# Patient Record
Sex: MALE | Race: WHITE | NOT HISPANIC OR LATINO | Employment: OTHER | URBAN - METROPOLITAN AREA
[De-identification: names, ages, dates, MRNs, and addresses within clinical notes are randomized per-mention and may not be internally consistent; named-entity substitution may affect disease eponyms.]

---

## 2017-01-13 RX ORDER — OXYBUTYNIN CHLORIDE 5 MG/1
5 TABLET, EXTENDED RELEASE ORAL DAILY
COMMUNITY
End: 2018-04-27 | Stop reason: SDUPTHER

## 2017-01-13 RX ORDER — FINASTERIDE 5 MG/1
5 TABLET, FILM COATED ORAL DAILY
COMMUNITY
End: 2018-04-27 | Stop reason: SDUPTHER

## 2017-01-13 RX ORDER — SILODOSIN 8 MG/1
8 CAPSULE ORAL DAILY
COMMUNITY
End: 2018-04-27 | Stop reason: SDUPTHER

## 2017-01-13 RX ORDER — IBUPROFEN 400 MG/1
400 TABLET ORAL DAILY
Status: ON HOLD | COMMUNITY
End: 2017-01-16

## 2017-01-13 RX ORDER — MONTELUKAST SODIUM 10 MG/1
10 TABLET ORAL
Status: ON HOLD | COMMUNITY
End: 2017-01-16

## 2017-01-13 RX ORDER — LISINOPRIL 10 MG/1
10 TABLET ORAL DAILY
COMMUNITY
End: 2018-05-03 | Stop reason: SDUPTHER

## 2017-01-15 ENCOUNTER — ANESTHESIA EVENT (OUTPATIENT)
Dept: GASTROENTEROLOGY | Facility: HOSPITAL | Age: 82
End: 2017-01-15
Payer: COMMERCIAL

## 2017-01-16 ENCOUNTER — HOSPITAL ENCOUNTER (OUTPATIENT)
Facility: HOSPITAL | Age: 82
Setting detail: OUTPATIENT SURGERY
Discharge: HOME | End: 2017-01-16
Attending: SURGERY | Admitting: SURGERY
Payer: COMMERCIAL

## 2017-01-16 ENCOUNTER — ANESTHESIA (OUTPATIENT)
Dept: GASTROENTEROLOGY | Facility: HOSPITAL | Age: 82
End: 2017-01-16
Payer: COMMERCIAL

## 2017-01-16 VITALS
HEART RATE: 51 BPM | TEMPERATURE: 97.5 F | HEIGHT: 72 IN | RESPIRATION RATE: 16 BRPM | OXYGEN SATURATION: 94 % | DIASTOLIC BLOOD PRESSURE: 93 MMHG | WEIGHT: 205 LBS | SYSTOLIC BLOOD PRESSURE: 134 MMHG | BODY MASS INDEX: 27.77 KG/M2

## 2017-01-16 DIAGNOSIS — K62.5 HEMORRHAGE OF ANUS AND RECTUM: ICD-10-CM

## 2017-01-16 PROCEDURE — 88312 SPECIAL STAINS GROUP 1: CPT | Performed by: SURGERY

## 2017-01-16 PROCEDURE — 88305 TISSUE EXAM BY PATHOLOGIST: CPT | Performed by: SURGERY

## 2017-01-16 RX ORDER — PROPOFOL 10 MG/ML
INJECTION, EMULSION INTRAVENOUS AS NEEDED
Status: DISCONTINUED | OUTPATIENT
Start: 2017-01-16 | End: 2017-01-16 | Stop reason: SURG

## 2017-01-16 RX ORDER — SODIUM CHLORIDE 9 MG/ML
100 INJECTION, SOLUTION INTRAVENOUS CONTINUOUS
Status: DISCONTINUED | OUTPATIENT
Start: 2017-01-16 | End: 2017-01-16 | Stop reason: HOSPADM

## 2017-01-16 RX ORDER — KETAMINE HYDROCHLORIDE 100 MG/ML
INJECTION, SOLUTION INTRAMUSCULAR; INTRAVENOUS AS NEEDED
Status: DISCONTINUED | OUTPATIENT
Start: 2017-01-16 | End: 2017-01-16 | Stop reason: SURG

## 2017-01-16 RX ORDER — PROPOFOL 10 MG/ML
INJECTION, EMULSION INTRAVENOUS CONTINUOUS PRN
Status: DISCONTINUED | OUTPATIENT
Start: 2017-01-16 | End: 2017-01-16 | Stop reason: SURG

## 2017-01-16 RX ADMIN — PROPOFOL 175 MCG/KG/MIN: 10 INJECTION, EMULSION INTRAVENOUS at 09:17

## 2017-01-16 RX ADMIN — SODIUM CHLORIDE 100 ML/HR: 0.9 INJECTION, SOLUTION INTRAVENOUS at 08:58

## 2017-01-16 RX ADMIN — PROPOFOL 75 MG: 10 INJECTION, EMULSION INTRAVENOUS at 09:17

## 2017-01-16 RX ADMIN — KETAMINE HYDROCHLORIDE 20 MG: 100 INJECTION INTRAMUSCULAR; INTRAVENOUS at 09:17

## 2017-01-16 RX ADMIN — LIDOCAINE HYDROCHLORIDE 75 MG: 20 INJECTION, SOLUTION INTRAVENOUS at 09:17

## 2017-02-24 ENCOUNTER — ALLSCRIPTS OFFICE VISIT (OUTPATIENT)
Dept: OTHER | Facility: OTHER | Age: 82
End: 2017-02-24

## 2017-03-09 ENCOUNTER — LAB CONVERSION - ENCOUNTER (OUTPATIENT)
Dept: OTHER | Facility: OTHER | Age: 82
End: 2017-03-09

## 2017-03-09 LAB
A/G RATIO (HISTORICAL): 1.7 (CALC) (ref 1–2.5)
ALBUMIN SERPL BCP-MCNC: 3.9 G/DL (ref 3.6–5.1)
ALP SERPL-CCNC: 49 U/L (ref 40–115)
ALT SERPL W P-5'-P-CCNC: 17 U/L (ref 9–46)
AST SERPL W P-5'-P-CCNC: 14 U/L (ref 10–35)
BILIRUB SERPL-MCNC: 0.6 MG/DL (ref 0.2–1.2)
BUN SERPL-MCNC: 21 MG/DL (ref 7–25)
BUN/CREA RATIO (HISTORICAL): 17 (CALC) (ref 6–22)
CALCIUM SERPL-MCNC: 9.6 MG/DL (ref 8.6–10.3)
CHLORIDE SERPL-SCNC: 102 MMOL/L (ref 98–110)
CHOLEST SERPL-MCNC: 167 MG/DL (ref 125–200)
CHOLEST/HDLC SERPL: 4.2 (CALC)
CO2 SERPL-SCNC: 28 MMOL/L (ref 20–31)
CREAT SERPL-MCNC: 1.23 MG/DL (ref 0.7–1.11)
CREATININE, RANDOM URINE (HISTORICAL): 188 MG/DL (ref 20–370)
EGFR AFRICAN AMERICAN (HISTORICAL): 63 ML/MIN/1.73M2
EGFR-AMERICAN CALC (HISTORICAL): 55 ML/MIN/1.73M2
GAMMA GLOBULIN (HISTORICAL): 2.3 G/DL (CALC) (ref 1.9–3.7)
GLUCOSE (HISTORICAL): 128 MG/DL (ref 65–99)
HBA1C MFR BLD HPLC: 6.2 % OF TOTAL HGB
HDLC SERPL-MCNC: 40 MG/DL
LDL CHOLESTEROL (HISTORICAL): 93 MG/DL (CALC)
MAGNESIUM, UR (HISTORICAL): 0.7 MG/DL
MICROALBUMIN/CREATININE RATIO (HISTORICAL): 4 MCG/MG CREAT
NON-HDL-CHOL (CHOL-HDL) (HISTORICAL): 127 MG/DL (CALC)
POTASSIUM SERPL-SCNC: 4.6 MMOL/L (ref 3.5–5.3)
SODIUM SERPL-SCNC: 136 MMOL/L (ref 135–146)
TOTAL PROTEIN (HISTORICAL): 6.2 G/DL (ref 6.1–8.1)
TRIGL SERPL-MCNC: 171 MG/DL

## 2017-04-27 ENCOUNTER — ALLSCRIPTS OFFICE VISIT (OUTPATIENT)
Dept: OTHER | Facility: OTHER | Age: 82
End: 2017-04-27

## 2017-07-12 ENCOUNTER — ALLSCRIPTS OFFICE VISIT (OUTPATIENT)
Dept: OTHER | Facility: OTHER | Age: 82
End: 2017-07-12

## 2017-07-12 DIAGNOSIS — E11.9 TYPE 2 DIABETES MELLITUS WITHOUT COMPLICATIONS (HCC): ICD-10-CM

## 2017-07-14 ENCOUNTER — LAB CONVERSION - ENCOUNTER (OUTPATIENT)
Dept: OTHER | Facility: OTHER | Age: 82
End: 2017-07-14

## 2017-07-14 LAB
BUN SERPL-MCNC: 24 MG/DL (ref 7–25)
BUN/CREA RATIO (HISTORICAL): 18 (CALC) (ref 6–22)
CALCIUM SERPL-MCNC: 9.8 MG/DL (ref 8.6–10.3)
CHLORIDE SERPL-SCNC: 105 MMOL/L (ref 98–110)
CO2 SERPL-SCNC: 27 MMOL/L (ref 20–31)
CREAT SERPL-MCNC: 1.37 MG/DL (ref 0.7–1.11)
EGFR AFRICAN AMERICAN (HISTORICAL): 56 ML/MIN/1.73M2
EGFR-AMERICAN CALC (HISTORICAL): 48 ML/MIN/1.73M2
GLUCOSE (HISTORICAL): 130 MG/DL (ref 65–99)
HBA1C MFR BLD HPLC: 6.1 % OF TOTAL HGB
POTASSIUM SERPL-SCNC: 4.9 MMOL/L (ref 3.5–5.3)
SODIUM SERPL-SCNC: 139 MMOL/L (ref 135–146)

## 2017-10-23 ENCOUNTER — ALLSCRIPTS OFFICE VISIT (OUTPATIENT)
Dept: OTHER | Facility: OTHER | Age: 82
End: 2017-10-23

## 2017-10-24 NOTE — PROGRESS NOTES
Assessment  1  Diabetes mellitus, type II (250 00) (E11 9)   2  Hypertension (401 9) (I10)   3  Spinal stenosis (724 00) (M48 00)   4  Encounter for preventive health examination (V70 0) (Z00 00)   5  Need for influenza vaccination (V04 81) (Z23)    Plan  Depression screen    · *VB - PHQ-9 Tool; Status:Complete;   Done: 10OVP2117 12:00AM  Diabetes mellitus, type II    · (1) COMPREHENSIVE METABOLIC PANEL; Status:Active; Requested KLK:05WAX8467;    · (1) HEMOGLOBIN A1C; Status:Active; Requested TAT:01WTF8597;    · (1) HEMOGLOBIN A1C; Status:Canceled;    · (1) LIPID PANEL, FASTING; Status:Active; Requested NYX:22VRY0037;   Encounter for prostate cancer screening    · (1) PSA (SCREEN) (Dx V76 44 Screen for Prostate Cancer); Status:Active; Requested  IWY:06NMU0808; Health Maintenance    · *VB - Fall Risk Assessment  (Dx Z13 89 Screen for Neurologic Disorder); Status:Complete;   Done:  85TGX8493 12:00AM   · *VB - Urinary Incontinence Screen (Dx Z13 89 Screen for UI); Status:Complete;   Done: 57LOE8724  10:34AM  Health Maintenance, Enlarged prostate with lower urinary tract symptoms (LUTS)    · *VB - Urinary Incontinence Screen (Dx Z13 89 Screen for UI); Status:Canceled;    Need for influenza vaccination    · Fluzone High-Dose 0 5 ML Intramuscular Suspension Prefilled Syringe  Spinal stenosis    · Avoid being constipated and avoid straining while having a bowel movement ; Status:Complete;    Done: 13BSR4506 11:30AM   · Begin a walking program  Start with walks lasting 10 minutes and slowly work up to 30-40 minutes  as pain allows ; Status:Complete;   Done: 39FLR0196 11:30AM   · We recommend that you avoid straining your back while lifting ; Status:Complete;   Done: 36YTU9591  11:30AM    Chief Complaint  Chief Complaint Free Text Note Form: Pt here for check up      History of Present Illness  HPI: only c/o mild intermittent neck and back pain   Spinal Stenosis: The patient is being seen for a routine clinic follow-up of spinal stenosis  Symptoms:  neck pain-- and-- back pain, but-- no lower extremity pain,-- no sensory changes,-- no localized weakness,-- no urinary retention,-- no urinary incontinence,-- no bowel incontinence-- and-- no gait disturbance  Current treatment includes acetaminophen and Tramado occ  By report, there is good compliance with treatment, good tolerance of treatment and good symptom control  The patient is currently able to do activities of daily living without limitations, unable to work and able to do housework without limitations  Hypertension (Follow-Up): The patient presents for follow-up of essential hypertension  The patient states he has been doing well with his blood pressure control since the last visit  He has no comorbid illnesses  He has no significant interval events  Symptoms: The patient is currently asymptomatic  Home monitoring: The patient checks his blood pressure sporadically  Blood pressure control has been good  Lifestyle: Diet: He consumes a diverse and healthy diet  Weight Issues: He does not have any weight concerns  Exercise: He does not exercise regularly  Smoking: He does not use tobacco Alcohol: He denies alcohol use  Drug Use: He denies drug use  Medications: Medication(s): an ACE inhibitor  Disease Management: the patient is doing well with his blood pressure goals  The patient is due for a lipid panel-- and-- a serum creatinine  Diabetes Type II (Follow-Up): The patient states he has been doing well with his Type II Diabetes control since the last visit  Comorbid Illnesses: hypertension  Complications: no peripheral neuropathy,-- no nephropathy,-- no retinopathy-- and-- no coronary artery disease  He has no significant interval events  Symptoms: The patient is currently asymptomatic  Associated symptoms include no recent weight gain-- and-- no recent weight loss  Home monitoring: The patient checks his blood sugar sporadically  -- Glycemic control has been good -- the patient reports no symptomatic hypoglycemic episodes  Medications: the patient is adherent with his medication regimen  -- He denies medication side effects  Medication(s): Metformin HCl,-- ACE inhibitors-- and-- Aspirin  The patient is doing well with his diabetes goals  Due For: a lipid panel,-- a urine microalbumin-- and-- a hemoglobin A1c  Review of Systems  Complete-Male:   Constitutional: as noted in HPI  Eyes: No complaints of eye pain, no red eyes, no discharge from eyes, no itchy eyes  Respiratory: No complaints of shortness of breath, no wheezing, no cough, no SOB on exertion, no orthopnea or PND  Genitourinary: No complaints of dysuria, no incontinence, no hesitancy, no nocturia, no genital lesion, no testicular pain  Musculoskeletal: as noted in HPI  Integumentary: No complaints of skin rash or skin lesions, no itching, no skin wound, no dry skin  Neurological: No compliants of headache, no confusion, no convulsions, no numbness or tingling, no dizziness or fainting, no limb weakness, no difficulty walking  Psychiatric: Is not suicidal, no sleep disturbances, no anxiety or depression, no change in personality, no emotional problems  Endocrine: as noted in HPI  Active Problems  1  Chronic low back pain (724 2,338 29) (M54 5,G89 29)   2  Colon cancer screening (V76 51) (Z12 11)   3  Depression screen (V79 0) (Z13 89)   4  Diabetes mellitus, type II (250 00) (E11 9)   5  Enlarged prostate with lower urinary tract symptoms (LUTS) (600 01) (N40 1)   6  Hearing loss, unspecified laterality   7  Hypertension (401 9) (I10)   8  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)   9  Seasonal allergies (477 9) (J30 2)   10  Spinal stenosis (724 00) (M48 00)    Past Medical History  1  History of Fecal occult blood test positive (792 1) (R19 5)   2  History of balanitis (V13 89) (Z87 438)   3   History of phimosis (V13 89) (W57 869)  Active Problems And Past Medical History Reviewed: The active problems and past medical history were reviewed and updated today  Surgical History  1  History of Elective Circumcision   2  History of Hernia Repair   3  History of Laminectomy Lumbar    Family History  Mother    1  Family history of Mother  At Age 80  Father    2  Family history of Father  At Age 61    Social History   · Former smoker (W04 18) (J68 138)  Social History Reviewed: The social history was reviewed and updated today  The social history was reviewed and is unchanged  Current Meds   1  Saleem Contour Test In [employer]; USE 1 STRIP DAILY; Therapy: 79HSG3739 to (Last EN:40GJZ1461)  Requested for: 2017 Ordered   2  Control Test STRP; USE 1 STRIP DAILY; Therapy: 40OJE5293 to (Evaluate:2017)  Requested for: 50DSX3626; Last Rx:2017   Ordered   3  Easy Touch Lancets 33G/Twist Miscellaneous; use as directed; Therapy: 08EHX7626 to (Last Rx:2017)  Requested for: 2017 Ordered   4  Finasteride 5 MG Oral Tablet; TAKE 1 TABLET AT BEDTIME; Therapy: 07PDI3004 to (0731 40 44 23)  Requested for: 2017; Last Rx:2017   Ordered   5  Ibuprofen 400 MG Oral Tablet; daily; Therapy: 14IIP3489 to Recorded   6  Lancets 30G Miscellaneous; USE AS DIRECTED; Therapy: 67NED0406 to (Evaluate:2016)  Requested for: 71CVE6471; Last Rx:2015   Ordered   7  Lisinopril 10 MG Oral Tablet; TAKE 1 TABLET DAILY; Therapy: 01SKS5094 to (Poly Caldera)  Requested for: 72Ush6941; Last Rx:68Mco2471   Ordered   8  MetFORMIN HCl - 500 MG Oral Tablet; TAKE 1 TABLET TWICE DAILY WITH FOOD; Therapy: 91QBI9379 to (Quentin Yeboah)  Requested for: 00Jwq6123; Last Rx:28Lvi9710   Ordered   9  Oxybutynin Chloride ER 5 MG Oral Tablet Extended Release 24 Hour; take 1 tablet by mouth daily; Therapy: 42BKY8887 to (Tom Wright)  Requested for: 2017 Ordered   10  Rapaflo 8 MG Oral Capsule; TAKE 1 CAPSULE DAILY WITH FOOD;     Therapy: 2014 to (Hugh Khan)  Requested for: 27Apr2017; Last Rx:03Xxd1907    Ordered   11  TraMADol HCl - 50 MG Oral Tablet; TAKE 1 TO 2 TABLETS 4 TIMES DAILY AS NEEDED FOR PAIN;    Therapy: 82PMR3780 to (Evaluate:06Ssa2331); Last Rx:32Wxp7406 Ordered   12  TrueTrack Test In Vitro Strip; TEST ONCE DAILY; Therapy: 10RUQ9334 to (Evaluate:98Lhk5371)  Requested for: 06Uum3359; Last HN:09PRG2396    Ordered  Medication List Reviewed: The medication list was reviewed and updated today  Allergies  1  No Known Drug Allergies    Vitals  Vital Signs    Recorded: 47BHI2308 09:01AM   Temperature 97 6 F, Tympanic   Heart Rate 76   Systolic 186, LUE, Sitting   Diastolic 60, LUE, Sitting   Height 5 ft 8 5 in   Weight 211 lb 2 oz   BMI Calculated 31 63   BSA Calculated 2 1     Physical Exam    Constitutional   General appearance: No acute distress, well appearing and well nourished  Eyes   Conjunctiva and lids: No swelling, erythema, or discharge  Pupils and irises: Equal, round and reactive to light  Pulmonary   Respiratory effort: No increased work of breathing or signs of respiratory distress  Auscultation of lungs: Clear to auscultation, equal breath sounds bilaterally, no wheezes, no rales, no rhonci  Cardiovascular   Auscultation of heart: Normal rate and rhythm, normal S1 and S2, without murmurs  Examination of extremities for edema and/or varicosities: Normal     Carotid pulses: Normal     Abdomen   Abdomen: Non-tender, no masses  Liver and spleen: No hepatomegaly or splenomegaly  Lymphatic   Palpation of lymph nodes in neck: No lymphadenopathy  Musculoskeletal   Gait and station: Normal     Skin   Skin and subcutaneous tissue: Normal without rashes or lesions  Neurologic   Cranial nerves: Cranial nerves 2-12 intact      Psychiatric   Orientation to person, place and time: Normal     Mood and affect: Normal          Results/Data  *VB - Urinary Incontinence Screen (Dx Z13 89 Screen for UI) 92TZD3772 10:34AM Shayla Battles     Test Name Result Flag Reference   Urinary Incontinence Assessment 55TID0437       *VB - Fall Risk Assessment  (Dx Z13 89 Screen for Neurologic Disorder) 12ECX5287 12:00AM Shayla Battles     Test Name Result Flag Reference   Falls Risk      1 fall without injury in the past year     *VB - PHQ-9 Tool 26RZI6426 12:00AM Shayla [employer]     Test Name Result Flag Reference   PHQ-9 Adult Depression Score 3       *VB - Foot Exam 13FOA9305 12:00AM Shayla [employer]     Test Name Result Flag Reference   FOOT EXAM 87WMV8746         Health Management  Diabetes mellitus, type II   *VB - Eye Exam; every 1 year; Last 78UIZ9258; Next Due: 23RTR9608; Near Due  Health Maintenance   Medicare Annual Wellness Visit; every 1 year; Next Due: 12XQZ7135;  Overdue    Future Appointments    Date/Time Provider Specialty Site   04/27/2018 10:30 AM Kaushik Freitas, [address]  Urology [facility] [place]     Signatures   Electronically signed by : MINDA Salazar ; Oct 23 2017 11:30AM EST                       (Author)

## 2017-11-27 ENCOUNTER — GENERIC CONVERSION - ENCOUNTER (OUTPATIENT)
Dept: OTHER | Facility: OTHER | Age: 82
End: 2017-11-27

## 2017-11-27 ENCOUNTER — APPOINTMENT (OUTPATIENT)
Dept: AUDIOLOGY | Age: 82
End: 2017-11-27
Payer: COMMERCIAL

## 2017-11-27 PROCEDURE — 92557 COMPREHENSIVE HEARING TEST: CPT | Performed by: AUDIOLOGIST

## 2017-11-27 PROCEDURE — 92567 TYMPANOMETRY: CPT | Performed by: AUDIOLOGIST

## 2017-11-29 ENCOUNTER — LAB CONVERSION - ENCOUNTER (OUTPATIENT)
Dept: OTHER | Facility: OTHER | Age: 82
End: 2017-11-29

## 2017-11-29 LAB
A/G RATIO (HISTORICAL): 1.7 (CALC) (ref 1–2.5)
ALBUMIN SERPL BCP-MCNC: 4 G/DL (ref 3.6–5.1)
ALP SERPL-CCNC: 53 U/L (ref 40–115)
ALT SERPL W P-5'-P-CCNC: 12 U/L (ref 9–46)
AST SERPL W P-5'-P-CCNC: 11 U/L (ref 10–35)
BILIRUB SERPL-MCNC: 0.6 MG/DL (ref 0.2–1.2)
BUN SERPL-MCNC: 25 MG/DL (ref 7–25)
BUN/CREA RATIO (HISTORICAL): 18 (CALC) (ref 6–22)
CALCIUM SERPL-MCNC: 9.7 MG/DL (ref 8.6–10.3)
CHLORIDE SERPL-SCNC: 103 MMOL/L (ref 98–110)
CHOLEST SERPL-MCNC: 169 MG/DL
CHOLEST/HDLC SERPL: 4.3 (CALC)
CO2 SERPL-SCNC: 24 MMOL/L (ref 20–31)
CREAT SERPL-MCNC: 1.37 MG/DL (ref 0.7–1.11)
EGFR AFRICAN AMERICAN (HISTORICAL): 56 ML/MIN/1.73M2
EGFR-AMERICAN CALC (HISTORICAL): 48 ML/MIN/1.73M2
GAMMA GLOBULIN (HISTORICAL): 2.4 G/DL (CALC) (ref 1.9–3.7)
GLUCOSE (HISTORICAL): 134 MG/DL (ref 65–99)
HBA1C MFR BLD HPLC: 6.1 % OF TOTAL HGB
HDLC SERPL-MCNC: 39 MG/DL
LDL CHOLESTEROL (HISTORICAL): 104 MG/DL (CALC)
NON-HDL-CHOL (CHOL-HDL) (HISTORICAL): 130 MG/DL (CALC)
POTASSIUM SERPL-SCNC: 4.7 MMOL/L (ref 3.5–5.3)
PROSTATE SPECIFIC ANTIGEN TOTAL (HISTORICAL): 0.7 NG/ML
SODIUM SERPL-SCNC: 138 MMOL/L (ref 135–146)
TOTAL PROTEIN (HISTORICAL): 6.4 G/DL (ref 6.1–8.1)
TRIGL SERPL-MCNC: 147 MG/DL

## 2017-12-01 ENCOUNTER — GENERIC CONVERSION - ENCOUNTER (OUTPATIENT)
Dept: INTERNAL MEDICINE CLINIC | Age: 82
End: 2017-12-01

## 2017-12-01 ENCOUNTER — GENERIC CONVERSION - ENCOUNTER (OUTPATIENT)
Dept: OTHER | Facility: OTHER | Age: 82
End: 2017-12-01

## 2017-12-12 NOTE — PROGRESS NOTES
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)    Plan  Diabetes mellitus, type II    · MetFORMIN HCl - 500 MG Oral Tablet (Glucophage); TAKE 1 TABLET TWICE DAILY WITH  FOOD   · Begin a limited exercise program ; Status:Complete;   Done: 23Eds9071 11:02AM    Discussion/Summary  Counseling Documentation With Imm: The patient, patient's family was counseled regarding diagnostic results, instructions for management, risk factor reductions, prognosis, impressions, risks and benefits of treatment options  Medication SE Review and Pt Understands Tx: Possible side effects of new medications were reviewed with the patient/guardian today  The treatment plan was reviewed with the patient/guardian  The patient/guardian understands and agrees with the treatment plan      Chief Complaint  Chief Complaint Free Text Note Form: Pt here for check up      History of Present Illness  HPI: He feels ok   His wife tells me that they stopped his Celexa about 2 months ago without any change  Hypertension (Follow-Up): The patient presents for follow-up of essential hypertension  The patient states he has been doing well with his blood pressure control since the last visit  Comorbid Illnesses: coronary artery disease  He has no significant interval events  Symptoms: The patient is currently asymptomatic  Home monitoring: The patient checks his blood pressure sporadically  Blood pressure control has been good  Lifestyle: Diet: He consumes a diverse and healthy diet  Weight Issues: He does not have any weight concerns  Exercise: He does not exercise regularly  Smoking: He does not use tobacco Alcohol: He denies alcohol use  Drug Use: He denies drug use  Medications: the patient is adherent with his medication regimen  He denies medication side effects  Medication(s): an ACE inhibitor     Disease Management: the patient is doing well with his blood pressure goals  Diabetes Type II (Follow-Up): The patient states he has been doing well with his Type II Diabetes control since the last visit  Comorbid Illnesses: hypertension  Complications: coronary artery disease, but no peripheral neuropathy, no nephropathy and no retinopathy  He has no significant interval events  Symptoms: The patient is currently asymptomatic  Associated symptoms include no recent weight gain and no recent weight loss  Home monitoring: The patient checks his blood sugars regularly  Glycemic control has been good  the patient reports no symptomatic hypoglycemic episodes  Medications: the patient is adherent with his medication regimen  He denies medication side effects  Medication(s): Metformin HCl  The patient is doing well with his diabetes goals  Due For: a retinal exam and a foot exam    Depression (Follow-Up): The patient states his depression has improved since the last visit  They have had recurrent episodes of major depression  He describes this as mild  He has no comorbid illnesses  Interval Symptoms: resolved depression  Social Support: the patient has good social support  Medications: The patient is not currently on any medications for his depression  Review of Systems  Complete-Male:   Constitutional: as noted in HPI  Eyes: No complaints of eye pain, no red eyes, no discharge from eyes, no itchy eyes  Cardiovascular: as noted in HPI  Respiratory: as noted in HPI  Gastrointestinal: No complaints of abdominal pain, no constipation, no nausea or vomiting, no diarrhea or bloody stools  Genitourinary: BPH sees urology  Musculoskeletal: No complaints of arthralgia, no myalgias, no joint swelling or stiffness, no limb pain or swelling  Neurological: No compliants of headache, no confusion, no convulsions, no numbness or tingling, no dizziness or fainting, no limb weakness, no difficulty walking     Psychiatric: as noted in HPI  Endocrine: as noted in HPI  Active Problems    1  Back pain (724 5) (M54 9)   2  Benign prostatic hypertrophy with lower urinary tract symptoms (LUTS) (600 01) (N40 1)   3  Diabetes mellitus, type II (250 00) (E11 9)   4  Diarrhea (787 91) (R19 7)   5  Flu vaccine need (V04 81) (Z23)   6  Hypertension (401 9) (I10)   7  Lumbar radiculopathy (724 4) (M54 16)   8  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)   9  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)   10  Phimosis (605) (N47 1)   11  Sciatica of right side (724 3) (M54 31)   12  Seasonal allergies (477 9) (J30 2)   13  Spinal stenosis (724 00) (M48 00)   14  Symptoms involving urinary system (788 99) (R39 9)   15  Vertigo (780 4) (R42)   16  Vitamin D deficiency (268 9) (E55 9)    Past Medical History    1  History of Encounter for screening for cardiovascular disorders (V81 2) (Z13 6)   2  History of balanitis (V13 89) (Z87 438)   3  History of Pre-procedural examination (V72 84) (Z01 818)   4  History of Skin rash (782 1) (R21)  Active Problems And Past Medical History Reviewed: The active problems and past medical history were reviewed and updated today  Surgical History    1  History of Elective Circumcision   2  History of Hernia Repair   3  History of Laminectomy Lumbar    Family History  Mother    1  Family history of Mother  At Age 80  Father    2  Family history of Father  At Age 61    Social History    · Former smoker (T57 98) (S27 001)  Social History Reviewed: The social history was reviewed and updated today  The social history was reviewed and is unchanged  Current Meds   1  Control Test In Vitro Strip; USE 1 STRIP DAILY; Therapy: 48KEV8689 to (Grant Elham)  Requested for: 32ETG8544; Last Rx:2015   Ordered   2  Finasteride 5 MG Oral Tablet; TAKE 1 TABLET AT BEDTIME; Therapy: 85AZB7914 to (Evaluate:89Ake9946)  Requested for: 29Tcf4857; Last Rx:75Ehm9929   Ordered   3  Ibuprofen 400 MG Oral Tablet; daily; Therapy: 72UWW0140 to Recorded   4  Lancets 30G Miscellaneous; USE AS DIRECTED; Therapy: 69TWT7811 to (Evaluate:05Jun2016)  Requested for: 94PYR2274; Last Rx:45Hld7042   Ordered   5  Lisinopril 10 MG Oral Tablet; TAKE 1 TABLET DAILY; Therapy: 63MFC6185 to (Evaluate:51Tag0741)  Requested for: 49Gjg2708; Last Rx:51Xis9901   Ordered   6  MetFORMIN HCl - 500 MG Oral Tablet; TAKE 1 TABLET TWICE DAILY WITH FOOD; Therapy: 39XDV7809 to (Evaluate:73Wfq7753)  Requested for: 22WZL1839; Last Rx:91Zqn9255   Ordered   7  Montelukast Sodium 10 MG Oral Tablet; TAKE 1 TABLET DAILY; Therapy: 59PFS7587 to (Last Baby Garden)  Requested for: 01Ngm1873 Ordered   8  Oxybutynin Chloride ER 5 MG Oral Tablet Extended Release 24 Hour; take 1 tablet by mouth daily; Therapy: 30IZO8420 to (Last Rx:06Apr2016)  Requested for: 71Xib2645 Ordered   9  Rapaflo 8 MG Oral Capsule; TAKE 1 CAPSULE DAILY WITH FOOD; Therapy: 78QHX7501 to (Evaluate:06Apr2017)  Requested for: 67Czh3375; Last Rx:80Xct5758   Ordered   10  TrueTrack Test In Vitro Strip; TEST ONCE DAILY; Therapy: 74WSA6996 to (Evaluate:27Jun2014)  Requested for: 65Qbp2218; Last FM:31IST1045    Ordered  Medication List Reviewed: The medication list was reviewed and updated today  Allergies    1  No Known Drug Allergies    Vitals  Vital Signs    Recorded: 48HUK2292 22:10QY   Systolic 828, LUE, Sitting   Diastolic 68, LUE, Sitting   Heart Rate 72   Temperature 97 6 F, Tympanic   Height 5 ft 8 5 in   Weight 211 lb 8 oz   BMI Calculated 31 69   BSA Calculated 2 1     Physical Exam    Constitutional   General appearance: No acute distress, well appearing and well nourished  appears younger than stated age  Eyes   Conjunctiva and lids: No swelling, erythema, or discharge  glassses  Pupils and irises: Equal, round and reactive to light  Pulmonary   Respiratory effort: No increased work of breathing or signs of respiratory distress  Auscultation of lungs: Clear to auscultation, equal breath sounds bilaterally, no wheezes, no rales, no rhonci  Cardiovascular   Auscultation of heart: Normal rate and rhythm, normal S1 and S2, without murmurs  Examination of extremities for edema and/or varicosities: Normal     Carotid pulses: Normal     Abdomen   Abdomen: Non-tender, no masses  Musculoskeletal   Gait and station: Abnormal   mild kyphosis  Neurologic   Cranial nerves: Cranial nerves 2-12 intact  Psychiatric   Orientation to person, place and time: Normal     Mood and affect: Normal          Health Management  Diabetes mellitus, type II   *VB - Eye Exam; every 1 year; Next Due: 00Ycw9066; Overdue  Health Maintenance   Medicare Annual Wellness Visit; every 1 year; Next Due: 25PTJ7063; Overdue    Future Appointments    Date/Time Provider Specialty Site   10/21/2016 10:00 AM MINDA Yost   Internal Medicine [address] INTERNAL MEDICINE [place]   04/27/2017 11:00 AM Iris Yuan, [address]  Urology [facility]     Signatures   Electronically signed by : MINDA Wood ; Aug 22 2016 11:00AM EST                       (Author)

## 2018-01-12 VITALS
HEART RATE: 76 BPM | HEIGHT: 69 IN | BODY MASS INDEX: 31.25 KG/M2 | TEMPERATURE: 98.2 F | WEIGHT: 211 LBS | SYSTOLIC BLOOD PRESSURE: 110 MMHG | DIASTOLIC BLOOD PRESSURE: 66 MMHG

## 2018-01-13 VITALS
WEIGHT: 212 LBS | SYSTOLIC BLOOD PRESSURE: 128 MMHG | HEIGHT: 69 IN | HEART RATE: 82 BPM | BODY MASS INDEX: 31.4 KG/M2 | DIASTOLIC BLOOD PRESSURE: 72 MMHG

## 2018-01-13 VITALS
TEMPERATURE: 97.6 F | HEIGHT: 69 IN | HEART RATE: 76 BPM | BODY MASS INDEX: 31.27 KG/M2 | DIASTOLIC BLOOD PRESSURE: 60 MMHG | SYSTOLIC BLOOD PRESSURE: 116 MMHG | WEIGHT: 211.13 LBS

## 2018-01-14 VITALS
HEIGHT: 69 IN | HEART RATE: 80 BPM | WEIGHT: 212 LBS | TEMPERATURE: 97.7 F | SYSTOLIC BLOOD PRESSURE: 122 MMHG | DIASTOLIC BLOOD PRESSURE: 66 MMHG | BODY MASS INDEX: 31.4 KG/M2

## 2018-01-23 NOTE — PROGRESS NOTES
Assessment    1  Encounter for preventive health examination (V70 0) (Z00 00)   2  Colon cancer screening (V76 51) (Z12 11)    Plan  Colon cancer screening    · (1) OCCULT BLOOD, FECAL IMMUNOCHEMICAL TEST; Status:Active; Requested  for:[id number];   Flu vaccine need    · Fluzone High-Dose 0 5 ML Intramuscular Suspension Prefilled Syringe  Hearing loss, unspecified laterality    · *1 - [facility] Physician Referral  Consult  Status: Hold For - Scheduling   Requested for: [id number]  Care Summary provided  : Yes  Need for pneumococcal vaccination    · Prevnar 13 Intramuscular Suspension    Discussion/Summary  Impression: Subsequent Annual Wellness Visit  Cardiovascular screening and counseling: screening is current  Diabetes screening and counseling: screening is current  Colorectal cancer screening and counseling: the patient declines screening and due for fecal occult blood testing  Prostate cancer screening and counseling: screening is current  Osteoporosis screening and counseling: the risks and benefits of screening were discussed  Abdominal aortic aneurysm screening and counseling: the risks and benefits of screening were discussed  Glaucoma screening and counseling: screening is current  HIV screening and counseling: screening not indicated  Immunizations: influenza vaccine is due today, influenza vaccination is recommended annually, the lifetime pneumococcal vaccine has been completed, Prevnar due, hepatitis B vaccination series is not indicated at this time due to the patient's low risk of pablo the disease, the risks and benefits of the Zostavax vaccine were discussed with the patient and the risks and benefits of the Tdap vaccine were discussed with the patient  Advance Directive Planning: not complete, he was encouraged to follow-up with me to discuss his questions and/or decisions  Advice and education were given regarding increasing physical activity   Patient Discussion: plan discussed with the patient, plan discussed with the patient's family, follow-up visit needed in one year  Chief Complaint  Pt here for Wellness Exam and flu shot      History of Present Illness  Welcome to Medicare and Wellness Visits: The patient is being seen for the subsequent annual wellness visit  Medicare Screening and Risk Factors   Hospitalizations: he has been previously hospitalizied  Once per lifetime medicare screening tests: ECG  Medicare Screening Tests Risk Questions   Abdominal aortic aneurysm risk assessment: tobacco use and over 72years of age  Osteoporosis risk assessment: , over 48years of age and tobacco use  HIV risk assessment: none indicated  Drug and Alcohol Use: The patient is a former cigarette smoker  The patient reports rare alcohol use  He has never used illicit drugs  Mood Disorder and Cognitive Impairment Screening: He denies feeling down, depressed, or hopeless over the past two weeks  He denies feeling little interest or pleasure in doing things over the past two weeks  Cognitive impairment screening: difficulty learning/retaining new information, difficulty handling complex tasks, denies difficulty with reasoning, denies difficulty with spatial ability and orientation, denies difficulty with language and denies difficulty with behavior  Functional Ability/Level of Safety: Hearing is significantly decreased and a hearing aid is not used  The patient is currently able to do activities of daily living without limitations, able to do instrumental activities of daily living without limitations, able to participate in social activities without limitations and able to drive without limitations   Activities of daily living details: does not need help using the phone, no transportation help needed, does not need help shopping, no meal preparation help needed, does not need help doing housework, does not need help doing laundry, does not need help managing medications and does not need help managing money  Fall risk factors: The patient fell 0 times in the past 12 months  Home safety risk factors:  no grab bars in the bathroom and has a shower chair, but no uneven floors and handrails on the stairs  Advance Directives: Advance directives: no living will, no durable power of  for health care directives and no advance directives  Co-Managers and Medical Equipment/Suppliers: See Patient Care Team   Reviewed Updated Víctor Si:   Last Medicare Wellness Visit Information was reviewed, patient interviewed and updates made to the record today  Preventive Quality Program 65 and Older: Falls Risk: The patient fell 0 times in the past 12 months  The patient currently has no urinary incontinence symptoms  2016      Patient Care Team    Care Team Member Role Specialty Office Number   Anirudh Hernandez MD PhD Specialist Neurosurgery [phone]   Carolyn Abdul, [address]   Urology [phone]     Review of Systems    Constitutional: negative  Eyes: negative  ENT: hearing loss  Cardiovascular: negative  Respiratory: negative  Gastrointestinal: negative  Genitourinary: negative  Musculoskeletal: negative  Integumentary and Breasts: negative  Neurological: negative  Psychiatric: negative  Endocrine: negative  Active Problems    1  Back pain (724 5) (M54 9)   2  Benign prostatic hypertrophy with lower urinary tract symptoms (LUTS) (600 01) (N40 1)   3  Diabetes mellitus, type II (250 00) (E11 9)   4  Diarrhea (787 91) (R19 7)   5  Hypertension (401 9) (I10)   6  Lumbar radiculopathy (724 4) (M54 16)   7  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)   8  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)   9  Phimosis (605) (N47 1)   10  Sciatica of right side (724 3) (M54 31)   11  Seasonal allergies (477 9) (J30 2)   12  Spinal stenosis (724 00) (M48 00)   13  Symptoms involving urinary system (788 99) (R39 9)   14   Vertigo (780  4) (R42)   15  Vitamin D deficiency (268 9) (E55 9)    Past Medical History    · History of Encounter for screening for cardiovascular disorders (V81 2) (Z13 6)   · History of Flu vaccine need (V04 81) (Z23)   · History of balanitis (V13 89) (B58 425)   · History of Pre-procedural examination (V72 84) (Z01 818)   · History of Skin rash (782 1) (R21)    The active problems and past medical history were reviewed and updated today  Surgical History    · History of Elective Circumcision   · History of Hernia Repair   · History of Laminectomy Lumbar    The surgical history was reviewed and updated today  Family History  Mother    · Family history of Mother  At Age 80  Father    · Family history of Father  At Age 61    The family history was reviewed and updated today  Social History    · Former smoker (Y49 29) (H11 251)   · quit 25 yrs ago  1ppd x 30 - 40 yrs  The social history was reviewed and updated today  The social history was reviewed and is unchanged  Current Meds   1  Control Test In Vitro Strip; USE 1 STRIP DAILY; Therapy: 54GRB5806 to (Bassem Orozco)  Requested for: 87XOP7322; Last   Rx:2015 Ordered   2  Easy Touch Lancets 33G/Twist Miscellaneous; use as directed; Therapy: 72JNF5050 to (Last Tonia Nick)  Requested for: 07VWP5625 Ordered   3  Finasteride 5 MG Oral Tablet; TAKE 1 TABLET AT BEDTIME; Therapy: 86MXD2865 to (Evaluate:2017)  Requested for: 66Iiz4737; Last   Rx:28Qil5554 Ordered   4  Ibuprofen 400 MG Oral Tablet; daily; Therapy: 05LAK2683 to Recorded   5  Lancets 30G Miscellaneous; USE AS DIRECTED; Therapy: 77SEX5966 to (Evaluate:2016)  Requested for: 22GWP0665; Last   Rx:2015 Ordered   6  Lisinopril 10 MG Oral Tablet; TAKE 1 TABLET DAILY; Therapy: 17CTB1169 to (Evaluate:2017)  Requested for: 2016; Last   Rx:2016 Ordered   7   MetFORMIN HCl - 500 MG Oral Tablet; TAKE 1 TABLET TWICE DAILY WITH FOOD; Therapy: 76RLR7347 to (Evaluate:20Mar2017)  Requested for: 86Orf4621; Last   Rx:30Oxc2730 Ordered   8  Montelukast Sodium 10 MG Oral Tablet; TAKE 1 TABLET DAILY; Therapy: 50EFK8008 to (Last Wava Flow)  Requested for: 97Ucx3199 Ordered   9  Oxybutynin Chloride ER 5 MG Oral Tablet Extended Release 24 Hour; take 1 tablet by   mouth daily; Therapy: 53ZTH2552 to (Last Rx:12Exa8957)  Requested for: 58Rsg5991 Ordered   10  Rapaflo 8 MG Oral Capsule; TAKE 1 CAPSULE DAILY WITH FOOD; Therapy: 12OKA3523 to (Evaluate:06Apr2017)  Requested for: 92Nyx9613; Last    Rx:17Zfh8143 Ordered   11  TrueTrack Test In Vitro Strip; TEST ONCE DAILY; Therapy: 95ETH4053 to (Evaluate:27Jun2014)  Requested for: 68Acp2206; Last    QV:96OVX2034 Ordered    The medication list was reviewed and updated today  Allergies    1  No Known Drug Allergies    Immunizations   1 2 3    Influenza  09-Oct-2013 07-Oct-2014 16-Oct-2015    PPSV  16-Nov-2004       Vitals  Signs    Systolic: 011, LUE, Sitting  Diastolic: 78, LUE, Sitting  Heart Rate: 68  Temperature: 97 2 F, Tympanic  Height: 5 ft 8 5 in  Weight: 209 lb 6 oz  BMI Calculated: 31 37  BSA Calculated: 2 1    Physical Exam    Constitutional   General appearance: No acute distress, well appearing and well nourished  within normal limits of ideal weight and appears younger than stated age  Head and Face   Head and face: Normal     Palpation of the face and sinuses: No sinus tenderness  Eyes   Conjunctiva and lids: No erythema, swelling or discharge  Pupils and irises: Equal, round, reactive to light  Ears, Nose, Mouth, and Throat   External inspection of ears and nose: Normal     Hearing: Abnormal   [place]  Oropharynx: Normal with no erythema, edema, exudate or lesions  Neck   Neck: Supple, symmetric, trachea midline, no masses  Thyroid: Normal, no thyromegaly  Pulmonary   Respiratory effort: No increased work of breathing or signs of respiratory distress  Auscultation of lungs: Clear to auscultation  Cardiovascular   Auscultation of heart: Normal rate and rhythm, normal S1 and S2, no murmurs  Carotid pulses: 2+ bilaterally  Peripheral vascular exam: Normal     Examination of extremities for edema and/or varicosities: Normal     Abdomen   Abdomen: Non-tender, no masses  Liver and spleen: No hepatomegaly or splenomegaly  Examination for hernias: No hernias appreciated  Lymphatic   Palpation of lymph nodes in neck: No lymphadenopathy  Musculoskeletal   Gait and station: Normal     Inspection/palpation of digits and nails: Normal without clubbing or cyanosis  Skin   Skin and subcutaneous tissue: Normal without rashes or lesions  Neurologic   Cranial nerves: Cranial nerves 2-12 intact  Cortical function: Normal mental status  Coordination: Normal finger to nose and heel to shin  Psychiatric   Judgment and insight: Normal     Orientation to person, place and time: Normal     Recent and remote memory: Abnormal   mild decrease in recent memory  Mood and affect: Normal        Procedure    Procedure:   Results: 20/40 in both eyes with corrective device, 20/40 in the right eye with corrective device, 20/40 in the left eye with corrective device      Health Management  Diabetes mellitus, type II   *VB - Eye Exam; every 1 year; Next Due: 80Bul1367; Overdue  Health Maintenance   Medicare Annual Wellness Visit; every 1 year; Next Due: 37FSI0244; Overdue    Future Appointments    Date/Time Provider Specialty Site   02/24/2017 10:00 AM MINDA Hoang   Internal Medicine [facility] INTERNAL MEDICINE [place]   04/27/2017 11:00 AM Daljit Espitia, [address]  Urology [facility] FOR UROLOG[facility]     Signatures   Electronically signed by : MINDA Orosco ; Oct 21 2016  2:27PM EST                       (Author)

## 2018-02-23 DIAGNOSIS — E11.9 CONTROLLED TYPE 2 DIABETES MELLITUS WITHOUT COMPLICATION, WITHOUT LONG-TERM CURRENT USE OF INSULIN (HCC): Primary | ICD-10-CM

## 2018-02-23 RX ORDER — LANCETS 33 GAUGE
EACH MISCELLANEOUS 2 TIMES DAILY
Qty: 100 EACH | Refills: 2 | Status: SHIPPED | OUTPATIENT
Start: 2018-02-23 | End: 2019-03-19 | Stop reason: SDUPTHER

## 2018-02-23 RX ORDER — LANCETS 33 GAUGE
EACH MISCELLANEOUS
COMMUNITY
Start: 2016-09-26 | End: 2018-02-23 | Stop reason: SDUPTHER

## 2018-02-26 ENCOUNTER — TELEPHONE (OUTPATIENT)
Dept: UROLOGY | Facility: AMBULATORY SURGERY CENTER | Age: 83
End: 2018-02-26

## 2018-02-26 DIAGNOSIS — R35.0 INCREASED URINARY FREQUENCY: Primary | ICD-10-CM

## 2018-02-26 NOTE — TELEPHONE ENCOUNTER
Juan Frederickn, wife of patient, called office to report patient is experiencing urinary frequency and nocturia for the past couple days  He is currently on oxybutynin, finasteride, and Rapaflo  Wife had patient urinate in cup, and it is yellow clear urine  Patient denies fever, dysuria, or pain  Suggested to wife to have patient's urine tested to rule out UTI  Wife agreed and requested the UA and urine culture orders be faxed to [facility] on [address]  She understands it can take 48-72 hrs to result  Patient will call back if symptoms worsen before that time

## 2018-02-28 LAB
APPEARANCE UR: CLEAR
BILIRUB UR QL STRIP: NEGATIVE
COLOR UR: YELLOW
GLUCOSE UR QL STRIP: NEGATIVE
HGB UR QL STRIP: NEGATIVE
KETONES UR QL STRIP: NEGATIVE
LEUKOCYTE ESTERASE UR QL STRIP: NEGATIVE
NITRITE UR QL STRIP: NEGATIVE
PH UR STRIP: NORMAL [PH] (ref 5–8)
PROT UR QL STRIP: NEGATIVE
SP GR UR STRIP: 1.02 (ref 1–1.03)

## 2018-03-27 ENCOUNTER — OFFICE VISIT (OUTPATIENT)
Dept: INTERNAL MEDICINE CLINIC | Age: 83
End: 2018-03-27
Payer: COMMERCIAL

## 2018-03-27 VITALS
BODY MASS INDEX: 28.17 KG/M2 | WEIGHT: 208 LBS | HEART RATE: 72 BPM | TEMPERATURE: 98.3 F | SYSTOLIC BLOOD PRESSURE: 118 MMHG | OXYGEN SATURATION: 98 % | DIASTOLIC BLOOD PRESSURE: 60 MMHG | HEIGHT: 72 IN

## 2018-03-27 DIAGNOSIS — I10 ESSENTIAL HYPERTENSION: ICD-10-CM

## 2018-03-27 DIAGNOSIS — M25.562 LEFT MEDIAL KNEE PAIN: Primary | ICD-10-CM

## 2018-03-27 DIAGNOSIS — E11.9 CONTROLLED TYPE 2 DIABETES MELLITUS WITHOUT COMPLICATION, WITHOUT LONG-TERM CURRENT USE OF INSULIN (HCC): ICD-10-CM

## 2018-03-27 DIAGNOSIS — R53.82 CHRONIC FATIGUE: ICD-10-CM

## 2018-03-27 PROBLEM — M54.50 CHRONIC LOW BACK PAIN: Status: ACTIVE | Noted: 2017-07-12

## 2018-03-27 PROBLEM — G89.29 CHRONIC LOW BACK PAIN: Status: ACTIVE | Noted: 2017-07-12

## 2018-03-27 PROCEDURE — 99214 OFFICE O/P EST MOD 30 MIN: CPT | Performed by: INTERNAL MEDICINE

## 2018-03-27 RX ORDER — MELOXICAM 7.5 MG/1
7.5 TABLET ORAL DAILY
Qty: 30 TABLET | Refills: 0 | Status: SHIPPED | OUTPATIENT
Start: 2018-03-27 | End: 2018-05-03
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
## 2018-03-27 NOTE — ASSESSMENT & PLAN NOTE
Patient states his fasting blood sugars been a little higher than normal and ranging in the 150s    Will recheck an A1c

## 2018-03-27 NOTE — ASSESSMENT & PLAN NOTE
He and his wife also complained that he is more fatigued than usual despite sleeping well at night    He will check blood work
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
## 2018-03-27 NOTE — PATIENT INSTRUCTIONS
Arthritis   AMBULATORY CARE:   Arthritis  is a disease that causes inflammation in one or more joints  There are many types of arthritis, such as osteoarthritis, rheumatoid arthritis, and septic arthritis  Some types cause inflammation in the joints  Other types wear away the cartilage between joints  This makes the bones of the joint rub together when you move the joint  Your symptoms may be constant, or symptoms may come and go  Arthritis often gets worse over time and can cause permanent joint damage  Common signs and symptoms of arthritis:   · Pain, swelling, or stiffness in the joint    · Limited range of motion in the joint    · Warmth or redness over the joint    · Tenderness when you touch the joint    · Stiff joints in the morning that loosen with movement    · A creaking or grinding sound when you move the joint    · Fever  Seek care immediately if:   · You have a fever and severe joint pain or swelling  · You cannot move the affected joint  · You have severe joint pain you cannot tolerate  Contact your healthcare provider if:   · Your pain or swelling does not get better with treatment  · You have questions or concerns about your condition or care  Treatment  will depend on the type of arthritis you have and if it is severe  You may need any of the following:  · Acetaminophen  decreases pain and fever  It is available without a doctor's order  Ask how much to take and how often to take it  Follow directions  Acetaminophen can cause liver damage if not taken correctly  · NSAIDs , such as ibuprofen, help decrease swelling, pain, and fever  This medicine is available with or without a doctor's order  NSAIDs can cause stomach bleeding or kidney problems in certain people  If you take blood thinner medicine, always ask your healthcare provider if NSAIDs are safe for you  Always read the medicine label and follow directions  · Steroid medicine  helps reduce swelling and pain       · Surgery may be needed to repair or replace a damaged joint  Manage arthritis:   · Rest your painful joint so it can heal   Your healthcare provider may recommend crutches or a walker if the affected joint is in a leg  · Apply ice or heat to the joint  Both can help decrease swelling and pain  Ice may also help prevent tissue damage  Use an ice pack, or put crushed ice in a plastic bag  Cover it with a towel and place it on your joint for 15 to 20 minutes every hour or as directed  You can apply heat for 20 minutes every 2 hours  Heat treatment includes hot packs or heat lamps  · Elevate your joint  Elevation helps reduce swelling and pain  Raise your joint above the level of your heart as often as you can  Prop your painful joint on pillows to keep it above your heart comfortably  · Go to physical or occupational therapy as directed  A physical therapist can teach you exercises to improve flexibility and range of motion  You may also be shown non-weight-bearing exercises that are safe for your joints, such as swimming  Exercise can help keep your joints flexible and reduce pain  An occupational therapist can help you learn to do your daily activities when your joints are stiff or sore  · Maintain a healthy weight  Extra weight puts increased pressure on your joints  Ask your healthcare provider what you should weigh  If you need to lose weight, he can help you create a weight loss program  Weight loss can help reduce pain and increase your ability to do your activities  The amount of exercise you do may vary each day, depending on your symptoms  · Wear flat or low-heeled shoes  This will help decrease pain and reduce pressure on your ankle, knee, and hip joints  · Use support devices as directed  You may be given splints to wear on your hands to help your joints rest and to decrease inflammation  While you sleep, use a pillow that is firm enough to support your neck and head    Other equipment  that may help you move and prevent falls:  · Orthotic shoes or insoles  help support your feet when you walk  · Crutches, a cane, or a walker  may help decrease your risk for falling  They also decrease stress on affected joints  · Devices to prevent falls  include raised toilet seats and bathtub bars to help you get up from sitting  Handrails can be placed in areas where you need balance and support  Follow up with your healthcare provider or rheumatologist as directed:  Write down your questions so you remember to ask them during your visits  © 2017 [facility] Information is for End User's use only and may not be sold, redistributed or otherwise used for commercial purposes  All illustrations and images included in CareNotes® are the copyrighted property of A CLAUDIA A MINDA , Frank  or Vaibhav Nava  The above information is an  only  It is not intended as medical advice for individual conditions or treatments  Talk to your doctor, nurse or pharmacist before following any medical regimen to see if it is safe and effective for you

## 2018-03-31 LAB
ALBUMIN SERPL-MCNC: 4.1 G/DL (ref 3.6–5.1)
ALBUMIN/GLOB SERPL: 1.9 (CALC) (ref 1–2.5)
ALP SERPL-CCNC: 48 U/L (ref 40–115)
ALT SERPL-CCNC: 13 U/L (ref 9–46)
AST SERPL-CCNC: 12 U/L (ref 10–35)
BASOPHILS # BLD AUTO: 21 CELLS/UL (ref 0–200)
BASOPHILS NFR BLD AUTO: 0.6 %
BILIRUB SERPL-MCNC: 0.5 MG/DL (ref 0.2–1.2)
BUN SERPL-MCNC: 25 MG/DL (ref 7–25)
BUN/CREAT SERPL: 17 (CALC) (ref 6–22)
CALCIUM SERPL-MCNC: 9.7 MG/DL (ref 8.6–10.3)
CHLORIDE SERPL-SCNC: 106 MMOL/L (ref 98–110)
CO2 SERPL-SCNC: 26 MMOL/L (ref 20–31)
CREAT SERPL-MCNC: 1.47 MG/DL (ref 0.7–1.11)
EOSINOPHIL # BLD AUTO: 91 CELLS/UL (ref 15–500)
EOSINOPHIL NFR BLD AUTO: 2.6 %
ERYTHROCYTE [DISTWIDTH] IN BLOOD BY AUTOMATED COUNT: 12.9 % (ref 11–15)
EST. AVERAGE GLUCOSE BLD GHB EST-MCNC: 131 (CALC)
EST. AVERAGE GLUCOSE BLD GHB EST-SCNC: 7.3 (CALC)
GLOBULIN SER CALC-MCNC: 2.2 G/DL (CALC) (ref 1.9–3.7)
GLUCOSE SERPL-MCNC: 138 MG/DL (ref 65–99)
HBA1C MFR BLD: 6.2 % OF TOTAL HGB
HCT VFR BLD AUTO: 42.6 % (ref 38.5–50)
HGB BLD-MCNC: 14.3 G/DL (ref 13.2–17.1)
LYMPHOCYTES # BLD AUTO: 1302 CELLS/UL (ref 850–3900)
LYMPHOCYTES NFR BLD AUTO: 37.2 %
MCH RBC QN AUTO: 31.3 PG (ref 27–33)
MCHC RBC AUTO-ENTMCNC: 33.6 G/DL (ref 32–36)
MCV RBC AUTO: 93.2 FL (ref 80–100)
MONOCYTES # BLD AUTO: 319 CELLS/UL (ref 200–950)
MONOCYTES NFR BLD AUTO: 9.1 %
NEUTROPHILS # BLD AUTO: 1768 CELLS/UL (ref 1500–7800)
NEUTROPHILS NFR BLD AUTO: 50.5 %
PLATELET # BLD AUTO: 168 THOUSAND/UL (ref 140–400)
PMV BLD REES-ECKER: 10.6 FL (ref 7.5–12.5)
POTASSIUM SERPL-SCNC: 4.9 MMOL/L (ref 3.5–5.3)
PROT SERPL-MCNC: 6.3 G/DL (ref 6.1–8.1)
RBC # BLD AUTO: 4.57 MILLION/UL (ref 4.2–5.8)
SL AMB EGFR AFRICAN AMERICAN: 51 ML/MIN/1.73M2
SL AMB EGFR NON AFRICAN AMERICAN: 44 ML/MIN/1.73M2
SODIUM SERPL-SCNC: 140 MMOL/L (ref 135–146)
TSH SERPL-ACNC: 2.6 MIU/L (ref 0.4–4.5)
WBC # BLD AUTO: 3.5 THOUSAND/UL (ref 3.8–10.8)

## 2018-04-23 LAB
LEFT EYE DIABETIC RETINOPATHY: NORMAL
RIGHT EYE DIABETIC RETINOPATHY: NORMAL

## 2018-04-26 ENCOUNTER — APPOINTMENT (OUTPATIENT)
Dept: RADIOLOGY | Age: 83
End: 2018-04-26
Payer: COMMERCIAL

## 2018-04-26 DIAGNOSIS — M25.562 LEFT MEDIAL KNEE PAIN: ICD-10-CM

## 2018-04-26 PROCEDURE — 73562 X-RAY EXAM OF KNEE 3: CPT

## 2018-04-27 ENCOUNTER — OFFICE VISIT (OUTPATIENT)
Dept: UROLOGY | Facility: AMBULATORY SURGERY CENTER | Age: 83
End: 2018-04-27
Payer: COMMERCIAL

## 2018-04-27 VITALS
DIASTOLIC BLOOD PRESSURE: 80 MMHG | SYSTOLIC BLOOD PRESSURE: 120 MMHG | HEIGHT: 69 IN | BODY MASS INDEX: 31.99 KG/M2 | HEART RATE: 76 BPM | WEIGHT: 216 LBS

## 2018-04-27 DIAGNOSIS — N40.1 BENIGN PROSTATIC HYPERPLASIA WITH URINARY FREQUENCY: Primary | ICD-10-CM

## 2018-04-27 DIAGNOSIS — R35.0 BENIGN PROSTATIC HYPERPLASIA WITH URINARY FREQUENCY: Primary | ICD-10-CM

## 2018-04-27 PROCEDURE — 51798 US URINE CAPACITY MEASURE: CPT | Performed by: NURSE PRACTITIONER

## 2018-04-27 PROCEDURE — 99213 OFFICE O/P EST LOW 20 MIN: CPT | Performed by: NURSE PRACTITIONER

## 2018-04-27 RX ORDER — OXYBUTYNIN CHLORIDE 5 MG/1
5 TABLET, EXTENDED RELEASE ORAL DAILY
Qty: 90 TABLET | Refills: 3 | Status: SHIPPED | OUTPATIENT
Start: 2018-04-27 | End: 2018-10-23 | Stop reason: SDUPTHER

## 2018-04-27 RX ORDER — FINASTERIDE 5 MG/1
5 TABLET, FILM COATED ORAL DAILY
Qty: 90 TABLET | Refills: 3 | Status: SHIPPED | OUTPATIENT
Start: 2018-04-27 | End: 2018-10-23 | Stop reason: SDUPTHER

## 2018-04-27 RX ORDER — SILODOSIN 8 MG/1
8 CAPSULE ORAL DAILY
Qty: 90 CAPSULE | Refills: 3 | Status: SHIPPED | OUTPATIENT
Start: 2018-04-27 | End: 2019-05-10 | Stop reason: ALTCHOICE

## 2018-04-27 NOTE — PROGRESS NOTES
4/27/2018    [facility]  1935  [id number]        Assessment  BPH  OAB  UTI  Phimosis s/p circumcision (2014)    Travis Yip is a 81 y o  male being managed by Dr Sanchez  He is comfortable with his current urinary symptoms  A bladder ultrasound was obtained today in the office and PVR was 30ml  He will continue to take oxybutynin, Finasteride, and Rapaflo daily  Refills were provided  He will return in 1 year for follow up  All questions answered  History of Present Illness  81 y o  male with a history of BPH, OAB, and phimosis s/p circumcision (2014) presents today for 1 year follow up  He continues to take Rapaflo, Finasteride, and Oxybutynin daily  He is doing well and denies any worsening urinary symptoms  He has urinary urgency, nocturia 1 time a night, and occasional incontinence  He has a good stream  He denies any changes in his overall health and is doing well  Review of Systems  Review of Systems   Constitutional: Negative  HENT: Negative  Respiratory: Negative  Cardiovascular: Negative  Gastrointestinal: Negative  Genitourinary:        As per HPI   Musculoskeletal: Negative  Skin: Negative  Neurological: Negative  Hematological: Negative  Urinary Incontinence Screening      Most Recent Value   Urinary Incontinence   Urinary Incontinence? Yes   Incomplete emptying? No   Urinary frequency? No   Urinary urgency? Yes   Urinary hesitancy? No   Dysuria (painful difficult urination)? No   Nocturia (waking up to use the bathroom)? No   Straining (having to push to go)? No   Weak stream?  No   Intermittent stream?  No   Post void dribbling?   No            Past Medical History  Past Medical History:   Diagnosis Date    BPH (benign prostatic hyperplasia)     Diabetes mellitus (HCC)     blood sugar 167 this am at 0630    GERD (gastroesophageal reflux disease)     Heme + stool     Hypertension     Lumbar radiculopathy     Myocardial infarction ([facility] [address] ) 35 years ago    Sciatica        Past Surgical History  Past Surgical History:   Procedure Laterality Date    BACK SURGERY      HERNIA REPAIR      DC COLONOSCOPY FLX DX W/COLLJ SPEC WHEN PFRMD N/A 1/16/2017    Procedure: EGD AND COLONOSCOPY;  Surgeon: Fareed Pan DO;  Location: [facility]; Service: General        Past Family History  History reviewed  No pertinent family history  Past Social history  Social History     Social History    Marital status: /Civil Union     Spouse name: N/A    Number of children: N/A    Years of education: N/A     Occupational History    Not on file  Social History Main Topics    Smoking status: Former Smoker    Smokeless tobacco: Never Used    Alcohol use No    Drug use: No    Sexual activity: Not on file     Other Topics Concern    Not on file     Social History Narrative    No narrative on file       Current Medications  Current Outpatient Prescriptions   Medication Sig Dispense Refill    EASY TOUCH LANCETS 33G/TWIST MISC by Does not apply route 2 (two) times a day 100 each 2    finasteride (PROSCAR) 5 mg tablet Take 1 tablet (5 mg total) by mouth daily 90 tablet 3    glucose blood (CONTROL TEST) test strip 1 each by Other route daily 1 each 0    glucose blood test strip 2 each by Other route daily CONTOUR TEST STRIPS     DX CODE  E11 9    TEST BLOOD SUGAR TWICE DAILY  100 each 2    Lancets 30G MISC by Does not apply route      lisinopril (ZESTRIL) 10 mg tablet Take 10 mg by mouth daily      meloxicam (MOBIC) 7 5 mg tablet Take 1 tablet (7 5 mg total) by mouth daily 30 tablet 0    metFORMIN (GLUCOPHAGE) 500 mg tablet Take 500 mg by mouth 2 (two) times a day with meals      oxybutynin (DITROPAN-XL) 5 mg 24 hr tablet Take 1 tablet (5 mg total) by mouth daily 90 tablet 3    Silodosin (RAPAFLO) 8 MG CAPS Take 1 capsule by mouth daily 90 capsule 3     No current facility-administered medications for this visit          Allergies  No Known Allergies    Past Medical History, Social History, Family History, medications and allergies were reviewed  Vitals  Vitals:    04/27/18 1036   BP: 120/80   BP Location: Left arm   Patient Position: Sitting   Pulse: 76   Weight: 98 kg (216 lb)   Height: 5' 9" (1 753 m)       Physical Exam  Skin: warm, dry, intact  Cardiac: Peripheral edema: negative  Pulmonary: Non-labored breathing  Abdomen: Soft, non-tender, non-distended  Musculoskeletal: AROM with no joint deformity or tenderness    Neurology: Alert and oriented      Results    Lab Results   Component Value Date    GLUCOSE 125 09/07/2014    CALCIUM 9 7 03/30/2018     11/28/2017    K 4 7 11/28/2017    CO2 24 11/28/2017     11/28/2017    BUN 25 03/30/2018    CREATININE 1 47 (H) 03/30/2018     Lab Results   Component Value Date    WBC 3 68 (L) 09/07/2014    HGB 14 3 03/30/2018    HCT 42 6 03/30/2018    MCV 93 2 03/30/2018     03/30/2018

## 2018-05-03 ENCOUNTER — OFFICE VISIT (OUTPATIENT)
Dept: INTERNAL MEDICINE CLINIC | Age: 83
End: 2018-05-03
Payer: COMMERCIAL

## 2018-05-03 VITALS
HEART RATE: 98 BPM | HEIGHT: 69 IN | TEMPERATURE: 97.9 F | SYSTOLIC BLOOD PRESSURE: 130 MMHG | DIASTOLIC BLOOD PRESSURE: 60 MMHG | OXYGEN SATURATION: 61 % | BODY MASS INDEX: 31.7 KG/M2 | WEIGHT: 214 LBS

## 2018-05-03 DIAGNOSIS — M25.562 LEFT MEDIAL KNEE PAIN: Primary | ICD-10-CM

## 2018-05-03 DIAGNOSIS — I10 ESSENTIAL HYPERTENSION: ICD-10-CM

## 2018-05-03 DIAGNOSIS — E11.9 TYPE 2 DIABETES MELLITUS WITHOUT COMPLICATION, WITHOUT LONG-TERM CURRENT USE OF INSULIN (HCC): ICD-10-CM

## 2018-05-03 PROCEDURE — 99214 OFFICE O/P EST MOD 30 MIN: CPT | Performed by: INTERNAL MEDICINE

## 2018-05-03 PROCEDURE — 20610 DRAIN/INJ JOINT/BURSA W/O US: CPT | Performed by: INTERNAL MEDICINE

## 2018-05-03 RX ORDER — TRIAMCINOLONE ACETONIDE 40 MG/ML
40 INJECTION, SUSPENSION INTRA-ARTICULAR; INTRAMUSCULAR
Status: COMPLETED | OUTPATIENT
Start: 2018-05-03 | End: 2018-05-03

## 2018-05-03 RX ORDER — LISINOPRIL 10 MG/1
10 TABLET ORAL DAILY
Qty: 90 TABLET | Refills: 3 | Status: SHIPPED | OUTPATIENT
Start: 2018-05-03 | End: 2019-06-03 | Stop reason: SDUPTHER

## 2018-05-03 RX ORDER — LIDOCAINE HYDROCHLORIDE 10 MG/ML
1 INJECTION, SOLUTION INFILTRATION; PERINEURAL
Status: COMPLETED | OUTPATIENT
Start: 2018-05-03 | End: 2018-05-03

## 2018-05-03 RX ADMIN — TRIAMCINOLONE ACETONIDE 40 MG: 40 INJECTION, SUSPENSION INTRA-ARTICULAR; INTRAMUSCULAR at 10:28

## 2018-05-03 RX ADMIN — LIDOCAINE HYDROCHLORIDE 1 ML: 10 INJECTION, SOLUTION INFILTRATION; PERINEURAL at 10:28

## 2018-05-03 NOTE — PATIENT INSTRUCTIONS
After Arthrodesis   DISCHARGE INSTRUCTIONS:   Call 911 if:   · You feel lightheaded, short of breath, or have chest pain  · You cough up blood  Seek care immediately if:   · Your arm or leg feels warm, tender, and painful  · Your arm or leg looks swollen and red  · Your leg or arm is pale, numb, or cool to the touch  · Blood soaks through your bandage  · Your stitches or staples come apart  · Your splint or cast comes off  · You have severe pain, even after you take medicine  · You feel lightheaded, short of breath, and have chest pain  · You cough up blood  Contact your healthcare provider if:   · You have a fever or chills  · Your wound is red, swollen, or draining pus  · You have pain and swelling in your joint that does not get better with medicine  · You have questions or concerns about your condition or care  Medicines:   · Prescription pain medicine  will usually be given  Ask your healthcare provider how to take this medicine safely  · Take your medicine as directed  Contact your healthcare provider if you think your medicine is not helping or if you have side effects  Tell him or her if you are allergic to any medicine  Keep a list of the medicines, vitamins, and herbs you take  Include the amounts, and when and why you take them  Bring the list or the pill bottles to follow-up visits  Carry your medicine list with you in case of an emergency  Care for yourself at home:   · Apply ice  on your joint for 15 to 20 minutes every hour or as directed  Use an ice pack, or put crushed ice in a plastic bag  Cover it with a towel  Ice helps prevent tissue damage and decreases swelling and pain  · Elevate  your joint above the level of your heart as often as you can  This will help decrease swelling and pain  Prop your joint on pillows or blankets to keep it elevated comfortably  · Use support devices as directed    You may need to wear a splint, soft or hard cast, or walking boot to keep your joint stable as it heals  · Follow activity restrictions as directed  You will need to limit certain activities while your joint heals and fuses together  If you had arthrodesis in your knee, ankle, or foot, you will need to keep weight off your joint for up to 12 weeks  Use crutches as directed  You will be allowed to slowly put more weight on your joint over time  · Care for your wound as directed  Carefully wash the wound with soap and water  Dry the area and put on new, clean bandages as directed  Change your bandages when they get wet or dirty  Follow up with your healthcare provider as directed: You will need to return to have your stitches or staples removed  Write down your questions so you remember to ask them during your visits  © 2017 [employer] Information is for End User's use only and may not be sold, redistributed or otherwise used for commercial purposes  All illustrations and images included in CareNotes® are the copyrighted property of [employer]  or [employer]  The above information is an  only  It is not intended as medical advice for individual conditions or treatments  Talk to your doctor, nurse or pharmacist before following any medical regimen to see if it is safe and effective for you

## 2018-05-03 NOTE — PROGRESS NOTES
Assessment/Plan:    Controlled type 2 diabetes mellitus without complication, without long-term current use of insulin ([facility] [address] )  Patient states his fasting blood sugars been a little higher than normal and ranging in the 150s  Will recheck an A1c    Hypertension  Patient's blood pressure has been well controlled and without chest pain or edema  Continue same    Left medial knee pain  After the 1 of the last snow storms his knee started bothering him on the left  There was no specific trauma     Will treat with Mobic, check an x-ray and continue with his knee brace  Chronic fatigue  He and his wife also complained that he is more fatigued than usual despite sleeping well at night  He will check blood work       Diagnoses and all orders for this visit:    Left medial knee pain  -     Discontinue: meloxicam (MOBIC) 7 5 mg tablet; Take 1 tablet (7 5 mg total) by mouth daily  -     XR knee 3 vw left non injury; Future    Chronic fatigue  -     CBC and differential; Future  -     Comprehensive metabolic panel; Future  -     TSH baseline; Future    Controlled type 2 diabetes mellitus without complication, without long-term current use of insulin ([facility])  -     HEMOGLOBIN A1C W/ EAG ESTIMATION; Future    Essential hypertension    Other orders  -     Lancets 30G MISC; by Does not apply route          Subjective:      Patient ID: Jose Rodrigez is a 81 y o  male  Patient is here for a checkup  His biggest complaint is knee pain  But he also is concerned about his blood sugar and his wife is concerned that he sleeps so much    Diabetes has been well controlled for quite some time now but he tends to be very obsessive about where his numbers are  Lately he has had more 140s in 150s in usual   Had no recent sickness not changed his diet and does not exercise on a regular basis    He had no specific complications as at present    His blood pressure has been great with no recent evidence of cardiovascular disease  He did have an MI in the distant past and does see Cardiology  Knee Pain    The incident occurred more than 1 week ago  The incident occurred at home  The injury mechanism is unknown  The pain is present in the left knee  The pain is at a severity of 6/10  The pain is moderate  The pain has been constant since onset  Associated symptoms include an inability to bear weight and a loss of motion  Pertinent negatives include no numbness  He has tried acetaminophen, ice and rest for the symptoms  The treatment provided no relief  Fatigue   This is a chronic problem  The current episode started more than 1 year ago  The problem occurs constantly  The problem has been waxing and waning  Associated symptoms include fatigue  Pertinent negatives include no abdominal pain, arthralgias, chest pain, chills, congestion, coughing, fever, headaches, joint swelling, myalgias, nausea, neck pain, numbness, rash, sore throat or weakness  Associated symptoms comments: Hard of hearing  Nothing aggravates the symptoms  He has tried sleep and rest for the symptoms  The treatment provided no relief  Review of Systems   Constitutional: Positive for fatigue  Negative for chills, fever and unexpected weight change  HENT: Negative for congestion, ear pain, hearing loss, postnasal drip, sinus pressure, sore throat, trouble swallowing and voice change  Eyes: Negative for visual disturbance  Respiratory: Negative for cough, chest tightness, shortness of breath and wheezing  Cardiovascular: Negative for chest pain, palpitations and leg swelling  Gastrointestinal: Negative for abdominal distention, abdominal pain, anal bleeding, blood in stool, constipation, diarrhea and nausea  Endocrine: Negative for cold intolerance, polydipsia, polyphagia and polyuria  Genitourinary: Negative for dysuria, flank pain, frequency, hematuria and urgency     Musculoskeletal: Negative for arthralgias, back pain, gait problem, joint swelling, myalgias and neck pain  Knee pain and swelling   Skin: Negative for rash  Allergic/Immunologic: Negative for immunocompromised state  Neurological: Negative for dizziness, syncope, facial asymmetry, weakness, light-headedness, numbness and headaches  Hematological: Negative for adenopathy  Psychiatric/Behavioral: Negative for confusion, sleep disturbance and suicidal ideas  Objective:      /60 (BP Location: Left arm, Patient Position: Sitting, Cuff Size: Standard)   Pulse 72   Temp 98 3 °F (36 8 °C) (Tympanic)   Ht 6' (1 829 m)   Wt 94 3 kg (208 lb)   SpO2 98%   BMI 28 21 kg/m²          Physical Exam   Constitutional: He is oriented to person, place, and time  He appears well-developed and well-nourished  No distress  HENT:   Right Ear: External ear normal    Left Ear: External ear normal    Nose: Nose normal    Mouth/Throat: Oropharynx is clear and moist  No oropharyngeal exudate  Hard of hearing   Eyes: EOM are normal  Pupils are equal, round, and reactive to light  Neck: Normal range of motion  Neck supple  No JVD present  No thyromegaly present  Cardiovascular: Normal rate, regular rhythm, normal heart sounds and intact distal pulses  Exam reveals no gallop  No murmur heard  Pulmonary/Chest: Effort normal and breath sounds normal  No respiratory distress  He has no wheezes  He has no rales  Abdominal: Soft  Bowel sounds are normal  He exhibits no distension and no mass  There is no tenderness  Musculoskeletal: Normal range of motion  He exhibits no tenderness  Limping, small effusion left knee   Lymphadenopathy:     He has no cervical adenopathy  Neurological: He is alert and oriented to person, place, and time  No cranial nerve deficit  Coordination normal    Skin: Skin is warm and dry  No rash noted  Psychiatric: He has a normal mood and affect   His behavior is normal  Judgment and thought content normal  Vitals reviewed

## 2018-05-03 NOTE — PROGRESS NOTES
Assessment/Plan:    Diabetes mellitus, type II ([facility] [address] )  Despite patient's concerns his last A1c was 6 4  Continue same    Left medial knee pain  Patient's x-ray felt to show significant DJD but did show a small effusion  He has family I need to try injecting his knee with steroids and this was performed       Diagnoses and all orders for this visit:    Left medial knee pain    Essential hypertension  -     lisinopril (ZESTRIL) 10 mg tablet; Take 1 tablet (10 mg total) by mouth daily    Type 2 diabetes mellitus without complication, without long-term current use of insulin ([facility])    Other orders  -     Large joint arthrocentesis          Subjective:      Patient ID: Sarah Lee is a 81 y o  male  Patient is here to go over his blood work and to recheck his knee  Diabetes is under excellent controlled an A1c is 6 4 despite he is concerned that his blood sugars go up and down somewhat  Weight has remained stable and he has no low episodes  He is also no secondary he complications from same  His knee pain however has not gotten any better and x-ray did not show severe arthritis  I recommended physical therapy and Ortho the patient and his family family wish that I try to injected with some steroids 1st            Review of Systems   Constitutional: Negative  Respiratory: Negative  Cardiovascular: Negative  Gastrointestinal: Negative  Endocrine: Negative  Musculoskeletal: Positive for joint swelling (Small joint effusion left knee)  Neurological: Negative  Psychiatric/Behavioral: Negative  Objective:      /60 (BP Location: Left arm, Patient Position: Sitting)   Pulse 98   Temp 97 9 °F (36 6 °C) (Tympanic)   Ht 5' 9" (1 753 m)   Wt 97 1 kg (214 lb)   SpO2 (!) 61%   BMI 31 60 kg/m²          Physical Exam   Constitutional: He is oriented to person, place, and time  He appears well-developed and well-nourished  No distress     HENT:   Right Ear: External ear normal    Left Ear: External ear normal    Nose: Nose normal    Mouth/Throat: Oropharynx is clear and moist  No oropharyngeal exudate  Eyes: EOM are normal  Pupils are equal, round, and reactive to light  Neck: Normal range of motion  Neck supple  No JVD present  No thyromegaly present  Cardiovascular: Normal rate, regular rhythm, normal heart sounds and intact distal pulses  Exam reveals no gallop  No murmur heard  Pulmonary/Chest: Effort normal and breath sounds normal  No respiratory distress  He has no wheezes  He has no rales  Abdominal: Soft  Bowel sounds are normal  He exhibits no distension and no mass  There is no tenderness  Musculoskeletal: Normal range of motion  He exhibits no tenderness  Limping, small effusion left knee   Lymphadenopathy:     He has no cervical adenopathy  Neurological: He is alert and oriented to person, place, and time  No cranial nerve deficit  Coordination normal    Skin: Skin is warm and dry  No rash noted  Psychiatric: He has a normal mood and affect  His behavior is normal  Judgment and thought content normal    Vitals reviewed      Large joint arthrocentesis  Date/Time: 5/3/2018 10:28 AM  Consent given by: patient and spouse  Site marked: site marked  Supporting Documentation  Indications: pain   Procedure Details  Location: knee - L knee  Needle size: 22 G  Approach: lateral  Medications administered: 1 mL lidocaine 1 %; 40 mg triamcinolone acetonide 40 mg/mL    Patient tolerance: patient tolerated the procedure well with no immediate complications  Dressing:  Sterile dressing applied

## 2018-05-03 NOTE — ASSESSMENT & PLAN NOTE
Patient's x-ray felt to show significant DJD but did show a small effusion    He has family I need to try injecting his knee with steroids and this was performed

## 2018-05-11 ENCOUNTER — DOCUMENTATION (OUTPATIENT)
Dept: AUDIOLOGY | Age: 83
End: 2018-05-11

## 2018-05-11 NOTE — PROGRESS NOTES
Walk in: Patient concerned that hearing aids not working  [employer] [id number] rechargeable Right#[id number] and Left#[id number]  Cleaned and checked - sound good  Otoscopy revealed cerumen occlusion Left and moderate (very deep) cerumen Right  Patient will contact PCP for ear cleaning and will call this center with any further concerns

## 2018-05-15 ENCOUNTER — OFFICE VISIT (OUTPATIENT)
Dept: INTERNAL MEDICINE CLINIC | Age: 83
End: 2018-05-15
Payer: COMMERCIAL

## 2018-05-15 VITALS
HEART RATE: 72 BPM | WEIGHT: 209 LBS | SYSTOLIC BLOOD PRESSURE: 124 MMHG | TEMPERATURE: 97.8 F | OXYGEN SATURATION: 97 % | HEIGHT: 69 IN | DIASTOLIC BLOOD PRESSURE: 62 MMHG | BODY MASS INDEX: 30.96 KG/M2

## 2018-05-15 DIAGNOSIS — H61.22 HEARING LOSS DUE TO CERUMEN IMPACTION, LEFT: Primary | ICD-10-CM

## 2018-05-15 PROCEDURE — 69209 REMOVE IMPACTED EAR WAX UNI: CPT | Performed by: INTERNAL MEDICINE

## 2018-05-15 PROCEDURE — 99212 OFFICE O/P EST SF 10 MIN: CPT | Performed by: INTERNAL MEDICINE

## 2018-05-15 NOTE — PATIENT INSTRUCTIONS
Cerumen Impaction   WHAT YOU NEED TO KNOW:   What is cerumen impaction? Cerumen impaction is the blockage of the outer ear canal by tightly packed cerumen (earwax)  What causes cerumen impaction? Anything that affects the normal outward flow of cerumen may cause impaction  The following factors may make it more likely for earwax to become impacted:  · Advanced age     · Conditions that produce too much cerumen, such as keratosis and other skin diseases    · Narrow or abnormally shaped ear canals    · Use of a hearing aid    · Incorrect use of cotton swabs, or using needles, hair pins, or other objects to clean the ears  What are the signs and symptoms of cerumen impaction? · Trouble hearing    · Dizziness    · Ear fullness or a feeling that something is plugging up your ear    · Itchiness or pain in the ears    · Ringing in the ears  How is cerumen impaction diagnosed? Your healthcare provider will ask about your medical history  This includes any ear problems or procedures you may have had  Your healthcare provider will carefully check your ears using a scope with a light  Your healthcare provider may look for other problems, such as bleeding, infection, or injury  Your eardrums will be checked for tears or holes  Your healthcare provider may also test your hearing  How is cerumen impaction treated? The goal of treatment is to remove the hardened wax  The type of treatment to be used may depend on your age, symptoms, or risk factors  Ask your healthcare provider which of the following treatments may be best for you:  · Ear drops:  Ear drops may be used to clear or soften the impacted earwax  This may be used alone or in combination with a procedure to take out the earwax      · Procedures:  When the impaction can be clearly seen, removal may be done using any of the following:    ¨ Irrigation:  Warm water from a syringe is used to wash the wax out of the ear canal  Irrigation may not be used on people with an eardrum tear, infection, or who have had ear surgery  ¨ Suction:  A small plastic tube that is connected to a machine is used to suck the impacted wax out of your ear  ¨ Instruments:  Your healthcare provider may use a curette (scoop-like instrument) or forceps to remove the impacted wax  What are the risks of having a cerumen impaction? · Procedures to remove the wax may cause bleeding and infection  The ear canal may be scraped and scratched or the eardrum may be injured, which may cause loss of hearing  · Untreated impacted cerumen may cause your symptoms to become worse  If it is not removed, impacted cerumen may cause an infection, irritation, loss of hearing, or further ear problems  When should I contact my healthcare provider? · You have a fever  · You have trouble hearing or hear ringing noises  · You have questions or concerns about your condition or care  When should I seek immediate care? · You feel dizzy  · You have discharge or blood coming out of your ear  · Your ear pain does not go away or gets worse  CARE AGREEMENT:   You have the right to help plan your care  Learn about your health condition and how it may be treated  Discuss treatment options with your caregivers to decide what care you want to receive  You always have the right to refuse treatment  The above information is an  only  It is not intended as medical advice for individual conditions or treatments  Talk to your doctor, nurse or pharmacist before following any medical regimen to see if it is safe and effective for you  © 2017 [employer]  Information is for End User's use only and may not be sold, redistributed or otherwise used for commercial purposes  All illustrations and images included in CareNotes® are the copyrighted property of [employer]  or [employer]

## 2018-05-15 NOTE — PROGRESS NOTES
Assessment/Plan:    Impacted cerumen of left ear  His ear was flushed successfully without complication       There are no diagnoses linked to this encounter  Subjective:      Patient ID: Carly Cooper is a 81 y o  male  HPI      Review of Systems   HENT: Positive for ear pain  Negative for ear discharge, hearing loss, rhinorrhea and sore throat  Hard of hearing and blocked ear   Respiratory: Negative for cough  Gastrointestinal: Negative for abdominal pain, diarrhea and vomiting  Musculoskeletal: Negative for neck pain  Skin: Negative for rash  Neurological: Negative for headaches  Objective:      /62 (BP Location: Left arm, Patient Position: Sitting, Cuff Size: Standard)   Pulse 72   Temp 97 8 °F (36 6 °C) (Tympanic)   Ht 5' 9" (1 753 m)   Wt 94 8 kg (209 lb)   SpO2 97%   BMI 30 86 kg/m²          Physical Exam   HENT:   Right Ear: External ear normal    Nose: Nose normal    Mouth/Throat: Oropharynx is clear and moist    Cerumen impaction left ear, hard of hearing   Vitals reviewed  Ear cerumen removal  Date/Time: 5/15/2018 2:15 PM  Performed by: Maris Dancer by: Levy Wiley     Patient location:  Clinic  Indications / Diagnosis:  Cerumen impaction  Other Assisting Provider: No    Consent:     Consent obtained:  Verbal    Consent given by:  Patient and spouse    Risks discussed:  TM perforation and incomplete removal    Alternatives discussed:  No treatment  Procedure details:     Local anesthetic:  None    Location:  L ear    Procedure type: irrigation      Approach:  Natural orifice  Post-procedure details:     Complication:  None    Hearing quality:  Improved    Patient tolerance of procedure:   Tolerated well, no immediate complications

## 2018-05-17 ENCOUNTER — OFFICE VISIT (OUTPATIENT)
Dept: OBGYN CLINIC | Facility: MEDICAL CENTER | Age: 83
End: 2018-05-17
Payer: COMMERCIAL

## 2018-05-17 VITALS
SYSTOLIC BLOOD PRESSURE: 124 MMHG | WEIGHT: 205 LBS | HEART RATE: 66 BPM | BODY MASS INDEX: 30.36 KG/M2 | DIASTOLIC BLOOD PRESSURE: 74 MMHG | HEIGHT: 69 IN

## 2018-05-17 DIAGNOSIS — M25.562 ACUTE PAIN OF LEFT KNEE: Primary | ICD-10-CM

## 2018-05-17 DIAGNOSIS — M76.52 PATELLAR TENDINITIS OF LEFT KNEE: ICD-10-CM

## 2018-05-17 PROCEDURE — 99203 OFFICE O/P NEW LOW 30 MIN: CPT | Performed by: FAMILY MEDICINE

## 2018-05-17 NOTE — PROGRESS NOTES
Assessment:     1  Acute pain of left knee    2  Patellar tendinitis of left knee        Plan:     Problem List Items Addressed This Visit     Patellar tendinitis of left knee      Other Visit Diagnoses     Acute pain of left knee    -  Primary    Relevant Medications    diclofenac sodium (VOLTAREN) 1 %    Other Relevant Orders    Ambulatory referral to Physical Therapy         Subjective:     Patient ID: Daryle Hoot is a 81 y o  male  Chief Complaint:  Patient is a 41-year-old male presenting today for evaluation of left knee pain  He reports for the past 3 months experiencing a throbbing, achy pain along the anterior aspect of the knee  He has been provided with a intra-articular cortisone injection by his PCP which she states did not provide any relief  Pain is typically reproduced with climbing stairs and standing after a period of prolonged sitting  He has also been able to mow his lawn due to worsening of pain symptoms  He denies any locking or clicking  He denies any crepitus, warmth or swelling  Allergy:  No Known Allergies  Medications:  all current active meds have been reviewed  Past Medical History:  Past Medical History:   Diagnosis Date    Balanitis     last assessed 12/19/14    BPH (benign prostatic hyperplasia)     Diabetes mellitus (HCC)     blood sugar 167 this am at 0630    GERD (gastroesophageal reflux disease)     Heme + stool     Hypertension     Lumbar radiculopathy     Myocardial infarction ([facility] [address] )     35 years ago    Phimosis     last assessed 04/27/16    Sciatica     right side, last assessed 04/25/16     Past Surgical History:  Past Surgical History:   Procedure Laterality Date    BACK SURGERY      laminectomy Lumbar    CIRCUMCISION      HERNIA REPAIR Right     UT COLONOSCOPY FLX DX W/COLLJ SPEC WHEN PFRMD N/A 1/16/2017    Procedure: EGD AND COLONOSCOPY;  Surgeon: Marieta Cushing, DO;  Location: [facility]; Service: General     Family History:  History reviewed   No pertinent family history  Social History:  History   Alcohol Use No     History   Drug Use No     History   Smoking Status    Former Smoker    Packs/day: 1 00    Years: 30 00   Smokeless Tobacco    Never Used     Comment: quit 25 yrs ago     Review of Systems      Objective:  BP Readings from Last 1 Encounters:   05/17/18 124/74      Wt Readings from Last 1 Encounters:   05/17/18 93 kg (205 lb)      BMI:   Estimated body mass index is 30 27 kg/m² as calculated from the following:    Height as of this encounter: 5' 9" (1 753 m)  Weight as of this encounter: 93 kg (205 lb)  BSA:   Estimated body surface area is 2 09 meters squared as calculated from the following:    Height as of this encounter: 5' 9" (1 753 m)  Weight as of this encounter: 93 kg (205 lb)  Physical Exam  Ortho Exam    I have personally reviewed pertinent films in PACS

## 2018-05-21 ENCOUNTER — TELEPHONE (OUTPATIENT)
Dept: OBGYN CLINIC | Facility: HOSPITAL | Age: 83
End: 2018-05-21

## 2018-05-21 NOTE — TELEPHONE ENCOUNTER
Caller: Katrin Ojeda spouse  Callback# [phone]  Dr Butch Young      I received a voice message 05/21 stating Dr Butch Young need to have diclofenac sodium (VOLTAREN) 1 % approved please advise thanks

## 2018-05-21 NOTE — PROGRESS NOTES
PT Evaluation     Today's date: 2018  Patient name: Katrin Santillan  : 1935  MRN: [id number]  Referring provider: Steph Mckeon DO  Dx:   Encounter Diagnosis     ICD-10-CM    1  Acute pain of left knee M25 562 Ambulatory referral to Physical Therapy       Start Time:   Stop Time:   Total time in clinic (min): 60 minutes    Assessment  Impairments: abnormal gait, abnormal or restricted ROM, abnormal movement, activity intolerance, impaired physical strength, lacks appropriate home exercise program, pain with function and weight-bearing intolerance    Assessment details: Katrin Santillan is a 81 y o  male who presents with signs and symptoms consistent of left internal derangement/meniscal pathology  Patient presents with left knee pain, decrease weight bearing/tolerance over left knee, eqivocal mcmurrays, and functional weakness in quad/hamstring  Due to these impairments, Patient has difficulty performing sit to stand tranfers (requires UE assistance), prolonged walking, stair negotiation, step up test, and step down test at LLE  Patient's gait pattern is antalgic with decrease stance LLE, no push of LLE, weight bearing intolerance LLE  Patient would benefit from consistent use of SPC  Patient would also benefit from skilled physical therapy to address the impairments, improve their level of function, and to improve their overall quality of life  Understanding of Dx/Px/POC: fair   Prognosis: good    Goals  Short Term Goals: to be achieved by 4 weeks  1) Patient to be independent with basic HEP  2) Decrease pain to 4/10 at its worst   3) Increase left knee ROM by 5-10 degrees   4) Increase LE strength by 1/2 MMT grade in all deficient planes      Long Term Goals: to be achieved by discharge  1) FOTO equal to or greater than 54   2) Ambulation to improve to maximal level of function  3) Stair negotiation will improve to reciprocal   4) Sit to stand transfers will improve to maximal level of function 5) Improve ability to perform step up/step down test without pain  Plan  Patient would benefit from: skilled PT  Planned modality interventions: cryotherapy  Planned therapy interventions: manual therapy, patient education, therapeutic activities, therapeutic exercise and home exercise program  Frequency: 2-3x per week for 4-6 weeks  Treatment plan discussed with: patient        Subjective Evaluation    History of Present Illness  Mechanism of injury: Patient reports gradual onset left knee pain  Patient does not identify a mechanism of injury  Patient recently received an injection without much  He also had a radiograph which was negative of acute bone pathology  Patient notes aggravation of left knee pain with sit to stand transfers and knee bending activities  Pain is mostly anterior in nature  Patient state that his left knee does swell up frequently  Patient denies any recent falls  He does ambulate with a SPC; however, did not bring it to his appointment  Patient is a very poor historian  Patient denies buckling or clicking  Patient also denies numbness and tingling in LLE  Pain  Current pain ratin  At best pain ratin  At worst pain ratin  Relieving factors: rest and ice (sitting)      Diagnostic Tests  X-ray: normal  Treatments  Current treatment: injection treatment  Patient Goals  Patient goals for therapy: increased motion, decreased pain and increased strength          Objective     Tenderness   Left Knee   Tenderness in the LCL (proximal), MCL (proximal), medial joint line, medial retinaculum, patellar tendon and quadriceps tendon  Active Range of Motion   Left Knee   Flexion: 135 degrees   Extension: 0 degrees     Mobility   Patellar Mobility:   Left Knee   WFL: medial, lateral, superior and inferior       Strength/Myotome Testing     Left Knee   Flexion: 4  Extension: 4    Right Knee   Flexion: 5  Extension: 4    Tests     Left Knee   Positive lateral Umer and medial Umer  Negative anterior drawer, anterior Lachman, posterior drawer, valgus stress test at 0 degrees, valgus stress test at 30 degrees, varus stress test at 0 degrees and varus stress test at 30 degrees  Additional Tests Details  Stable valgus and varus; however pain noted at LLE  Equvical medal/lateral mcmurrays     Hamstring: severe limitation LLE     Ambulation     Observational Gait   Gait: antalgic and asymmetric   Decreased walking speed and stride length  Additional Observational Gait Details  Decrease stance on LLE, weight hesitation over LLE, decrease stride length, no push off present LLE, antalgic pattern  Functional Assessment     Forward Step Up 6"   Left Leg  Pain, ipsilateral trunk side bending and increased contralateral push off  Forward Step Down 6"   Left Leg  Unable to perform         Flowsheet Rows      Most Recent Value   PT/OT G-Codes   Current Score  32   Projected Score  47   FOTO information reviewed  Yes   Assessment Type  Evaluation   G code set  Other PT/OT Primary   Other PT Primary Current Status ()  CL   Other PT Primary Goal Status ()  CK          Precautions: MI, Lumbar radiculopathy, DM II, BPH, hearing loss, Chronic fatigue, HTN    Daily Treatment Diary     Manual  5/22            PROM of L knee              PFJ mobilizations                                                        Exercise Diary  5/22            Quad sets 10x5"            Bridge 10x            Hamstring stretch 5x10"            TKE             Mini squats             Hamstring curls             4" step up - lateral             Bicycle              Quad stretch                                                                                                                                                                 Modalities  5/22            CP to finish 10 min                                        Pt was given a HEP with verbal and written instruction

## 2018-05-22 ENCOUNTER — EVALUATION (OUTPATIENT)
Dept: PHYSICAL THERAPY | Age: 83
End: 2018-05-22
Payer: COMMERCIAL

## 2018-05-22 DIAGNOSIS — M25.562 ACUTE PAIN OF LEFT KNEE: ICD-10-CM

## 2018-05-22 PROCEDURE — G8991 OTHER PT/OT GOAL STATUS: HCPCS | Performed by: PHYSICAL THERAPIST

## 2018-05-22 PROCEDURE — 97162 PT EVAL MOD COMPLEX 30 MIN: CPT | Performed by: PHYSICAL THERAPIST

## 2018-05-22 PROCEDURE — G8990 OTHER PT/OT CURRENT STATUS: HCPCS | Performed by: PHYSICAL THERAPIST

## 2018-05-22 PROCEDURE — 97110 THERAPEUTIC EXERCISES: CPT | Performed by: PHYSICAL THERAPIST

## 2018-05-22 NOTE — TELEPHONE ENCOUNTER
Patient's insurance company requires 2 oral NSAIDS prior to a topical being covered  Patient has tried mobic only for this problem from PCP  He does not have any stomach problems while taking NSAIDS  He would like to try OTC aspercream with lidocaine gel first to see if it helps with the pain  He will call office if it does not help to see if a prior auth can be placed

## 2018-05-24 ENCOUNTER — OFFICE VISIT (OUTPATIENT)
Dept: PHYSICAL THERAPY | Age: 83
End: 2018-05-24
Payer: COMMERCIAL

## 2018-05-24 DIAGNOSIS — M25.562 ACUTE PAIN OF LEFT KNEE: Primary | ICD-10-CM

## 2018-05-24 PROCEDURE — 97140 MANUAL THERAPY 1/> REGIONS: CPT | Performed by: PHYSICAL THERAPIST

## 2018-05-24 PROCEDURE — 97110 THERAPEUTIC EXERCISES: CPT | Performed by: PHYSICAL THERAPIST

## 2018-05-24 PROCEDURE — 97112 NEUROMUSCULAR REEDUCATION: CPT | Performed by: PHYSICAL THERAPIST

## 2018-05-24 NOTE — PROGRESS NOTES
Daily Note     Today's date: 2018  Patient name: Marco Antonio   : 1935  MRN: [id number]  Referring provider: Nicholas Hernandez DO  Dx:   Encounter Diagnosis     ICD-10-CM    1  Acute pain of left knee M25 562        Start Time: 1400  Stop Time: 1455  Total time in clinic (min): 55 minutes    Subjective: Patient states "It seems to be loosening up and not too sore "       Objective: See treatment diary below      Assessment: Tolerated treatment well  Patient requires constant verbal and tactile cues  Mild discomfort noted with step up and mini squats  Patient continues to display an antalgic gait pattern with first few steps which then subsides  Plan: Continue per plan of care       Precautions: MI, Lumbar radiculopathy, DM II, BPH, hearing loss, Chronic fatigue, HTN    Daily Treatment Diary     Manual              PROM of L knee  5 min            PFJ mobilizations 5 min                                                       Exercise Diary             Quad sets 10x5" 20x5"           Bridge 10x            Hamstring stretch 5x10" 5x10"           TKE  15#- 2x10            Mini squats  10x           Hamstring curls  2x10           4" step up - lateral             Bicycle   5 min            Quad stretch   10x10"           6" step up  2x10           2x10                                                                                                                                      Modalities             CP to finish 10 min 10 min                                       1:1 with PT from 2-245PM

## 2018-05-29 ENCOUNTER — OFFICE VISIT (OUTPATIENT)
Dept: PHYSICAL THERAPY | Age: 83
End: 2018-05-29
Payer: COMMERCIAL

## 2018-05-29 DIAGNOSIS — M25.562 ACUTE PAIN OF LEFT KNEE: Primary | ICD-10-CM

## 2018-05-29 PROCEDURE — 97140 MANUAL THERAPY 1/> REGIONS: CPT | Performed by: PHYSICAL THERAPIST

## 2018-05-29 PROCEDURE — 97110 THERAPEUTIC EXERCISES: CPT | Performed by: PHYSICAL THERAPIST

## 2018-05-29 PROCEDURE — 97112 NEUROMUSCULAR REEDUCATION: CPT | Performed by: PHYSICAL THERAPIST

## 2018-05-29 NOTE — PROGRESS NOTES
Daily Note     Today's date: 2018  Patient name: Ilana Yuan  : 1935  MRN: [id number]  Referring provider: Patricia Hall DO  Dx:   Encounter Diagnosis     ICD-10-CM    1  Acute pain of left knee M25 562        Start Time: 1100  Stop Time: 1205  Total time in clinic (min): 65 minutes    Subjective: Patient reports "knee is feeling terrible today " Patient unable to identify why  Patient states his knee felt fine after last PT session  Objective: See treatment diary below      Assessment: Patient requires constant verbal and tactile cues  Transfers (sit to supine and rolling) take a prolonged time for patient to accomplish  First few steps in weight bearing are painful which subsides fairly quickly  Plan: Continue per plan of care       Precautions: MI, Lumbar radiculopathy, DM II, BPH, hearing loss, Chronic fatigue, HTN    Daily Treatment Diary     Manual             PROM of L knee  5 min 5 min           PFJ mobilizations 5 min 5 min                                                       Exercise Diary            Quad sets 10x5" 20x5" 20x5"          Bridge 10x  10x          Hamstring stretch 5x10" 5x10" 5x10"          TKE  15#- 2x10  15# 2x10          Mini squats  10x 2x10          Hamstring curls  2x10 gtb 2x12          4" step up - lateral             Bicycle   5 min            Quad stretch   10x10" 10x10"          6" step up  2x10 2x10          LAQ (90-45)   3x10   3#                                                                                                                                   Modalities            CP to finish 10 min 10 min 10 min                                    1:1 with PT from 11-1158am

## 2018-06-01 ENCOUNTER — OFFICE VISIT (OUTPATIENT)
Dept: PHYSICAL THERAPY | Age: 83
End: 2018-06-01
Payer: COMMERCIAL

## 2018-06-01 DIAGNOSIS — M25.562 ACUTE PAIN OF LEFT KNEE: Primary | ICD-10-CM

## 2018-06-01 PROCEDURE — 97140 MANUAL THERAPY 1/> REGIONS: CPT

## 2018-06-01 PROCEDURE — 97110 THERAPEUTIC EXERCISES: CPT

## 2018-06-01 PROCEDURE — 97112 NEUROMUSCULAR REEDUCATION: CPT

## 2018-06-01 NOTE — PROGRESS NOTES
Daily Note     Today's date: 2018  Patient name: Basim Cabrera  : 1935  MRN: [id number]  Referring provider: Yaya Lawrence DO  Dx:   Encounter Diagnosis     ICD-10-CM    1  Acute pain of left knee M25 562                   Subjective:  Pt reports L knee hurts in a m  But loosens up slightly during the day  Pt reports "stiffens up" with prolonged sitting      Objective: See treatment diary below      Assessment:  Progressing there ex as so, encouraged pt to avoid prolonged sitting and to improve mobility      Plan: Continue per plan of care  Progress treatment as tolerated        Precautions: MI, Lumbar radiculopathy, DM II, BPH, hearing loss, Chronic fatigue, HTN    Daily Treatment Diary     Manual  18          PROM of L knee  5 min 5 min 5 min           PFJ mobilizations 5 min 5 min  3 min                                                     Exercise Diary  18         Quad sets 10x5" 20x5" 20x5" 25x5"         Bridge 10x  10x 2x10         Hamstring stretch 5x10" 5x10" 5x10" 5x10"         TKE  15#- 2x10  15# 2x10 15# 2x10         Mini squats  10x 2x10 2x10         Hamstring curls  2x10 gtb 2x12 gtb 2x10         4" step up - lateral             Bicycle   5 min   6 min         Quad stretch   10x10" 10x10" 5x10"         6" step up  2x10 2x10 2x10         LAQ (90-45)   3x10   3# 3# 3x10                                                                                                                                  Modalities  18         CP to finish 10 min 10 min 10 min Pt defers

## 2018-06-05 ENCOUNTER — OFFICE VISIT (OUTPATIENT)
Dept: PHYSICAL THERAPY | Age: 83
End: 2018-06-05
Payer: COMMERCIAL

## 2018-06-05 DIAGNOSIS — M25.562 ACUTE PAIN OF LEFT KNEE: Primary | ICD-10-CM

## 2018-06-05 PROCEDURE — G8991 OTHER PT/OT GOAL STATUS: HCPCS | Performed by: PHYSICAL THERAPIST

## 2018-06-05 PROCEDURE — 97112 NEUROMUSCULAR REEDUCATION: CPT | Performed by: PHYSICAL THERAPIST

## 2018-06-05 PROCEDURE — 97140 MANUAL THERAPY 1/> REGIONS: CPT | Performed by: PHYSICAL THERAPIST

## 2018-06-05 PROCEDURE — G8990 OTHER PT/OT CURRENT STATUS: HCPCS | Performed by: PHYSICAL THERAPIST

## 2018-06-05 PROCEDURE — 97110 THERAPEUTIC EXERCISES: CPT | Performed by: PHYSICAL THERAPIST

## 2018-06-05 NOTE — PROGRESS NOTES
Daily Note     Today's date: 2018  Patient name: Nikhil Campoverde  : 1935  MRN: [id number]  Referring provider: Jayda Alvarado DO  Dx:   Encounter Diagnosis     ICD-10-CM    1  Acute pain of left knee M25 562        Start Time: 845  Stop Time: 930  Total time in clinic (min): 45 minutes    Subjective: Patient reports "pivoting on L knee yesterday" which made it sore all night  Patient notes icing his L knee throughout the night  Objective: See treatment diary below      Assessment: WB positions continue to aggravate L knee pain  Patient continues to compensate step up with increased contralateral push off  Primary issue seems to be knee strength/stability as ROM is nearly full and pain free  Advance strength as tolerated  Plan: Continue per plan of care       Precautions: MI, Lumbar radiculopathy, DM II, BPH, hearing loss, Chronic fatigue, HTN    Daily Treatment Diary     Manual  18 6/         PROM of L knee  5 min 5 min 5 min            PFJ mobilizations 5 min 5 min  3 min 3 min         Tibiofemoral joint mobilizations    5min                                       Exercise Diary  18 6/5        Quad sets 10x5" 20x5" 20x5" 25x5" 25x5"        Bridge 10x  10x 2x10         Hamstring stretch 5x10" 5x10" 5x10" 5x10" 5x10"        TKE  15#- 2x10  15# 2x10 15# 2x10 10#  2x10        Mini squats  10x 2x10 2x10         Hamstring curls  2x10 gtb 2x12 gtb 2x10 2x10 gtb        4" step up - lateral     2x10  forward        Bicycle   5 min   6 min 5 min         Quad stretch   10x10" 10x10" 5x10" 10x10"        6" step up  2x10 2x10 2x10         LAQ (90-45)   3x10   3# 3# 3x10 3#  3x10                                                                                                                                 Modalities  18         CP to finish 10 min 10 min 10 min Pt defers                                     1:1 with PT from 845-930

## 2018-06-08 ENCOUNTER — OFFICE VISIT (OUTPATIENT)
Dept: PHYSICAL THERAPY | Age: 83
End: 2018-06-08
Payer: COMMERCIAL

## 2018-06-08 DIAGNOSIS — M25.562 ACUTE PAIN OF LEFT KNEE: Primary | ICD-10-CM

## 2018-06-08 PROCEDURE — 97112 NEUROMUSCULAR REEDUCATION: CPT | Performed by: PHYSICAL THERAPIST

## 2018-06-08 PROCEDURE — 97140 MANUAL THERAPY 1/> REGIONS: CPT | Performed by: PHYSICAL THERAPIST

## 2018-06-08 PROCEDURE — 97110 THERAPEUTIC EXERCISES: CPT | Performed by: PHYSICAL THERAPIST

## 2018-06-08 NOTE — PROGRESS NOTES
Daily Note     Today's date: 2018  Patient name: Daryle Hoot  : 1935  MRN: [id number]  Referring provider: Jacey Cloud DO  Dx:   Encounter Diagnosis     ICD-10-CM    1  Acute pain of left knee M25 562        Start Time: 845  Stop Time: 930  Total time in clinic (min): 45 minutes    Subjective: Patient reports feeling better  No pain at night  Less pain with first few steps after resting  Overall, pleased with progress thus far  Objective: See treatment diary below      Assessment: Tolerated treatment well  Patient continues to require constant verbal and tactile cues  Increased bicycle to 10 minutes today  Good tolerance to progression      Plan: Continue per plan of care       Precautions: MI, Lumbar radiculopathy, DM II, BPH, hearing loss, Chronic fatigue, HTN    Daily Treatment Diary     Manual  18        PROM of L knee  5 min 5 min 5 min            PFJ mobilizations 5 min 5 min  3 min 3 min 3 min        Tibiofemoral joint mobilizations    5min 5 min                                       Exercise Diary  18 6       Quad sets 10x5" 20x5" 20x5" 25x5" 25x5" 30x5"       Bridge 10x  10x 2x10         Hamstring stretch 5x10" 5x10" 5x10" 5x10" 5x10" 5x10"       TKE  15#- 2x10  15# 2x10 15# 2x10 10#  2x10        Mini squats  10x 2x10 2x10  2x13       Hamstring curls  2x10 gtb 2x12 gtb 2x10 2x10 gtb 2x10 gtb       4" step up - lateral     2x10  forward        Bicycle   5 min   6 min 5 min  10 min       Quad stretch   10x10" 10x10" 5x10" 10x10" 10x10"       6" step up  2x10 2x10 2x10  2x10       LAQ (90-45)   3x10   3# 3# 3x10 3#  3x10 Manual resistanc 3x10                                                                                                                                 Modalities  18         CP to finish 10 min 10 min 10 min Pt defers                                     1:1 with PT from [phone]

## 2018-06-12 ENCOUNTER — OFFICE VISIT (OUTPATIENT)
Dept: INTERNAL MEDICINE CLINIC | Age: 83
End: 2018-06-12
Payer: COMMERCIAL

## 2018-06-12 ENCOUNTER — OFFICE VISIT (OUTPATIENT)
Dept: PHYSICAL THERAPY | Age: 83
End: 2018-06-12
Payer: COMMERCIAL

## 2018-06-12 VITALS
WEIGHT: 206.6 LBS | SYSTOLIC BLOOD PRESSURE: 130 MMHG | HEIGHT: 69 IN | HEART RATE: 62 BPM | DIASTOLIC BLOOD PRESSURE: 60 MMHG | TEMPERATURE: 97.1 F | OXYGEN SATURATION: 98 % | BODY MASS INDEX: 30.6 KG/M2

## 2018-06-12 DIAGNOSIS — Z01.818 PREOP GENERAL PHYSICAL EXAM: Primary | ICD-10-CM

## 2018-06-12 DIAGNOSIS — H25.9 AGE-RELATED CATARACT OF BOTH EYES, UNSPECIFIED AGE-RELATED CATARACT TYPE: ICD-10-CM

## 2018-06-12 DIAGNOSIS — M25.562 ACUTE PAIN OF LEFT KNEE: Primary | ICD-10-CM

## 2018-06-12 DIAGNOSIS — I10 ESSENTIAL HYPERTENSION: ICD-10-CM

## 2018-06-12 DIAGNOSIS — E11.9 CONTROLLED TYPE 2 DIABETES MELLITUS WITHOUT COMPLICATION, WITHOUT LONG-TERM CURRENT USE OF INSULIN (HCC): ICD-10-CM

## 2018-06-12 PROBLEM — F44.2: Status: ACTIVE | Noted: 2018-06-12

## 2018-06-12 PROCEDURE — 93000 ELECTROCARDIOGRAM COMPLETE: CPT | Performed by: INTERNAL MEDICINE

## 2018-06-12 PROCEDURE — 97112 NEUROMUSCULAR REEDUCATION: CPT

## 2018-06-12 PROCEDURE — 97110 THERAPEUTIC EXERCISES: CPT

## 2018-06-12 PROCEDURE — 99213 OFFICE O/P EST LOW 20 MIN: CPT | Performed by: INTERNAL MEDICINE

## 2018-06-12 PROCEDURE — 1160F RVW MEDS BY RX/DR IN RCRD: CPT | Performed by: INTERNAL MEDICINE

## 2018-06-12 PROCEDURE — 3075F SYST BP GE 130 - 139MM HG: CPT | Performed by: INTERNAL MEDICINE

## 2018-06-12 PROCEDURE — 3078F DIAST BP <80 MM HG: CPT | Performed by: INTERNAL MEDICINE

## 2018-06-12 RX ORDER — PREDNISOLONE ACETATE 10 MG/ML
SUSPENSION/ DROPS OPHTHALMIC
COMMUNITY
Start: 2018-05-17 | End: 2018-09-14

## 2018-06-12 RX ORDER — BROMFENAC SODIUM 0.7 MG/ML
SOLUTION/ DROPS OPHTHALMIC
COMMUNITY
Start: 2018-05-17 | End: 2018-09-14

## 2018-06-12 RX ORDER — BESIFLOXACIN 6 MG/ML
SUSPENSION OPHTHALMIC
COMMUNITY
Start: 2018-05-17 | End: 2018-09-14

## 2018-06-12 NOTE — PROGRESS NOTES
Daily Note     Today's date: 2018  Patient name: Mona Ochoa  : 1935  MRN: [id number]  Referring provider: Norberto Fraser DO  Dx:   Encounter Diagnosis     ICD-10-CM    1  Acute pain of left knee M25 562                   Subjective: Patient reports no significant change since last visit  Stated his pain comes and goes but overall has made good progress  Objective: See treatment diary below      Assessment: Continues to have pain with steps/step up exercise but overall noted some improvement  Encouraged to focus on strengthening at home by performing step ups  Very good L knee ROM  Plan: Continue per plan of care  Progress strengthening      Precautions: MI, Lumbar radiculopathy, DM II, BPH, hearing loss, Chronic fatigue, HTN     Daily Treatment Diary      Manual  18           PROM of L knee  5 min 5 min 5 min       5 min           PFJ mobilizations 5 min 5 min  3 min 3 min 3 min             Tibiofemoral joint mobilizations       5min 5 min                                                                    Exercise Diary  18         Quad sets 10x5" 20x5" 20x5" 25x5" 25x5" 30x5"  30x5"         Bridge 10x   10x 2x10               Hamstring stretch 5x10" 5x10" 5x10" 5x10" 5x10" 5x10"  20" x5         TKE   15#- 2x10  15# 2x10 15# 2x10 10#  2x10    10# 20x         Mini squats   10x 2x10 2x10   2x13           Hamstring curls   2x10 gtb 2x12 gtb 2x10 2x10 gtb 2x10 gtb  2x10 gtb         4" step up - lateral         2x10  forward             Bicycle    5 min    6 min 5 min  10 min  10 min         Quad stretch    10x10" 10x10" 5x10" 10x10" 10x10"  20"x5         6" step up   2x10 2x10 2x10   2x10  2x10         LAQ (90-45)     3x10   3# 3# 3x10 3#  3x10 Manual resistanc 3x10   2x15 manual resist                                                                                                                                                                                                                                       Modalities  5/22 5/24 5/29 6/1/18 6/12/18             CP to finish 10 min 10 min 10 min Pt defers Pt defer

## 2018-06-12 NOTE — PATIENT INSTRUCTIONS
Cataracts   AMBULATORY CARE:   A cataract  is clouding of the eye lens  You may develop a cataract in one or both eyes  The cause of a cataracts is not known  Common symptoms include the following:   · Vision loss    · Cloudy, foggy, fuzzy, or hazy blurring of vision    · Problems driving at night or in bright sunlight    · Double vision    · Problem seeing shades of colors  Seek care immediately if:   · You suddenly lose your eyesight  · You feel a sudden, sharp pain in your eye  Contact your healthcare provider if:   · You have a fever  · You have chills, a cough, or feel weak and achy  · You have questions or concerns about your condition or care  Treatment for cataracts  may include glasses or contact lenses to correct your vision  You may also need surgery to remove your cataract  An artificial lens will be put into your eye to replace the damaged lens  Protect your eyes:   · Wear sunglasses  to protect your eyes from the sunlight and prevent eye discomfort  Make sure the sunglasses have UV protection  · Do not smoke  Nicotine and other chemicals in cigarettes and cigars can cause lung damage  Ask your healthcare provider for information if you currently smoke and need help to quit  E-cigarettes or smokeless tobacco still contain nicotine  Talk to your healthcare provider before you use these products  Follow up with your healthcare provider as directed:  Write down your questions so you remember to ask them during your visits  © 2017 [employer] Information is for End User's use only and may not be sold, redistributed or otherwise used for commercial purposes  All illustrations and images included in CareNotes® are the copyrighted property of [employer]  or [employer]  The above information is an  only  It is not intended as medical advice for individual conditions or treatments   Talk to your doctor, nurse or pharmacist before following any medical regimen to see if it is safe and effective for you

## 2018-06-12 NOTE — PROGRESS NOTES
Assessment/Plan:    Controlled type 2 diabetes mellitus without complication, without long-term current use of insulin (Formerly [facility])  Lab Results   Component Value Date    HGBA1C 6 2 (H) 03/30/2018       No results for input(s): POCGLU in the last 72 hours  Blood Sugar Average: Last 72 hrs:   patient has well-controlled diabetes with diet only    Preop general physical exam   Patient is due for cataract surgery on the right on 06/19 by a Dr Lashell Hinson  He is cleared for surgery    Hypertension   His blood pressure is well controlled with lisinopril 10    Age-related cataract of both eyes   Due for surgery for both eyes for decreased vision       Diagnoses and all orders for this visit:    Preop general physical exam  -     Pulse oximetry, spot; Future  -     POCT ECG    Age-related cataract of both eyes, unspecified age-related cataract type    Controlled type 2 diabetes mellitus without complication, without long-term current use of insulin (Former[facility])    Essential hypertension  -     POCT ECG    Other orders  -     BESIVANCE 0 6 % SUSP;   -     PROLENSA 0 07 % SOLN;   -     prednisoLONE acetate (PRED FORTE) 1 % ophthalmic suspension;           Subjective:      Patient ID: Nikhil Campoverde is a 81 y o  male  Patient has had decreasing vision was found to have cataracts of both eyes he is here for clearance of for same  Both his diabetes and hypertension have been well controlled            Review of Systems   Constitutional: Negative for chills, fatigue, fever and unexpected weight change  HENT: Positive for hearing loss  Negative for congestion, ear pain, postnasal drip, sinus pressure, sore throat, trouble swallowing and voice change  Eyes: Positive for visual disturbance  Respiratory: Negative for cough, chest tightness, shortness of breath and wheezing  Cardiovascular: Negative for chest pain, palpitations and leg swelling     Gastrointestinal: Negative for abdominal distention, abdominal pain, anal bleeding, blood in stool, constipation, diarrhea and nausea  Endocrine: Negative for cold intolerance, polydipsia, polyphagia and polyuria  Genitourinary: Negative for dysuria, flank pain, frequency, hematuria and urgency  Musculoskeletal: Positive for back pain  Negative for arthralgias, gait problem, joint swelling, myalgias and neck pain  Skin: Negative for rash  Allergic/Immunologic: Negative for immunocompromised state  Neurological: Negative for dizziness, syncope, facial asymmetry, weakness, light-headedness, numbness and headaches  Hematological: Negative for adenopathy  Psychiatric/Behavioral: Negative for confusion, sleep disturbance and suicidal ideas  Objective:      /60 (BP Location: Left arm, Patient Position: Sitting)   Pulse 62   Temp (!) 97 1 °F (36 2 °C) (Tympanic)   Ht 5' 9" (1 753 m)   Wt 93 7 kg (206 lb 9 6 oz)   SpO2 98%   BMI 30 51 kg/m²          Physical Exam   Constitutional: He is oriented to person, place, and time  He appears well-developed and well-nourished  HENT:   Head: Normocephalic and atraumatic  Dentures, hard of hearing   Eyes: EOM are normal  Pupils are equal, round, and reactive to light  Cataract   Neck: Normal range of motion  Neck supple  No tracheal deviation present  Cardiovascular: Normal rate and regular rhythm  Pulmonary/Chest: Effort normal and breath sounds normal    Abdominal: Soft  Bowel sounds are normal    Musculoskeletal:   Kyphosis   Lymphadenopathy:     He has no cervical adenopathy  Neurological: He is alert and oriented to person, place, and time  Skin: Skin is warm and dry  Psychiatric: He has a normal mood and affect  Vitals reviewed

## 2018-06-12 NOTE — ASSESSMENT & PLAN NOTE
Patient is due for cataract surgery on the right on 06/19 by a Dr Dalton Hussein    He is cleared for surgery

## 2018-06-12 NOTE — ASSESSMENT & PLAN NOTE
Lab Results   Component Value Date    HGBA1C 6 2 (H) 03/30/2018       No results for input(s): POCGLU in the last 72 hours      Blood Sugar Average: Last 72 hrs:   patient has well-controlled diabetes with diet only

## 2018-06-15 ENCOUNTER — OFFICE VISIT (OUTPATIENT)
Dept: PHYSICAL THERAPY | Age: 83
End: 2018-06-15
Payer: COMMERCIAL

## 2018-06-15 DIAGNOSIS — M25.562 ACUTE PAIN OF LEFT KNEE: Primary | ICD-10-CM

## 2018-06-15 PROCEDURE — 97110 THERAPEUTIC EXERCISES: CPT | Performed by: PHYSICAL THERAPIST

## 2018-06-15 PROCEDURE — 97112 NEUROMUSCULAR REEDUCATION: CPT | Performed by: PHYSICAL THERAPIST

## 2018-06-15 PROCEDURE — 97140 MANUAL THERAPY 1/> REGIONS: CPT | Performed by: PHYSICAL THERAPIST

## 2018-06-18 ENCOUNTER — EVALUATION (OUTPATIENT)
Dept: PHYSICAL THERAPY | Age: 83
End: 2018-06-18
Payer: COMMERCIAL

## 2018-06-18 DIAGNOSIS — M25.562 ACUTE PAIN OF LEFT KNEE: Primary | ICD-10-CM

## 2018-06-18 PROCEDURE — G8992 OTHER PT/OT  D/C STATUS: HCPCS | Performed by: PHYSICAL THERAPIST

## 2018-06-18 PROCEDURE — G8991 OTHER PT/OT GOAL STATUS: HCPCS | Performed by: PHYSICAL THERAPIST

## 2018-06-18 PROCEDURE — 97140 MANUAL THERAPY 1/> REGIONS: CPT | Performed by: PHYSICAL THERAPIST

## 2018-06-18 PROCEDURE — 97110 THERAPEUTIC EXERCISES: CPT | Performed by: PHYSICAL THERAPIST

## 2018-06-18 PROCEDURE — 97112 NEUROMUSCULAR REEDUCATION: CPT | Performed by: PHYSICAL THERAPIST

## 2018-06-18 NOTE — PROGRESS NOTES
PT-Discharge     Today's date: 2018  Patient name: Baron South  : 1935  MRN: [id number]  Referring provider: Wyatt Christensen DO  Dx:   Encounter Diagnosis     ICD-10-CM    1  Acute pain of left knee M25 [id number]        Start Time: 845  Stop Time: 930  Total time in clinic (min): 45 minutes    Assessment    Assessment details: Humza Vega reports a perceived improvement of 90%  Functional status measure has improved to 66  Patient notes pain has reduced to 0-5/10  Patient denies pain with kneeling, but does complain of knee pain with first few steps in a WB position after a period of rest  Patient is able to negotiate stairs in a reciprocal manner  He does demonstrate some contralateral push of with ascending and mild hesitation descending due to left knee weakness  However, upon MMT, strength is full at LLE  Knee ROM is full and pain free  Equivocal [facility]'s test continue (both medial and lateral)  Patient will be undergoing eye surgery for cataracts removal, according to patient; therefore, patient will be discharged at this time  Overall, patient has met all goals and is ready for discharge  Patient is please with his progress  Thank you for this referral           Understanding of Dx/Px/POC: fair   Prognosis: good    Goals  Short Term Goals: to be achieved by 4 weeks  1) Patient to be independent with basic HEP - MET  2) Decrease pain to 4/10 at its worst - Partially MET  3) Increase left knee ROM by 5-10 degrees - MET  4) Increase LE strength by 1/2 MMT grade in all deficient planes  - MET    Long Term Goals: to be achieved by discharge  1) FOTO equal to or greater than 54  -MET  2) Ambulation to improve to maximal level of function- MET  3) Stair negotiation will improve to reciprocal -MET  4) Sit to stand transfers will improve to maximal level of function -MET  5) Improve ability to perform step up/step down test without pain  - MET    Plan  Frequency: D/C to fitness program/HEP    Treatment plan discussed with: patient  Plan details: It is recommended patient continue with our fitness program to improve strength and stair negotiation; however, patient will need to be cleared by physician due to eye surgery this week  Subjective Evaluation    History of Present Illness  Mechanism of injury: Initial evaluation: Patient reports gradual onset left knee pain  Patient does not identify a mechanism of injury  Patient recently received an injection without much  He also had a radiograph which was negative of acute bone pathology  Patient notes aggravation of left knee pain with sit to stand transfers and knee bending activities  Pain is mostly anterior in nature  Patient state that his left knee does swell up frequently  Patient denies any recent falls  He does ambulate with a SPC; however, did not bring it to his appointment  Patient is a very poor historian  Patient denies buckling or clicking  Patient also denies numbness and tingling in LLE  Pain  Current pain ratin  At best pain ratin  At worst pain ratin  Relieving factors: rest and ice (sitting)      Diagnostic Tests  X-ray: normal  Treatments  Current treatment: injection treatment  Patient Goals  Patient goals for therapy: increased motion, decreased pain and increased strength          Objective     Tenderness   Left Knee   No tenderness in the LCL (proximal), MCL (proximal), medial joint line, medial retinaculum, patellar tendon and quadriceps tendon  Active Range of Motion   Left Knee   Flexion: 135 degrees   Extension: 3 degrees     Mobility   Patellar Mobility:   Left Knee   WFL: medial, lateral, superior and inferior  Strength/Myotome Testing     Right Hip   Planes of Motion   Flexion: 5  Extension: 5  Abduction: 5  Adduction: 5    Left Knee   Flexion: 5  Extension: 5    Right Knee   Flexion: 5  Extension: 5    Tests     Left Knee   Positive lateral Umer and medial Umer     Negative anterior drawer, anterior Lachman, posterior drawer, valgus stress test at 0 degrees, valgus stress test at 30 degrees, varus stress test at 0 degrees and varus stress test at 30 degrees  Additional Tests Details  Stable valgus and varus; however pain noted at LLE  Equvical medal/lateral mcmurrays     Hamstring: mild/mod limitation LLE     Ambulation     Ambulation: Stairs   Ascend stairs: independent  Pattern: reciprocal  Railings: two rails  Pattern: reciprocal  Railings: two rails    Observational Gait   Increased walking speed and stride length  Additional Observational Gait Details  Improve gait pattern with normalization of walking speed and stride length  No antalgic gait pattern noted today  Functional Assessment     Forward Step Up 6"   Left Leg  No pain, no ipsilateral trunk side bending and no increased contralateral push off         Flowsheet Rows      Most Recent Value   PT/OT G-Codes   Current Score  66   Projected Score  56   FOTO information reviewed  Yes   Assessment Type  Discharge   G code set  Other PT/OT Primary   Other PT Primary Goal Status ()  CK   Other PT Primary Discharge Status ()  CJ          Precautions: MI, Lumbar radiculopathy, DM II, BPH, hearing loss, Chronic fatigue, HTN  Daily Treatment Diary      Manual  5/24 5/29 6/1/18 6/5 6/8  6/12  6/15         PROM of L knee  5 min 5 min 5 min       5 min           PFJ mobilizations 5 min 5 min  3 min 3 min 3 min    2 min          Tibiofemoral joint mobilizations       5min 5 min     6 min                                                               Exercise Diary  5/22 5/24 5/29 6/1/18 6/5 6/8  6/12  6/15  6/18     Quad sets 10x5" 20x5" 20x5" 25x5" 25x5" 30x5"  30x5"  30x5"  30x5"     Bridge 10x   10x 2x10               Hamstring stretch 5x10" 5x10" 5x10" 5x10" 5x10" 5x10"  20" x5  10x10"       TKE   15#- 2x10  15# 2x10 15# 2x10 10#  2x10    10# 20x         Mini squats   10x 2x10 2x10   2x13    3x10  3x10     Hamstring curls   2x10 gtb 2x12 gtb 2x10 2x10 gtb 2x10 gtb  2x10 gtb  3x10 standing       4" step up - lateral         2x10  forward             Bicycle    5 min    6 min 5 min  10 min  10 min    8 min     Quad stretch    10x10" 10x10" 5x10" 10x10" 10x10"  20"x5         6" step up   2x10 2x10 2x10   2x10  2x10         LAQ (90-45)     3x10   3# 3# 3x10 3#  3x10 Manual resistanc 3x10   2x15 manual resist  3x10 man resistance         8" step up                3x10  3x10                                                                                                                                                                                                           Modalities  5/22 5/24 5/29 6/1/18 6/12/18             CP to finish 10 min 10 min 10 min Pt defers Pt defer                                       1:1 with PT from [id number]

## 2018-06-19 ENCOUNTER — APPOINTMENT (OUTPATIENT)
Dept: PHYSICAL THERAPY | Age: 83
End: 2018-06-19
Payer: COMMERCIAL

## 2018-06-21 DIAGNOSIS — M54.5 CHRONIC LOW BACK PAIN, UNSPECIFIED BACK PAIN LATERALITY, WITH SCIATICA PRESENCE UNSPECIFIED: Primary | ICD-10-CM

## 2018-06-21 DIAGNOSIS — G89.29 CHRONIC LOW BACK PAIN, UNSPECIFIED BACK PAIN LATERALITY, WITH SCIATICA PRESENCE UNSPECIFIED: Primary | ICD-10-CM

## 2018-06-22 ENCOUNTER — APPOINTMENT (OUTPATIENT)
Dept: PHYSICAL THERAPY | Age: 83
End: 2018-06-22
Payer: COMMERCIAL

## 2018-06-25 RX ORDER — TRAMADOL HYDROCHLORIDE 50 MG/1
50 TABLET ORAL EVERY 6 HOURS PRN
Qty: 60 TABLET | Refills: 0 | Status: SHIPPED | OUTPATIENT
Start: 2018-06-25 | End: 2019-03-19 | Stop reason: SDUPTHER

## 2018-06-26 ENCOUNTER — APPOINTMENT (OUTPATIENT)
Dept: PHYSICAL THERAPY | Age: 83
End: 2018-06-26
Payer: COMMERCIAL

## 2018-06-29 ENCOUNTER — APPOINTMENT (OUTPATIENT)
Dept: PHYSICAL THERAPY | Age: 83
End: 2018-06-29
Payer: COMMERCIAL

## 2018-07-05 DIAGNOSIS — E11.8 TYPE 2 DIABETES MELLITUS WITH COMPLICATION, UNSPECIFIED WHETHER LONG TERM INSULIN USE: Primary | ICD-10-CM

## 2018-09-14 ENCOUNTER — OFFICE VISIT (OUTPATIENT)
Dept: INTERNAL MEDICINE CLINIC | Age: 83
End: 2018-09-14
Payer: COMMERCIAL

## 2018-09-14 VITALS
TEMPERATURE: 97.8 F | DIASTOLIC BLOOD PRESSURE: 60 MMHG | BODY MASS INDEX: 29.65 KG/M2 | SYSTOLIC BLOOD PRESSURE: 122 MMHG | OXYGEN SATURATION: 97 % | WEIGHT: 200.2 LBS | HEART RATE: 74 BPM | HEIGHT: 69 IN

## 2018-09-14 DIAGNOSIS — K12.1 MOUTH ULCERS: Primary | ICD-10-CM

## 2018-09-14 DIAGNOSIS — E11.8 TYPE 2 DIABETES MELLITUS WITH COMPLICATION, UNSPECIFIED WHETHER LONG TERM INSULIN USE: ICD-10-CM

## 2018-09-14 PROBLEM — H61.22 IMPACTED CERUMEN OF LEFT EAR: Status: RESOLVED | Noted: 2018-05-15 | Resolved: 2018-09-14

## 2018-09-14 PROBLEM — Z01.818 PREOP GENERAL PHYSICAL EXAM: Status: RESOLVED | Noted: 2018-06-12 | Resolved: 2018-09-14

## 2018-09-14 PROCEDURE — 3008F BODY MASS INDEX DOCD: CPT | Performed by: INTERNAL MEDICINE

## 2018-09-14 PROCEDURE — 99213 OFFICE O/P EST LOW 20 MIN: CPT | Performed by: INTERNAL MEDICINE

## 2018-09-14 RX ORDER — ACYCLOVIR 400 MG/1
400 TABLET ORAL 3 TIMES DAILY
Qty: 30 TABLET | Refills: 0 | Status: SHIPPED | OUTPATIENT
Start: 2018-09-14 | End: 2019-03-19

## 2018-09-14 NOTE — ASSESSMENT & PLAN NOTE
Patient has had mouth ulcers on and off for the last couple weeks  They are on the left side of his mouth under his tongue  Will treat with acyclovir and nystatin solution    She will call me on Monday to let me know how they are doing

## 2018-09-14 NOTE — PATIENT INSTRUCTIONS
Gingivostomatitis   AMBULATORY CARE:   Gingivostomatitis (GS)  is a condition that causes painful sores on the lips, tongue, gums, and inside the mouth  GS is caused by the herpes simplex virus  The virus spreads easily from person to person through saliva or shared objects  The sores usually heal within 2 weeks with treatment  Common signs and symptoms of GS:   · Sores, ulcers, or blisters in your mouth    · Irritated gums    · Sore throat    · Fever, headache, fatigue    · Nausea, vomiting, or swollen lymph glands  Seek care immediately if:   · You have severe pain  Contact your healthcare provider if:   · Your fever or other symptoms return after treatment  · You are urinating less than usual     · Your mouth sores are draining pus or blood  · You have questions or concerns about your condition or care  Treatment  may include any of the following:  · Acetaminophen  decreases pain and fever  It is available without a doctor's order  Ask how much to take and how often to take it  Follow directions  Acetaminophen can cause liver damage if not taken correctly  · NSAIDs , such as ibuprofen, help decrease swelling, pain, and fever  This medicine is available with or without a doctor's order  NSAIDs can cause stomach bleeding or kidney problems in certain people  If you take blood thinner medicine, always ask your healthcare provider if NSAIDs are safe for you  Always read the medicine label and follow directions  · Numbing medicine  helps decrease pain from your mouth sores  This medicine is usually a liquid that you swish in your mouth and then spit out  · Antiviral medicine  helps treat a viral infection  Manage your symptoms:   · Brush your teeth at least 2 times each day  Floss at least 1 time each day  If you wear dentures, make sure they fit properly  · Drink liquids as directed to prevent dehydration  It is important to drink liquids even though your mouth is sore   Ask how much liquid to drink each day and which liquids are best for you  · Eat a variety of healthy foods  You may need to eat bland foods until your pain gets better  Healthy foods include fruits, vegetables, whole-grain breads, low-fat dairy products, beans, lean meats, and fish  Do not eat spicy, dry, hard, or acidic foods, such as oranges  · Do not smoke  Nicotine and other chemicals in cigarettes and cigars can cause mouth and lung damage  Ask your healthcare provider for information if you currently smoke and need help to quit  E-cigarettes or smokeless tobacco still contain nicotine  Talk to your healthcare provider before you use these products  Follow up with your healthcare provider as directed:  Write down your questions so you remember to ask them during your visits  © 2017 [employer] Information is for End User's use only and may not be sold, redistributed or otherwise used for commercial purposes  All illustrations and images included in CareNotes® are the copyrighted property of [employer]  or [employer]  The above information is an  only  It is not intended as medical advice for individual conditions or treatments  Talk to your doctor, nurse or pharmacist before following any medical regimen to see if it is safe and effective for you

## 2018-09-14 NOTE — PROGRESS NOTES
Assessment/Plan:    No problem-specific Assessment & Plan notes found for this encounter  Diagnoses and all orders for this visit:    Mouth ulcers  -     acyclovir (ZOVIRAX) 400 MG tablet; Take 1 tablet (400 mg total) by mouth 3 (three) times a day for 7 days  -     nystatin (MYCOSTATIN) 100,000 units/mL suspension; Apply 5 mL (500,000 Units total) to the mouth or throat 4 (four) times a day    Type 2 diabetes mellitus with complication, unspecified whether long term insulin use (HCC)  -     metFORMIN (GLUCOPHAGE) 500 mg tablet; Take 1 tablet (500 mg total) by mouth 2 (two) times a day with meals          Subjective:      Patient ID: Rogelio Trammell is a 81 y o  male  Patient has had mouth sores for the past several weeks that have come and gone to a degree  There are always on the left side of his mouth under his tongue  The make it difficult to eat and swallow  He has had cold sores in the past but never like this  He has not been on antibiotics or a new medication  Review of Systems   Constitutional: Negative  HENT: Positive for hearing loss (  Chronic), mouth sores, sore throat and trouble swallowing  Eyes: Negative  Respiratory: Negative  Cardiovascular: Negative  Neurological: Negative  Psychiatric/Behavioral: Negative  Objective:      /60 (BP Location: Left arm, Patient Position: Sitting)   Pulse 74   Temp 97 8 °F (36 6 °C) (Tympanic)   Ht 5' 9" (1 753 m)   Wt 90 8 kg (200 lb 3 2 oz)   SpO2 97%   BMI 29 56 kg/m²          Physical Exam   Constitutional: He is oriented to person, place, and time  He appears well-developed and well-nourished  HENT:   Numerous ulcers of the mucosa under the left side of his tongue and gums, hard of hearing chronic   Eyes: Conjunctivae are normal  Pupils are equal, round, and reactive to light  Neck: Normal range of motion  Cardiovascular: Normal rate and regular rhythm      Pulmonary/Chest: Effort normal and breath sounds normal    Musculoskeletal: Normal range of motion  Lymphadenopathy:     He has no cervical adenopathy  Neurological: He is alert and oriented to person, place, and time  Psychiatric: He has a normal mood and affect

## 2018-10-18 DIAGNOSIS — E11.9 CONTROLLED TYPE 2 DIABETES MELLITUS WITHOUT COMPLICATION, WITHOUT LONG-TERM CURRENT USE OF INSULIN (HCC): ICD-10-CM

## 2018-10-18 RX ORDER — BLOOD-GLUCOSE METER
2 EACH MISCELLANEOUS DAILY
Qty: 100 EACH | Refills: 2 | Status: SHIPPED | OUTPATIENT
Start: 2018-10-18 | End: 2019-03-19 | Stop reason: SDUPTHER

## 2018-10-23 DIAGNOSIS — R35.0 BENIGN PROSTATIC HYPERPLASIA WITH URINARY FREQUENCY: ICD-10-CM

## 2018-10-23 DIAGNOSIS — N40.1 BENIGN PROSTATIC HYPERPLASIA WITH URINARY FREQUENCY: ICD-10-CM

## 2018-10-23 RX ORDER — FINASTERIDE 5 MG/1
5 TABLET, FILM COATED ORAL DAILY
Qty: 90 TABLET | Refills: 3 | Status: SHIPPED | OUTPATIENT
Start: 2018-10-23 | End: 2019-03-19 | Stop reason: SDUPTHER

## 2018-10-23 RX ORDER — OXYBUTYNIN CHLORIDE 5 MG/1
5 TABLET, EXTENDED RELEASE ORAL DAILY
Qty: 90 TABLET | Refills: 3 | Status: SHIPPED | OUTPATIENT
Start: 2018-10-23 | End: 2019-09-20 | Stop reason: SDUPTHER

## 2018-11-08 ENCOUNTER — OFFICE VISIT (OUTPATIENT)
Dept: INTERNAL MEDICINE CLINIC | Age: 83
End: 2018-11-08
Payer: COMMERCIAL

## 2018-11-08 VITALS
HEART RATE: 62 BPM | SYSTOLIC BLOOD PRESSURE: 118 MMHG | WEIGHT: 203.6 LBS | HEIGHT: 69 IN | TEMPERATURE: 97.9 F | OXYGEN SATURATION: 98 % | DIASTOLIC BLOOD PRESSURE: 60 MMHG | BODY MASS INDEX: 30.16 KG/M2

## 2018-11-08 DIAGNOSIS — E11.9 CONTROLLED TYPE 2 DIABETES MELLITUS WITHOUT COMPLICATION, WITHOUT LONG-TERM CURRENT USE OF INSULIN (HCC): Primary | ICD-10-CM

## 2018-11-08 DIAGNOSIS — Z23 NEED FOR INFLUENZA VACCINATION: ICD-10-CM

## 2018-11-08 DIAGNOSIS — Z00.00 MEDICARE ANNUAL WELLNESS VISIT, SUBSEQUENT: ICD-10-CM

## 2018-11-08 DIAGNOSIS — Z91.81 RISK FOR FALLS: ICD-10-CM

## 2018-11-08 DIAGNOSIS — Z12.5 SCREENING FOR PROSTATE CANCER: ICD-10-CM

## 2018-11-08 DIAGNOSIS — H91.93 DECREASED HEARING OF BOTH EARS: ICD-10-CM

## 2018-11-08 DIAGNOSIS — I10 ESSENTIAL HYPERTENSION: ICD-10-CM

## 2018-11-08 DIAGNOSIS — H66.93 OTITIS OF BOTH EARS: ICD-10-CM

## 2018-11-08 PROBLEM — K12.1 MOUTH ULCERS: Status: RESOLVED | Noted: 2018-09-14 | Resolved: 2018-11-08

## 2018-11-08 PROBLEM — H25.9 AGE-RELATED CATARACT OF BOTH EYES: Status: RESOLVED | Noted: 2018-06-12 | Resolved: 2018-11-08

## 2018-11-08 PROCEDURE — 4040F PNEUMOC VAC/ADMIN/RCVD: CPT

## 2018-11-08 PROCEDURE — 3074F SYST BP LT 130 MM HG: CPT | Performed by: INTERNAL MEDICINE

## 2018-11-08 PROCEDURE — 90662 IIV NO PRSV INCREASED AG IM: CPT

## 2018-11-08 PROCEDURE — 3078F DIAST BP <80 MM HG: CPT | Performed by: INTERNAL MEDICINE

## 2018-11-08 PROCEDURE — 1170F FXNL STATUS ASSESSED: CPT

## 2018-11-08 PROCEDURE — G0008 ADMIN INFLUENZA VIRUS VAC: HCPCS

## 2018-11-08 PROCEDURE — G0439 PPPS, SUBSEQ VISIT: HCPCS | Performed by: INTERNAL MEDICINE

## 2018-11-08 PROCEDURE — 3008F BODY MASS INDEX DOCD: CPT | Performed by: INTERNAL MEDICINE

## 2018-11-08 PROCEDURE — 99214 OFFICE O/P EST MOD 30 MIN: CPT | Performed by: INTERNAL MEDICINE

## 2018-11-08 PROCEDURE — 1036F TOBACCO NON-USER: CPT | Performed by: INTERNAL MEDICINE

## 2018-11-08 PROCEDURE — 1125F AMNT PAIN NOTED PAIN PRSNT: CPT

## 2018-11-08 RX ORDER — AMOXICILLIN 500 MG/1
500 CAPSULE ORAL EVERY 8 HOURS SCHEDULED
Qty: 30 CAPSULE | Refills: 0 | Status: SHIPPED | OUTPATIENT
Start: 2018-11-08 | End: 2018-11-18

## 2018-11-08 NOTE — ASSESSMENT & PLAN NOTE
Lab Results   Component Value Date    HGBA1C 6 2 (H) 03/30/2018       No results for input(s): POCGLU in the last 72 hours  Blood Sugar Average: Last 72 hrs:   patient consistently runs A1cs in the low 6s  He has no symptoms of hyperglycemia nor retinopathy or neuropathy    He is managed with merely by diet

## 2018-11-08 NOTE — ASSESSMENT & PLAN NOTE
His blood pressure continues to be extremely well controlled with lisinopril 10  He furthermore denies arm symptoms of cardiovascular disease such as claudication headache or chest pain    His risk factors for same include well-controlled diabetes hypertension and prior disease but no hyperlipidemia

## 2018-11-08 NOTE — PROGRESS NOTES
Assessment and Plan:    Problem List Items Addressed This Visit     None      Visit Diagnoses     Need for influenza vaccination    -  Primary    Relevant Orders    influenza vaccine, [id number], high-dose, PF 0 5 mL, for patients 65 yr+ (FLUZONE HIGH-DOSE) (Completed)        Health Maintenance Due   Topic Date Due    SLP PLAN OF CARE  1935    DTaP,Tdap,and Td Vaccines (1 - Tdap) 02/27/1957    URINE MICROALBUMIN  03/08/2018    INFLUENZA VACCINE  07/01/2018    Diabetic Foot Exam  07/23/2018    HEMOGLOBIN A1C  09/30/2018         HPI:  Jodie Corado is a 81 y o  male here for his Subsequent Wellness Visit  Patient Active Problem List   Diagnosis    Chronic low back pain    Controlled type 2 diabetes mellitus without complication, without long-term current use of insulin (HCC)    Enlarged prostate with lower urinary tract symptoms (LUTS)    Sensorineural hearing loss (SNHL) of both ears    Hypertension    Old myocardial infarction    Seasonal allergies    Spinal stenosis    Left medial knee pain    Chronic fatigue    Patellar tendinitis of left knee    Age-related cataract of both eyes    Mouth ulcers     Past Medical History:   Diagnosis Date    Balanitis     last assessed 12/19/14    BPH (benign prostatic hyperplasia)     Diabetes mellitus (HCC)     blood sugar 167 this am at 0630    GERD (gastroesophageal reflux disease)     Heme + stool     Hypertension     Lumbar radiculopathy     Myocardial infarction ([facility] [address] )     35 years ago    Phimosis     last assessed 04/27/16    Sciatica     right side, last assessed 04/25/16     Past Surgical History:   Procedure Laterality Date    BACK SURGERY      laminectomy Lumbar    CIRCUMCISION      HERNIA REPAIR Right     NC COLONOSCOPY FLX DX W/COLLJ SPEC WHEN PFRMD N/A 1/16/2017    Procedure: EGD AND COLONOSCOPY;  Surgeon: Jane Patton DO;  Location: [facility]; Service: General     No family history on file    History   Smoking Status    Former Smoker    Packs/day: 1 00    Years: 30 00   Smokeless Tobacco    Never Used     Comment: quit 25 yrs ago     History   Alcohol Use No      History   Drug Use No       Current Outpatient Prescriptions   Medication Sig Dispense Refill    acyclovir (ZOVIRAX) 400 MG tablet Take 1 tablet (400 mg total) by mouth 3 (three) times a day for 7 days 30 tablet 0    EASY TOUCH LANCETS 33G/TWIST MISC by Does not apply route 2 (two) times a day 100 each 2    EASY TOUCH TEST test strip 2 EACH BY OTHER ROUTE DAILY CONTOUR TEST STRIPS DX CODE E11 9 TEST BLOOD SUGAR TWICE DAILY  100 each 2    finasteride (PROSCAR) 5 mg tablet Take 1 tablet (5 mg total) by mouth daily 90 tablet 3    glucose blood (CONTROL TEST) test strip 1 each by Other route daily 1 each 0    Lancets 30G MISC by Does not apply route      lisinopril (ZESTRIL) 10 mg tablet Take 1 tablet (10 mg total) by mouth daily 90 tablet 3    metFORMIN (GLUCOPHAGE) 500 mg tablet Take 1 tablet (500 mg total) by mouth 2 (two) times a day with meals 90 tablet 1    nystatin (MYCOSTATIN) 100,000 units/mL suspension Apply 5 mL (500,000 Units total) to the mouth or throat 4 (four) times a day 60 mL 0    oxybutynin (DITROPAN-XL) 5 mg 24 hr tablet Take 1 tablet (5 mg total) by mouth daily 90 tablet 3    Silodosin (RAPAFLO) 8 MG CAPS Take 1 capsule by mouth daily 90 capsule 3    traMADol (ULTRAM) 50 mg tablet Take 1 tablet (50 mg total) by mouth every 6 (six) hours as needed for moderate pain 60 tablet 0     No current facility-administered medications for this visit        No Known Allergies  Immunization History   Administered Date(s) Administered    Influenza Split High Dose Preservative Free IM 10/09/2013, 10/07/2014, 10/16/2015, 10/21/2016, 10/23/2017    Influenza, high dose seasonal 0 5 mL 11/08/2018    Pneumococcal Conjugate 13-Valent 10/21/2016    Pneumococcal Polysaccharide PPV23 11/16/2004       Patient Care Team:  Mia Goodwin MD as PCP - [facility] [place] MD Alice Zamora MD    Medicare Screening Tests and Risk Assessments:  Ronald Villegas is here for his Subsequent Wellness visit  Last Medicare Wellness visit information reviewed, patient interviewed and updates made to the record today  Health Risk Assessment:  Patient rates overall health as good  Patient feels that their physical health rating is Same  Eyesight was rated as Same  Hearing was rated as Slightly worse  Patient feels that their emotional and mental health rating is Same  Pain experienced by patient in the last 7 days has been Some  Patient's pain rating has been 4/10  Patient states that he has experienced no weight loss or gain in last 6 months  Emotional/Mental Health:  Patient has been feeling nervous/anxious  PHQ-9 Depression Screening:    Frequency of the following problems over the past two weeks:      1  Little interest or pleasure in doing things: 0 - not at all      2  Feeling down, depressed, or hopeless: 0 - not at all  PHQ-2 Score: 0          Broken Bones/Falls: Fall Risk Assessment:    In the past year, patient has experienced: No history of falling in past year          Bladder/Bowel:  Patient has not leaked urine accidently in the last six months  Patient reports no loss of bowel control  Immunizations:  Patient has had a flu vaccination within the last year  Patient has received a pneumonia shot  Patient has not received a shingles shot  Patient has not received tetanus/diphtheria shot  Home Safety:  Patient does not have trouble with stairs inside or outside of their home  Patient currently reports that there are no safety hazards present in home, working smoke alarms, working carbon monoxide detectors        Preventative Screenings:   prostate cancer screen performed, colon cancer screen completed, cholesterol screen completed, glaucoma eye exam completed,     Nutrition:  Current diet: Diabetic and No Added Salt with servings of the following:    Medications:  Patient is not currently taking any over-the-counter supplements  Patient is able to manage medications  (Additional Comments: Wife helps)Lifestyle Choices:  Patient reports no tobacco use  Patient has smoked or used tobacco in the past   Patient has stopped his tobacco use  Patient reports no alcohol use  Patient does not drive a vehicle  Patient does not wear seat belt  Current level of exercise of physical activity described by patient as: limited  Activities of Daily Living:  Can get out of bed by his or her self, able to dress self, unable to make own meals, unable to do own shopping, able to bathe self, unable to do laundry/housekeeping, unable to manage own money and other related tasks    Previous Hospitalizations:  No hospitalization or ED visit in past 12 months        Advanced Directives:  Patient has decided on a power of   Patient has spoken to designated power of   Patient has not completed advanced directive  Social Support: Friends and family    Preventative Screening/Counseling:      Cardiovascular:      General: Screening Current          Diabetes:      General: Screening Current          Colorectal Cancer:      General: Screening Current          Prostate Cancer:      General: Screening Current      Due for labs: PSA          Osteoporosis:      General: Screening Current          AAA:      General: Screening Not Indicated          Glaucoma:      General: Screening Current          HIV:      General: Screening Not Indicated          Hepatitis C:      General: Screening Not Indicated        Advanced Directives:   Patient has no living will for healthcare, does not have durable POA for healthcare, patient does not have an advanced directive  Information on ACP and/or AD provided  Provider agrees with end of life decisions        Immunizations:      Influenza: Influenza Recommended Annually      Pneumococcal: Lifetime Vaccine Completed Shingrix: Risks & Benefits Discussed      Hepatitis B (Low risk patients): Series Not Indicated      TDAP: Risks & Benefits Discussed      Other Preventative Counseling (Non-Medicare):   Fall Prevention

## 2018-11-08 NOTE — PATIENT INSTRUCTIONS
Obesity   AMBULATORY CARE:   Obesity  is when your body mass index (BMI) is greater than 30  Your healthcare provider will use your height and weight to measure your BMI  The risks of obesity include  many health problems, such as injuries or physical disability  You may need tests to check for the following:  · Diabetes     · High blood pressure or high cholesterol     · Heart disease     · Gallbladder or liver disease     · Cancer of the colon, breast, prostate, liver, or kidney     · Sleep apnea     · Arthritis or gout  Seek care immediately if:   · You have a severe headache, confusion, or difficulty speaking  · You have weakness on one side of your body  · You have chest pain, sweating, or shortness of breath  Contact your healthcare provider if:   · You have symptoms of gallbladder or liver disease, such as pain in your upper abdomen  · You have knee or hip pain and discomfort while walking  · You have symptoms of diabetes, such as intense hunger and thirst, and frequent urination  · You have symptoms of sleep apnea, such as snoring or daytime sleepiness  · You have questions or concerns about your condition or care  Treatment for obesity  focuses on helping you lose weight to improve your health  Even a small decrease in BMI can reduce the risk for many health problems  Your healthcare provider will help you set a weight-loss goal   · Lifestyle changes  are the first step in treating obesity  These include making healthy food choices and getting regular physical activity  Your healthcare provider may suggest a weight-loss program that involves coaching, education, and therapy  · Medicine  may help you lose weight when it is used with a healthy diet and physical activity  · Surgery  can help you lose weight if you are very obese and have other health problems  There are several types of weight-loss surgery  Ask your healthcare provider for more information    Be successful losing weight:   · Set small, realistic goals  An example of a small goal is to walk for 20 minutes 5 days a week  Anther goal is to lose 5% of your body weight  · Tell friends, family members, and coworkers about your goals  and ask for their support  Ask a friend to lose weight with you, or join a weight-loss support group  · Identify foods or triggers that may cause you to overeat , and find ways to avoid them  Remove tempting high-calorie foods from your home and workplace  Place a bowl of fresh fruit on your kitchen counter  If stress causes you to eat, then find other ways to cope with stress  · Keep a diary to track what you eat and drink  Also write down how many minutes of physical activity you do each day  Weigh yourself once a week and record it in your diary  Eating changes: You will need to eat 500 to 1,000 fewer calories each day than you currently eat to lose 1 to 2 pounds a week  The following changes will help you cut calories:  · Eat smaller portions  Use small plates, no larger than 9 inches in diameter  Fill your plate half full of fruits and vegetables  Measure your food using measuring cups until you know what a serving size looks like  · Eat 3 meals and 1 or 2 snacks each day  Plan your meals in advance  Henry Guillaume and eat at home most of the time  Eat slowly  · Eat fruits and vegetables at every meal   They are low in calories and high in fiber, which makes you feel full  Do not add butter, margarine, or cream sauce to vegetables  Use herbs to season steamed vegetables  · Eat less fat and fewer fried foods  Eat more baked or grilled chicken and fish  These protein sources are lower in calories and fat than red meat  Limit fast food  Dress your salads with olive oil and vinegar instead of bottled dressing  · Limit the amount of sugar you eat  Do not drink sugary beverages  Limit alcohol  Activity changes:  Physical activity is good for your body in many ways   It helps you burn calories and build strong muscles  It decreases stress and depression, and improves your mood  It can also help you sleep better  Talk to your healthcare provider before you begin an exercise program   · Exercise for at least 30 minutes 5 days a week  Start slowly  Set aside time each day for physical activity that you enjoy and that is convenient for you  It is best to do both weight training and an activity that increases your heart rate, such as walking, bicycling, or swimming  · Find ways to be more active  Do yard work and housecleaning  Walk up the stairs instead of using elevators  Spend your leisure time going to events that require walking, such as outdoor festivals or fairs  This extra physical activity can help you lose weight and keep it off  Follow up with your healthcare provider as directed: You may need to meet with a dietitian  Write down your questions so you remember to ask them during your visits  © 2017 [address] [employer] Information is for End User's use only and may not be sold, redistributed or otherwise used for commercial purposes  All illustrations and images included in CareNotes® are the copyrighted property of [employer]  or Vaibhav Nava  The above information is an  only  It is not intended as medical advice for individual conditions or treatments  Talk to your doctor, nurse or pharmacist before following any medical regimen to see if it is safe and effective for you  Urinary Incontinence   WHAT YOU NEED TO KNOW:   What is urinary incontinence? Urinary incontinence (UI) is when you lose control of your bladder  What causes UI? UI occurs because your bladder cannot store or empty urine properly  The following are the most common types of UI:  · Stress incontinence  is when you leak urine due to increased bladder pressure  This may happen when you cough, sneeze, or exercise       · Urge incontinence  is when you feel the need to urinate right away and leak urine accidentally  · Mixed incontinence  is when you have both stress and urge UI  What are the signs and symptoms of UI?   · You feel like your bladder does not empty completely when you urinate  · You urinate often and need to urinate immediately  · You leak urine when you sleep, or you wake up with the urge to urinate  · You leak urine when you cough, sneeze, exercise, or laugh  How is UI diagnosed? Your healthcare provider will ask how often you leak urine and whether you have stress or urge symptoms  Tell him which medicines you take, how often you urinate, and how much liquid you drink each day  You may need any of the following tests:  · Urine tests  may show infection or kidney function  · A pelvic exam  may be done to check for blockages  A pelvic exam will also show if your bladder, uterus, or other organs have moved out of place  · An x-ray, ultrasound, or CT  may show problems with parts of your urinary system  You may be given contrast liquid to help your organs show up better in the pictures  Tell the healthcare provider if you have ever had an allergic reaction to contrast liquid  Do not enter the MRI room with anything metal  Metal can cause serious injury  Tell the healthcare provider if you have any metal in or on your body  · A bladder scan  will show how much urine is left in your bladder after you urinate  You will be asked to urinate and then healthcare providers will use a small ultrasound machine to check the urine left in your bladder  · Cystometry  is used to check the function of your urinary system  Your healthcare provider checks the pressure in your bladder while filling it with fluid  Your bladder pressure may also be tested when your bladder is full and while you urinate  How is UI treated? · Medicines  can help strengthen your bladder control      · Electrical stimulation  is used to send a small amount of electrical energy to your pelvic floor muscles  This helps control your bladder function  Electrodes may be placed outside your body or in your rectum  For women, the electrodes may be placed in the vagina  · A bulking agent  may be injected into the wall of your urethra to make it thicker  This helps keep your urethra closed and decreases urine leakage  · Devices  such as a clamp, pessary, or tampon may help stop urine leaks  Ask your healthcare provider for more information about these and other devices  · Surgery  may be needed if other treatments do not work  Several types of surgery can help improve your bladder control  Ask your healthcare provider for more information about the surgery you may need  How can I manage my symptoms? · Do pelvic muscle exercises often  Your pelvic muscles help you stop urinating  Squeeze these muscles tight for 5 seconds, then relax for 5 seconds  Gradually work up to squeezing for 10 seconds  Do 3 sets of 15 repetitions a day, or as directed  This will help strengthen your pelvic muscles and improve bladder control  · A catheter  may be used to help empty your bladder  A catheter is a tiny, plastic tube that is put into your bladder to drain your urine  Your healthcare provider may tell you to use a catheter to prevent your bladder from getting too full and leaking urine  · Keep a UI record  Write down how often you leak urine and how much you leak  Make a note of what you were doing when you leaked urine  · Train your bladder  Go to the bathroom at set times, such as every 2 hours, even if you do not feel the urge to go  You can also try to hold your urine when you feel the urge to go  For example, hold your urine for 5 minutes when you feel the urge to go  As that becomes easier, hold your urine for 10 minutes  · Drink liquids as directed  Ask your healthcare provider how much liquid to drink each day and which liquids are best for you   You may need to limit the amount of liquid you drink to help control your urine leakage  Limit or do not have drinks that contain caffeine or alcohol  Do not drink any liquid right before you go to bed  · Prevent constipation  Eat a variety of high-fiber foods  Good examples are high-fiber cereals, beans, vegetables, and whole-grain breads  Prune juice may help make your bowel movement softer  Walking is the best way to trigger your intestines to have a bowel movement  · Exercise regularly and maintain a healthy weight  Ask your healthcare provider how much you should weigh and about the best exercise plan for you  Weight loss and exercise will decrease pressure on your bladder and help you control your leakage  Ask him to help you create a weight loss plan if you are overweight  When should I seek immediate care? · You have severe pain  · You are confused or cannot think clearly  When should I contact my healthcare provider? · You have a fever  · You see blood in your urine  · You have pain when you urinate  · You have new or worse pain, even after treatment  · Your mouth feels dry or you have vision changes  · Your urine is cloudy or smells bad  · You have questions or concerns about your condition or care  CARE AGREEMENT:   You have the right to help plan your care  Learn about your health condition and how it may be treated  Discuss treatment options with your caregivers to decide what care you want to receive  You always have the right to refuse treatment  The above information is an  only  It is not intended as medical advice for individual conditions or treatments  Talk to your doctor, nurse or pharmacist before following any medical regimen to see if it is safe and effective for you  © 2017 [address] [employer] Information is for End User's use only and may not be sold, redistributed or otherwise used for commercial purposes   All illustrations and images included in CareNotes® are the copyrighted property of [employer]  or Vaibhav Nava  Cigarette Smoking and Your Health   AMBULATORY CARE:   Risks to your health if you smoke:  Nicotine and other chemicals found in tobacco damage every cell in your body  Even if you are a light smoker, you have an increased risk for cancer, heart disease, and lung disease  If you are pregnant or have diabetes, smoking increases your risk for complications  Benefits to your health if you stop smoking:   · You decrease respiratory symptoms such as coughing, wheezing, and shortness of breath  · You reduce your risk for cancers of the lung, mouth, throat, kidney, bladder, pancreas, stomach, and cervix  If you already have cancer, you increase the benefits of chemotherapy  You also reduce your risk for cancer returning or a second cancer from developing  · You reduce your risk for heart disease, blood clots, heart attack, and stroke  · You reduce your risk for lung infections, and diseases such as pneumonia, asthma, chronic bronchitis, and emphysema  · Your circulation improves  More oxygen can be delivered to your body  If you have diabetes, you lower your risk for complications, such as kidney, artery, and eye diseases  You also lower your risk for nerve damage  Nerve damage can lead to amputations, poor vision, and blindness  · You improve your body's ability to heal and to fight infections  Benefits to the health of others if you stop smoking:  Tobacco is harmful to nonsmokers who breathe in your secondhand smoke  The following are ways the health of others around you may improve when you stop smoking:  · You lower the risks for lung cancer and heart disease in nonsmoking adults  · If you are pregnant, you lower the risk for miscarriage, early delivery, low birth weight, and stillbirth  You also lower your baby's risk for SIDS, obesity, developmental delay, and neurobehavioral problems, such as ADHD  · If you have children, you lower their risk for ear infections, colds, pneumonia, bronchitis, and asthma  For more information and support to stop smoking:   · Smokefree  gov  Phone: 9- 583 - 144-1162  Web Address: www smokefrPROTEGO  Follow up with your healthcare provider as directed:  Write down your questions so you remember to ask them during your visits  © 2017 [address] [employer] Information is for End User's use only and may not be sold, redistributed or otherwise used for commercial purposes  All illustrations and images included in CareNotes® are the copyrighted property of [employer]  or Vaibhav Nava  The above information is an  only  It is not intended as medical advice for individual conditions or treatments  Talk to your doctor, nurse or pharmacist before following any medical regimen to see if it is safe and effective for you  Fall Prevention   WHAT YOU NEED TO KNOW:   What is fall prevention? Fall prevention includes ways to make your home and other areas safer  It also includes ways you can move more carefully to prevent a fall  What increases my risk for falls? · Lack of vitamin D    · Not getting enough sleep each night    · Trouble walking or keeping your balance, or foot problems    · Health conditions that cause changes in your blood pressure, vision, or muscle strength and coordination    · Medicines that make you dizzy, weak, or sleepy    · Problems seeing clearly    · Shoes that have high heels or are not supportive    · Tripping hazards, such as items left on the floor, no handrails on the stairs, or broken steps  How can I help protect myself from falls? · Stand or sit up slowly  This may help you keep your balance and prevent falls  If you need to get up during the night, sit up first  Be sure you are fully awake before you stand  Turn on the light before you start walking  Go slowly in case you are still sleepy   Make sure you will not trip over any pets sleeping in the bedroom  · Use assistive devices as directed  Your healthcare provider may suggest that you use a cane or walker to help you keep your balance  You may need to have grab bars put in your bathroom near the toilet or in the shower  · Wear shoes that fit well and have soles that   Wear shoes both inside and outside  Use slippers with good   Do not wear shoes with high heels  · Wear a personal alarm  This is a device that allows you to call 911 if you fall and need help  Ask your healthcare provider for more information  · Stay active  Exercise can help strengthen your muscles and improve your balance  Your healthcare provider may recommend water aerobics or walking  He or she may also recommend physical therapy to improve your coordination  Never start an exercise program without talking to your healthcare provider first      · Manage medical conditions  Keep all appointments with your healthcare providers  Visit your eye doctor as directed  How can I make my home safer? · Add items to prevent falls in the bathroom  Put nonslip strips on your bath or shower floor to prevent you from slipping  Use a bath mat if you do not have carpet in the bathroom  This will prevent you from falling when you step out of the bath or shower  Use a shower seat so you do not need to stand while you shower  Sit on the toilet or a chair in your bathroom to dry yourself and put on clothing  This will prevent you from losing your balance from drying or dressing yourself while you are standing  · Keep paths clear  Remove books, shoes, and other objects from walkways and stairs  Place cords for telephones and lamps out of the way so that you do not need to walk over them  Tape them down if you cannot move them  Remove small rugs  If you cannot remove a rug, secure it with double-sided tape  This will prevent you from tripping  · Install bright lights in your home  Use night lights to help light paths to the bathroom or kitchen  Always turn on the light before you start walking  · Keep items you use often on shelves within reach  Do not use a step stool to help you reach an item  · Paint or place reflective tape on the edges of your stairs  This will help you see the stairs better  Call 911 or have someone else call if:   · You have fallen and are unconscious  · You have fallen and cannot move part of your body  Contact your healthcare provider if:   · You have fallen and have pain or a headache  · You have questions or concerns about your condition or care  CARE AGREEMENT:   You have the right to help plan your care  Learn about your health condition and how it may be treated  Discuss treatment options with your caregivers to decide what care you want to receive  You always have the right to refuse treatment  The above information is an  only  It is not intended as medical advice for individual conditions or treatments  Talk to your doctor, nurse or pharmacist before following any medical regimen to see if it is safe and effective for you  © 2017 [address] [facility] Information is for End User's use only and may not be sold, redistributed or otherwise used for commercial purposes  All illustrations and images included in CareNotes® are the copyrighted property of [employer]  or Vaibhav Nava  Advance Directives   WHAT YOU NEED TO KNOW:   What are advance directives? Advance directives are legal documents that state your wishes and plans for medical care  These plans are made ahead of time in case you lose your ability to make decisions for yourself  Advance directives can apply to any medical decision, such as the treatments you want, and if you want to donate organs  What are the types of advance directives? There are many types of advance directives, and each state has rules about how to use them   You may choose a combination of any of the following:  · Living will: This is a written record of the treatment you want  You can also choose which treatments you do not want, which to limit, and which to stop at a certain time  This includes surgery, medicine, IV fluid, and tube feedings  · Durable power of  for healthcare [facility]): This is a written record that states who you want to make healthcare choices for you when you are unable to make them for yourself  This person, called a proxy, is usually a family member or a friend  You may choose more than 1 proxy  · Do not resuscitate (DNR) order:  A DNR order is used in case your heart stops beating or you stop breathing  It is a request not to have certain forms of treatment, such as CPR  A DNR order may be included in other types of advance directives  · Medical directive: This covers the care that you want if you are in a coma, near death, or unable to make decisions for yourself  You can list the treatments you want for each condition  Treatment may include pain medicine, surgery, blood transfusions, dialysis, IV or tube feedings, and a ventilator (breathing machine)  · Values history: This document has questions about your views, beliefs, and how you feel and think about life  This information can help others choose the care that you would choose  Why are advance directives important? An advance directive helps you control your care  Although spoken wishes may be used, it is better to have your wishes written down  Spoken wishes can be misunderstood, or not followed  Treatments may be given even if you do not want them  An advance directive may make it easier for your family to make difficult choices about your care  How do I decide what to put in my advance directives? · Make informed decisions:  Make sure you fully understand treatments or care you may receive   Think about the benefits and problems your decisions could cause for you or your family  Talk to healthcare providers if you have concerns or questions before you write down your wishes  You may also want to talk with your [employer] or , or a   Check your state laws to make sure that what you put in your advance directive is legal      · Sign all forms:  Sign and date your advance directive when you have finished  You may also need 2 witnesses to sign the forms  Witnesses cannot be your doctor or his staff, your spouse, heirs or beneficiaries, people you owe money to, or your chosen proxy  Talk to your family, proxy, and healthcare providers about your advance directive  Give each person a copy, and keep one for yourself in a place you can get to easily  Do not keep it hidden or locked away  · Review and revise your plans: You can revise your advance directive at any time, as long as you are able to make decisions  Review your plan every year, and when there are changes in your life, or your health  When you make changes, let your family, proxy, and healthcare providers know  Give each a new copy  Where can I find more information? · [employer]  [address] [place] , [address]  Phone: 9- 656 - [phone]  Phone: 0- 053 - [phone]  Web Address: http://www  aafp org  · [address] [facility])  [address] [facility] [place], [address]  Phone: 7- [phone] - [phone]  Phone: [phone]  Web Address: Maxim narayanan  CARE AGREEMENT:   You have the right to help plan your care  To help with this plan, you must learn about your health condition and treatment options  You must also learn about advance directives and how they are used  Work with your healthcare providers to decide what care will be used to treat you  You always have the right to refuse treatment  The above information is an  only   It is not intended as medical advice for individual conditions or treatments  Talk to your doctor, nurse or pharmacist before following any medical regimen to see if it is safe and effective for you  © 2017 [address] [employer] Information is for End User's use only and may not be sold, redistributed or otherwise used for commercial purposes  All illustrations and images included in CareNotes® are the copyrighted property of [employer]  or Vaibhav Nava

## 2018-11-08 NOTE — PROGRESS NOTES
Assessment/Plan:    Controlled type 2 diabetes mellitus without complication, without long-term current use of insulin ([facility])  Lab Results   Component Value Date    HGBA1C 6 2 (H) 03/30/2018       No results for input(s): POCGLU in the last 72 hours  Blood Sugar Average: Last 72 hrs:   patient consistently runs A1cs in the low 6s  He has no symptoms of hyperglycemia nor retinopathy or neuropathy  He is managed with merely by diet    Hypertension  His blood pressure continues to be extremely well controlled with lisinopril 10  He furthermore denies arm symptoms of cardiovascular disease such as claudication headache or chest pain  His risk factors for same include well-controlled diabetes hypertension and prior disease but no hyperlipidemia    Otitis of both ears  Patient also complains of bilateral ear pain on exam both tympanic membranes are red  Will treat with a course of amoxicillin       Diagnoses and all orders for this visit:    Controlled type 2 diabetes mellitus without complication, without long-term current use of insulin ([facility] )  -     Comprehensive metabolic panel; Future  -     Hemoglobin A1C; Future  -     Lipid panel; Future    Otitis of both ears  -     amoxicillin (AMOXIL) 500 mg capsule; Take 1 capsule (500 mg total) by mouth every 8 (eight) hours for 10 days    Essential hypertension    Medicare annual wellness visit, subsequent    Need for influenza vaccination  -     influenza vaccine, [id number], high-dose, PF 0 5 mL, for patients 65 yr+ (FLUZONE HIGH-DOSE)    Risk for falls    Screening for prostate cancer  -     PSA, Total Screen    Decreased hearing of both ears  -     Ambulatory referral to Audiology; Future          Subjective:      Patient ID: Elke Juarez is a 81 y o  male  Patient is doing extremely well and is without complaints except for decreased hearing  Both he and his wife are due for a hearing test      Patient's shoes and socks removed  Right Foot/Ankle   Right Foot Inspection  Skin Exam: skin normal and skin intact no dry skin, no warmth, no callus, no erythema, no maceration, no abnormal color, no pre-ulcer, no ulcer and no callus                          Toe Exam: ROM and strength within normal limitsRight toe deformity: Onychomycosis  Sensory   Vibration: intact    Monofilament testing: intact  Vascular    The right DP pulse is 1+  Left Foot/Ankle  Left Foot Inspection  Skin Exam: skin normal and skin intactno dry skin, no warmth, no erythema, no maceration, normal color, no pre-ulcer, no ulcer and no callus                         Toe Exam: ROM and strength within normal limits and left toe deformity                   Sensory   Vibration: intact    Monofilament: intact  Vascular    The left DP pulse is 1+  Assign Risk Category:  Deformity present; No loss of protective sensation; Weak pulses       Risk: 1        Review of Systems   Constitutional: Positive for fatigue  Negative for chills, fever and unexpected weight change  HENT: Positive for ear pain and hearing loss  Negative for congestion, postnasal drip, sinus pressure, sore throat, trouble swallowing and voice change  Eyes: Negative for visual disturbance  Respiratory: Negative for cough, chest tightness, shortness of breath and wheezing  Cardiovascular: Negative for chest pain, palpitations and leg swelling  Gastrointestinal: Negative for abdominal distention, abdominal pain, anal bleeding, blood in stool, constipation, diarrhea and nausea  Endocrine: Negative for cold intolerance, polydipsia, polyphagia and polyuria  Genitourinary: Negative for dysuria, flank pain, frequency, hematuria and urgency  Musculoskeletal: Positive for back pain  Negative for arthralgias, gait problem, joint swelling, myalgias and neck pain  Skin: Negative for rash  Allergic/Immunologic: Negative for immunocompromised state     Neurological: Negative for dizziness, syncope, facial asymmetry, weakness, light-headedness, numbness and headaches  Hematological: Negative for adenopathy  Psychiatric/Behavioral: Negative for confusion, sleep disturbance and suicidal ideas  Objective:      /60 (BP Location: Left arm, Patient Position: Sitting)   Pulse 62   Temp 97 9 °F (36 6 °C) (Tympanic)   Ht 5' 9" (1 753 m)   Wt 92 4 kg (203 lb 9 6 oz)   SpO2 98%   BMI 30 07 kg/m²          Physical Exam   Constitutional: He is oriented to person, place, and time  He appears well-developed and well-nourished  No distress  HENT:   Nose: Nose normal    Mouth/Throat: Oropharynx is clear and moist  No oropharyngeal exudate  Red tympanic membranes bilaterally and hearing aids bilaterally   Eyes: Pupils are equal, round, and reactive to light  EOM are normal    Neck: Normal range of motion  Neck supple  No JVD present  No thyromegaly present  Cardiovascular: Normal rate, normal heart sounds and intact distal pulses  Exam reveals no gallop  Pulses are weak pulses  No murmur heard  Pulses:       Dorsalis pedis pulses are 1+ on the right side, and 1+ on the left side  Frequent premature beats, EKG done in June showed PVCs   Pulmonary/Chest: Effort normal and breath sounds normal  No respiratory distress  He has no wheezes  He has no rales  Abdominal: Soft  Bowel sounds are normal  He exhibits no distension and no mass  There is no tenderness  Musculoskeletal: Normal range of motion  He exhibits no tenderness  Mild kyphosis, slow gait   Feet:   Right Foot:   Skin Integrity: Negative for ulcer, skin breakdown, erythema, warmth, callus or dry skin  Left Foot:   Skin Integrity: Negative for ulcer, skin breakdown, erythema, warmth, callus or dry skin  Lymphadenopathy:     He has no cervical adenopathy  Neurological: He is alert and oriented to person, place, and time  No cranial nerve deficit  Coordination normal    Skin: No rash noted  Psychiatric: He has a normal mood and affect   His behavior is normal  Judgment and thought content normal    Vitals reviewed

## 2018-11-08 NOTE — ASSESSMENT & PLAN NOTE
Patient also complains of bilateral ear pain on exam both tympanic membranes are red    Will treat with a course of amoxicillin

## 2018-11-29 ENCOUNTER — OFFICE VISIT (OUTPATIENT)
Dept: AUDIOLOGY | Age: 83
End: 2018-11-29
Payer: COMMERCIAL

## 2018-11-29 DIAGNOSIS — H90.A32 MIXED CONDUCTIVE AND SENSORINEURAL HEARING LOSS OF LEFT EAR WITH RESTRICTED HEARING OF RIGHT EAR: Primary | ICD-10-CM

## 2018-11-29 PROCEDURE — 92557 COMPREHENSIVE HEARING TEST: CPT | Performed by: AUDIOLOGIST

## 2018-11-29 PROCEDURE — 92567 TYMPANOMETRY: CPT | Performed by: AUDIOLOGIST

## 2018-11-29 NOTE — PROGRESS NOTES
HEARING EVALUATION    Name:  Leela Anthony  :  1935  Age:  81 y o  Date of Evaluation: 18     History: Known asymmetrical hearing loss  Reason for visit: Leela Anthony is being seen today at the request of Dr Chiqui Jamison for an evaluation of hearing  Patient reports that left hearing aid is not functioning  He also reports that he was experiencing discomfort in the left ear and was diagnosed with an ear infection (finishing antibiotics now)  Although the patient denies any tinnitus or dizziness his wife did express concern over his balance/steadiness  EVALUATION:    Otoscopic Evaluation:   Right Ear: Clear and healthy ear canal and tympanic membrane   Left Ear: ear canal pink, small white growth anteriorly    Tympanometry:   Right: Type A - normal middle ear pressure and compliance   Left: Type A - normal middle ear pressure and compliance    Audiogram Results:  Right: 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  Left: 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    *Today's results reveal significant decrease in hearing in the left ear    *see attached audiogram         Hearing Aid Visit:    Name:  Leela Anthony  :  1935  Age:  81 y o  Date of Evaluation: 18     Leela Anthony is being seen for a hearing aid visit  Leela Anthony   is fit binaurally with Phonak Audeo B50 R samuel hearing aid(s) serial number [id number] right/ serial number [id number] eft  Warranty 3/11/20  Patient reports left instrument is not working  Action:  Instruments were cleaned and checked  Left instrument was found to be weak and will be sent for repair  Right instrument had good sound quality and speechmapping revealed excellent frequency response  RECOMMENDATIONS:  Return to [facility]  for F/U and Copy to Patient/Caregiver   Hearing re-evaluation in 1-3 months  Left hearing aid sent for repair   Will do brief check of air conduction thresholds in left ear prior to fitting repaired instrument (schedule 30 minute HAV when hearing aid returns from repair)    PATIENT EDUCATION:   Discussed results and recommendations with patient and caregiver  Questions were addressed and the patient was encouraged to contact our department should concerns arise        Carole Leyva   Clinical Audiologist

## 2018-11-29 NOTE — LETTER
November 29, 2018     Mckay Lewis MD  [address] [facility]    Patient: Jonathon Rodriguez   YOB: 1935   Date of Visit: 11/29/2018       Dear Dr Kelly Austin: Thank you for referring Saritha Chapin to me for evaluation  Below are my notes for this consultation  If you have questions, please do not hesitate to call me  I look forward to following your patient along with you           Sincerely,        Enrique Lainez        CC: No Recipients

## 2018-12-05 ENCOUNTER — HOSPITAL ENCOUNTER (EMERGENCY)
Facility: HOSPITAL | Age: 83
Discharge: HOME | End: 2018-12-05
Attending: EMERGENCY MEDICINE | Admitting: EMERGENCY MEDICINE
Payer: COMMERCIAL

## 2018-12-05 ENCOUNTER — APPOINTMENT (EMERGENCY)
Dept: RADIOLOGY | Facility: HOSPITAL | Age: 83
End: 2018-12-05
Payer: COMMERCIAL

## 2018-12-05 VITALS
DIASTOLIC BLOOD PRESSURE: 68 MMHG | WEIGHT: 203.71 LBS | OXYGEN SATURATION: 97 % | RESPIRATION RATE: 15 BRPM | TEMPERATURE: 97.9 F | SYSTOLIC BLOOD PRESSURE: 149 MMHG | BODY MASS INDEX: 30.08 KG/M2 | HEART RATE: 62 BPM

## 2018-12-05 DIAGNOSIS — S01.81XA LACERATION OF FOREHEAD, INITIAL ENCOUNTER: ICD-10-CM
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
## 2018-12-05 LAB
ALBUMIN SERPL BCP-MCNC: 3.6 G/DL (ref 3.5–5)
ALP SERPL-CCNC: 56 U/L (ref 46–116)
ALT SERPL W P-5'-P-CCNC: 22 U/L (ref 12–78)
ANION GAP SERPL CALCULATED.3IONS-SCNC: 8 MMOL/L (ref 4–13)
AST SERPL W P-5'-P-CCNC: 12 U/L (ref 5–45)
ATRIAL RATE: 62 BPM
BASOPHILS # BLD AUTO: 0.04 THOUSANDS/ΜL (ref 0–0.1)
BASOPHILS NFR BLD AUTO: 1 % (ref 0–1)
BILIRUB SERPL-MCNC: 0.47 MG/DL (ref 0.2–1)
BUN SERPL-MCNC: 24 MG/DL (ref 5–25)
CALCIUM SERPL-MCNC: 9.2 MG/DL (ref 8.3–10.1)
CHLORIDE SERPL-SCNC: 105 MMOL/L (ref 100–108)
CO2 SERPL-SCNC: 24 MMOL/L (ref 21–32)
CREAT SERPL-MCNC: 1.39 MG/DL (ref 0.6–1.3)
EOSINOPHIL # BLD AUTO: 0.09 THOUSAND/ΜL (ref 0–0.61)
EOSINOPHIL NFR BLD AUTO: 2 % (ref 0–6)
ERYTHROCYTE [DISTWIDTH] IN BLOOD BY AUTOMATED COUNT: 12 % (ref 11.6–15.1)
GFR SERPL CREATININE-BSD FRML MDRD: 47 ML/MIN/1.73SQ M
GLUCOSE SERPL-MCNC: 83 MG/DL (ref 65–140)
HCT VFR BLD AUTO: 43 % (ref 36.5–49.3)
HGB BLD-MCNC: 14.4 G/DL (ref 12–17)
IMM GRANULOCYTES # BLD AUTO: 0.01 THOUSAND/UL (ref 0–0.2)
IMM GRANULOCYTES NFR BLD AUTO: 0 % (ref 0–2)
LYMPHOCYTES # BLD AUTO: 1 THOUSANDS/ΜL (ref 0.6–4.47)
LYMPHOCYTES NFR BLD AUTO: 24 % (ref 14–44)
MCH RBC QN AUTO: 31.2 PG (ref 26.8–34.3)
MCHC RBC AUTO-ENTMCNC: 33.5 G/DL (ref 31.4–37.4)
MCV RBC AUTO: 93 FL (ref 82–98)
MONOCYTES # BLD AUTO: 0.37 THOUSAND/ΜL (ref 0.17–1.22)
MONOCYTES NFR BLD AUTO: 9 % (ref 4–12)
NEUTROPHILS # BLD AUTO: 2.75 THOUSANDS/ΜL (ref 1.85–7.62)
NEUTS SEG NFR BLD AUTO: 64 % (ref 43–75)
NRBC BLD AUTO-RTO: 0 /100 WBCS
P AXIS: 42 DEGREES
PLATELET # BLD AUTO: 176 THOUSANDS/UL (ref 149–390)
PMV BLD AUTO: 10.6 FL (ref 8.9–12.7)
POTASSIUM SERPL-SCNC: 4.4 MMOL/L (ref 3.5–5.3)
PR INTERVAL: 192 MS
PROT SERPL-MCNC: 6.8 G/DL (ref 6.4–8.2)
QRS AXIS: -8 DEGREES
QRSD INTERVAL: 112 MS
QT INTERVAL: 408 MS
QTC INTERVAL: 414 MS
RBC # BLD AUTO: 4.62 MILLION/UL (ref 3.88–5.62)
SODIUM SERPL-SCNC: 137 MMOL/L (ref 136–145)
T WAVE AXIS: 43 DEGREES
VENTRICULAR RATE: 62 BPM
WBC # BLD AUTO: 4.26 THOUSAND/UL (ref 4.31–10.16)

## 2018-12-05 PROCEDURE — 93010 ELECTROCARDIOGRAM REPORT: CPT | Performed by: INTERNAL MEDICINE

## 2018-12-05 PROCEDURE — 36415 COLL VENOUS BLD VENIPUNCTURE: CPT | Performed by: EMERGENCY MEDICINE

## 2018-12-05 PROCEDURE — 93005 ELECTROCARDIOGRAM TRACING: CPT

## 2018-12-05 PROCEDURE — 70450 CT HEAD/BRAIN W/O DYE: CPT

## 2018-12-05 PROCEDURE — 90471 IMMUNIZATION ADMIN: CPT

## 2018-12-05 PROCEDURE — 72125 CT NECK SPINE W/O DYE: CPT

## 2018-12-05 PROCEDURE — 85025 COMPLETE CBC W/AUTO DIFF WBC: CPT | Performed by: EMERGENCY MEDICINE

## 2018-12-05 PROCEDURE — 80053 COMPREHEN METABOLIC PANEL: CPT | Performed by: EMERGENCY MEDICINE

## 2018-12-05 PROCEDURE — 99284 EMERGENCY DEPT VISIT MOD MDM: CPT

## 2018-12-05 PROCEDURE — 90715 TDAP VACCINE 7 YRS/> IM: CPT | Performed by: EMERGENCY MEDICINE

## 2018-12-05 RX ORDER — LIDOCAINE HYDROCHLORIDE 10 MG/ML
5 INJECTION, SOLUTION EPIDURAL; INFILTRATION; INTRACAUDAL; PERINEURAL ONCE
Status: COMPLETED | OUTPATIENT
Start: 2018-12-05 | End: 2018-12-05

## 2018-12-05 RX ADMIN — TETANUS TOXOID, REDUCED DIPHTHERIA TOXOID AND ACELLULAR PERTUSSIS VACCINE, ADSORBED 0.5 ML: 5; 2.5; 8; 8; 2.5 SUSPENSION INTRAMUSCULAR at 13:26

## 2018-12-05 RX ADMIN — LIDOCAINE HYDROCHLORIDE 5 ML: 10 INJECTION, SOLUTION EPIDURAL; INFILTRATION; INTRACAUDAL; PERINEURAL at 13:26

## 2018-12-05 NOTE — DISCHARGE INSTRUCTIONS
Sutures should be removed in approximately 5 to 7 days  You can return to the emergency department or   go to her primary care doctor for removal     Laceration   WHAT YOU NEED TO KNOW:   A laceration is an injury to the skin and the soft tissue underneath it  Lacerations happen when you are cut or hit by something  They can happen anywhere on the body  DISCHARGE INSTRUCTIONS:   Return to the emergency department if:   · You have heavy bleeding or bleeding that does not stop after 10 minutes of holding firm, direct pressure over the wound  · Your wound opens up  Contact your healthcare provider if:   · You have a fever or chills  · Your laceration is red, warm, or swollen  · You have red streaks on your skin coming from your wound  · You have white or yellow drainage from the wound that smells bad  · You have pain that gets worse, even after treatment  · You have questions or concerns about your condition or care  Medicines:   · Prescription pain medicine  may be given  Ask how to take this medicine safely  · Antibiotics  help treat or prevent a bacterial infection  · Take your medicine as directed  Contact your healthcare provider if you think your medicine is not helping or if you have side effects  Tell him or her if you are allergic to any medicine  Keep a list of the medicines, vitamins, and herbs you take  Include the amounts, and when and why you take them  Bring the list or the pill bottles to follow-up visits  Carry your medicine list with you in case of an emergency  Care for your wound as directed:   · Do not get your wound wet  until your healthcare provider says it is okay  Do not soak your wound in water  Do not go swimming until your healthcare provider says it is okay  Carefully wash the wound with soap and water  Gently pat the area dry or allow it to air dry  · Change your bandages  when they get wet, dirty, or after washing   Apply new, clean bandages as directed  Do not apply elastic bandages or tape too tight  Do not put powders or lotions over your incision  · Apply antibiotic ointment as directed  Your healthcare provider may give you antibiotic ointment to put over your wound if you have stitches  If you have strips of tape over your incision, let them dry up and fall off on their own  If they do not fall off within 14 days, gently remove them  If you have glue over your wound, do not remove or pick at it  If your glue comes off, do not replace it with glue that you have at home  · Check your wound every day for signs of infection such as swelling, redness, or pus  Self-care:   · Apply ice  on your wound for 15 to 20 minutes every hour or as directed  Use an ice pack, or put crushed ice in a plastic bag  Cover it with a towel  Ice helps prevent tissue damage and decreases swelling and pain  · Use a splint as directed  A splint will decrease movement and stress on your wound  It may help it heal faster  A splint may be used for lacerations over joints or areas of your body that bend  Ask your healthcare provider how to apply and remove a splint  · Decrease scarring of your wound  by applying ointments as directed  Do not apply ointments until your healthcare provider says it is okay  You may need to wait until your wound is healed  Ask which ointment to buy and how often to use it  After your wound is healed, use sunscreen over the area when you are out in the sun  You should do this for at least 6 months to 1 year after your injury  Follow up with your healthcare provider as directed: You may need to follow up in 24 to 48 hours to have your wound checked for infection  You will need to return in 3 to 14 days if you have stitches or staples so they can be removed  Care for your wound as directed to prevent infection and help it heal  Write down your questions so you remember to ask them during your visits    © 2017 [employer]  Information is for End User's use only and may not be sold, redistributed or otherwise used for commercial purposes  All illustrations and images included in CareNotes® are the copyrighted property of [employer]  or [employer]  The above information is an  only  It is not intended as medical advice for individual conditions or treatments  Talk to your doctor, nurse or pharmacist before following any medical regimen to see if it is safe and effective for you

## 2018-12-05 NOTE — ED PROVIDER NOTES
History  Chief Complaint   Patient presents with    Fall     as per EMS pt being treated for hypotension and when he got up he got a little woozy and fell hitting his head against a desk has a 3-4cm lac on the left side of his head as per patient he never went out during this AM      30-year-old male presents for evaluation after sustaining a mechanical fall and striking his head on a desk while at the [employer] office  No loss of consciousness  According the patient's wife, he did seem to have transient ataxia yesterday and today seemed off balance when standing up  Patient struck the right periorbital/temporal region of his head and sustained approximately 2 cm laceration to that region  Patient denies any focal neurological deficits or headache at this time  No overt neck pain  No other complaints  Prior to Admission Medications   Prescriptions Last Dose Informant Patient Reported? Taking? EASY TOUCH LANCETS 33G/TWIST MISC  Self No No   Sig: by Does not apply route 2 (two) times a day   EASY TOUCH TEST test strip   No No   Si EACH BY OTHER ROUTE DAILY CONTOUR TEST STRIPS DX CODE E11 9 TEST BLOOD SUGAR TWICE DAILY     Lancets 30G MISC  Self Yes No   Sig: by Does not apply route   Silodosin (RAPAFLO) 8 MG CAPS  Self No No   Sig: Take 1 capsule by mouth daily   acyclovir (ZOVIRAX) 400 MG tablet   No No   Sig: Take 1 tablet (400 mg total) by mouth 3 (three) times a day for 7 days   finasteride (PROSCAR) 5 mg tablet   No No   Sig: Take 1 tablet (5 mg total) by mouth daily   glucose blood (CONTROL TEST) test strip  Self No No   Si each by Other route daily   lisinopril (ZESTRIL) 10 mg tablet  Self No No   Sig: Take 1 tablet (10 mg total) by mouth daily   metFORMIN (GLUCOPHAGE) 500 mg tablet   No No   Sig: Take 1 tablet (500 mg total) by mouth 2 (two) times a day with meals   nystatin (MYCOSTATIN) 100,000 units/mL suspension   No No   Sig: Apply 5 mL (500,000 Units total) to the mouth or throat 4 (four) times a day   oxybutynin (DITROPAN-XL) 5 mg 24 hr tablet   No No   Sig: Take 1 tablet (5 mg total) by mouth daily   traMADol (ULTRAM) 50 mg tablet   No No   Sig: Take 1 tablet (50 mg total) by mouth every 6 (six) hours as needed for moderate pain      Facility-Administered Medications: None       Past Medical History:   Diagnosis Date    Balanitis     last assessed 12/19/14    BPH (benign prostatic hyperplasia)     Diabetes mellitus (HCC)     blood sugar 167 this am at 0630    GERD (gastroesophageal reflux disease)     Heme + stool     Hypertension     Lumbar radiculopathy     Myocardial infarction ([facility] [address] )     35 years ago    Phimosis     last assessed 04/27/16    Sciatica     right side, last assessed 04/25/16       Past Surgical History:   Procedure Laterality Date    BACK SURGERY      laminectomy Lumbar    CATARACT EXTRACTION, BILATERAL      CIRCUMCISION      HERNIA REPAIR Right     NM COLONOSCOPY FLX DX W/COLLJ SPEC WHEN PFRMD N/A 1/16/2017    Procedure: EGD AND COLONOSCOPY;  Surgeon: Rosario Salgado DO;  Location: [facility]; Service: General       History reviewed  No pertinent family history  I have reviewed and agree with the history as documented  Social History   Substance Use Topics    Smoking status: Former Smoker     Packs/day: 1 00     Years: 30 00    Smokeless tobacco: Never Used      Comment: quit 25 yrs ago    Alcohol use No        Review of Systems   Constitutional: Negative for activity change, appetite change, chills, diaphoresis, fatigue and fever  HENT: Negative for dental problem, ear pain, sore throat, trouble swallowing and voice change  Eyes: Negative for pain and visual disturbance  Respiratory: Negative for cough, chest tightness, shortness of breath and wheezing  Cardiovascular: Negative for chest pain, palpitations and leg swelling     Gastrointestinal: Negative for abdominal pain, anal bleeding, blood in stool, diarrhea, nausea, rectal pain and vomiting  Endocrine: Negative for polydipsia, polyphagia and polyuria  Genitourinary: Negative for difficulty urinating, dysuria, flank pain, frequency, hematuria and urgency  Musculoskeletal: Negative for back pain, joint swelling, myalgias, neck pain and neck stiffness  Skin: Negative for pallor, rash and wound  Neurological: Negative for dizziness, facial asymmetry, speech difficulty, weakness, light-headedness, numbness and headaches  Hematological: Negative for adenopathy  Psychiatric/Behavioral: Negative for agitation  The patient is not nervous/anxious  All other systems reviewed and are negative  Physical Exam  Physical Exam   Constitutional: He is oriented to person, place, and time  He appears well-developed and well-nourished  No distress  HENT:   Head: Normocephalic  Head is with laceration  Head is without raccoon's eyes, without Carrillo's sign, without abrasion, without contusion, without right periorbital erythema and without left periorbital erythema  Right Ear: External ear normal    Left Ear: External ear normal    Nose: Nose normal    Mouth/Throat: Oropharynx is clear and moist    Eyes: Pupils are equal, round, and reactive to light  Conjunctivae and EOM are normal  Right eye exhibits no discharge  Left eye exhibits no discharge  No scleral icterus  Neck: Trachea normal and phonation normal  No JVD present  Spinous process tenderness present  No tracheal tenderness and no muscular tenderness present  No neck rigidity  No tracheal deviation, no erythema and normal range of motion present  No thyroid mass and no thyromegaly present  Cardiovascular: Normal rate, regular rhythm, normal heart sounds and intact distal pulses  Exam reveals no gallop and no friction rub  No murmur heard  Pulmonary/Chest: No stridor  Abdominal: Soft  He exhibits no distension and no mass  There is no tenderness  There is no rebound and no guarding     Musculoskeletal: Normal range of motion  He exhibits no edema, tenderness or deformity  Lymphadenopathy:     He has no cervical adenopathy  Neurological: He is alert and oriented to person, place, and time  He has normal strength  He displays no atrophy, no tremor and normal reflexes  No cranial nerve deficit or sensory deficit  He exhibits normal muscle tone  He displays no seizure activity  Coordination normal  GCS eye subscore is 4  GCS verbal subscore is 5  GCS motor subscore is 6  Skin: Skin is warm and dry  Capillary refill takes less than 2 seconds  No rash noted  He is not diaphoretic  No erythema  No pallor  Psychiatric: He has a normal mood and affect  Nursing note and vitals reviewed                Vital Signs  ED Triage Vitals [12/05/18 1203]   Temperature Pulse Respirations Blood Pressure SpO2   97 9 °F (36 6 °C) 63 18 167/86 99 %      Temp Source Heart Rate Source Patient Position - Orthostatic VS BP Location FiO2 (%)   Oral Monitor Sitting Left arm --      Pain Score       No Pain           Vitals:    12/05/18 1203 12/05/18 1245 12/05/18 1400   BP: 167/86 158/77 149/68   Pulse: 63 62 62   Patient Position - Orthostatic VS: Sitting  Lying       Visual Acuity  Visual Acuity      Most Recent Value   L Pupil Size (mm)  3   R Pupil Size (mm)  3          ED Medications  Medications   tetanus-diphtheria-acellular pertussis (BOOSTRIX) IM injection 0 5 mL (0 5 mL Intramuscular Given 12/5/18 1326)   lidocaine (PF) (XYLOCAINE-MPF) 1 % injection 5 mL (5 mL Infiltration Given 12/5/18 1326)       Diagnostic Studies  Results Reviewed     Procedure Component Value Units Date/Time    Comprehensive metabolic panel [[id number]]  (Abnormal) Collected:  12/05/18 1249    Lab Status:  Final result Specimen:  Blood from Arm, Left Updated:  12/05/18 1323     Sodium 137 mmol/L      Potassium 4 4 mmol/L      Chloride 105 mmol/L      CO2 24 mmol/L      ANION GAP 8 mmol/L      BUN 24 mg/dL      Creatinine 1 39 (H) mg/dL      Glucose 83 mg/dL Calcium 9 2 mg/dL      AST 12 U/L      ALT 22 U/L      Alkaline Phosphatase 56 U/L      Total Protein 6 8 g/dL      Albumin 3 6 g/dL      Total Bilirubin 0 47 mg/dL      eGFR 47 ml/min/1 73sq m     Narrative:         National Kidney Disease Education Program recommendations are as follows:  GFR calculation is accurate only with a steady state creatinine  Chronic Kidney disease less than 60 ml/min/1 73 sq  meters  Kidney failure less than 15 ml/min/1 73 sq  meters  CBC and differential [[id number]]  (Abnormal) Collected:  12/05/18 1249    Lab Status:  Final result Specimen:  Blood from Arm, Left Updated:  12/05/18 1301     WBC 4 26 (L) Thousand/uL      RBC 4 62 Million/uL      Hemoglobin 14 4 g/dL      Hematocrit 43 0 %      MCV 93 fL      MCH 31 2 pg      MCHC 33 5 g/dL      RDW 12 0 %      MPV 10 6 fL      Platelets 306 Thousands/uL      nRBC 0 /100 WBCs      Neutrophils Relative 64 %      Immat GRANS % 0 %      Lymphocytes Relative 24 %      Monocytes Relative 9 %      Eosinophils Relative 2 %      Basophils Relative 1 %      Neutrophils Absolute 2 75 Thousands/µL      Immature Grans Absolute 0 01 Thousand/uL      Lymphocytes Absolute 1 00 Thousands/µL      Monocytes Absolute 0 37 Thousand/µL      Eosinophils Absolute 0 09 Thousand/µL      Basophils Absolute 0 04 Thousands/µL                  CT cervical spine without contrast   ED Interpretation by Gretchen León DO (12/05 1357)   No obvious acute cervical spinal pathology  Final Result by Liban Murry DO (12/05 4257)      No cervical spine fracture or traumatic malalignment  Moderate multilevel cervical degenerative changes  Emphysema with left upper lobe scarring  Workstation performed: [id number]         CT head without contrast   ED Interpretation by Gretchen León DO (12/05 8996)   No obvious acute intracranial pathology  Final Result by Liban Murry DO (12/05 8453)      No acute intracranial abnormality        Right frontal scalp soft tissue swelling and laceration  No calvarial fracture  Workstation performed: [id number]                    Procedures  Lac Repair  Date/Time: 12/5/2018 3:04 PM  Performed by: Hunter Mayer  Authorized by: Parminder TAYLOR   Consent: Verbal consent obtained  Risks and benefits: risks, benefits and alternatives were discussed  Consent given by: patient  Patient understanding: patient states understanding of the procedure being performed  Patient identity confirmed: verbally with patient and arm band  Body area: head/neck  Location details: forehead  Laceration length: 2 cm  Foreign bodies: no foreign bodies  Tendon involvement: none  Nerve involvement: none  Vascular damage: no  Anesthesia: local infiltration    Anesthesia:  Local Anesthetic: lidocaine 1% without epinephrine  Anesthetic total: 3 mL    Sedation:  Patient sedated: no    Wound Dehiscence:  Superficial Wound Dehiscence: simple closure      Procedure Details:  Irrigation solution: saline  Irrigation method: syringe  Amount of cleaning: standard  Debridement: none  Degree of undermining: none  Skin closure: 4-0 Prolene  Number of sutures: 3  Technique: simple  Approximation: close  Approximation difficulty: simple  Dressing: 4x4 sterile gauze             Phone Contacts  ED Phone Contact    ED Course           Identification of Seniors at Risk      Most Recent Value   (ISAR) Identification of Seniors at Risk   Before the illness or injury that brought you to the Emergency, did you need someone to help you on a regular basis? 0 Filed at: 12/05/2018 1214   In the last 24 hours, have you needed more help than usual?  0 Filed at: 12/05/2018 1214   Have you been hospitalized for one or more nights during the past 6 months? 0 Filed at: 12/05/2018 1214   In general, do you see well?  0 Filed at: 12/05/2018 1214   In general, do you have serious problems with your memory?   0 Filed at: 12/05/2018 1214   Do you take more than three different medications every day? 1 Filed at: 12/05/2018 1214   ISAR Score  1 Filed at: 12/05/2018 1214                          MDM  Number of Diagnoses or Management Options  Diagnosis management comments: 71-year-old male presents for evaluation after a fall and sustained closed head trauma with laceration to the right periorbital/temporal region  Transient ataxia  No focal neurological deficits  Basic labs, CT head and cervical spine, symptom management, Tdap booster, disposition as appropriate  Amount and/or Complexity of Data Reviewed  Clinical lab tests: ordered and reviewed  Tests in the radiology section of CPT®: ordered and reviewed  Review and summarize past medical records: yes      CritCare Time    Disposition  Final diagnoses:   Fall from standing, initial encounter   Laceration of forehead, initial encounter     Time reflects when diagnosis was documented in both MDM as applicable and the Disposition within this note     Time User Action Codes Description Comment    12/5/2018  1:58 PM Austin Miramontes Add [N54  XXXA] Fall from standing, initial encounter     12/5/2018  1:58 PM Tony Oviedo Add [S01 [id number]] Laceration of forehead, initial encounter       ED Disposition     ED Disposition Condition Comment    Discharge  Evertt Sandhoff discharge to home/self care  Condition at discharge: Stable        Follow-up Information     Follow up With Specialties Details Why Contact Info    Elsa Avila MD Internal Medicine Call in 1 day Follow up [address] [place] [address]  [phone]            Patient's Medications   Discharge Prescriptions    No medications on file     No discharge procedures on file      ED Provider  Electronically Signed by           Lizeth Chicas DO  12/05/18 8315

## 2018-12-05 NOTE — ED RE-EVALUATION NOTE
All imaging reviewed  All labs reviewed  No acute abnormalities noted  Laceration repaired  Patient ambulatory without any difficulty  Will be discharged home       Gretchen León DO  12/05/18 0130

## 2018-12-06 ENCOUNTER — OFFICE VISIT (OUTPATIENT)
Dept: AUDIOLOGY | Age: 83
End: 2018-12-06

## 2018-12-06 DIAGNOSIS — H90.3 SENSORINEURAL HEARING LOSS, BILATERAL: Primary | ICD-10-CM

## 2018-12-06 NOTE — PROGRESS NOTES
Hearing Aid Visit:    Name:  Geri Trejo  :  1935  Age:  81 y o  Date of Evaluation: 18     Geri Trejo is being seen for a hearing aid visit  Geri Trejo   is fit binaurally with [employer]onak Audeo B50 R samuel hearing aid(s) serial number [id number] right/ serial number [id number] eft  Warranty 3/11/20  Patient reports that he fell yesterday and hit his head  He reports that he was taken to the hospital and received stitches  A CT scan was reportedly done and no abnormalities found  Action:  Otoscopy revealed active drainage and red tympanic membrane (with possible dried blood) in left ear  Brief audiometric testing was performed and revealed improved left ear thresholds from last week, but not back to 2017 levels  A mixed component is still noted  Recommendations:   Discontinue use of left hearing aid until medically clear  ENT visit scheduled for today at [facility] at 1:00 to address decreased hearing and dizziness  Patient will schedule HAV for possible reprogramming once left ear medically clear          Carole Claire   Clinical Audiologist

## 2019-01-16 LAB
ALBUMIN SERPL-MCNC: 3.9 G/DL (ref 3.6–5.1)
ALBUMIN/GLOB SERPL: 1.7 (CALC) (ref 1–2.5)
ALP SERPL-CCNC: 49 U/L (ref 40–115)
ALT SERPL-CCNC: 12 U/L (ref 9–46)
AST SERPL-CCNC: 12 U/L (ref 10–35)
BILIRUB SERPL-MCNC: 0.6 MG/DL (ref 0.2–1.2)
BUN SERPL-MCNC: 23 MG/DL (ref 7–25)
BUN/CREAT SERPL: 16 (CALC) (ref 6–22)
CALCIUM SERPL-MCNC: 9.7 MG/DL (ref 8.6–10.3)
CHLORIDE SERPL-SCNC: 103 MMOL/L (ref 98–110)
CHOLEST SERPL-MCNC: 168 MG/DL
CHOLEST/HDLC SERPL: 4.1 (CALC)
CO2 SERPL-SCNC: 28 MMOL/L (ref 20–32)
CREAT SERPL-MCNC: 1.43 MG/DL (ref 0.7–1.11)
GLOBULIN SER CALC-MCNC: 2.3 G/DL (CALC) (ref 1.9–3.7)
GLUCOSE SERPL-MCNC: 134 MG/DL (ref 65–99)
HBA1C MFR BLD: 6.2 % OF TOTAL HGB
HDLC SERPL-MCNC: 41 MG/DL
LDLC SERPL CALC-MCNC: 103 MG/DL (CALC)
NONHDLC SERPL-MCNC: 127 MG/DL (CALC)
POTASSIUM SERPL-SCNC: 4.6 MMOL/L (ref 3.5–5.3)
PROT SERPL-MCNC: 6.2 G/DL (ref 6.1–8.1)
SL AMB EGFR AFRICAN AMERICAN: 52 ML/MIN/1.73M2
SL AMB EGFR NON AFRICAN AMERICAN: 45 ML/MIN/1.73M2
SODIUM SERPL-SCNC: 139 MMOL/L (ref 135–146)
TRIGL SERPL-MCNC: 139 MG/DL

## 2019-03-19 ENCOUNTER — OFFICE VISIT (OUTPATIENT)
Dept: INTERNAL MEDICINE CLINIC | Age: 84
End: 2019-03-19
Payer: COMMERCIAL

## 2019-03-19 VITALS
WEIGHT: 204.8 LBS | TEMPERATURE: 97.7 F | HEART RATE: 72 BPM | HEIGHT: 69 IN | DIASTOLIC BLOOD PRESSURE: 60 MMHG | SYSTOLIC BLOOD PRESSURE: 118 MMHG | BODY MASS INDEX: 30.33 KG/M2

## 2019-03-19 DIAGNOSIS — I10 ESSENTIAL HYPERTENSION: ICD-10-CM

## 2019-03-19 DIAGNOSIS — E11.9 CONTROLLED TYPE 2 DIABETES MELLITUS WITHOUT COMPLICATION, WITHOUT LONG-TERM CURRENT USE OF INSULIN (HCC): Primary | ICD-10-CM

## 2019-03-19 DIAGNOSIS — E11.8 TYPE 2 DIABETES MELLITUS WITH COMPLICATION, UNSPECIFIED WHETHER LONG TERM INSULIN USE: ICD-10-CM

## 2019-03-19 DIAGNOSIS — N40.1 BENIGN PROSTATIC HYPERPLASIA WITH URINARY FREQUENCY: ICD-10-CM

## 2019-03-19 DIAGNOSIS — I25.2 OLD MYOCARDIAL INFARCTION: ICD-10-CM

## 2019-03-19 DIAGNOSIS — R35.0 BENIGN PROSTATIC HYPERPLASIA WITH URINARY FREQUENCY: ICD-10-CM

## 2019-03-19 DIAGNOSIS — M54.5 CHRONIC LOW BACK PAIN, UNSPECIFIED BACK PAIN LATERALITY, WITH SCIATICA PRESENCE UNSPECIFIED: ICD-10-CM

## 2019-03-19 DIAGNOSIS — G89.29 CHRONIC LOW BACK PAIN, UNSPECIFIED BACK PAIN LATERALITY, WITH SCIATICA PRESENCE UNSPECIFIED: ICD-10-CM

## 2019-03-19 PROCEDURE — 1036F TOBACCO NON-USER: CPT | Performed by: INTERNAL MEDICINE

## 2019-03-19 PROCEDURE — 3078F DIAST BP <80 MM HG: CPT | Performed by: INTERNAL MEDICINE

## 2019-03-19 PROCEDURE — 3074F SYST BP LT 130 MM HG: CPT | Performed by: INTERNAL MEDICINE

## 2019-03-19 PROCEDURE — 3725F SCREEN DEPRESSION PERFORMED: CPT | Performed by: INTERNAL MEDICINE

## 2019-03-19 PROCEDURE — 99214 OFFICE O/P EST MOD 30 MIN: CPT | Performed by: INTERNAL MEDICINE

## 2019-03-19 RX ORDER — TRAMADOL HYDROCHLORIDE 50 MG/1
50 TABLET ORAL EVERY 6 HOURS PRN
Qty: 60 TABLET | Refills: 0 | Status: SHIPPED | OUTPATIENT
Start: 2019-03-19 | End: 2021-06-15 | Stop reason: SDUPTHER

## 2019-03-19 RX ORDER — FINASTERIDE 5 MG/1
5 TABLET, FILM COATED ORAL DAILY
Qty: 90 TABLET | Refills: 3 | Status: SHIPPED | OUTPATIENT
Start: 2019-03-19 | End: 2020-05-26 | Stop reason: SDUPTHER

## 2019-03-19 RX ORDER — LANCETS 33 GAUGE
EACH MISCELLANEOUS 2 TIMES DAILY
Qty: 100 EACH | Refills: 2 | Status: SHIPPED | OUTPATIENT
Start: 2019-03-19 | End: 2020-03-17 | Stop reason: SDUPTHER

## 2019-03-19 NOTE — ASSESSMENT & PLAN NOTE
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

## 2019-03-19 NOTE — PATIENT INSTRUCTIONS
Type 2 Diabetes in Adults   AMBULATORY CARE:   Type 2 diabetes  is a disease that affects how your body uses glucose (sugar)  Normally, when the blood sugar level increases, the pancreas makes more insulin  Insulin helps move sugar out of the blood so it can be used for energy  Type 2 diabetes develops because either the body cannot make enough insulin, or it cannot use the insulin correctly  After many years, your pancreas may stop making insulin  Common symptoms include the following:   · More hunger or thirst than usual     · Frequent urination     · Weight loss without trying     · Blurred vision  Call 911 if you have any of the following:   · You have any of the following signs of a stroke:      ¨ Numbness or drooping on one side of your face     ¨ Weakness in an arm or leg    ¨ Confusion or difficulty speaking    ¨ Dizziness, a severe headache, or vision loss    · You have any of the following signs of a heart attack:      ¨ Squeezing, pressure, or pain in your chest that lasts longer than 5 minutes or returns    ¨ Discomfort or pain in your back, neck, jaw, stomach, or arm     ¨ Trouble breathing    ¨ Nausea or vomiting    ¨ Lightheadedness or a sudden cold sweat, especially with chest pain or trouble breathing  Seek care immediately if:   · You have severe abdominal pain, or the pain spreads to your back  You may also be vomiting  · You have trouble staying awake or focusing  · You are shaking or sweating  · You have blurred or double vision  · Your breath has a fruity, sweet smell  · Your breathing is deep and labored, or rapid and shallow  · Your heartbeat is fast and weak  Contact your healthcare provider if:   · You are vomiting or have diarrhea  · You have an upset stomach and cannot eat the foods on your meal plan  · You feel weak or more tired than usual      · You feel dizzy, have headaches, or are easily irritated  · Your skin is red, warm, dry, or swollen  · You have a wound that does not heal      · You have numbness in your arms or legs  · You have trouble coping with your illness, or you feel anxious or depressed  · You have questions or concerns about your condition or care  Treatment for type 2 diabetes  includes keeping your blood sugar at a normal level  You must eat the right foods, and exercise regularly  You may need medicine if you cannot control your blood sugar level with nutrition and exercise  You may also need medicine to prevent heart disease, a complication of type 2 diabetes  You may  need any of the following:  · Hypoglycemic medicines or insulin  may be given to decrease the amount of sugar in your blood  · Blood pressure medicine  may be given to lower your blood pressure  Your blood pressure should be less than 140/90  · Cholesterol lowering medicine  may be given to prevent heart disease  · Antiplatelets , such as aspirin, help prevent blood clots  Take your antiplatelet medicine exactly as directed  These medicines make it more likely for you to bleed or bruise  If you are told to take aspirin, do not take acetaminophen or ibuprofen instead  · Take your medicine as directed  Contact your healthcare provider if you think your medicine is not helping or if you have side effects  Tell him or her if you are allergic to any medicine  Keep a list of the medicines, vitamins, and herbs you take  Include the amounts, and when and why you take them  Bring the list or the pill bottles to follow-up visits  Carry your medicine list with you in case of an emergency  Check your blood sugar level: You will be taught how to check a small drop of blood in a glucose monitor  You will need to check your blood sugar level at least 3 times each day if you are on insulin  Ask your healthcare provider when and how often to check during the day  If you check your blood sugar level before a meal , it should be between 80 and 130 mg/dL   If you check your blood sugar level 1 to 2 hours after a meal , it should be less than 180 mg/dL  Ask your healthcare provider if these are good goals for you  Write down your results, and show them to your healthcare provider  He may use the results to make changes to your medicine, food, and exercise schedules  If your blood sugar level is too low: Your blood sugar level is too low if it goes below 70 mg/dL  If the level is too low, eat or drink 15 grams of fast-acting carbohydrate  These are found naturally in fruits  Fast-acting carbohydrates will raise your blood sugar level quickly  Examples of 15 grams of fast-acting carbohydrate are 4 ounces (½ cup) of fruit juice or 4 ounces of regular soda  Other examples are 2 tablespoons of raisins or 3 to 4 glucose tablets  Check your blood sugar level 15 minutes later  If the level is still low (less than 100 mg/dL), eat another 15 grams of carbohydrate  When the level returns to 100 mg/dL, eat a snack or meal that contains carbohydrates  This will help prevent another drop in blood sugar  Always carefully follow your healthcare provider's instructions on how to treat low blood sugar levels  Check your feet each day for sores:  Wear shoes and socks that fit correctly  Do not trim your toenails  Ask your healthcare provider for more information about foot care  Follow your meal plan:  A dietitian will help you make a meal plan to keep your blood sugar level steady  Do not skip meals  Your blood sugar level may drop too low if you have taken diabetes medicine and do not eat  · Keep track of carbohydrates (sugar and starchy foods)  Your blood sugar level can get too high if you eat too many carbohydrates  Your dietitian will help you plan meals and snacks that have the right amount of carbohydrates  · Eat low-fat foods , such as skinless chicken and low-fat milk  · Eat less sodium (salt)    Limit high-sodium foods, such as soy sauce, potato chips, and soup  Do not add salt to food you cook  Limit your use of table salt  You should have less than 2,300 mg of sodium per day  · Eat high-fiber foods , such as vegetables, whole grain breads, and beans  · Limit alcohol  Alcohol affects your blood sugar level and can make it harder to manage your diabetes  Limit alcohol to 1 drink a day if you are a woman  Limit alcohol to 2 drinks a day if you are a man  A drink of alcohol is 12 ounces of beer, 5 ounces of wine, or 1½ ounces of liquor  Maintain a healthy weight:  Ask your healthcare provider how much you should weigh  A healthy weight can help you control your diabetes  Ask your provider to help you create a weight loss plan if you are overweight  Together you can set manageable weight loss goals  Exercise as directed:  Exercise can help keep your blood sugar level steady, decrease your risk of heart disease, and help you lose weight  Stretch before and after you exercise  Exercise for at least 150 minutes every week  Spread this amount of exercise over at least 3 days a week  Do not skip exercise more than 2 days in a row  Include muscle strengthening activities 2 to 3 days each week  Older adults should include balance training 2 to 3 times each week  Activities that help increase balance include yoga and beverley chi  Work with your healthcare provider to create an exercise plan  · Check your blood sugar level before and after exercise  Healthcare providers may tell you to change the amount of insulin you take or food you eat  If your blood sugar level is high, check your blood or urine for ketones before you exercise  Do not exercise if your blood sugar level is high and you have ketones  · If your blood sugar level is less than 100 mg/dL, have a carbohydrate snack before you exercise  Examples are 4 to 6 crackers, ½ banana, 8 ounces (1 cup) of milk, or 4 ounces (½ cup) of juice   Drink water or liquids that do not contain sugar before, during, and after exercise  Ask your dietitian or healthcare provider which liquids you should drink when you exercise  · Do not sit for longer than 30 minutes  If you cannot walk around, at least stand up  This will help you stay active and keep your blood circulating  Do not smoke:  Nicotine and other chemicals in cigarettes and cigars can cause lung damage and make it more difficult to manage your diabetes  Ask your healthcare provider for information if you currently smoke and need help to quit  Do not use e-cigarettes or smokeless tobacco in place of cigarettes or to help you quit  They still contain nicotine  Check your blood pressure as directed:  Ask your healthcare provider what your blood pressure should be  Most adults with diabetes and high blood pressure should have a systolic blood pressure (first number) less than 140  Your diastolic blood pressure (second number) should be less than 90  Wear medical alert identification:  Wear medical alert jewelry or carry a card that says you have diabetes  Ask your healthcare provider where to get these items  Ask about vaccines: You have a higher risk for serious illness if you get the flu, pneumonia, or hepatitis  Ask your healthcare provider if you should get a flu, pneumonia, or hepatitis B vaccine, and when to get the vaccine  Follow up with your healthcare provider as directed: You may need to return to have your A1c checked every 3 months  You will need to return at least once each year to have your feet checked  You will need an eye exam once a year to check for retinopathy  You will also need urine tests every year to check for kidney problems  You may need tests to monitor for heart disease such as an EKG, stress test, blood pressure monitoring, and blood tests  Write down your questions so you remember to ask them during your visits     © 2017 [employer] Information is for End User's use only and may not be sold, redistributed or otherwise used for commercial purposes  All illustrations and images included in CareNotes® are the copyrighted property of [employer]  or [employer]  The above information is an  only  It is not intended as medical advice for individual conditions or treatments  Talk to your doctor, nurse or pharmacist before following any medical regimen to see if it is safe and effective for you

## 2019-03-19 NOTE — PROGRESS NOTES
Assessment/Plan:    Controlled type 2 diabetes mellitus without complication, without long-term current use of insulin (Formerly [facility])  Lab Results   Component Value Date    HGBA1C 6 2 (H) 01/15/2019       No results for input(s): POCGLU in the last 72 hours  Blood Sugar Average: Last 72 hrs:   he has excellent control of his blood sugar with diet and metformin  Will recheck blood work in summer    Hypertension  His blood pressure also has well-controlled with only lisinopril 10 mg    Old myocardial infarction  Patient was diagnosed with an inferior MI years before I met him  He has never had complains of chest pain palpitations or shortness of breath and his last EKG was not consistent with an inferior MI  He does not regularly follow with Cardiology    Enlarged prostate with lower urinary tract symptoms (LUTS)  Patient continues to have difficulty with urination either too much or too little and takes Proscar Oxybutrin in and Rapaflo for control of same he does follow with Urology    Chronic low back pain  He continues to take an occasional tramadol for chronic low back pain and would like a refill       Diagnoses and all orders for this visit:    Controlled type 2 diabetes mellitus without complication, without long-term current use of insulin ([facility])  -     Basic metabolic panel; Future  -     Hemoglobin A1C; Future  -     Microalbumin / creatinine urine ratio  -     EASY TOUCH LANCETS 33G/TWIST MISC; by Does not apply route 2 (two) times a day  -     glucose blood (EASY TOUCH TEST) test strip; 2 each by Other route daily CONTOUR TEST STRIPS     DX CODE  E11 9    TEST BLOOD SUGAR TWICE DAILY  Old myocardial infarction    Essential hypertension    Benign prostatic hyperplasia with urinary frequency  -     finasteride (PROSCAR) 5 mg tablet;  Take 1 tablet (5 mg total) by mouth daily    Type 2 diabetes mellitus with complication, unspecified whether long term insulin use (HCC)  -     metFORMIN (GLUCOPHAGE) 500 mg tablet; Take 1 tablet (500 mg total) by mouth 2 (two) times a day with meals    Chronic low back pain, unspecified back pain laterality, with sciatica presence unspecified  -     traMADol (ULTRAM) 50 mg tablet; Take 1 tablet (50 mg total) by mouth every 6 (six) hours as needed for moderate pain          Subjective:      Patient ID: Kaushik Lei is a 81 y o  male  Patient is here for a checkup but actually feels pretty well  He does does need some refills and will be due for repeat blood work in summer  Review of Systems   Constitutional: Positive for fatigue  Negative for chills, fever and unexpected weight change  HENT: Positive for hearing loss  Negative for congestion, ear pain, postnasal drip, sinus pressure, sore throat, trouble swallowing and voice change  Eyes: Negative for visual disturbance  Respiratory: Negative for cough, chest tightness, shortness of breath and wheezing  Cardiovascular: Negative for chest pain, palpitations and leg swelling  Gastrointestinal: Negative for abdominal distention, abdominal pain, anal bleeding, blood in stool, constipation, diarrhea and nausea  Endocrine: Negative for cold intolerance, polydipsia, polyphagia and polyuria  Genitourinary: Positive for difficulty urinating and frequency  Negative for dysuria, flank pain, hematuria and urgency  Musculoskeletal: Negative for arthralgias, back pain, gait problem, joint swelling, myalgias and neck pain  Skin: Negative for rash  Allergic/Immunologic: Negative for immunocompromised state  Neurological: Negative for dizziness, syncope, facial asymmetry, weakness, light-headedness, numbness and headaches  Hematological: Negative for adenopathy  Psychiatric/Behavioral: Negative for confusion, sleep disturbance and suicidal ideas           Objective:      /60 (BP Location: Left arm, Patient Position: Sitting)   Pulse 72   Temp 97 7 °F (36 5 °C) (Tympanic)   Ht 5' 9" (1 753 m)   Wt 92 9 kg (204 lb 12 8 oz)   BMI 30 24 kg/m²          Physical Exam   Constitutional: He is oriented to person, place, and time  He appears well-developed and well-nourished  No distress  HENT:   Right Ear: External ear normal    Left Ear: External ear normal    Nose: Nose normal    Mouth/Throat: Oropharynx is clear and moist  No oropharyngeal exudate  Eyes: Pupils are equal, round, and reactive to light  EOM are normal    Neck: Normal range of motion  Neck supple  No JVD present  No thyromegaly present  Cardiovascular: Normal rate, regular rhythm, normal heart sounds and intact distal pulses  Exam reveals no gallop  No murmur heard  Pulmonary/Chest: Effort normal and breath sounds normal  No respiratory distress  He has no wheezes  He has no rales  Abdominal: Soft  Bowel sounds are normal  He exhibits no distension and no mass  There is no tenderness  Musculoskeletal: Normal range of motion  He exhibits no tenderness  Lymphadenopathy:     He has no cervical adenopathy  Neurological: He is alert and oriented to person, place, and time  No cranial nerve deficit  Coordination normal    Skin: No rash noted  Psychiatric: He has a normal mood and affect   His behavior is normal  Judgment and thought content normal

## 2019-03-19 NOTE — ASSESSMENT & PLAN NOTE
Lab Results   Component Value Date    HGBA1C 6 2 (H) 01/15/2019       No results for input(s): POCGLU in the last 72 hours  Blood Sugar Average: Last 72 hrs:   he has excellent control of his blood sugar with diet and metformin    Will recheck blood work in summer

## 2019-03-19 NOTE — ASSESSMENT & PLAN NOTE
Patient continues to have difficulty with urination either too much or too little and takes Proscar Oxybutrin in and Rapaflo for control of same he does follow with Urology

## 2019-05-10 ENCOUNTER — OFFICE VISIT (OUTPATIENT)
Dept: UROLOGY | Facility: AMBULATORY SURGERY CENTER | Age: 84
End: 2019-05-10
Payer: COMMERCIAL

## 2019-05-10 VITALS
WEIGHT: 199 LBS | HEART RATE: 68 BPM | DIASTOLIC BLOOD PRESSURE: 70 MMHG | BODY MASS INDEX: 29.47 KG/M2 | HEIGHT: 69 IN | SYSTOLIC BLOOD PRESSURE: 130 MMHG

## 2019-05-10 DIAGNOSIS — R39.15 BENIGN PROSTATIC HYPERPLASIA (BPH) WITH URINARY URGENCY: Primary | ICD-10-CM

## 2019-05-10 DIAGNOSIS — N40.1 BENIGN PROSTATIC HYPERPLASIA (BPH) WITH URINARY URGENCY: Primary | ICD-10-CM

## 2019-05-10 DIAGNOSIS — N32.81 OAB (OVERACTIVE BLADDER): ICD-10-CM

## 2019-05-10 LAB — POST-VOID RESIDUAL VOLUME, ML POC: 0 ML

## 2019-05-10 PROCEDURE — 51798 US URINE CAPACITY MEASURE: CPT | Performed by: NURSE PRACTITIONER

## 2019-05-10 PROCEDURE — 99214 OFFICE O/P EST MOD 30 MIN: CPT | Performed by: NURSE PRACTITIONER

## 2019-06-03 DIAGNOSIS — I10 ESSENTIAL HYPERTENSION: ICD-10-CM

## 2019-06-03 RX ORDER — LISINOPRIL 10 MG/1
10 TABLET ORAL DAILY
Qty: 90 TABLET | Refills: 3 | Status: SHIPPED | OUTPATIENT
Start: 2019-06-03 | End: 2020-09-21 | Stop reason: SDUPTHER

## 2019-09-19 DIAGNOSIS — R35.0 BENIGN PROSTATIC HYPERPLASIA WITH URINARY FREQUENCY: ICD-10-CM

## 2019-09-19 DIAGNOSIS — N40.1 BENIGN PROSTATIC HYPERPLASIA WITH URINARY FREQUENCY: ICD-10-CM

## 2019-09-19 NOTE — TELEPHONE ENCOUNTER
Patient left a message on the Medication Refill voice mail line requesting a new prescription for Oxybutynin ER 5mg, 90 day supply to [facility]

## 2019-09-20 RX ORDER — OXYBUTYNIN CHLORIDE 5 MG/1
5 TABLET, EXTENDED RELEASE ORAL DAILY
Qty: 90 TABLET | Refills: 3 | Status: SHIPPED | OUTPATIENT
Start: 2019-09-20 | End: 2020-09-21 | Stop reason: SDUPTHER

## 2019-09-20 NOTE — TELEPHONE ENCOUNTER
The patient has an upcoming office visit scheduled for 5/18/20 with JONAS Huitron in the [place] location but will run out of medication until then    Request for same, 90 day supply with 3 refills was queued and forwarded to the Advanced Practitioner covering the [place] location for approval

## 2019-09-25 ENCOUNTER — OFFICE VISIT (OUTPATIENT)
Dept: INTERNAL MEDICINE CLINIC | Age: 84
End: 2019-09-25
Payer: COMMERCIAL

## 2019-09-25 VITALS
BODY MASS INDEX: 29.86 KG/M2 | WEIGHT: 201.6 LBS | HEART RATE: 56 BPM | HEIGHT: 69 IN | SYSTOLIC BLOOD PRESSURE: 122 MMHG | TEMPERATURE: 98.2 F | OXYGEN SATURATION: 98 % | DIASTOLIC BLOOD PRESSURE: 58 MMHG

## 2019-09-25 DIAGNOSIS — I10 ESSENTIAL HYPERTENSION: ICD-10-CM

## 2019-09-25 DIAGNOSIS — E11.9 CONTROLLED TYPE 2 DIABETES MELLITUS WITHOUT COMPLICATION, WITHOUT LONG-TERM CURRENT USE OF INSULIN (HCC): ICD-10-CM

## 2019-09-25 DIAGNOSIS — M54.50 CHRONIC MIDLINE LOW BACK PAIN WITHOUT SCIATICA: ICD-10-CM

## 2019-09-25 DIAGNOSIS — G89.29 CHRONIC MIDLINE LOW BACK PAIN WITHOUT SCIATICA: ICD-10-CM

## 2019-09-25 DIAGNOSIS — R53.82 CHRONIC FATIGUE: ICD-10-CM

## 2019-09-25 DIAGNOSIS — Z23 NEED FOR INFLUENZA VACCINATION: ICD-10-CM

## 2019-09-25 PROBLEM — H66.93 OTITIS OF BOTH EARS: Status: RESOLVED | Noted: 2018-11-08 | Resolved: 2019-09-25

## 2019-09-25 PROCEDURE — 3078F DIAST BP <80 MM HG: CPT | Performed by: INTERNAL MEDICINE

## 2019-09-25 PROCEDURE — G0008 ADMIN INFLUENZA VIRUS VAC: HCPCS

## 2019-09-25 PROCEDURE — 99214 OFFICE O/P EST MOD 30 MIN: CPT | Performed by: INTERNAL MEDICINE

## 2019-09-25 PROCEDURE — 90662 IIV NO PRSV INCREASED AG IM: CPT

## 2019-09-25 PROCEDURE — 3074F SYST BP LT 130 MM HG: CPT | Performed by: INTERNAL MEDICINE

## 2019-09-25 NOTE — ASSESSMENT & PLAN NOTE
His BMI is in the overweight category  Of interest his wife felt he was losing weight but examination of his recent weights this year did not confirm that    She states he eats well and walks frequently

## 2019-09-25 NOTE — ASSESSMENT & PLAN NOTE
He does have chronic low back pain since his laminectomy but does well with only Tylenol and an occasional Advil

## 2019-09-25 NOTE — ASSESSMENT & PLAN NOTE
Lab Results   Component Value Date    HGBA1C 6 2 (H) 01/15/2019    His blood sugar has been well controlled on metformin 500 mg   He has no obvious symptoms of neuropathy retinopathy or nephropathy

## 2019-09-25 NOTE — ASSESSMENT & PLAN NOTE
He continues to have good control of his blood pressure with lisinopril 10   He has had no recent chest pain shortness of breath edema or PND

## 2019-09-25 NOTE — PATIENT INSTRUCTIONS
Medicare Preventive Visit Patient Instructions  Thank you for completing your Welcome to Medicare Visit or Medicare Annual Wellness Visit today  Your next wellness visit will be due in one year (9/25/2020)  The screening/preventive services that you may require over the next 5-10 years are detailed below  Some tests may not apply to you based off risk factors and/or age  Screening tests ordered at today's visit but not completed yet may show as past due  Also, please note that scanned in results may not display below  Preventive Screenings:  Service Recommendations Previous Testing/Comments   Colorectal Cancer Screening  · Colonoscopy    · Fecal Occult Blood Test (FOBT)/Fecal Immunochemical Test (FIT)  · Fecal DNA/Cologuard Test  · Flexible Sigmoidoscopy Age: 54-65 years old   Colonoscopy: every 10 years (May be performed more frequently if at higher risk)  OR  FOBT/FIT: every 1 year  OR  Cologuard: every 3 years  OR  Sigmoidoscopy: every 5 years  Screening may be recommended earlier than age 48 if at higher risk for colorectal cancer  Also, an individualized decision between you and your healthcare provider will decide whether screening between the ages of 74-80 would be appropriate   Colonoscopy: Not on file  FOBT/FIT: 11/12/2016  Cologuard: Not on file  Sigmoidoscopy: Not on file         Prostate Cancer Screening Individualized decision between patient and health care provider in men between ages of 53-78   Medicare will cover every 12 months beginning on the day after your 50th birthday PSA: No results in last 5 years          Hepatitis C Screening Once for adults born between [birth year] and 1965  More frequently in patients at high risk for Hepatitis C Hep C Antibody: Not on file       Diabetes Screening 1-2 times per year if you're at risk for diabetes or have pre-diabetes Fasting glucose: No results in last 5 years   A1C: 6 2 % of total Hgb       Cholesterol Screening Once every 5 years if you don't have a lipid disorder  May order more often based on risk factors  Lipid panel: 01/15/2019          Other Preventive Screenings Covered by Medicare:  1  Abdominal Aortic Aneurysm (AAA) Screening: covered once if your at risk  You're considered to be at risk if you have a family history of AAA or a male between the age of 73-68 who smoking at least 100 cigarettes in your lifetime  2  Lung Cancer Screening: covers low dose CT scan once per year if you meet all of the following conditions: (1) Age 50-69; (2) No signs or symptoms of lung cancer; (3) Current smoker or have quit smoking within the last 15 years; (4) You have a tobacco smoking history of at least 30 pack years (packs per day x number of years you smoked); (5) You get a written order from a healthcare provider  3  Glaucoma Screening: covered annually if you're considered high risk: (1) You have diabetes OR (2) Family history of glaucoma OR (3)  aged 48 and older OR (3)  American aged 72 and older  3  Osteoporosis Screening: covered every 2 years if you meet one of the following conditions: (1) Have a vertebral abnormality; (2) On glucocorticoid therapy for more than 3 months; (3) Have primary hyperparathyroidism; (4) On osteoporosis medications and need to assess response to drug therapy  5  HIV Screening: covered annually if you're between the age of 12-76  Also covered annually if you are younger than 13 and older than 72 with risk factors for HIV infection  For pregnant patients, it is covered up to 3 times per pregnancy      Immunizations:  Immunization Recommendations   Influenza Vaccine Annual influenza vaccination during flu season is recommended for all persons aged >= 6 months who do not have contraindications   Pneumococcal Vaccine (Prevnar and Pneumovax)  * Prevnar = PCV13  * Pneumovax = PPSV23 Adults 25-60 years old: 1-3 doses may be recommended based on certain risk factors  Adults 72 years old: Prevnar (PCV13) vaccine recommended followed by Pneumovax (PPSV23) vaccine  If already received PPSV23 since turning 65, then PCV13 recommended at least one year after PPSV23 dose  Hepatitis B Vaccine 3 dose series if at intermediate or high risk (ex: diabetes, end stage renal disease, liver disease)   Tetanus (Td) Vaccine - COST NOT COVERED BY MEDICARE PART B Following completion of primary series, a booster dose should be given every 10 years to maintain immunity against tetanus  Td may also be given as tetanus wound prophylaxis  Tdap Vaccine - COST NOT COVERED BY MEDICARE PART B Recommended at least once for all adults  For pregnant patients, recommended with each pregnancy  Shingles Vaccine (Shingrix) - COST NOT COVERED BY MEDICARE PART B  2 shot series recommended in those aged 48 and above     Health Maintenance Due:  There are no preventive care reminders to display for this patient  Immunizations Due:      Topic Date Due    HEPATITIS B VACCINES (1 of 3 - Risk 3-dose series) 02/27/1954    INFLUENZA VACCINE  07/01/2019     Advance Directives   What are advance directives? Advance directives are legal documents that state your wishes and plans for medical care  These plans are made ahead of time in case you lose your ability to make decisions for yourself  Advance directives can apply to any medical decision, such as the treatments you want, and if you want to donate organs  What are the types of advance directives? There are many types of advance directives, and each state has rules about how to use them  You may choose a combination of any of the following:  · Living will: This is a written record of the treatment you want  You can also choose which treatments you do not want, which to limit, and which to stop at a certain time  This includes surgery, medicine, IV fluid, and tube feedings  · Durable power of  for healthcare [facility]):   This is a written record that states who you want to make healthcare choices for you when you are unable to make them for yourself  This person, called a proxy, is usually a family member or a friend  You may choose more than 1 proxy  · Do not resuscitate (DNR) order:  A DNR order is used in case your heart stops beating or you stop breathing  It is a request not to have certain forms of treatment, such as CPR  A DNR order may be included in other types of advance directives  · Medical directive: This covers the care that you want if you are in a coma, near death, or unable to make decisions for yourself  You can list the treatments you want for each condition  Treatment may include pain medicine, surgery, blood transfusions, dialysis, IV or tube feedings, and a ventilator (breathing machine)  · Values history: This document has questions about your views, beliefs, and how you feel and think about life  This information can help others choose the care that you would choose  Why are advance directives important? An advance directive helps you control your care  Although spoken wishes may be used, it is better to have your wishes written down  Spoken wishes can be misunderstood, or not followed  Treatments may be given even if you do not want them  An advance directive may make it easier for your family to make difficult choices about your care  Weight Management   Why it is important to manage your weight:  Being overweight increases your risk of health conditions such as heart disease, high blood pressure, type 2 diabetes, and certain types of cancer  It can also increase your risk for osteoarthritis, sleep apnea, and other respiratory problems  Aim for a slow, steady weight loss  Even a small amount of weight loss can lower your risk of health problems  How to lose weight safely:  A safe and healthy way to lose weight is to eat fewer calories and get regular exercise  You can lose up about 1 pound a week by decreasing the number of calories you eat by 500 calories each day     Healthy meal plan for weight management:  A healthy meal plan includes a variety of foods, contains fewer calories, and helps you stay healthy  A healthy meal plan includes the following:  · Eat whole-grain foods more often  A healthy meal plan should contain fiber  Fiber is the part of grains, fruits, and vegetables that is not broken down by your body  Whole-grain foods are healthy and provide extra fiber in your diet  Some examples of whole-grain foods are whole-wheat breads and pastas, oatmeal, brown rice, and bulgur  · Eat a variety of vegetables every day  Include dark, leafy greens such as spinach, kale, moy greens, and mustard greens  Eat yellow and orange vegetables such as carrots, sweet potatoes, and winter squash  · Eat a variety of fruits every day  Choose fresh or canned fruit (canned in its own juice or light syrup) instead of juice  Fruit juice has very little or no fiber  · Eat low-fat dairy foods  Drink fat-free (skim) milk or 1% milk  Eat fat-free yogurt and low-fat cottage cheese  Try low-fat cheeses such as mozzarella and other reduced-fat cheeses  · Choose meat and other protein foods that are low in fat  Choose beans or other legumes such as split peas or lentils  Choose fish, skinless poultry (chicken or turkey), or lean cuts of red meat (beef or pork)  Before you cook meat or poultry, cut off any visible fat  · Use less fat and oil  Try baking foods instead of frying them  Add less fat, such as margarine, sour cream, regular salad dressing and mayonnaise to foods  Eat fewer high-fat foods  Some examples of high-fat foods include french fries, doughnuts, ice cream, and cakes  · Eat fewer sweets  Limit foods and drinks that are high in sugar  This includes candy, cookies, regular soda, and sweetened drinks  Exercise:  Exercise at least 30 minutes per day on most days of the week  Some examples of exercise include walking, biking, dancing, and swimming   You can also fit in more physical activity by taking the stairs instead of the elevator or parking farther away from stores  Ask your healthcare provider about the best exercise plan for you  © Copyright [employer] 2018 Information is for End User's use only and may not be sold, redistributed or otherwise used for commercial purposes  All illustrations and images included in CareNotes® are the copyrighted property of A D A M , Inc  or [facility][note 3] Marco A Lynn   Weight Management   AMBULATORY CARE:   Why it is important to manage your weight:  Being overweight increases your risk of health conditions such as heart disease, high blood pressure, type 2 diabetes, and certain types of cancer  It can also increase your risk for osteoarthritis, sleep apnea, and other respiratory problems  Aim for a slow, steady weight loss  Even a small amount of weight loss can lower your risk of health problems  How to lose weight safely:  A safe and healthy way to lose weight is to eat fewer calories and get regular exercise  You can lose up about 1 pound a week by decreasing the number of calories you eat by 500 calories each day  You can decrease calories by eating smaller portion sizes or by cutting out high-calorie foods  Read labels to find out how many calories are in the foods you eat  You can also burn calories with exercise such as walking, swimming, or biking  You will be more likely to keep weight off if you make these changes part of your lifestyle  Healthy meal plan for weight management:  A healthy meal plan includes a variety of foods, contains fewer calories, and helps you stay healthy  A healthy meal plan includes the following:  · Eat whole-grain foods more often  A healthy meal plan should contain fiber  Fiber is the part of grains, fruits, and vegetables that is not broken down by your body  Whole-grain foods are healthy and provide extra fiber in your diet   Some examples of whole-grain foods are whole-wheat breads and pastas, oatmeal, [employer] rice, and bulgur  · Eat a variety of vegetables every day  Include dark, leafy greens such as spinach, kale, moy greens, and mustard greens  Eat yellow and orange vegetables such as carrots, sweet potatoes, and winter squash  · Eat a variety of fruits every day  Choose fresh or canned fruit (canned in its own juice or light syrup) instead of juice  Fruit juice has very little or no fiber  · Eat low-fat dairy foods  Drink fat-free (skim) milk or 1% milk  Eat fat-free yogurt and low-fat cottage cheese  Try low-fat cheeses such as mozzarella and other reduced-fat cheeses  · Choose meat and other protein foods that are low in fat  Choose beans or other legumes such as split peas or lentils  Choose fish, skinless poultry (chicken or turkey), or lean cuts of red meat (beef or pork)  Before you cook meat or poultry, cut off any visible fat  · Use less fat and oil  Try baking foods instead of frying them  Add less fat, such as margarine, sour cream, regular salad dressing and mayonnaise to foods  Eat fewer high-fat foods  Some examples of high-fat foods include french fries, doughnuts, ice cream, and cakes  · Eat fewer sweets  Limit foods and drinks that are high in sugar  This includes candy, cookies, regular soda, and sweetened drinks  Ways to decrease calories:   · Eat smaller portions  ¨ Use a small plate with smaller servings  ¨ Do not eat second helpings  ¨ When you eat at a restaurant, ask for a box and place half of your meal in the box before you eat  ¨ Share an entrée with someone else  · Replace high-calorie snacks with healthy, low-calorie snacks  ¨ Choose fresh fruit, vegetables, fat-free rice cakes, or air-popped popcorn instead of potato chips, nuts, or chocolate  ¨ Choose water or calorie-free drinks instead of soda or sweetened drinks  · Eat regular meals  Skipping meals can lead to overeating later in the day   Eat a healthy snack in place of a meal if you do not have time to eat a regular meal      · Do not shop for groceries when you are hungry  You may be more likely to make unhealthy food choices  Take a grocery list of healthy foods and shop after you have eaten  Exercise:  Exercise at least 30 minutes per day on most days of the week  Some examples of exercise include walking, biking, dancing, and swimming  You can also fit in more physical activity by taking the stairs instead of the elevator or parking farther away from stores  Ask your healthcare provider about the best exercise plan for you  Other things to consider as you try to lose weight:   · Be aware of situations that may give you the urge to overeat, such as eating while watching television  Find ways to avoid these situations  For example, read a book, go for a walk, or do crafts  · Meet with a weight loss support group or friends who are also trying to lose weight  This may help you stay motivated to continue working on your weight loss goals  © 2017 [address] Information is for End User's use only and may not be sold, redistributed or otherwise used for commercial purposes  All illustrations and images included in CareNotes® are the copyrighted property of [employer] M , Inc  or Vaibhav Nava  The above information is an  only  It is not intended as medical advice for individual conditions or treatments  Talk to your doctor, nurse or pharmacist before following any medical regimen to see if it is safe and effective for you  Heart Healthy Diet   AMBULATORY CARE:   A heart healthy diet  is an eating plan low in total fat, unhealthy fats, and sodium (salt)  A heart healthy diet helps decrease your risk for heart disease and stroke  Limit the amount of fat you eat to 25% to 35% of your total daily calories  Limit sodium to less than 2,300 mg each day  Healthy fats:  Healthy fats can help improve cholesterol levels   The risk for heart disease is decreased when cholesterol levels are normal  Choose healthy fats, such as the following:  · Unsaturated fat  is found in foods such as soybean, canola, olive, corn, and safflower oils  It is also found in soft tub margarine that is made with liquid vegetable oil  · Omega-3 fat  is found in certain fish, such as salmon, tuna, and trout, and in walnuts and flaxseed  Unhealthy fats:  Unhealthy fats can cause unhealthy cholesterol levels in your blood and increase your risk of heart disease  Limit unhealthy fats, such as the following:  · Cholesterol  is found in animal foods, such as eggs and lobster, and in dairy products made from whole milk  Limit cholesterol to less than 300 milligrams (mg) each day  You may need to limit cholesterol to 200 mg each day if you have heart disease  · Saturated fat  is found in meats, such as bynum and hamburger  It is also found in chicken or turkey skin, whole milk, and butter  Limit saturated fat to less than 7% of your total daily calories  Limit saturated fat to less than 6% if you have heart disease or are at increased risk for it  · Trans fat  is found in packaged foods, such as potato chips and cookies  It is also in hard margarine, some fried foods, and shortening  Avoid trans fats as much as possible    Heart healthy foods and drinks to include:  Ask your dietitian or healthcare provider how many servings to have from each of the following food groups:  · Grains:      ¨ Whole-wheat breads, cereals, and pastas, and brown rice    ¨ Low-fat, low-sodium crackers and chips    · Vegetables:      ¨ Broccoli, green beans, green peas, and spinach    ¨ Collards, kale, and lima beans    ¨ Carrots, sweet potatoes, tomatoes, and peppers    ¨ Canned vegetables with no salt added    · Fruits:      ¨ Bananas, peaches, pears, and pineapple    ¨ Grapes, raisins, and dates    ¨ Oranges, tangerines, grapefruit, orange juice, and grapefruit juice    ¨ Apricots, mangoes, melons, and papaya    ¨ Raspberries and strawberries    ¨ Canned fruit with no added sugar    · Low-fat dairy products:      ¨ Nonfat (skim) milk, 1% milk, and low-fat almond, cashew, or soy milks fortified with calcium    ¨ Low-fat cheese, regular or frozen yogurt, and cottage cheese    · Meats and proteins , such as lean cuts of beef and pork (loin, leg, round), skinless chicken and turkey, legumes, soy products, egg whites, and nuts  Foods and drinks to limit or avoid:  Ask your dietitian or healthcare provider about these and other foods that are high in unhealthy fat, sodium, and sugar:  · Snack or packaged foods , such as frozen dinners, cookies, macaroni and cheese, and cereals with more than 300 mg of sodium per serving    · Canned or dry mixes  for cakes, soups, sauces, or gravies    · Vegetables with added sodium , such as instant potatoes, vegetables with added sauces, or regular canned vegetables    · Other foods high in sodium , such as ketchup, barbecue sauce, salad dressing, pickles, olives, soy sauce, and miso    · High-fat dairy foods  such as whole or 2% milk, cream cheese, or sour cream, and cheeses     · High-fat protein foods  such as high-fat cuts of beef (T-bone steaks, ribs), chicken or turkey with skin, and organ meats, such as liver    · Cured or smoked meats , such as hot dogs, bynum, and sausage    · Unhealthy fats and oils , such as butter, stick margarine, shortening, and cooking oils such as coconut or palm oil    · Food and drinks high in sugar , such as soft drinks (soda), sports drinks, sweetened tea, candy, cake, cookies, pies, and doughnuts  Other diet guidelines to follow:   · Eat more foods containing omega-3 fats  Eat fish high in omega-3 fats at least 2 times a week  · Limit alcohol  Too much alcohol can damage your heart and raise your blood pressure  Women should limit alcohol to 1 drink a day  Men should limit alcohol to 2 drinks a day   A drink of alcohol is 12 ounces of beer, 5 ounces of wine, or 1½ ounces of liquor  · Choose low-sodium foods  High-sodium foods can lead to high blood pressure  Add little or no salt to food you prepare  Use herbs and spices in place of salt  · Eat more fiber  to help lower cholesterol levels  Eat at least 5 servings of fruits and vegetables each day  Eat 3 ounces of whole-grain foods each day  Legumes (beans) are also a good source of fiber  Lifestyle guidelines:   · Do not smoke  Nicotine and other chemicals in cigarettes and cigars can cause lung and heart damage  Ask your healthcare provider for information if you currently smoke and need help to quit  E-cigarettes or smokeless tobacco still contain nicotine  Talk to your healthcare provider before you use these products  · Exercise regularly  to help you maintain a healthy weight and improve your blood pressure and cholesterol levels  Ask your healthcare provider about the best exercise plan for you  Do not start an exercise program without asking your healthcare provider  Follow up with your healthcare provider as directed:  Write down your questions so you remember to ask them during your visits  © 2017 [facility] Information is for End User's use only and may not be sold, redistributed or otherwise used for commercial purposes  All illustrations and images included in CareNotes® are the copyrighted property of [employer]  or Vaibhav Nava  The above information is an  only  It is not intended as medical advice for individual conditions or treatments  Talk to your doctor, nurse or pharmacist before following any medical regimen to see if it is safe and effective for you  Weight Management   AMBULATORY CARE:   Why it is important to manage your weight:  Being overweight increases your risk of health conditions such as heart disease, high blood pressure, type 2 diabetes, and certain types of cancer   It can also increase your risk for osteoarthritis, sleep apnea, and other respiratory problems  Aim for a slow, steady weight loss  Even a small amount of weight loss can lower your risk of health problems  How to lose weight safely:  A safe and healthy way to lose weight is to eat fewer calories and get regular exercise  You can lose up about 1 pound a week by decreasing the number of calories you eat by 500 calories each day  You can decrease calories by eating smaller portion sizes or by cutting out high-calorie foods  Read labels to find out how many calories are in the foods you eat  You can also burn calories with exercise such as walking, swimming, or biking  You will be more likely to keep weight off if you make these changes part of your lifestyle  Healthy meal plan for weight management:  A healthy meal plan includes a variety of foods, contains fewer calories, and helps you stay healthy  A healthy meal plan includes the following:  · Eat whole-grain foods more often  A healthy meal plan should contain fiber  Fiber is the part of grains, fruits, and vegetables that is not broken down by your body  Whole-grain foods are healthy and provide extra fiber in your diet  Some examples of whole-grain foods are whole-wheat breads and pastas, oatmeal, brown rice, and bulgur  · Eat a variety of vegetables every day  Include dark, leafy greens such as spinach, kale, moy greens, and mustard greens  Eat yellow and orange vegetables such as carrots, sweet potatoes, and winter squash  · Eat a variety of fruits every day  Choose fresh or canned fruit (canned in its own juice or light syrup) instead of juice  Fruit juice has very little or no fiber  · Eat low-fat dairy foods  Drink fat-free (skim) milk or 1% milk  Eat fat-free yogurt and low-fat cottage cheese  Try low-fat cheeses such as mozzarella and other reduced-fat cheeses  · Choose meat and other protein foods that are low in fat    Choose beans or other legumes such as split peas or lentils  Choose fish, skinless poultry (chicken or turkey), or lean cuts of red meat (beef or pork)  Before you cook meat or poultry, cut off any visible fat  · Use less fat and oil  Try baking foods instead of frying them  Add less fat, such as margarine, sour cream, regular salad dressing and mayonnaise to foods  Eat fewer high-fat foods  Some examples of high-fat foods include french fries, doughnuts, ice cream, and cakes  · Eat fewer sweets  Limit foods and drinks that are high in sugar  This includes candy, cookies, regular soda, and sweetened drinks  Ways to decrease calories:   · Eat smaller portions  ¨ Use a small plate with smaller servings  ¨ Do not eat second helpings  ¨ When you eat at a restaurant, ask for a box and place half of your meal in the box before you eat  ¨ Share an entrée with someone else  · Replace high-calorie snacks with healthy, low-calorie snacks  ¨ Choose fresh fruit, vegetables, fat-free rice cakes, or air-popped popcorn instead of potato chips, nuts, or chocolate  ¨ Choose water or calorie-free drinks instead of soda or sweetened drinks  · Eat regular meals  Skipping meals can lead to overeating later in the day  Eat a healthy snack in place of a meal if you do not have time to eat a regular meal      · Do not shop for groceries when you are hungry  You may be more likely to make unhealthy food choices  Take a grocery list of healthy foods and shop after you have eaten  Exercise:  Exercise at least 30 minutes per day on most days of the week  Some examples of exercise include walking, biking, dancing, and swimming  You can also fit in more physical activity by taking the stairs instead of the elevator or parking farther away from stores  Ask your healthcare provider about the best exercise plan for you     Other things to consider as you try to lose weight:   · Be aware of situations that may give you the urge to overeat, such as eating while watching television  Find ways to avoid these situations  For example, read a book, go for a walk, or do crafts  · Meet with a weight loss support group or friends who are also trying to lose weight  This may help you stay motivated to continue working on your weight loss goals  © 2017 [employer] Information is for End User's use only and may not be sold, redistributed or otherwise used for commercial purposes  All illustrations and images included in CareNotes® are the copyrighted property of [employer]  or Vaibhav Nava  The above information is an  only  It is not intended as medical advice for individual conditions or treatments  Talk to your doctor, nurse or pharmacist before following any medical regimen to see if it is safe and effective for you  Heart Healthy Diet   AMBULATORY CARE:   A heart healthy diet  is an eating plan low in total fat, unhealthy fats, and sodium (salt)  A heart healthy diet helps decrease your risk for heart disease and stroke  Limit the amount of fat you eat to 25% to 35% of your total daily calories  Limit sodium to less than 2,300 mg each day  Healthy fats:  Healthy fats can help improve cholesterol levels  The risk for heart disease is decreased when cholesterol levels are normal  Choose healthy fats, such as the following:  · Unsaturated fat  is found in foods such as soybean, canola, olive, corn, and safflower oils  It is also found in soft tub margarine that is made with liquid vegetable oil  · Omega-3 fat  is found in certain fish, such as salmon, tuna, and trout, and in walnuts and flaxseed  Unhealthy fats:  Unhealthy fats can cause unhealthy cholesterol levels in your blood and increase your risk of heart disease  Limit unhealthy fats, such as the following:  · Cholesterol  is found in animal foods, such as eggs and lobster, and in dairy products made from whole milk   Limit cholesterol to less than 300 milligrams (mg) each day  You may need to limit cholesterol to 200 mg each day if you have heart disease  · Saturated fat  is found in meats, such as bynum and hamburger  It is also found in chicken or turkey skin, whole milk, and butter  Limit saturated fat to less than 7% of your total daily calories  Limit saturated fat to less than 6% if you have heart disease or are at increased risk for it  · Trans fat  is found in packaged foods, such as potato chips and cookies  It is also in hard margarine, some fried foods, and shortening  Avoid trans fats as much as possible    Heart healthy foods and drinks to include:  Ask your dietitian or healthcare provider how many servings to have from each of the following food groups:  · Grains:      ¨ Whole-wheat breads, cereals, and pastas, and brown rice    ¨ Low-fat, low-sodium crackers and chips    · Vegetables:      ¨ Broccoli, green beans, green peas, and spinach    ¨ Collards, kale, and lima beans    ¨ Carrots, sweet potatoes, tomatoes, and peppers    ¨ Canned vegetables with no salt added    · Fruits:      ¨ Bananas, peaches, pears, and pineapple    ¨ Grapes, raisins, and dates    ¨ Oranges, tangerines, grapefruit, orange juice, and grapefruit juice    ¨ Apricots, mangoes, melons, and papaya    ¨ Raspberries and strawberries    ¨ Canned fruit with no added sugar    · Low-fat dairy products:      ¨ Nonfat (skim) milk, 1% milk, and low-fat almond, cashew, or soy milks fortified with calcium    ¨ Low-fat cheese, regular or frozen yogurt, and cottage cheese    · Meats and proteins , such as lean cuts of beef and pork (loin, leg, round), skinless chicken and turkey, legumes, soy products, egg whites, and nuts  Foods and drinks to limit or avoid:  Ask your dietitian or healthcare provider about these and other foods that are high in unhealthy fat, sodium, and sugar:  · Snack or packaged foods , such as frozen dinners, cookies, macaroni and cheese, and cereals with more than 300 mg of sodium per serving    · Canned or dry mixes  for cakes, soups, sauces, or gravies    · Vegetables with added sodium , such as instant potatoes, vegetables with added sauces, or regular canned vegetables    · Other foods high in sodium , such as ketchup, barbecue sauce, salad dressing, pickles, olives, soy sauce, and miso    · High-fat dairy foods  such as whole or 2% milk, cream cheese, or sour cream, and cheeses     · High-fat protein foods  such as high-fat cuts of beef (T-bone steaks, ribs), chicken or turkey with skin, and organ meats, such as liver    · Cured or smoked meats , such as hot dogs, bynum, and sausage    · Unhealthy fats and oils , such as butter, stick margarine, shortening, and cooking oils such as coconut or palm oil    · Food and drinks high in sugar , such as soft drinks (soda), sports drinks, sweetened tea, candy, cake, cookies, pies, and doughnuts  Other diet guidelines to follow:   · Eat more foods containing omega-3 fats  Eat fish high in omega-3 fats at least 2 times a week  · Limit alcohol  Too much alcohol can damage your heart and raise your blood pressure  Women should limit alcohol to 1 drink a day  Men should limit alcohol to 2 drinks a day  A drink of alcohol is 12 ounces of beer, 5 ounces of wine, or 1½ ounces of liquor  · Choose low-sodium foods  High-sodium foods can lead to high blood pressure  Add little or no salt to food you prepare  Use herbs and spices in place of salt  · Eat more fiber  to help lower cholesterol levels  Eat at least 5 servings of fruits and vegetables each day  Eat 3 ounces of whole-grain foods each day  Legumes (beans) are also a good source of fiber  Lifestyle guidelines:   · Do not smoke  Nicotine and other chemicals in cigarettes and cigars can cause lung and heart damage  Ask your healthcare provider for information if you currently smoke and need help to quit  E-cigarettes or smokeless tobacco still contain nicotine  Talk to your healthcare provider before you use these products  · Exercise regularly  to help you maintain a healthy weight and improve your blood pressure and cholesterol levels  Ask your healthcare provider about the best exercise plan for you  Do not start an exercise program without asking your healthcare provider  Follow up with your healthcare provider as directed:  Write down your questions so you remember to ask them during your visits  © 2017 [employer]  Information is for End User's use only and may not be sold, redistributed or otherwise used for commercial purposes  All illustrations and images included in CareNotes® are the copyrighted property of A [employer] M [employer]  or Vaibhav Nava  The above information is an  only  It is not intended as medical advice for individual conditions or treatments  Talk to your doctor, nurse or pharmacist before following any medical regimen to see if it is safe and effective for you

## 2019-09-25 NOTE — ASSESSMENT & PLAN NOTE
He continues to sleep a lot at night and during the day but does not snore or have frequent nighttime awakenings

## 2019-09-25 NOTE — PROGRESS NOTES
Assessment/Plan:    Hypertension  He continues to have good control of his blood pressure with lisinopril 10  He has had no recent chest pain shortness of breath edema or PND    Chronic low back pain  He does have chronic low back pain since his laminectomy but does well with only Tylenol and an occasional Advil    Chronic fatigue  He continues to sleep a lot at night and during the day but does not snore or have frequent nighttime awakenings  BMI 29 0-29 9,adult  His BMI is in the overweight category  Of interest his wife felt he was losing weight but examination of his recent weights this year did not confirm that  She states he eats well and walks frequently    Controlled type 2 diabetes mellitus without complication, without long-term current use of insulin ([facility])    Lab Results   Component Value Date    HGBA1C 6 2 (H) 01/15/2019    His blood sugar has been well controlled on metformin 500 mg  He has no obvious symptoms of neuropathy retinopathy or nephropathy       Diagnoses and all orders for this visit:    BMI 29 0-29 9,adult    Controlled type 2 diabetes mellitus without complication, without long-term current use of insulin ([facility])  -     metFORMIN (GLUCOPHAGE) 500 mg tablet; Take 1 tablet (500 mg total) by mouth 2 (two) times a day with meals    Essential hypertension    Chronic fatigue    Chronic midline low back pain without sciatica    Need for influenza vaccination  -     influenza vaccine, 4511-9653, high-dose, PF 0 5 mL (FLUZONE HIGH-DOSE)          Subjective:      Patient ID: Nneo Deleon is a 81 y o  male  His wife is vitamin because she was concerned he was she thought he was losing weight  Examination of a recent weight in the office have shown up in down trend with minimal fluctuation  She states he has no complaints and has been eating well along with walking more    His blood sugars have been under excellent control and he only gets up once per night to urinate          Review of Systems Constitutional: Negative for chills, fatigue, fever and unexpected weight change  HENT: Positive for hearing loss  Negative for congestion, ear pain, postnasal drip, sinus pressure, sore throat, trouble swallowing and voice change  Eyes: Negative for visual disturbance  Respiratory: Negative for cough, chest tightness, shortness of breath and wheezing  Cardiovascular: Negative for chest pain, palpitations and leg swelling  Gastrointestinal: Negative for abdominal distention, abdominal pain, anal bleeding, blood in stool, constipation, diarrhea and nausea  Endocrine: Negative for cold intolerance, polydipsia, polyphagia and polyuria  Genitourinary: Negative for dysuria, flank pain, hematuria and urgency  Musculoskeletal: Positive for back pain  Negative for arthralgias, gait problem, joint swelling, myalgias and neck pain  Skin: Negative for rash  Allergic/Immunologic: Negative for immunocompromised state  Neurological: Negative for dizziness, syncope, facial asymmetry, weakness, light-headedness, numbness and headaches  Hematological: Negative for adenopathy  Psychiatric/Behavioral: Positive for decreased concentration  Negative for behavioral problems, confusion, sleep disturbance and suicidal ideas  Objective:      /58 (BP Location: Left arm, Patient Position: Sitting)   Pulse 56   Temp 98 2 °F (36 8 °C) (Tympanic)   Ht 5' 9" (1 753 m)   Wt 91 4 kg (201 lb 9 6 oz)   SpO2 98%   BMI 29 77 kg/m²          Physical Exam   Constitutional: He is oriented to person, place, and time  He appears well-developed and well-nourished  No distress  HENT:   Right Ear: External ear normal    Left Ear: External ear normal    Nose: Nose normal    Mouth/Throat: Oropharynx is clear and moist  No oropharyngeal exudate  Significant hard of hearing   Eyes: Pupils are equal, round, and reactive to light  EOM are normal    Neck: Normal range of motion  Neck supple  No JVD present   No thyromegaly present  Cardiovascular: Normal rate, regular rhythm, normal heart sounds and intact distal pulses  Exam reveals no gallop  No murmur heard  Pulmonary/Chest: Effort normal and breath sounds normal  No respiratory distress  He has no wheezes  He has no rales  Abdominal: Soft  Bowel sounds are normal  He exhibits no distension and no mass  There is no tenderness  Musculoskeletal: Normal range of motion  He exhibits no tenderness  Slow gait   Lymphadenopathy:     He has no cervical adenopathy  Neurological: He is alert and oriented to person, place, and time  No cranial nerve deficit  Coordination normal    Skin: No rash noted  Psychiatric: He has a normal mood and affect  His behavior is normal  Judgment and thought content normal      BMI Counseling: Body mass index is 29 77 kg/m²  The BMI is above normal  Nutrition recommendations include increasing intake of lean protein

## 2019-09-25 NOTE — PROGRESS NOTES
Assessment and Plan:     Problem List Items Addressed This Visit        Nervous and Auditory    RESOLVED: Otitis of both ears - Primary      Other Visit Diagnoses     Medicare annual wellness visit, subsequent        BMI 29 0-29 9,adult               Preventive health issues were discussed with patient, and age appropriate screening tests were ordered as noted in patient's After Visit Summary  Personalized health advice and appropriate referrals for health education or preventive services given if needed, as noted in patient's After Visit Summary       History of Present Illness:     Patient presents for Medicare Annual Wellness visit    Patient Care Team:  Tato Valverde MD as PCP - General Derrel Sprain, CRNP Samuel Russell, MD Rodell Boxer, MD Chari Milks, DO as Endoscopist     Problem List:     Patient Active Problem List   Diagnosis    Chronic low back pain    Controlled type 2 diabetes mellitus without complication, without long-term current use of insulin ([address] )    Enlarged prostate with lower urinary tract symptoms (LUTS)    Sensorineural hearing loss (SNHL) of both ears    Hypertension    Old myocardial infarction    Seasonal allergies    Spinal stenosis    Left medial knee pain    Chronic fatigue    Patellar tendinitis of left knee      Past Medical and Surgical History:     Past Medical History:   Diagnosis Date    Balanitis     last assessed 12/19/14    BPH (benign prostatic hyperplasia)     Diabetes mellitus (HCC)     blood sugar 167 this am at 0630    GERD (gastroesophageal reflux disease)     Heme + stool     Hypertension     Lumbar radiculopathy     Myocardial infarction ([address] )     35 years ago    Phimosis     last assessed 04/27/16    Sciatica     right side, last assessed 04/25/16     Past Surgical History:   Procedure Laterality Date    BACK SURGERY      laminectomy Lumbar    CATARACT EXTRACTION, BILATERAL      CIRCUMCISION      HERNIA REPAIR Right     MO COLONOSCOPY FLX DX W/COLLJ SPEC WHEN PFRMD N/A 1/16/2017    Procedure: EGD AND COLONOSCOPY;  Surgeon: Mirtha Abreu DO;  Location: [facility]; Service: General      Family History:     No family history on file  Social History:     Social History     Socioeconomic History    Marital status: /Civil Union     Spouse name: None    Number of children: None    Years of education: None    Highest education level: None   Occupational History    None   Social Needs    Financial resource strain: None    Food insecurity:     Worry: None     Inability: None    Transportation needs:     Medical: None     Non-medical: None   Tobacco Use    Smoking status: Former Smoker     Packs/day: 1 00     Years: 30 00     Pack years: 30 00    Smokeless tobacco: Never Used    Tobacco comment: quit 25 yrs ago   Substance and Sexual Activity    Alcohol use: No    Drug use: No    Sexual activity: None   Lifestyle    Physical activity:     Days per week: None     Minutes per session: None    Stress: None   Relationships    Social connections:     Talks on phone: None     Gets together: None     Attends Sabianism service: None     Active member of club or organization: None     Attends meetings of clubs or organizations: None     Relationship status: None    Intimate partner violence:     Fear of current or ex partner: None     Emotionally abused: None     Physically abused: None     Forced sexual activity: None   Other Topics Concern    None   Social History Narrative    None       Medications and Allergies:     Current Outpatient Medications   Medication Sig Dispense Refill    EASY TOUCH LANCETS 33G/TWIST MISC by Does not apply route 2 (two) times a day 100 each 2    finasteride (PROSCAR) 5 mg tablet Take 1 tablet (5 mg total) by mouth daily 90 tablet 3    glucose blood (EASY TOUCH TEST) test strip 2 each by Other route daily CONTOUR TEST STRIPS     DX CODE  E11 9    TEST BLOOD SUGAR TWICE DAILY   100 each 2    lisinopril (ZESTRIL) 10 mg tablet Take 1 tablet (10 mg total) by mouth daily 90 tablet 3    metFORMIN (GLUCOPHAGE) 500 mg tablet Take 1 tablet (500 mg total) by mouth 2 (two) times a day with meals 90 tablet 1    nystatin (MYCOSTATIN) 100,000 units/mL suspension Apply 5 mL (500,000 Units total) to the mouth or throat 4 (four) times a day 60 mL 0    oxybutynin (DITROPAN-XL) 5 mg 24 hr tablet Take 1 tablet (5 mg total) by mouth daily 90 tablet 3    traMADol (ULTRAM) 50 mg tablet Take 1 tablet (50 mg total) by mouth every 6 (six) hours as needed for moderate pain 60 tablet 0     No current facility-administered medications for this visit  No Known Allergies   Immunizations:     Immunization History   Administered Date(s) Administered    Influenza Split High Dose Preservative Free IM 10/09/2013, 10/07/2014, 10/16/2015, 10/21/2016, 10/23/2017    Influenza, high dose seasonal 0 5 mL 2018    Pneumococcal Conjugate 13-Valent 10/21/2016    Pneumococcal Polysaccharide PPV23 2004    Tdap 2018      Health Maintenance: There are no preventive care reminders to display for this patient  Topic Date Due    HEPATITIS B VACCINES (1 of 3 - Risk 3-dose series) 1955    INFLUENZA VACCINE  2019      Medicare Health Risk Assessment:     /58 (BP Location: Left arm, Patient Position: Sitting)   Pulse 56   Temp 98 2 °F (36 8 °C) (Tympanic)   Ht 5' 9" (1 753 m)   Wt 91 4 kg (201 lb 9 6 oz)   SpO2 98%   BMI 29 77 kg/m²      Annual Wellness Visit    Tato Valverde MD  BMI Counseling: Body mass index is 29 77 kg/m²   The BMI {VB BMI Counselin}

## 2019-10-18 DIAGNOSIS — H90.3 SENSORINEURAL HEARING LOSS (SNHL) OF BOTH EARS: Primary | ICD-10-CM

## 2019-10-22 ENCOUNTER — OFFICE VISIT (OUTPATIENT)
Dept: AUDIOLOGY | Age: 84
End: 2019-10-22

## 2019-10-22 DIAGNOSIS — H90.3 SENSORY HEARING LOSS, BILATERAL: Primary | ICD-10-CM

## 2019-10-22 NOTE — PROGRESS NOTES
HEARING EVALUATION/HEARING AID VISIT    Name:  Niraj Pelayo  :  1935  Age:  81 y o  Date of Evaluation: 10/22/19     History: Known Hearing Loss binaurally  Reason for visit: Niraj Pelayo is being seen today at the request of Dr Marina Hunter for an evaluation of hearing  Patient reports he feels like he struggles to understand even when wearing the hearing aids  Otoscopic Evaluation:   Right Ear: Occluded, Could not visualize tympanic membrane   Left Ear: Occluded, Could not visualize tympanic membrane    Tympanometry:   Right: Type A - normal middle ear pressure and compliance   Left: Type A - normal middle ear pressure and compliance    Audiogram Results:  Patient wanted to postpone testing until his ears are cleaned  *see attached audiogram      Niraj Pelayo is fit with [employer] B50-R hearing aid(s)  Right serial number [id number]  Left serial number [id number]  Warranty date: 3/11/20 (Loss/Damage and repair)  Patient reports he has trouble understanding when wearing the hearing aids  Action:  Cleaned and checked hearing aids  Replaced wax filters  Changed from a power dome to a  tip mold, size small with no vent  Counseled patient on custom earmold in the left ear  No adjustments were made today  Will make adjustments once ears are cleaned and new audiogram is performed        RECOMMENDATIONS:  Annual hearing eval, Return to [facility]  for F/U and Copy to Patient/Caregiver      Carole Pal , CCC-A  Clinical Audiologist

## 2019-10-30 ENCOUNTER — OFFICE VISIT (OUTPATIENT)
Dept: INTERNAL MEDICINE CLINIC | Age: 84
End: 2019-10-30
Payer: COMMERCIAL

## 2019-10-30 VITALS
HEART RATE: 72 BPM | HEIGHT: 69 IN | DIASTOLIC BLOOD PRESSURE: 58 MMHG | TEMPERATURE: 98.7 F | OXYGEN SATURATION: 96 % | SYSTOLIC BLOOD PRESSURE: 108 MMHG | BODY MASS INDEX: 30.05 KG/M2 | WEIGHT: 202.9 LBS

## 2019-10-30 DIAGNOSIS — H60.333 ACUTE SWIMMER'S EAR OF BOTH SIDES: Primary | ICD-10-CM

## 2019-10-30 DIAGNOSIS — H61.23 EXCESSIVE CERUMEN IN BOTH EAR CANALS: ICD-10-CM

## 2019-10-30 PROCEDURE — 99213 OFFICE O/P EST LOW 20 MIN: CPT | Performed by: INTERNAL MEDICINE

## 2019-10-30 RX ORDER — NEOMYCIN SULFATE, POLYMYXIN B SULFATE AND HYDROCORTISONE 10; 3.5; 1 MG/ML; MG/ML; [USP'U]/ML
4 SUSPENSION/ DROPS AURICULAR (OTIC) 4 TIMES DAILY
Qty: 10 ML | Refills: 0 | Status: SHIPPED | OUTPATIENT
Start: 2019-10-30 | End: 2019-12-12

## 2019-10-30 NOTE — ASSESSMENT & PLAN NOTE
On rechecking for how his cerumen impactions were and some white debris of both ears and he is complaining of some discomfort and hearing loss  He does wear it hearing is on a regular basis    Will give him a prescription for ear drops for swimmer's ear for now

## 2019-10-30 NOTE — PATIENT INSTRUCTIONS
Otitis Externa   AMBULATORY CARE:   Otitis externa , or swimmer's ear, is an infection in the outer ear canal  This canal goes from the outside of the ear to the eardrum  Common signs and symptoms include the following:   · Ear pain    · Outer ear canal is red and swollen    · Clear fluid or pus is leaking out of your ear    · Outer ear canal is itchy and you see a rash    · Trouble hearing because your ear is plugged    · Feel a bump in your ear canal, called a polyp    · Flakes of skin fall from your ear  Seek care immediately if:   · You have severe ear pain  · You are suddenly unable to hear at all  · You have new swelling in your face, behind your ears, or in your neck  · You suddenly cannot move part of your face  · Your face suddenly feels numb  Contact your healthcare provider if:   · You have a fever  · Your signs and symptoms do not get better after 2 days of treatment  · Your signs and symptoms go away for a time, but then come back  · You have questions or concerns about your condition or care  Treatment for otitis externa  may include any of the following:  · NSAIDs , such as ibuprofen, help decrease swelling, pain, and fever  This medicine is available with or without a doctor's order  NSAIDs can cause stomach bleeding or kidney problems in certain people  If you take blood thinner medicine, always ask if NSAIDs are safe for you  Always read the medicine label and follow directions  Do not give these medicines to children under 10months of age without direction from your child's healthcare provider  · Acetaminophen  decreases pain and fever  It is available without a doctor's order  Ask how much to take and how often to take it  Follow directions  Acetaminophen can cause liver damage if not taken correctly  · Ear drops  that contain an antibiotic may be given  The antibiotic helps treat a bacterial infection  You may also be given steroid medicine   The steroid helps decrease redness, swelling, and pain  · Ear wicking  removes fluid or wax from your outer ear canal  Healthcare providers may insert a small tube, called a wick, into your ear to help drain fluid  A wick also may be used to put medicine into your ear canal if the canal is blocked  Follow the steps below to use eardrops:   · Lie down on your side with your infected ear facing up  · Carefully drip the correct number of eardrops into your ear  Have another person help you if possible  · Gently move the outside part of your ear back and forth to help the medicine reach your ear canal      · Stay lying down in the same position (with your ear facing up) for 3 to 5 minutes  Prevent otitis externa:   · Do not put cotton swabs or foreign objects in your ears  · Wrap a clean moist washcloth around your finger, and use it to clean your outer ear and remove extra ear wax  · Use ear plugs when you swim  Dry your outer ears completely after you swim or bathe  Follow up with your healthcare provider as directed:  Write down your questions so you remember to ask them during your visits  © 2017 [address] [facility] Information is for End User's use only and may not be sold, redistributed or otherwise used for commercial purposes  All illustrations and images included in CareNotes® are the copyrighted property of [employer]  or Va[employer]hav Nava  The above information is an  only  It is not intended as medical advice for individual conditions or treatments  Talk to your doctor, nurse or pharmacist before following any medical regimen to see if it is safe and effective for you

## 2019-10-30 NOTE — PROGRESS NOTES
Assessment/Plan:    Acute swimmer's ear of both sides  On rechecking for how his cerumen impactions were and some white debris of both ears and he is complaining of some discomfort and hearing loss  He does wear it hearing is on a regular basis  Will give him a prescription for ear drops for swimmer's ear for now    Excessive cerumen in both ear canals  At last check a headaches cerumen in both ears probably packed in due to his bilateral hearing aids  He was in given instructions on using Debrox and home ear flush  At present he has no cerumen in place       Diagnoses and all orders for this visit:    Acute swimmer's ear of both sides  -     neomycin-polymyxin-hydrocortisone (CORTISPORIN) 0 35%-10,000 units/mL-1% otic suspension; Administer 4 drops into both ears 4 (four) times a day for 7 days    Excessive cerumen in both ear canals          Subjective:      Patient ID: Elva Qiu is a 81 y o  male  Patient continues to complain of poor hearing and blockage in his ears  He has been doing home Debrox and flushing with probable good response  But probably is been doing the flush a little more than a needs to  He also wears hearing aids 24/7  Review of Systems   Constitutional: Negative for chills, fatigue, fever and unexpected weight change  HENT: Positive for hearing loss  Negative for congestion, ear pain, postnasal drip, sinus pressure, sore throat, trouble swallowing and voice change  Eyes: Negative for visual disturbance  Respiratory: Negative for cough, chest tightness, shortness of breath and wheezing  Cardiovascular: Negative for chest pain, palpitations and leg swelling  Gastrointestinal: Negative for abdominal distention, abdominal pain, anal bleeding, blood in stool, constipation, diarrhea and nausea  Endocrine: Negative for cold intolerance, polydipsia, polyphagia and polyuria  Genitourinary: Negative for dysuria, flank pain, frequency, hematuria and urgency  Musculoskeletal: Negative for arthralgias, back pain, gait problem, joint swelling, myalgias and neck pain  Skin: Negative for rash  Allergic/Immunologic: Negative for immunocompromised state  Neurological: Negative for dizziness, syncope, facial asymmetry, weakness, light-headedness, numbness and headaches  Hematological: Negative for adenopathy  Psychiatric/Behavioral: Negative for confusion, sleep disturbance and suicidal ideas  Objective:      /58 (BP Location: Left arm, Patient Position: Sitting)   Pulse 72   Temp 98 7 °F (37 1 °C) (Tympanic)   Ht 5' 9" (1 753 m)   Wt 92 kg (202 lb 14 4 oz)   SpO2 96%   BMI 29 96 kg/m²          Physical Exam   Constitutional: He is oriented to person, place, and time  He appears well-developed and well-nourished  No distress  HENT:   Nose: Nose normal    Mouth/Throat: Oropharynx is clear and moist  No oropharyngeal exudate  White debris in both ears right worse than left against ear drum   Eyes: Pupils are equal, round, and reactive to light  EOM are normal    Neck: Normal range of motion  Neck supple  No JVD present  No thyromegaly present  Cardiovascular: Normal rate, regular rhythm, normal heart sounds and intact distal pulses  Exam reveals no gallop  No murmur heard  Pulmonary/Chest: Effort normal and breath sounds normal  No respiratory distress  He has no wheezes  He has no rales  Abdominal: Soft  Bowel sounds are normal  He exhibits no distension and no mass  There is no tenderness  Musculoskeletal: Normal range of motion  He exhibits no tenderness  Lymphadenopathy:     He has no cervical adenopathy  Neurological: He is alert and oriented to person, place, and time  No cranial nerve deficit  Coordination normal    Skin: No rash noted  Psychiatric: He has a normal mood and affect   His behavior is normal  Judgment and thought content normal

## 2019-10-30 NOTE — ASSESSMENT & PLAN NOTE
At last check a headaches cerumen in both ears probably packed in due to his bilateral hearing aids  He was in given instructions on using Debrox and home ear flush    At present he has no cerumen in place

## 2019-10-31 ENCOUNTER — OFFICE VISIT (OUTPATIENT)
Dept: AUDIOLOGY | Age: 84
End: 2019-10-31
Payer: COMMERCIAL

## 2019-10-31 DIAGNOSIS — H90.3 SENSORINEURAL HEARING LOSS (SNHL) OF BOTH EARS: ICD-10-CM

## 2019-10-31 DIAGNOSIS — H90.3 SENSORY HEARING LOSS, BILATERAL: Primary | ICD-10-CM

## 2019-10-31 PROCEDURE — 92567 TYMPANOMETRY: CPT | Performed by: AUDIOLOGIST

## 2019-10-31 PROCEDURE — 92557 COMPREHENSIVE HEARING TEST: CPT | Performed by: AUDIOLOGIST

## 2019-10-31 NOTE — PROGRESS NOTES
HEARING EVALUATION/HEARING AID VISIT    Name:  Edvin Vásquez  :  1935  Age:  81 y o  Date of Evaluation: 10/31/19     History: Known Hearing Loss binaurally  Reason for visit: Edvin Vásquez is being seen today at the request of Dr Dobbs Going for an evaluation of hearing  Patient reports he feels like he has a harder time hearing and understanding people  Otoscopic Evaluation:   Right Ear: Some redness was seen in the ear canal, along with a small amount of opaque drainage - due to antibiotic ear drops  Left Ear: Some redness was seen in the ear canal, along with a small amount of opaque drainage - due to antibiotic ear drops  Tympanometry:   Right: Type A - normal middle ear pressure and compliance   Left: Type Ad - hypermobile compliance    Audiogram Results:  Pure tone testing revealed a mild sloping to severe sensorineural hearing loss in the  right ear and a modeartely severe sloping to profound mixed hearing loss in the  left  ear  SRT and PTA are in agreement indicating good test reliability  Word recognition scores were fair in the right ear and very poor in the left ear  And overall shift was noted in the left ear when comparing to testing on 16 and 17  Counseled to see ENT due to significant decrease in hearing thresholds over the course of one year  *see attached audiogram      Edvin Vásquez is fit with Vin Espinosa B50-R hearing aid(s)  Right serial number [id number]  Left serial number [id number]   serial number [id number]  Warranty date: 3/11/20 (Loss/Damage and repair)  Patient reports he feels like he is having a hard time understanding people with the hearing aids  Action:  Cleaned and checked  Reprogrammed hearing aids to new audiogram (18)   Patient stated he has ear pain bilaterally and was started on an antibiotic ear drop, so no in ear adjustments were made, hearing aids were just reprogrammed based on new audiogram  Advised patient to discontinue use until ears are healthy  Will make new earmold impressions in two weeks       RECOMMENDATIONS:  Annual hearing eval, Return to [facility]  for F/U and Copy to Patient/Caregiver      Carole Hong , CCC-A  Clinical Audiologist

## 2019-11-04 ENCOUNTER — DOCUMENTATION (OUTPATIENT)
Dept: AUDIOLOGY | Age: 84
End: 2019-11-04

## 2019-11-04 NOTE — PROGRESS NOTES
Progress Note    Name:  Gadiel Manzo  :  1935  Age:  81 y o  Date of Evaluation: 19     Scanned in HA chart         Carole Israel , CCC-A  Clinical Audiologist

## 2019-11-15 ENCOUNTER — OFFICE VISIT (OUTPATIENT)
Dept: AUDIOLOGY | Age: 84
End: 2019-11-15
Payer: COMMERCIAL

## 2019-11-15 DIAGNOSIS — H90.3 SENSORY HEARING LOSS, BILATERAL: Primary | ICD-10-CM

## 2019-11-15 NOTE — PROGRESS NOTES
Progress Note    Name:  Ever Folds  :  1935  Age:  81 y o  Date of Evaluation: 11/15/19     Took bilateral earmold impressions without incident  Ordered new soft cShell molds  Sent in right hearing aid - patient stated it takes longer to turn on and it works intermittently         Carole Castro , CCC-A  Clinical Audiologist

## 2019-11-21 NOTE — PROGRESS NOTES
Progress Note    Name:  Bushra Malcolm  :  1935  Age:  81 y o  Date of Evaluation: 19     Hunter Hussein [phone]  Bernardino Zee B50-R   SN: [id number]  Warranty ends: 3/11/20  Replaced components       Waiting on cShell to schedule       Carole Muñoz   Clinical Audiologist

## 2019-12-04 NOTE — PROGRESS NOTES
31-4-70 Slim Tip silicone molds with receivers RFF, packing list [id number]  Inrani Almazan to cuauhtemoc HAV

## 2019-12-05 ENCOUNTER — OFFICE VISIT (OUTPATIENT)
Dept: AUDIOLOGY | Age: 84
End: 2019-12-05

## 2019-12-05 DIAGNOSIS — H90.3 SENSORY HEARING LOSS, BILATERAL: Primary | ICD-10-CM

## 2019-12-06 NOTE — PROGRESS NOTES
Hearing Aid Visit:    Name:  Gadiel Manzo  :  1935  Age:  81 y o  Date of Evaluation: 19     Gadiel Manzo is being seen for a hearing aid visit  Patient is fit with Gene Constable B50-R hearing aid(s)  Right serial number [id number]  Left serial number [id number]   serial number [id number]  Warranty date: 3/11/20 (Loss/Damage and repair)  Patient reports no issues or concerns  Action:  Patient picked up repaired right hearing aid  Patient was fit was new custom canal lock SlimTip Silicone molds Right serial number [id number]  Left serial number [id number]  Hearing aids were reprogrammed with the custom molds, overall gain was increased bilaterally  Patient was happy with fit and sound quality  Recommendations: Follow up in one month        Carole Rider , CCC-A  Clinical Audiologist

## 2019-12-10 LAB
ALBUMIN/CREAT UR: 6 MCG/MG CREAT
BUN SERPL-MCNC: 22 MG/DL (ref 7–25)
BUN/CREAT SERPL: 16 (CALC) (ref 6–22)
CALCIUM SERPL-MCNC: 9.7 MG/DL (ref 8.6–10.3)
CHLORIDE SERPL-SCNC: 105 MMOL/L (ref 98–110)
CO2 SERPL-SCNC: 28 MMOL/L (ref 20–32)
CREAT SERPL-MCNC: 1.4 MG/DL (ref 0.7–1.11)
CREAT UR-MCNC: 156 MG/DL (ref 20–320)
GLUCOSE SERPL-MCNC: 110 MG/DL (ref 65–99)
HBA1C MFR BLD: 6.1 % OF TOTAL HGB
MICROALBUMIN UR-MCNC: 1 MG/DL
POTASSIUM SERPL-SCNC: 4.9 MMOL/L (ref 3.5–5.3)
SL AMB EGFR AFRICAN AMERICAN: 53 ML/MIN/1.73M2
SL AMB EGFR NON AFRICAN AMERICAN: 46 ML/MIN/1.73M2
SODIUM SERPL-SCNC: 140 MMOL/L (ref 135–146)

## 2019-12-12 ENCOUNTER — OFFICE VISIT (OUTPATIENT)
Dept: INTERNAL MEDICINE CLINIC | Age: 84
End: 2019-12-12
Payer: COMMERCIAL

## 2019-12-12 VITALS
TEMPERATURE: 97.5 F | OXYGEN SATURATION: 98 % | DIASTOLIC BLOOD PRESSURE: 60 MMHG | WEIGHT: 202 LBS | SYSTOLIC BLOOD PRESSURE: 120 MMHG | HEIGHT: 69 IN | HEART RATE: 76 BPM | BODY MASS INDEX: 29.92 KG/M2

## 2019-12-12 DIAGNOSIS — H90.3 SENSORINEURAL HEARING LOSS (SNHL) OF BOTH EARS: ICD-10-CM

## 2019-12-12 DIAGNOSIS — I10 ESSENTIAL HYPERTENSION: ICD-10-CM

## 2019-12-12 DIAGNOSIS — E11.9 CONTROLLED TYPE 2 DIABETES MELLITUS WITHOUT COMPLICATION, WITHOUT LONG-TERM CURRENT USE OF INSULIN (HCC): Primary | ICD-10-CM

## 2019-12-12 PROBLEM — H60.333 ACUTE SWIMMER'S EAR OF BOTH SIDES: Status: RESOLVED | Noted: 2019-10-30 | Resolved: 2019-12-12

## 2019-12-12 PROBLEM — H61.23 EXCESSIVE CERUMEN IN BOTH EAR CANALS: Status: RESOLVED | Noted: 2019-10-30 | Resolved: 2019-12-12

## 2019-12-12 PROCEDURE — 1036F TOBACCO NON-USER: CPT | Performed by: INTERNAL MEDICINE

## 2019-12-12 PROCEDURE — 3725F SCREEN DEPRESSION PERFORMED: CPT | Performed by: INTERNAL MEDICINE

## 2019-12-12 PROCEDURE — 99214 OFFICE O/P EST MOD 30 MIN: CPT | Performed by: INTERNAL MEDICINE

## 2019-12-12 PROCEDURE — 3074F SYST BP LT 130 MM HG: CPT | Performed by: INTERNAL MEDICINE

## 2019-12-12 PROCEDURE — 3078F DIAST BP <80 MM HG: CPT | Performed by: INTERNAL MEDICINE

## 2019-12-12 PROCEDURE — 1160F RVW MEDS BY RX/DR IN RCRD: CPT | Performed by: INTERNAL MEDICINE

## 2019-12-12 NOTE — ASSESSMENT & PLAN NOTE
Lab Results   Component Value Date    HGBA1C 6 1 (H) 12/09/2019    He continues to have excellent control of his blood sugar on diet and metformin    And without obvious complications

## 2019-12-12 NOTE — PATIENT INSTRUCTIONS
Foot Care for People with Diabetes   AMBULATORY CARE:   What you need to know about foot care:   · Foot care helps protect your feet and prevent foot ulcers or sores  Long-term high blood sugar levels can damage the blood vessels and nerves in your legs and feet  This damage makes it hard to feel pressure, pain, temperature, and touch  You may not be able to feel a cut or sore, or shoes that are too tight  Foot care is needed to prevent serious problems, such as an infection or amputation  · Diabetes may cause your toes to become crooked or curved under  These changes may affect the way you walk and can lead to increased pressure on your foot  The pressure can decrease blood flow to your feet  Lack of blood flow increases your risk for a foot ulcer  Do not ignore small problems, such as dry skin or small wounds  These can become life-threatening over time without proper care  Contact your healthcare provider if:   · Your feet become numb, weak, or hard to move  · You have pus draining from a sore on your foot  · You have a wound on your foot that gets bigger, deeper, or does not heal      · You see blisters, cuts, scratches, calluses, or sores on your foot  · You have a fever, and your feet become red, warm, and swollen  · Your toenails become thick, curled, or yellow  · You find it hard to check your feet because your vision is poor  · You have questions or concerns about your condition or care  How to care for your feet:   · Check your feet each day  Look at your whole foot, including the bottom, and between and under your toes  Check for wounds, corns, and calluses  Use a mirror to see the bottom of your feet  The skin on your feet may be shiny, tight, or darker than normal  Your feet may also be cold and pale  Feel your feet by running your hands along the tops, bottoms, sides, and between your toes   Redness, swelling, and warmth are signs of blood flow problems that can lead to a foot ulcer  Do not try to remove corns or calluses yourself  · Wash your feet each day with soap and warm water  Do not use hot water, because this can injure your foot  Dry your feet gently with a towel after you wash them  Dry between and under your toes  · Apply lotion or a moisturizer on your dry feet  Ask your healthcare provider what lotions are best to use  Do not put lotion or moisturizer between your toes  · Cut your toenails correctly  File or cut your toenails straight across  Use a soft brush to clean around your toenails  If your toenails are very thick, you may need to have a healthcare provider or specialist cut them  · Protect your feet  Do not walk barefoot or wear your shoes without socks  Check your shoes for rocks or other objects that can hurt your feet  Wear cotton socks to help keep your feet dry  Wear socks without toe seams, or wear them with the seams inside out  Change your socks each day  Do not wear socks that are dirty or damp  · Wear shoes that fit well  Wear shoes that do not rub against any area of your feet  Your shoes should be ½ to ¾ inch (1 to 2 centimeters) longer than your feet  Your shoes should also have extra space around the widest part of your feet  Walking or athletic shoes with laces or straps that adjust are best  Ask your healthcare provider for help to choose shoes that fit you best  Ask him if you need to wear an insert, orthotic, or bandage on your feet  · Go to your follow-up visits  Your healthcare provider will do a foot exam at least once a year  You may need a foot exam more often if you have nerve damage, foot deformities, or ulcers  He will check for nerve damage and how well you can feel your feet  He will check your shoes to see if they fit well  Follow up with your healthcare provider or foot specialist as directed: You will need to have your feet checked at least once a year   You may need a foot exam more often if you have nerve damage, foot deformities, or ulcers  Write down your questions so you remember to ask them during your visits  © 2017 [employer] Information is for End User's use only and may not be sold, redistributed or otherwise used for commercial purposes  All illustrations and images included in CareNotes® are the copyrighted property of A [employer]  or [employer]  The above information is an  only  It is not intended as medical advice for individual conditions or treatments  Talk to your doctor, nurse or pharmacist before following any medical regimen to see if it is safe and effective for you

## 2019-12-12 NOTE — ASSESSMENT & PLAN NOTE
Again he has excellent control of his blood pressure and only on low-dose lisinopril he has had no reoccurrence of chest pain shortness of breath palpitations or claudication

## 2019-12-12 NOTE — PROGRESS NOTES
Assessment/Plan:    Controlled type 2 diabetes mellitus without complication, without long-term current use of insulin ([facility])    Lab Results   Component Value Date    HGBA1C 6 1 (H) 12/09/2019    He continues to have excellent control of his blood sugar on diet and metformin  And without obvious complications    Hypertension  Again he has excellent control of his blood pressure and only on low-dose lisinopril he has had no reoccurrence of chest pain shortness of breath palpitations or claudication    Sensorineural hearing loss (SNHL) of both ears  He also got bilateral hearing aids and is doing great  He is much more alert and less depressed       Diagnoses and all orders for this visit:    Controlled type 2 diabetes mellitus without complication, without long-term current use of insulin ([address] )    Sensorineural hearing loss (SNHL) of both ears    Essential hypertension          Subjective:      Patient ID: Wally Melo is a 81 y o  male  Since getting his hearing aids he has like a new man almost   He has voluntarily stopped driving though    Diabetes   He presents for his follow-up diabetic visit  He has type 2 diabetes mellitus  His disease course has been stable  There are no hypoglycemic associated symptoms  Pertinent negatives for hypoglycemia include no confusion, dizziness or headaches  There are no diabetic associated symptoms  Pertinent negatives for diabetes include no chest pain, no fatigue, no polydipsia, no polyphagia, no polyuria and no weakness  Diabetic complications include heart disease  Pertinent negatives for diabetic complications include no nephropathy, peripheral neuropathy or retinopathy  Risk factors for coronary artery disease include diabetes mellitus, hypertension, male sex, sedentary lifestyle and tobacco exposure  Current diabetic treatment includes diet and oral agent (monotherapy)  He is compliant with treatment most of the time  His weight is stable   He is following a diabetic and low salt diet  Meal planning includes avoidance of concentrated sweets  He has had a previous visit with a dietitian  He rarely participates in exercise  There is no change in his home blood glucose trend  His breakfast blood glucose range is generally  mg/dl  An ACE inhibitor/angiotensin II receptor blocker is being taken  He does not see a podiatrist Eye exam is current  Review of Systems   Constitutional: Negative for chills, fatigue, fever and unexpected weight change  HENT: Negative for congestion, ear pain, hearing loss, postnasal drip, sinus pressure, sore throat, trouble swallowing and voice change  Eyes: Negative for visual disturbance  Respiratory: Negative for cough, chest tightness, shortness of breath and wheezing  Cardiovascular: Negative for chest pain, palpitations and leg swelling  Gastrointestinal: Negative for abdominal distention, abdominal pain, anal bleeding, blood in stool, constipation, diarrhea and nausea  Endocrine: Negative for cold intolerance, polydipsia, polyphagia and polyuria  Genitourinary: Negative for dysuria, flank pain, frequency, hematuria and urgency  Musculoskeletal: Negative for arthralgias, back pain, gait problem, joint swelling, myalgias and neck pain  Skin: Negative for rash  Allergic/Immunologic: Negative for immunocompromised state  Neurological: Negative for dizziness, syncope, facial asymmetry, weakness, light-headedness, numbness and headaches  Hematological: Negative for adenopathy  Psychiatric/Behavioral: Negative for confusion, sleep disturbance and suicidal ideas  Objective:      /60 (BP Location: Left arm, Patient Position: Sitting)   Pulse 76   Temp 97 5 °F (36 4 °C) (Tympanic)   Ht 5' 9" (1 753 m)   Wt 91 6 kg (202 lb)   SpO2 98%   BMI 29 83 kg/m²          Physical Exam   Constitutional: He is oriented to person, place, and time  He appears well-developed and well-nourished  No distress     HENT: Right Ear: External ear normal    Left Ear: External ear normal    Nose: Nose normal    Mouth/Throat: Oropharynx is clear and moist  No oropharyngeal exudate  Eyes: Pupils are equal, round, and reactive to light  EOM are normal    Neck: Normal range of motion  Neck supple  No JVD present  No thyromegaly present  Cardiovascular: Normal rate, regular rhythm, normal heart sounds and intact distal pulses  Exam reveals no gallop  Pulses are no weak pulses  No murmur heard  Pulses:       Dorsalis pedis pulses are 1+ on the right side, and 1+ on the left side  Pulmonary/Chest: Effort normal and breath sounds normal  No respiratory distress  He has no wheezes  He has no rales  Abdominal: Soft  Bowel sounds are normal  He exhibits no distension and no mass  There is no tenderness  Musculoskeletal: Normal range of motion  He exhibits no tenderness  Kyphosis and slow gait   Feet:   Right Foot:   Skin Integrity: Negative for ulcer, skin breakdown, erythema, warmth, callus or dry skin  Left Foot:   Skin Integrity: Negative for ulcer, skin breakdown, erythema, warmth, callus or dry skin  Lymphadenopathy:     He has no cervical adenopathy  Neurological: He is alert and oriented to person, place, and time  No cranial nerve deficit or sensory deficit  Coordination normal    Skin: No rash noted  Psychiatric: He has a normal mood and affect  His behavior is normal  Judgment and thought content normal      Right Foot/Ankle   Right Foot Inspection  Skin Exam: skin normal and skin intact no dry skin, no warmth, no callus, no erythema, no maceration, no abnormal color, no pre-ulcer, no ulcer and no callus                          Toe Exam: ROM and strength within normal limits  Sensory   Vibration: intact  Proprioception: intact   Monofilament testing: intact  Vascular  Capillary refills: < 3 seconds  The right DP pulse is 1+       Left Foot/Ankle  Left Foot Inspection  Skin Exam: skin normal and skin intactno dry skin, no warmth, no erythema, no maceration, normal color, no pre-ulcer, no ulcer and no callus                         Toe Exam: ROM and strength within normal limits                   Sensory   Vibration: intact  Proprioception: intact  Monofilament: intact  Vascular  Capillary refills: < 3 seconds  The left DP pulse is 1+  Assign Risk Category:  No deformity present; No loss of protective sensation;  No weak pulses       Risk: 0

## 2020-01-23 ENCOUNTER — OFFICE VISIT (OUTPATIENT)
Dept: AUDIOLOGY | Age: 85
End: 2020-01-23

## 2020-01-23 DIAGNOSIS — H90.3 SENSORY HEARING LOSS, BILATERAL: Primary | ICD-10-CM

## 2020-01-23 NOTE — PROGRESS NOTES
Hearing Aid Visit:    Name:  Gadiel Manzo  :  1935  Age:  81 y o  Date of Evaluation: 20     Gadiel Manzo is being seen for a hearing aid visit  Patient is fit with Phonak Audeo B50-R hearing aid(s)  Right serial number [id number]  Left serial number [id number]     serial number [id number]  Warranty date: 3/11/20 (Loss/Damage and repair)       Patient reports the right hearing aid is whistling nonstop  Action:  Cleaned and checked hearing aids  Otoscopy revealed the right ear was completely clogged with wax  Counseled that the feedback is most likely due to the wax build up  Recommendations:   Discontinue use of the right hearing aid until the ear is cleaned        Carole Rider , CCC-A  Clinical Audiologist

## 2020-02-14 ENCOUNTER — OFFICE VISIT (OUTPATIENT)
Dept: AUDIOLOGY | Age: 85
End: 2020-02-14

## 2020-02-14 DIAGNOSIS — H90.3 SENSORY HEARING LOSS, BILATERAL: Primary | ICD-10-CM

## 2020-02-14 NOTE — PROGRESS NOTES
Hearing Aid Visit:    Name:  Brea Ramírez  :  1935  Age:  81 y o  Date of Evaluation: 20     Brea Ramírez is being seen for a hearing aid visit  Patient is fit with Phonak Audeo B50-R hearing aid(s)  Right serial number [id number]  Left serial number [id number]     serial number [id number]  Warranty date: 3/11/20 (Loss/Damage and repair)       Patient reports some things are too loud and expressed interest in volume control  Action:  Cleaned and checked hearing aids  Tried to add volume control, the left push button was not working, it would not adjust the volume  Counseled patient that it would have to be sent in for repair  Patient wanted to wait  Recommendations: Follow up Carole Bang , CCC-A  Clinical Audiologist

## 2020-02-19 NOTE — PROGRESS NOTES
4:21p 12/3/18    Left hearing RFF  SN #[id number]  Warranty 3/11/2020  Will constanza JOSEPH to schedule 30-minute HAV with Zaida Serrano  Self

## 2020-03-17 DIAGNOSIS — E11.9 CONTROLLED TYPE 2 DIABETES MELLITUS WITHOUT COMPLICATION, WITHOUT LONG-TERM CURRENT USE OF INSULIN (HCC): ICD-10-CM

## 2020-03-17 RX ORDER — LANCETS 32 GAUGE
EACH MISCELLANEOUS 2 TIMES DAILY
Qty: 100 EACH | Refills: 3 | Status: SHIPPED | OUTPATIENT
Start: 2020-03-17 | End: 2021-03-30

## 2020-05-11 ENCOUNTER — TELEPHONE (OUTPATIENT)
Dept: UROLOGY | Facility: AMBULATORY SURGERY CENTER | Age: 85
End: 2020-05-11

## 2020-05-19 ENCOUNTER — TELEMEDICINE (OUTPATIENT)
Dept: UROLOGY | Facility: AMBULATORY SURGERY CENTER | Age: 85
End: 2020-05-19
Payer: COMMERCIAL

## 2020-05-19 DIAGNOSIS — N32.81 OAB (OVERACTIVE BLADDER): Primary | ICD-10-CM

## 2020-05-19 DIAGNOSIS — Z98.890 HISTORY OF CIRCUMCISION: ICD-10-CM

## 2020-05-19 DIAGNOSIS — N40.1 BENIGN PROSTATIC HYPERPLASIA WITH URINARY FREQUENCY: ICD-10-CM

## 2020-05-19 DIAGNOSIS — R35.0 BENIGN PROSTATIC HYPERPLASIA WITH URINARY FREQUENCY: ICD-10-CM

## 2020-05-19 PROCEDURE — 1160F RVW MEDS BY RX/DR IN RCRD: CPT | Performed by: UROLOGY

## 2020-05-19 PROCEDURE — 99214 OFFICE O/P EST MOD 30 MIN: CPT | Performed by: UROLOGY

## 2020-05-26 DIAGNOSIS — R35.0 BENIGN PROSTATIC HYPERPLASIA WITH URINARY FREQUENCY: ICD-10-CM

## 2020-05-26 DIAGNOSIS — N40.1 BENIGN PROSTATIC HYPERPLASIA WITH URINARY FREQUENCY: ICD-10-CM

## 2020-05-26 RX ORDER — FINASTERIDE 5 MG/1
5 TABLET, FILM COATED ORAL DAILY
Qty: 90 TABLET | Refills: 3 | Status: SHIPPED | OUTPATIENT
Start: 2020-05-26 | End: 2021-07-08 | Stop reason: SDUPTHER

## 2020-05-27 ENCOUNTER — OFFICE VISIT (OUTPATIENT)
Dept: AUDIOLOGY | Age: 85
End: 2020-05-27

## 2020-05-27 DIAGNOSIS — H90.3 SENSORY HEARING LOSS, BILATERAL: Primary | ICD-10-CM

## 2020-06-01 DIAGNOSIS — E11.9 CONTROLLED TYPE 2 DIABETES MELLITUS WITHOUT COMPLICATION, WITHOUT LONG-TERM CURRENT USE OF INSULIN (HCC): ICD-10-CM

## 2020-06-15 ENCOUNTER — OFFICE VISIT (OUTPATIENT)
Dept: INTERNAL MEDICINE CLINIC | Age: 85
End: 2020-06-15
Payer: COMMERCIAL

## 2020-06-15 VITALS
SYSTOLIC BLOOD PRESSURE: 120 MMHG | OXYGEN SATURATION: 97 % | HEART RATE: 68 BPM | TEMPERATURE: 97.8 F | WEIGHT: 202.4 LBS | HEIGHT: 69 IN | DIASTOLIC BLOOD PRESSURE: 62 MMHG | BODY MASS INDEX: 29.98 KG/M2

## 2020-06-15 DIAGNOSIS — E11.9 CONTROLLED TYPE 2 DIABETES MELLITUS WITHOUT COMPLICATION, WITHOUT LONG-TERM CURRENT USE OF INSULIN (HCC): ICD-10-CM

## 2020-06-15 DIAGNOSIS — R35.0 BENIGN PROSTATIC HYPERPLASIA WITH URINARY FREQUENCY: ICD-10-CM

## 2020-06-15 DIAGNOSIS — N40.1 BENIGN PROSTATIC HYPERPLASIA WITH URINARY FREQUENCY: ICD-10-CM

## 2020-06-15 DIAGNOSIS — I10 ESSENTIAL HYPERTENSION: Primary | ICD-10-CM

## 2020-06-15 PROBLEM — M76.52 PATELLAR TENDINITIS OF LEFT KNEE: Status: RESOLVED | Noted: 2018-05-17 | Resolved: 2020-06-15

## 2020-06-15 PROCEDURE — 1160F RVW MEDS BY RX/DR IN RCRD: CPT | Performed by: INTERNAL MEDICINE

## 2020-06-15 PROCEDURE — 3074F SYST BP LT 130 MM HG: CPT | Performed by: INTERNAL MEDICINE

## 2020-06-15 PROCEDURE — 3008F BODY MASS INDEX DOCD: CPT | Performed by: INTERNAL MEDICINE

## 2020-06-15 PROCEDURE — 3078F DIAST BP <80 MM HG: CPT | Performed by: INTERNAL MEDICINE

## 2020-06-15 PROCEDURE — 99214 OFFICE O/P EST MOD 30 MIN: CPT | Performed by: INTERNAL MEDICINE

## 2020-06-15 PROCEDURE — 4040F PNEUMOC VAC/ADMIN/RCVD: CPT | Performed by: INTERNAL MEDICINE

## 2020-06-15 PROCEDURE — 1036F TOBACCO NON-USER: CPT | Performed by: INTERNAL MEDICINE

## 2020-06-25 LAB
ALBUMIN SERPL-MCNC: 4.1 G/DL (ref 3.6–5.1)
ALBUMIN/GLOB SERPL: 1.9 (CALC) (ref 1–2.5)
ALP SERPL-CCNC: 55 U/L (ref 35–144)
ALT SERPL-CCNC: 11 U/L (ref 9–46)
AST SERPL-CCNC: 12 U/L (ref 10–35)
BILIRUB SERPL-MCNC: 0.6 MG/DL (ref 0.2–1.2)
BUN SERPL-MCNC: 30 MG/DL (ref 7–25)
BUN/CREAT SERPL: 20 (CALC) (ref 6–22)
CALCIUM SERPL-MCNC: 9.5 MG/DL (ref 8.6–10.3)
CHLORIDE SERPL-SCNC: 108 MMOL/L (ref 98–110)
CO2 SERPL-SCNC: 24 MMOL/L (ref 20–32)
CREAT SERPL-MCNC: 1.51 MG/DL (ref 0.7–1.11)
GLOBULIN SER CALC-MCNC: 2.2 G/DL (CALC) (ref 1.9–3.7)
GLUCOSE SERPL-MCNC: 121 MG/DL (ref 65–99)
HBA1C MFR BLD: 6.2 % OF TOTAL HGB
POTASSIUM SERPL-SCNC: 4.8 MMOL/L (ref 3.5–5.3)
PROT SERPL-MCNC: 6.3 G/DL (ref 6.1–8.1)
SL AMB EGFR AFRICAN AMERICAN: 48 ML/MIN/1.73M2
SL AMB EGFR NON AFRICAN AMERICAN: 42 ML/MIN/1.73M2
SODIUM SERPL-SCNC: 142 MMOL/L (ref 135–146)

## 2020-09-21 DIAGNOSIS — N40.1 BENIGN PROSTATIC HYPERPLASIA WITH URINARY FREQUENCY: ICD-10-CM

## 2020-09-21 DIAGNOSIS — R35.0 BENIGN PROSTATIC HYPERPLASIA WITH URINARY FREQUENCY: ICD-10-CM

## 2020-09-21 DIAGNOSIS — I10 ESSENTIAL HYPERTENSION: ICD-10-CM

## 2020-09-21 RX ORDER — LISINOPRIL 10 MG/1
10 TABLET ORAL DAILY
Qty: 90 TABLET | Refills: 3 | Status: SHIPPED | OUTPATIENT
Start: 2020-09-21 | End: 2021-06-15 | Stop reason: SDUPTHER

## 2020-09-21 RX ORDER — OXYBUTYNIN CHLORIDE 5 MG/1
5 TABLET, EXTENDED RELEASE ORAL DAILY
Qty: 90 TABLET | Refills: 3 | Status: SHIPPED | OUTPATIENT
Start: 2020-09-21 | End: 2021-06-15 | Stop reason: SDUPTHER

## 2020-09-28 ENCOUNTER — OFFICE VISIT (OUTPATIENT)
Dept: INTERNAL MEDICINE CLINIC | Age: 85
End: 2020-09-28
Payer: COMMERCIAL

## 2020-09-28 VITALS
WEIGHT: 201.2 LBS | BODY MASS INDEX: 29.8 KG/M2 | DIASTOLIC BLOOD PRESSURE: 54 MMHG | HEIGHT: 69 IN | TEMPERATURE: 97 F | SYSTOLIC BLOOD PRESSURE: 116 MMHG | OXYGEN SATURATION: 97 % | HEART RATE: 76 BPM

## 2020-09-28 DIAGNOSIS — I10 ESSENTIAL HYPERTENSION: ICD-10-CM

## 2020-09-28 DIAGNOSIS — Z13.220 SCREENING, LIPID: ICD-10-CM

## 2020-09-28 DIAGNOSIS — R53.82 CHRONIC FATIGUE: ICD-10-CM

## 2020-09-28 DIAGNOSIS — Z23 NEED FOR IMMUNIZATION AGAINST INFLUENZA: Primary | ICD-10-CM

## 2020-09-28 DIAGNOSIS — E11.9 CONTROLLED TYPE 2 DIABETES MELLITUS WITHOUT COMPLICATION, WITHOUT LONG-TERM CURRENT USE OF INSULIN (HCC): ICD-10-CM

## 2020-09-28 DIAGNOSIS — H90.3 SENSORINEURAL HEARING LOSS (SNHL) OF BOTH EARS: ICD-10-CM

## 2020-09-28 PROCEDURE — 90662 IIV NO PRSV INCREASED AG IM: CPT

## 2020-09-28 PROCEDURE — 1160F RVW MEDS BY RX/DR IN RCRD: CPT | Performed by: INTERNAL MEDICINE

## 2020-09-28 PROCEDURE — 3078F DIAST BP <80 MM HG: CPT | Performed by: INTERNAL MEDICINE

## 2020-09-28 PROCEDURE — 1036F TOBACCO NON-USER: CPT | Performed by: INTERNAL MEDICINE

## 2020-09-28 PROCEDURE — 3725F SCREEN DEPRESSION PERFORMED: CPT | Performed by: INTERNAL MEDICINE

## 2020-09-28 PROCEDURE — 3074F SYST BP LT 130 MM HG: CPT | Performed by: INTERNAL MEDICINE

## 2020-09-28 PROCEDURE — 99214 OFFICE O/P EST MOD 30 MIN: CPT | Performed by: INTERNAL MEDICINE

## 2020-09-28 PROCEDURE — G0008 ADMIN INFLUENZA VIRUS VAC: HCPCS

## 2020-09-28 NOTE — PROGRESS NOTES
Assessment/Plan:    Controlled type 2 diabetes mellitus without complication, without long-term current use of insulin ([facility])    Lab Results   Component Value Date    HGBA1C 6 2 (H) 06/24/2020   His fasting blood sugars all remain in the 120s on metformin    Hypertension  His blood pressure also continues be under good control and without obvious cardiovascular symptoms    Sensorineural hearing loss (SNHL) of both ears  He remains significantly hard of hearing    Spinal stenosis  He continues to have some gait issues due to his spinal stenosis but does remain ambulatory       Diagnoses and all orders for this visit:    Need for immunization against influenza  -     influenza vaccine, high-dose, PF 0 7 mL (FLUZONE HIGH-DOSE)    Controlled type 2 diabetes mellitus without complication, without long-term current use of insulin ([facility] )  -     Comprehensive metabolic panel; Future  -     Hemoglobin A1C; Future  -     metFORMIN (GLUCOPHAGE) 500 mg tablet; Take 1 tablet (500 mg total) by mouth 2 (two) times a day with meals    Essential hypertension    Chronic fatigue    Screening, lipid  -     Lipid panel; Future    Sensorineural hearing loss (SNHL) of both ears          Subjective:      Patient ID: Marc Mei is a 81 y o  male  HPI        Review of Systems   Constitutional: Positive for fatigue  Negative for chills, fever and unexpected weight change  HENT: Positive for hearing loss  Negative for congestion, ear pain, postnasal drip, sinus pressure, sore throat, trouble swallowing and voice change  Eyes: Negative for visual disturbance  Respiratory: Negative for cough, chest tightness, shortness of breath and wheezing  Cardiovascular: Negative for chest pain, palpitations and leg swelling  Gastrointestinal: Negative for abdominal distention, abdominal pain, anal bleeding, blood in stool, constipation, diarrhea and nausea  Endocrine: Negative for cold intolerance, polydipsia, polyphagia and polyuria  Genitourinary: Negative for dysuria, flank pain, frequency, hematuria and urgency  Musculoskeletal: Positive for arthralgias and back pain  Negative for gait problem, joint swelling, myalgias and neck pain  Skin: Negative for rash  Allergic/Immunologic: Negative for immunocompromised state  Neurological: Negative for dizziness, syncope, facial asymmetry, weakness, light-headedness, numbness and headaches  Hematological: Negative for adenopathy  Psychiatric/Behavioral: Negative for confusion, sleep disturbance and suicidal ideas  Objective:      /54 (BP Location: Left arm, Patient Position: Sitting, Cuff Size: Standard)   Pulse 76   Temp (!) 97 °F (36 1 °C) (Tympanic)   Ht 5' 9" (1 753 m)   Wt 91 3 kg (201 lb 3 2 oz)   SpO2 97%   BMI 29 71 kg/m²          Physical Exam  Constitutional:       General: He is not in acute distress  Appearance: He is well-developed  HENT:      Right Ear: External ear normal       Left Ear: External ear normal       Nose: Nose normal       Mouth/Throat:      Pharynx: No oropharyngeal exudate  Eyes:      Pupils: Pupils are equal, round, and reactive to light  Neck:      Musculoskeletal: Normal range of motion and neck supple  Thyroid: No thyromegaly  Vascular: No JVD  Cardiovascular:      Rate and Rhythm: Normal rate and regular rhythm  Heart sounds: Normal heart sounds  No murmur  No gallop  Pulmonary:      Effort: Pulmonary effort is normal  No respiratory distress  Breath sounds: Normal breath sounds  No wheezing or rales  Abdominal:      General: Bowel sounds are normal  There is no distension  Palpations: Abdomen is soft  There is no mass  Tenderness: There is no abdominal tenderness  Musculoskeletal: Normal range of motion  General: No tenderness  Comments: Kyphosis   Lymphadenopathy:      Cervical: No cervical adenopathy  Skin:     Findings: No rash     Neurological:      Mental Status: He is alert and oriented to person, place, and time  Cranial Nerves: No cranial nerve deficit  Coordination: Coordination normal       Gait: Gait abnormal (Slow)  Psychiatric:         Behavior: Behavior normal          Thought Content:  Thought content normal          Judgment: Judgment normal

## 2020-09-28 NOTE — ASSESSMENT & PLAN NOTE
Lab Results   Component Value Date    HGBA1C 6 2 (H) 06/24/2020   His fasting blood sugars all remain in the 120s on metformin

## 2020-10-20 DIAGNOSIS — H90.3 SENSORINEURAL HEARING LOSS (SNHL) OF BOTH EARS: Primary | ICD-10-CM

## 2020-10-21 LAB
ALBUMIN SERPL-MCNC: 4.1 G/DL (ref 3.6–5.1)
ALBUMIN/GLOB SERPL: 1.8 (CALC) (ref 1–2.5)
ALP SERPL-CCNC: 55 U/L (ref 35–144)
ALT SERPL-CCNC: 10 U/L (ref 9–46)
AST SERPL-CCNC: 12 U/L (ref 10–35)
BILIRUB SERPL-MCNC: 0.5 MG/DL (ref 0.2–1.2)
BUN SERPL-MCNC: 23 MG/DL (ref 7–25)
BUN/CREAT SERPL: 15 (CALC) (ref 6–22)
CALCIUM SERPL-MCNC: 9.6 MG/DL (ref 8.6–10.3)
CHLORIDE SERPL-SCNC: 104 MMOL/L (ref 98–110)
CHOLEST SERPL-MCNC: 153 MG/DL
CHOLEST/HDLC SERPL: 4 (CALC)
CO2 SERPL-SCNC: 29 MMOL/L (ref 20–32)
CREAT SERPL-MCNC: 1.51 MG/DL (ref 0.7–1.11)
GLOBULIN SER CALC-MCNC: 2.3 G/DL (CALC) (ref 1.9–3.7)
GLUCOSE SERPL-MCNC: 125 MG/DL (ref 65–99)
HBA1C MFR BLD: 6.1 % OF TOTAL HGB
HDLC SERPL-MCNC: 38 MG/DL
LDLC SERPL CALC-MCNC: 92 MG/DL (CALC)
NONHDLC SERPL-MCNC: 115 MG/DL (CALC)
POTASSIUM SERPL-SCNC: 4.8 MMOL/L (ref 3.5–5.3)
PROT SERPL-MCNC: 6.4 G/DL (ref 6.1–8.1)
SL AMB EGFR AFRICAN AMERICAN: 48 ML/MIN/1.73M2
SL AMB EGFR NON AFRICAN AMERICAN: 42 ML/MIN/1.73M2
SODIUM SERPL-SCNC: 137 MMOL/L (ref 135–146)
TRIGL SERPL-MCNC: 126 MG/DL

## 2020-10-22 ENCOUNTER — OFFICE VISIT (OUTPATIENT)
Dept: AUDIOLOGY | Age: 85
End: 2020-10-22
Payer: COMMERCIAL

## 2020-10-22 DIAGNOSIS — H90.3 SENSORY HEARING LOSS, BILATERAL: Primary | ICD-10-CM

## 2020-10-22 PROCEDURE — 92557 COMPREHENSIVE HEARING TEST: CPT | Performed by: AUDIOLOGIST

## 2020-10-22 PROCEDURE — 92567 TYMPANOMETRY: CPT | Performed by: AUDIOLOGIST

## 2020-10-28 ENCOUNTER — TELEPHONE (OUTPATIENT)
Dept: INTERNAL MEDICINE CLINIC | Age: 85
End: 2020-10-28

## 2020-10-28 ENCOUNTER — TELEPHONE (OUTPATIENT)
Dept: UROLOGY | Facility: MEDICAL CENTER | Age: 85
End: 2020-10-28

## 2020-10-28 DIAGNOSIS — N32.81 OAB (OVERACTIVE BLADDER): Primary | ICD-10-CM

## 2020-11-19 ENCOUNTER — OFFICE VISIT (OUTPATIENT)
Dept: AUDIOLOGY | Age: 85
End: 2020-11-19
Payer: COMMERCIAL

## 2020-11-19 DIAGNOSIS — H90.3 SENSORINEURAL HEARING LOSS, BILATERAL: Primary | ICD-10-CM

## 2020-12-10 ENCOUNTER — OFFICE VISIT (OUTPATIENT)
Dept: AUDIOLOGY | Age: 85
End: 2020-12-10

## 2020-12-10 DIAGNOSIS — H90.3 SENSORINEURAL HEARING LOSS, BILATERAL: Primary | ICD-10-CM

## 2020-12-18 ENCOUNTER — OFFICE VISIT (OUTPATIENT)
Dept: INTERNAL MEDICINE CLINIC | Age: 85
End: 2020-12-18
Payer: COMMERCIAL

## 2020-12-18 VITALS
WEIGHT: 204.8 LBS | DIASTOLIC BLOOD PRESSURE: 52 MMHG | OXYGEN SATURATION: 98 % | HEART RATE: 92 BPM | BODY MASS INDEX: 30.33 KG/M2 | SYSTOLIC BLOOD PRESSURE: 118 MMHG | TEMPERATURE: 97.7 F | HEIGHT: 69 IN

## 2020-12-18 DIAGNOSIS — Z00.00 MEDICARE ANNUAL WELLNESS VISIT, SUBSEQUENT: Primary | ICD-10-CM

## 2020-12-18 DIAGNOSIS — R53.82 CHRONIC FATIGUE: ICD-10-CM

## 2020-12-18 DIAGNOSIS — E11.9 CONTROLLED TYPE 2 DIABETES MELLITUS WITHOUT COMPLICATION, WITHOUT LONG-TERM CURRENT USE OF INSULIN (HCC): ICD-10-CM

## 2020-12-18 DIAGNOSIS — M48.07 SPINAL STENOSIS OF LUMBOSACRAL REGION: ICD-10-CM

## 2020-12-18 DIAGNOSIS — R35.0 BENIGN PROSTATIC HYPERPLASIA WITH URINARY FREQUENCY: ICD-10-CM

## 2020-12-18 DIAGNOSIS — N40.1 BENIGN PROSTATIC HYPERPLASIA WITH URINARY FREQUENCY: ICD-10-CM

## 2020-12-18 DIAGNOSIS — N39.498 OTHER URINARY INCONTINENCE: Primary | ICD-10-CM

## 2020-12-18 DIAGNOSIS — N32.81 OAB (OVERACTIVE BLADDER): ICD-10-CM

## 2020-12-18 PROCEDURE — G0439 PPPS, SUBSEQ VISIT: HCPCS | Performed by: INTERNAL MEDICINE

## 2020-12-18 PROCEDURE — T1015 CLINIC SERVICE: HCPCS | Performed by: INTERNAL MEDICINE

## 2020-12-23 ENCOUNTER — OFFICE VISIT (OUTPATIENT)
Dept: AUDIOLOGY | Age: 85
End: 2020-12-23

## 2020-12-23 DIAGNOSIS — H90.3 SENSORY HEARING LOSS, BILATERAL: Primary | ICD-10-CM

## 2021-01-06 ENCOUNTER — OFFICE VISIT (OUTPATIENT)
Dept: AUDIOLOGY | Age: 86
End: 2021-01-06

## 2021-01-06 DIAGNOSIS — H90.3 SENSORY HEARING LOSS, BILATERAL: Primary | ICD-10-CM

## 2021-01-06 NOTE — PROGRESS NOTES
Hearing Aid Visit:    Name:  Brea Ramírez  :  1935  Age:  81 y o  Date of Evaluation: 21     Brea Ramírez is being seen for a hearing aid visit  Patient is fit with  [employer] B50-R hearing aid(s)  Right serial number [id number]  Left serial number [id number]     serial number [id number]  Warranty date: 3/11/20 (Loss/Damage and repair)  Patient reports the left hearing aid stopped working  Action:  The  wire is dead  Did not have a 3xP left  wire, replaced it with a right  wire in the meantime  Ordered , no charge  Does not need appointment to change out wire       Recommendations:    when wire comes in       [facility], Carole TAYLOR CCC-A  Clinical Audiologist

## 2021-01-08 NOTE — PROGRESS NOTES
Progress Note    Name:  Chinyere Jordan  :  1935  Age:  81 y o  Date of Evaluation: 21     Patients power size 3  arrived  Patient to be notified to  at   Audiologist will need to exchange out the current wire for the replacement wire for him  Poncho Crumpt to give to audiologist in back at time of pick-up         Carole Panchal , CCC-A  Clinical Audiologist

## 2021-01-11 ENCOUNTER — OFFICE VISIT (OUTPATIENT)
Dept: AUDIOLOGY | Age: 86
End: 2021-01-11

## 2021-01-11 DIAGNOSIS — H90.3 SENSORY HEARING LOSS, BILATERAL: Primary | ICD-10-CM

## 2021-01-11 NOTE — PROGRESS NOTES
Progress Note    Name:  Marlene Story  :  1935  Age:  81 y o  Date of Evaluation: 21     Left  wire was installed  No charge         Carole Drummond , CCC-A  Clinical Audiologist

## 2021-01-20 DIAGNOSIS — Z23 ENCOUNTER FOR IMMUNIZATION: ICD-10-CM

## 2021-02-02 ENCOUNTER — TELEPHONE (OUTPATIENT)
Dept: UROLOGY | Facility: AMBULATORY SURGERY CENTER | Age: 86
End: 2021-02-02

## 2021-02-02 ENCOUNTER — TELEMEDICINE (OUTPATIENT)
Dept: UROLOGY | Facility: AMBULATORY SURGERY CENTER | Age: 86
End: 2021-02-02
Payer: COMMERCIAL

## 2021-02-02 DIAGNOSIS — N39.498 OTHER URINARY INCONTINENCE: ICD-10-CM

## 2021-02-02 DIAGNOSIS — N32.81 OAB (OVERACTIVE BLADDER): ICD-10-CM

## 2021-02-02 DIAGNOSIS — N40.1 BENIGN PROSTATIC HYPERPLASIA WITH URINARY FREQUENCY: Primary | ICD-10-CM

## 2021-02-02 DIAGNOSIS — R35.0 BENIGN PROSTATIC HYPERPLASIA WITH URINARY FREQUENCY: Primary | ICD-10-CM

## 2021-02-02 PROCEDURE — 99213 OFFICE O/P EST LOW 20 MIN: CPT | Performed by: NURSE PRACTITIONER

## 2021-02-02 PROCEDURE — 1160F RVW MEDS BY RX/DR IN RCRD: CPT | Performed by: NURSE PRACTITIONER

## 2021-02-02 NOTE — TELEPHONE ENCOUNTER
Attempted to reach Patient on # listed in appointment notes or virtual visit  Lady answered and stated I had wrong # no one by that name  Tried # listed in Patient's chart LMOM  Provided # for office to return call

## 2021-02-02 NOTE — PROGRESS NOTES
Virtual Regular Visit      Assessment/Plan:    Problem List Items Addressed This Visit        Genitourinary    OAB (overactive bladder)       Patient has no complaints to his urinary pattern  He will continue oxybutynin 5 mg daily  This is prescribed by his PCP  We did discuss potential side effects of anticholinergics in patients of advanced age  He will remain on Myrbetriq 50 mg daily  Prescription refill was electronically sent to his pharmacy  Relevant Medications    Mirabegron ER 50 MG TB24       Other    Benign prostatic hyperplasia with urinary frequency - Primary       Patient will remain on finasteride 5 mg daily  This is managed by his PCP  Other Visit Diagnoses     Other urinary incontinence        Relevant Medications    Mirabegron ER 50 MG TB24        Follow-up in 1 year for re-evaluation and PVR assessment  He was instructed to call sooner with any issues  Patient and wife agreeable to plan  Reason for visit is   Chief Complaint   Patient presents with    Virtual Regular Visit        Encounter provider JONAS Freeman    Provider located at [address] [address]      Recent Visits  No visits were found meeting these conditions  Showing recent visits within past 7 days and meeting all other requirements     Today's Visits  Date Type Provider Dept   02/02/21 Telephone [facility]   02/02/21 Telephone [facility] [place]   02/02/21 [address] [facility] [address] [facility] [place]   Showing today's visits and meeting all other requirements     Future Appointments  No visits were found meeting these conditions  Showing future appointments within next 150 days and meeting all other requirements        The patient was identified by name and date of birth   Chinyere Jordan was informed that this is a telemedicine visit and that the visit is being conducted through [employer] and patient was informed that this is not a secure, HIPAA-compliant platform  He agrees to proceed     My office door was closed  No one else was in the room  He acknowledged consent and understanding of privacy and security of the video platform  The patient has agreed to participate and understands they can discontinue the visit at any time  Patient is aware this is a billable service  Subjective  Marybeth Angelo is a 81 y o  male with a history of BPH and urinary urgency  He was last evaluated via telemedicine visit in May  He remains on oxybutynin 5 mg daily and finasteride 5 mg daily which are both managed by his PCP  We had started him on Myrbetriq 50 mg daily  Patient's wife states he has had decreased episodes of urinary incontinence  He does occasionally experience urinary urgency and wears a sanitary pad daily for protection  Patient has no complaints of pain and denies any gross hematuria or dysuria  No additional changes to his overall health  Past Medical History:   Diagnosis Date    Balanitis     last assessed 12/19/14    BPH (benign prostatic hyperplasia)     Diabetes mellitus (HCC)     blood sugar 167 this am at 0630    GERD (gastroesophageal reflux disease)     Heme + stool     Hypertension     Lumbar radiculopathy     Myocardial infarction ([facility] )     35 years ago    Phimosis     last assessed 04/27/16    Sciatica     right side, last assessed 04/25/16       Past Surgical History:   Procedure Laterality Date    BACK SURGERY      laminectomy Lumbar    CATARACT EXTRACTION, BILATERAL      CIRCUMCISION      HERNIA REPAIR Right     CO COLONOSCOPY FLX DX W/COLLJ SPEC WHEN PFRMD N/A 1/16/2017    Procedure: EGD AND COLONOSCOPY;  Surgeon: Vanesa Valladares DO;  Location: [facility];   Service: General       Current Outpatient Medications   Medication Sig Dispense Refill    Easy Touch Lancets 32G MISC by Does not apply route 2 (two) times a day Test ones daily 100 each 3    finasteride (PROSCAR) 5 mg tablet Take 1 tablet (5 mg total) by mouth daily 90 tablet 3    glucose blood (Easy Touch Test) test strip 2 each by Other route daily CONTOUR TEST STRIPS     DX CODE  E11 9    TEST BLOOD SUGAR TWICE DAILY  100 each 2    lisinopril (ZESTRIL) 10 mg tablet Take 1 tablet (10 mg total) by mouth daily 90 tablet 3    metFORMIN (GLUCOPHAGE) 500 mg tablet Take 1 tablet (500 mg total) by mouth 2 (two) times a day with meals 90 tablet 2    Mirabegron ER 50 MG TB24 Take 1 tablet (50 mg total) by mouth daily 90 tablet 3    oxybutynin (DITROPAN-XL) 5 mg 24 hr tablet Take 1 tablet (5 mg total) by mouth daily 90 tablet 3    traMADol (ULTRAM) 50 mg tablet Take 1 tablet (50 mg total) by mouth every 6 (six) hours as needed for moderate pain 60 tablet 0     No current facility-administered medications for this visit  No Known Allergies    Review of Systems   Constitutional: Negative  HENT: Negative  Respiratory: Negative  Cardiovascular: Negative  Gastrointestinal: Negative  Genitourinary: Negative for decreased urine volume, difficulty urinating, dysuria, flank pain, frequency and hematuria  Musculoskeletal: Negative  Skin: Negative  Neurological: Negative  Psychiatric/Behavioral: Negative  Video Exam    There were no vitals filed for this visit  Physical Exam  Constitutional:       Appearance: Normal appearance  Neurological:      General: No focal deficit present  Mental Status: He is alert and oriented to person, place, and time  Psychiatric:         Mood and Affect: Mood normal          Behavior: Behavior normal          Thought Content:  Thought content normal          Judgment: Judgment normal           I spent 15 minutes with patient today in which greater than 50% of the time was spent in counseling/coordination of care regarding plan of care      VIRTUAL VISIT DISCLAIMER    Marlene Story acknowledges that he has consented to an online visit or consultation  He understands that the online visit is based solely on information provided by him, and that, in the absence of a face-to-face physical evaluation by the physician, the diagnosis he receives is both limited and provisional in terms of accuracy and completeness  This is not intended to replace a full medical face-to-face evaluation by the physician  Edvin Vásquez understands and accepts these terms

## 2021-02-02 NOTE — ASSESSMENT & PLAN NOTE
Patient has no complaints to his urinary pattern  He will continue oxybutynin 5 mg daily  This is prescribed by his PCP  We did discuss potential side effects of anticholinergics in patients of advanced age  He will remain on Myrbetriq 50 mg daily  Prescription refill was electronically sent to his pharmacy

## 2021-02-02 NOTE — TELEPHONE ENCOUNTER
Patient's wife provided correct # for visit [phone]  Message sent to nasrin and Patient was checked in for virtual visit

## 2021-02-13 ENCOUNTER — IMMUNIZATIONS (OUTPATIENT)
Dept: FAMILY MEDICINE CLINIC | Facility: HOSPITAL | Age: 86
End: 2021-02-13

## 2021-02-13 DIAGNOSIS — Z23 ENCOUNTER FOR IMMUNIZATION: Primary | ICD-10-CM

## 2021-02-13 PROCEDURE — 0001A SARS-COV-2 / COVID-19 MRNA VACCINE (PFIZER-BIONTECH) 30 MCG: CPT

## 2021-02-13 PROCEDURE — 91300 SARS-COV-2 / COVID-19 MRNA VACCINE (PFIZER-BIONTECH) 30 MCG: CPT

## 2021-03-06 ENCOUNTER — IMMUNIZATIONS (OUTPATIENT)
Dept: FAMILY MEDICINE CLINIC | Facility: HOSPITAL | Age: 86
End: 2021-03-06

## 2021-03-06 DIAGNOSIS — Z23 ENCOUNTER FOR IMMUNIZATION: Primary | ICD-10-CM

## 2021-03-06 PROCEDURE — 0002A SARS-COV-2 / COVID-19 MRNA VACCINE (PFIZER-BIONTECH) 30 MCG: CPT

## 2021-03-06 PROCEDURE — 91300 SARS-COV-2 / COVID-19 MRNA VACCINE (PFIZER-BIONTECH) 30 MCG: CPT

## 2021-03-29 DIAGNOSIS — E11.9 CONTROLLED TYPE 2 DIABETES MELLITUS WITHOUT COMPLICATION, WITHOUT LONG-TERM CURRENT USE OF INSULIN (HCC): ICD-10-CM

## 2021-03-30 RX ORDER — LANCETS 33 GAUGE
EACH MISCELLANEOUS
Qty: 100 EACH | Refills: 2 | Status: SHIPPED | OUTPATIENT
Start: 2021-03-30

## 2021-06-08 ENCOUNTER — RA CDI HCC (OUTPATIENT)
Dept: OTHER | Facility: HOSPITAL | Age: 86
End: 2021-06-08

## 2021-06-08 NOTE — PROGRESS NOTES
Assessment & Plan:     E11 9  Controlled type 2 diabetes mellitus without complication, without long-term current use of insulin ([facility] )  I have evaluated the patient and found the condition to be: Stable  I have evaluated the patient and: Recommended continue with same treatment plan    N40 1  Enlarged prostate with lower urinary tract symptoms (LUTS)  I have evaluated the patient and found the condition to be: Stable  I have evaluated the patient and: Recommended continue with same treatment plan    N32 81  OAB (overactive bladder)  I have evaluated the patient and found the condition to be: Stable  I have evaluated the patient and: Recommended continue with same treatment plan    M48 00  Spinal stenosis  I have evaluated the patient and found the condition to be: Stable  I have evaluated the patient and: Recommended continue with same treatment plan    M54 5  G89 29  Chronic low back pain  I have evaluated the patient and found the condition to be: Stable  I have evaluated the patient and: Recommended continue with same treatment plan    R53 82  Chronic fatigue  I have evaluated the patient and found the condition to be: Stable  I have evaluated the patient and: Recommended continue with same treatment plan    N40 1  R35 0  Benign prostatic hyperplasia with urinary frequency  I have evaluated the patient and found the condition to be: Stable  I have evaluated the patient and: Recommended continue with same treatment plan    H90 3  Sensorineural hearing loss (SNHL) of both ears  I have evaluated the patient and found the condition to be: Stable  I have evaluated the patient and: Recommended continue with same treatment plan    BMI Counseling: Body mass index is 30 1 kg/m²  The BMI is above normal  Nutrition recommendations include consuming healthier snacks, moderation in carbohydrate intake and increasing intake of lean protein  Exercise recommendations include vigorous physical activity 75 minutes/week   No pharmacotherapy was ordered  Subjective:     Patient ID: Sonya Ames is a 81 y o  male     Chief Complaint   Patient presents with    Follow-up     6 month follow up      Diabetes  He presents for his follow-up diabetic visit  He has type 2 diabetes mellitus  His disease course has been stable  There are no hypoglycemic associated symptoms  Pertinent negatives for hypoglycemia include no confusion, dizziness or headaches  Associated symptoms include fatigue, foot paresthesias and polyuria  Pertinent negatives for diabetes include no blurred vision, no chest pain, no foot ulcerations, no polydipsia, no polyphagia, no weakness and no weight loss  Symptoms are stable  Diabetic complications include heart disease and peripheral neuropathy  Risk factors for coronary artery disease include diabetes mellitus, dyslipidemia, hypertension, male sex, obesity, tobacco exposure and sedentary lifestyle  Current diabetic treatment includes diet and oral agent (monotherapy)  He is compliant with treatment most of the time  His weight is fluctuating minimally  He is following a diabetic, low salt and low fat/cholesterol diet  Meal planning includes avoidance of concentrated sweets  He has had a previous visit with a dietitian  He rarely participates in exercise  His home blood glucose trend is decreasing steadily  An ACE inhibitor/angiotensin II receptor blocker is being taken  He does not see a podiatrist Eye exam is current  Review of Systems   Constitutional: Positive for fatigue  Negative for chills, fever, unexpected weight change and weight loss  HENT: Positive for hearing loss  Negative for congestion, ear pain, postnasal drip, sinus pressure, sore throat, trouble swallowing and voice change  Eyes: Negative for blurred vision and visual disturbance  Respiratory: Negative for cough, chest tightness, shortness of breath and wheezing      Cardiovascular: Negative for chest pain, palpitations and leg swelling  Gastrointestinal: Negative for abdominal distention, abdominal pain, anal bleeding, blood in stool, constipation, diarrhea and nausea  Endocrine: Positive for polyuria  Negative for cold intolerance, polydipsia and polyphagia  Genitourinary: Positive for frequency and urgency  Negative for dysuria, flank pain and hematuria  Musculoskeletal: Negative for arthralgias, back pain, gait problem, joint swelling, myalgias and neck pain  Skin: Negative for rash  Allergic/Immunologic: Negative for immunocompromised state  Neurological: Negative for dizziness, syncope, facial asymmetry, weakness, light-headedness, numbness and headaches  Hematological: Negative for adenopathy  Psychiatric/Behavioral: Positive for decreased concentration  Negative for confusion, sleep disturbance and suicidal ideas         Objective:      /62 (BP Location: Left arm, Patient Position: Sitting)   Pulse 64   Temp 98 1 °F (36 7 °C) (Tympanic)   Ht 5' 9" (1 753 m)   Wt 92 4 kg (203 lb 12 8 oz)   SpO2 99%   BMI 30 10 kg/m²     Problem List Items Addressed This Visit        Endocrine    Controlled type 2 diabetes mellitus without complication, without long-term current use of insulin ([facility])       Lab Results   Component Value Date    HGBA1C 6 1 (H) 10/20/2020    he has well-controlled blood sugars on metformin and diet         Relevant Medications    metFORMIN (GLUCOPHAGE) 500 mg tablet       Nervous and Auditory    Sensorineural hearing loss (SNHL) of both ears - Primary      He has been seen by audiology            Genitourinary    Enlarged prostate with lower urinary tract symptoms (LUTS)      Patient remains on Oxy bruit Rinne and was seen by Urology         Relevant Medications    oxybutynin (DITROPAN-XL) 5 mg 24 hr tablet    OAB (overactive bladder)     he was also placed on Myrbetriq by Urology         Relevant Medications    oxybutynin (DITROPAN-XL) 5 mg 24 hr tablet       Other    Chronic low back pain He takes occasional tramadol for his chronic back pain         Relevant Medications    traMADol (ULTRAM) 50 mg tablet    Memory deficits      His wife to does notice a continued increasing poor memory no like to try him on a medication I explained that most the meds a 3rd of the time help with though the time do not help and so the time make it worse  Due to potential GI side effects will start low dose at 5 mg         Relevant Medications    donepezil (ARICEPT) 5 mg tablet      Other Visit Diagnoses     Essential hypertension        Relevant Medications    lisinopril (ZESTRIL) 10 mg tablet          Physical Exam  Vitals and nursing note reviewed  Constitutional:       Appearance: Normal appearance  He is well-developed  HENT:      Head: Normocephalic and atraumatic  Nose: Nose normal    Eyes:      Conjunctiva/sclera: Conjunctivae normal       Pupils: Pupils are equal, round, and reactive to light  Neck:      Vascular: No carotid bruit  Cardiovascular:      Rate and Rhythm: Normal rate and regular rhythm  Pulses: Normal pulses  no weak pulses          Dorsalis pedis pulses are 2+ on the right side and 2+ on the left side  Heart sounds: Normal heart sounds  No murmur heard  Pulmonary:      Effort: Pulmonary effort is normal  No respiratory distress  Breath sounds: Normal breath sounds  Abdominal:      Palpations: Abdomen is soft  Tenderness: There is no abdominal tenderness  Musculoskeletal:      Cervical back: Normal range of motion and neck supple  Right lower leg: No edema  Left lower leg: No edema  Feet:      Right foot:      Skin integrity: No ulcer, skin breakdown, erythema, warmth, callus or dry skin  Left foot:      Skin integrity: No ulcer, skin breakdown, erythema, warmth, callus or dry skin  Lymphadenopathy:      Cervical: No cervical adenopathy  Skin:     General: Skin is warm and dry        Capillary Refill: Capillary refill takes 2 to 3 seconds  Neurological:      General: No focal deficit present  Mental Status: He is alert and oriented to person, place, and time  Cranial Nerves: No cranial nerve deficit  Sensory: Sensory deficit ( feet) present  Comments: Poor short-term memory   Psychiatric:         Mood and Affect: Mood normal          Behavior: Behavior normal          Thought Content: Thought content normal          Judgment: Judgment normal      Right Foot/Ankle   Right Foot Inspection  Skin Exam: skin normal and skin intact no dry skin, no warmth, no callus, no erythema, no maceration, no abnormal color, no pre-ulcer, no ulcer and no callus                          Toe Exam: ROM and strength within normal limits  Sensory   Vibration: diminished    Monofilament testing: diminished  Vascular  Capillary refills: < 3 seconds  The right DP pulse is 2+  Left Foot/Ankle  Left Foot Inspection  Skin Exam: skin normal and skin intactno dry skin, no warmth, no erythema, no maceration, normal color, no pre-ulcer, no ulcer and no callus                                         Sensory   Vibration: diminished    Monofilament: diminished  Vascular  Capillary refills: < 3 seconds  The left DP pulse is 2+  Assign Risk Category:  ; Loss of protective sensation;  No weak pulses       Risk: 1

## 2021-06-15 ENCOUNTER — OFFICE VISIT (OUTPATIENT)
Dept: INTERNAL MEDICINE CLINIC | Age: 86
End: 2021-06-15
Payer: COMMERCIAL

## 2021-06-15 VITALS
WEIGHT: 203.8 LBS | HEIGHT: 69 IN | HEART RATE: 64 BPM | SYSTOLIC BLOOD PRESSURE: 110 MMHG | TEMPERATURE: 98.1 F | OXYGEN SATURATION: 99 % | DIASTOLIC BLOOD PRESSURE: 62 MMHG | BODY MASS INDEX: 30.18 KG/M2

## 2021-06-15 DIAGNOSIS — E11.9 CONTROLLED TYPE 2 DIABETES MELLITUS WITHOUT COMPLICATION, WITHOUT LONG-TERM CURRENT USE OF INSULIN (HCC): ICD-10-CM

## 2021-06-15 DIAGNOSIS — G89.29 CHRONIC LOW BACK PAIN: ICD-10-CM

## 2021-06-15 DIAGNOSIS — R35.0 BENIGN PROSTATIC HYPERPLASIA WITH URINARY FREQUENCY: ICD-10-CM

## 2021-06-15 DIAGNOSIS — N40.1 BENIGN PROSTATIC HYPERPLASIA WITH URINARY FREQUENCY: ICD-10-CM

## 2021-06-15 DIAGNOSIS — M54.50 CHRONIC LOW BACK PAIN: ICD-10-CM

## 2021-06-15 DIAGNOSIS — R41.3 MEMORY DEFICITS: ICD-10-CM

## 2021-06-15 DIAGNOSIS — H90.3 SENSORINEURAL HEARING LOSS (SNHL) OF BOTH EARS: Primary | ICD-10-CM

## 2021-06-15 DIAGNOSIS — N32.81 OAB (OVERACTIVE BLADDER): ICD-10-CM

## 2021-06-15 DIAGNOSIS — I10 ESSENTIAL HYPERTENSION: ICD-10-CM

## 2021-06-15 PROCEDURE — 99214 OFFICE O/P EST MOD 30 MIN: CPT | Performed by: INTERNAL MEDICINE

## 2021-06-15 PROCEDURE — 1160F RVW MEDS BY RX/DR IN RCRD: CPT | Performed by: INTERNAL MEDICINE

## 2021-06-15 PROCEDURE — 3074F SYST BP LT 130 MM HG: CPT | Performed by: INTERNAL MEDICINE

## 2021-06-15 PROCEDURE — 3078F DIAST BP <80 MM HG: CPT | Performed by: INTERNAL MEDICINE

## 2021-06-15 PROCEDURE — T1015 CLINIC SERVICE: HCPCS | Performed by: INTERNAL MEDICINE

## 2021-06-15 PROCEDURE — 1036F TOBACCO NON-USER: CPT | Performed by: INTERNAL MEDICINE

## 2021-06-15 RX ORDER — LISINOPRIL 10 MG/1
10 TABLET ORAL DAILY
Qty: 90 TABLET | Refills: 3 | Status: SHIPPED | OUTPATIENT
Start: 2021-06-15 | End: 2022-07-12 | Stop reason: SDUPTHER

## 2021-06-15 RX ORDER — DONEPEZIL HYDROCHLORIDE 5 MG/1
5 TABLET, FILM COATED ORAL
Qty: 30 TABLET | Refills: 3 | Status: SHIPPED | OUTPATIENT
Start: 2021-06-15 | End: 2021-09-07 | Stop reason: SDUPTHER

## 2021-06-15 RX ORDER — TRAMADOL HYDROCHLORIDE 50 MG/1
50 TABLET ORAL EVERY 6 HOURS PRN
Qty: 60 TABLET | Refills: 0 | Status: SHIPPED | OUTPATIENT
Start: 2021-06-15

## 2021-06-15 RX ORDER — OXYBUTYNIN CHLORIDE 5 MG/1
5 TABLET, EXTENDED RELEASE ORAL DAILY
Qty: 90 TABLET | Refills: 3 | Status: SHIPPED | OUTPATIENT
Start: 2021-06-15 | End: 2022-07-21

## 2021-06-15 NOTE — ASSESSMENT & PLAN NOTE
His wife to does notice a continued increasing poor memory no like to try him on a medication I explained that most the meds a 3rd of the time help with though the time do not help and so the time make it worse    Due to potential GI side effects will start low dose at 5 mg

## 2021-06-15 NOTE — PATIENT INSTRUCTIONS
Alzheimer Disease, Ambulatory Care   GENERAL INFORMATION:   Alzheimer disease  is an irreversible brain disorder that results in the gradual loss of memory  In Alzheimer disease (AD), parts of the brain die and cannot make normal levels of brain chemicals  The disease usually starts at about age 72 to 79 years but can start earlier  Common symptoms include the following:   · Mild AD:  Early AD symptoms may be minor and last from 1 to 3 years       ¨ Memory loss: Remembering what happened years ago, but unable to remember things from yesterday or forgetting the names of common things or people you know    ¨ Confusion about what month or season it is    ¨ Forgetting to brush your teeth or comb your hair    ¨ Difficulty taking care of your home or finances or difficulty making decisions    ¨ Loss of interest in your usual activities    ¨ Feeling depressed, angry, or confused about the changes you notice    · Moderate AD:      ¨ Problems choosing what clothes to wear, doing simple jobs, or caring for your self    ¨ Unable to recognize people familiar to you    ¨ Difficulty finding words to say what you mean, trouble talking in normal sentences, or speech that is difficult to understand    ¨ Feeling anxious, restless, and agitated at night and seeming depressed or worried    ¨ Difficulty controlling emotions and becoming loud, violent, and hard to control    ¨ Becoming confused and wandering off or pacing    ¨ Unable to plan and follow through with activities    ¨ Thinking something is true even though it is not, seeing things that are not actually there, or inability to control when you urinate    · Severe AD:      ¨ Complete loss of memory    ¨ Complete loss of speech    ¨ Loss of bladder and bowel control    ¨ Finding it very difficult to walk    ¨ Becoming angry and out of control or aggressive and destroying things    ¨ Unable to care for yourself and needing someone to take care of you  Contact a primary healthcare provider if:   · The person you are caring for:    ¨ Has a fever    ¨ Has a rash or his skin is itchy and swollen    ¨ Appears depressed and has difficulty coping with his symptoms    · You have questions or concerns about his condition or the care he is receiving  Treatment for Alzheimer disease  may include medicines to help increase the amount of normal chemicals in your brain or slow the death of brain cells  You may also need medicines to help control your mood or help you feel less nervous or restless  Medicines to help with bladder and bowel control or to help you sleep better may be needed  Some people may be given medicines to control delusions (false beliefs), hallucinations, or violent behaviors  Counseling can teach you ways to cope with your disease  Care for someone who has Alzheimer disease:   · Keep activities the same from day to day  People with AD do best with daily routines  Take breaks often  Save difficult activities for when the person seems the most alert  Choose activities that the person is interested in doing  Help the person get started and try to break difficult activities into small steps  · Keep mealtimes at the same time each day  It is best to limit food choices  Eating patterns may change in people with AD  It may help to have the person eat small meals and snacks  Ask healthcare providers about giving vitamins if you do not think the person is eating enough  · Get regular exercise  Regular exercise may help decrease depression and anxiety and improve sleep  Walking is a good exercise for people with AD  Check local senior centers for information about group exercise activities  · Learn to recognize pain or discomfort  Noisy breathing or rigid body movements may be a sign of pain  A sad or scared look may also mean the person is having discomfort  The person may also constantly make certain noises when he or she is in pain  · Provide personal care    Make sure to check the water temperature of the bath or shower so they do not burn themselves  It may help to choose and lay out clothes each night for them to wear the next day  Make sure to brush the person's teeth or dentures daily  Do not forget to take the person to the dentist for exams  · Provide a safe environment  Go through the house and remove things that could cause accidents  Make sure household  and medicines are out of reach  You may need to remove the stove burner knobs and hide matches and lighters to prevent injury  Remove loose rugs to prevent falls  Keep doors and windows tightly closed to prevent wandering or other injuries  If the person smokes, make sure cigarettes are always put out  · Keep the person with AD active and awake during the day  Limit how much liquid the person drinks in the evening  This may help him or her sleep through the night  Make sure that the person goes to bed at the same time every night  · Use short words or sentences when speaking to the person with AD  Call the person by name and speak slowly, clearly, and calmly  Use short words and sentences  Use the same words if you have to repeat yourself  Do not ask too many questions  Do not argue with the person  · Implement a bathroom schedule  This may mean reminding the person to urinate every 4 hours  Be aware of the person's bowel movement routine and keep it the same  · Prevent wandering  New door locks may be needed to keep the person from going outside alone  An alarm system near doors may tell you if the person wanders out  Have the person wear a necklace or bracelet with their name and phone number on it in case they wander away from you and are found by someone else  Follow up with your healthcare provider as directed:  Ask someone to go with you to help you remember what your healthcare provider tells you  The person can take notes for you during the visit and go over the notes with you later  Write down your questions so you remember to ask them during your visits  CARE AGREEMENT:   You have the right to help plan your care  Learn about your health condition and how it may be treated  Discuss treatment options with your caregivers to decide what care you want to receive  You always have the right to refuse treatment  The above information is an  only  It is not intended as medical advice for individual conditions or treatments  Talk to your doctor, nurse or pharmacist before following any medical regimen to see if it is safe and effective for you  © 2014 [address] is for End User's use only and may not be sold, redistributed or otherwise used for commercial purposes  All illustrations and images included in CareNotes® are the copyrighted property of [employer]  or [employer]

## 2021-06-15 NOTE — ASSESSMENT & PLAN NOTE
Lab Results   Component Value Date    HGBA1C 6 1 (H) 10/20/2020    he has well-controlled blood sugars on metformin and diet

## 2021-06-28 ENCOUNTER — OFFICE VISIT (OUTPATIENT)
Dept: AUDIOLOGY | Age: 86
End: 2021-06-28
Payer: COMMERCIAL

## 2021-06-28 DIAGNOSIS — H90.3 SENSORY HEARING LOSS, BILATERAL: Primary | ICD-10-CM

## 2021-06-28 NOTE — PROGRESS NOTES
Hearing Aid Visit:    Name:  Leela Anthony  :  1935  Age:  81 y o  Date of Evaluation: 21     Leela Anthony is being seen for a hearing aid visit  Patient is fit with  Phonak Audeo B50-R hearing aid(s)  Right serial number [id number]  Left serial number [id number]     serial number [id number]  Warranty date: 3/11/20 (Loss/Damage and repair)  Patient reports the right hearing aid is not working  Stopped a few days ago  Action:  Cleaned and checked both hearing aids  The right  wire was bent at the connection point to the speaker  Replaced  wire, listening check revealed weak sound quality  The microphone ports were completely clogged  All ashlyn ports were cleaned of debris, post listening check revealed clear sound quality  Patient was happy with changes  Patient was charged $100 for new wire  Recommendations: Follow up Carole Jaeger , CCC-A  Clinical Audiologist

## 2021-07-08 DIAGNOSIS — N40.1 BENIGN PROSTATIC HYPERPLASIA WITH URINARY FREQUENCY: ICD-10-CM

## 2021-07-08 DIAGNOSIS — R35.0 BENIGN PROSTATIC HYPERPLASIA WITH URINARY FREQUENCY: ICD-10-CM

## 2021-07-08 RX ORDER — FINASTERIDE 5 MG/1
5 TABLET, FILM COATED ORAL DAILY
Qty: 90 TABLET | Refills: 3 | Status: SHIPPED | OUTPATIENT
Start: 2021-07-08 | End: 2022-07-21

## 2021-07-09 ENCOUNTER — TELEPHONE (OUTPATIENT)
Dept: INTERNAL MEDICINE CLINIC | Age: 86
End: 2021-07-09

## 2021-08-20 ENCOUNTER — ESTABLISHED COMPREHENSIVE EXAM (OUTPATIENT)
Dept: URBAN - METROPOLITAN AREA CLINIC 6 | Facility: CLINIC | Age: 86
End: 2021-08-20

## 2021-08-20 DIAGNOSIS — Z96.1: ICD-10-CM

## 2021-08-20 DIAGNOSIS — E11.9: ICD-10-CM

## 2021-08-20 DIAGNOSIS — H35.363: ICD-10-CM

## 2021-08-20 PROCEDURE — 92014 COMPRE OPH EXAM EST PT 1/>: CPT

## 2021-08-20 ASSESSMENT — VISUAL ACUITY
OU_CC: J1
OD_CC: 20/200
OS_SC: 20/40+2
OD_SC: 20/50+2
OS_CC: 20/400

## 2021-08-20 ASSESSMENT — TONOMETRY
OD_IOP_MMHG: 10
OS_IOP_MMHG: 8

## 2021-08-30 ENCOUNTER — RA CDI HCC (OUTPATIENT)
Dept: OTHER | Facility: HOSPITAL | Age: 86
End: 2021-08-30

## 2021-08-30 NOTE — PROGRESS NOTES
[facility]  coding opportunities       Chart reviewed, no opportunity found: CHART REVIEWED, NO OPPORTUNITY FOUND                        Patients insurance company: [employer] (Medicare Advantage and Commercial)

## 2021-09-07 ENCOUNTER — OFFICE VISIT (OUTPATIENT)
Dept: INTERNAL MEDICINE CLINIC | Age: 86
End: 2021-09-07
Payer: COMMERCIAL

## 2021-09-07 VITALS
DIASTOLIC BLOOD PRESSURE: 62 MMHG | TEMPERATURE: 98.2 F | BODY MASS INDEX: 29.03 KG/M2 | HEIGHT: 69 IN | SYSTOLIC BLOOD PRESSURE: 120 MMHG | WEIGHT: 196 LBS | OXYGEN SATURATION: 98 % | HEART RATE: 64 BPM

## 2021-09-07 DIAGNOSIS — Z12.11 SCREEN FOR COLON CANCER: ICD-10-CM

## 2021-09-07 DIAGNOSIS — R41.3 MEMORY DEFICITS: ICD-10-CM

## 2021-09-07 DIAGNOSIS — E11.9 CONTROLLED TYPE 2 DIABETES MELLITUS WITHOUT COMPLICATION, WITHOUT LONG-TERM CURRENT USE OF INSULIN (HCC): Primary | ICD-10-CM

## 2021-09-07 LAB — SL AMB POCT HEMOGLOBIN AIC: 6.1 (ref ?–6.5)

## 2021-09-07 PROCEDURE — 1036F TOBACCO NON-USER: CPT | Performed by: INTERNAL MEDICINE

## 2021-09-07 PROCEDURE — 99214 OFFICE O/P EST MOD 30 MIN: CPT | Performed by: INTERNAL MEDICINE

## 2021-09-07 PROCEDURE — 83036 HEMOGLOBIN GLYCOSYLATED A1C: CPT | Performed by: INTERNAL MEDICINE

## 2021-09-07 PROCEDURE — 3725F SCREEN DEPRESSION PERFORMED: CPT | Performed by: INTERNAL MEDICINE

## 2021-09-07 RX ORDER — DONEPEZIL HYDROCHLORIDE 10 MG/1
10 TABLET, FILM COATED ORAL
Qty: 30 TABLET | Refills: 3 | Status: SHIPPED | OUTPATIENT
Start: 2021-09-07 | End: 2021-09-15 | Stop reason: SINTOL

## 2021-09-07 NOTE — PROGRESS NOTES
Assessment/Plan:    Memory deficits    His wife did not feel that the 5 mg of Aricept helped at all but she is willing to try 10 mg   I fear his poor hearing is also not helping    BMI 30 0-30 9,adult   Since his last visit he has lost 7 lb  Wife is monitoring what he eats    Controlled type 2 diabetes mellitus without complication, without long-term current use of insulin ([facility] [address] )    Lab Results   Component Value Date    HGBA1C 6 1 09/07/2021    he continues to have good control of his diabetes and that is off metformen       Diagnoses and all orders for this visit:    Controlled type 2 diabetes mellitus without complication, without long-term current use of insulin (HCC)  -     POCT hemoglobin A1c  -     Comprehensive metabolic panel; Future  -     Microalbumin / creatinine urine ratio  -     Lipid panel; Future    Memory deficits  -     donepezil (ARICEPT) 10 mg tablet; Take 1 tablet (10 mg total) by mouth daily at bedtime  -     TSH, 3rd generation; Future    Screen for colon cancer    BMI 30 0-30 9,adult    Other orders  -     metFORMIN (GLUCOPHAGE) 500 mg tablet          Subjective:      Patient ID: Masood Gautam is a 81 y o  male  Physically the patient is doing well his biggest problem continues to be his declining memory  His wife is going to reach out to senior care about getting her some relief when she has to do errands since he does not like being alone  His diabetes is under well control  He has been evaluated for his hearing and he does see Urology for his incontinence  Review of Systems   Constitutional: Positive for fatigue  Negative for chills, fever and unexpected weight change  HENT: Positive for hearing loss  Negative for congestion, ear pain, postnasal drip, sinus pressure, sore throat, trouble swallowing and voice change  Eyes: Negative for pain and visual disturbance  Respiratory: Negative for cough, chest tightness, shortness of breath and wheezing      Cardiovascular: Negative for chest pain, palpitations and leg swelling  Gastrointestinal: Negative for abdominal distention, abdominal pain, anal bleeding, blood in stool, constipation, diarrhea, nausea and vomiting  Endocrine: Negative for cold intolerance, polydipsia, polyphagia and polyuria  Genitourinary: Positive for frequency  Negative for dysuria, flank pain, hematuria and urgency  Musculoskeletal: Positive for back pain and gait problem  Negative for arthralgias, joint swelling, myalgias and neck pain  Skin: Negative for color change and rash  Allergic/Immunologic: Negative for immunocompromised state  Neurological: Negative for dizziness, seizures, syncope, facial asymmetry, weakness, light-headedness, numbness and headaches  Decreased memory   Hematological: Negative for adenopathy  Psychiatric/Behavioral: Positive for behavioral problems and sleep disturbance  Negative for confusion and suicidal ideas  All other systems reviewed and are negative  Objective:      /62 (BP Location: Left arm, Patient Position: Sitting, Cuff Size: Standard)   Pulse 64   Temp 98 2 °F (36 8 °C)   Ht 5' 9" (1 753 m)   Wt 88 9 kg (196 lb)   SpO2 98%   BMI 28 94 kg/m²          Physical Exam  Constitutional:       General: He is not in acute distress  Appearance: Normal appearance  He is well-developed  HENT:      Right Ear: External ear normal       Left Ear: External ear normal       Ears:      Comments: Hard of hearing     Nose: Nose normal       Mouth/Throat:      Pharynx: No oropharyngeal exudate  Eyes:      Pupils: Pupils are equal, round, and reactive to light  Neck:      Thyroid: No thyromegaly  Vascular: No JVD  Cardiovascular:      Rate and Rhythm: Normal rate and regular rhythm  Heart sounds: Normal heart sounds  No murmur heard  No gallop  Pulmonary:      Effort: Pulmonary effort is normal  No respiratory distress  Breath sounds: Normal breath sounds   No wheezing or rales  Abdominal:      General: Bowel sounds are normal  There is no distension  Palpations: Abdomen is soft  There is no mass  Tenderness: There is no abdominal tenderness  Musculoskeletal:         General: No tenderness  Normal range of motion  Cervical back: Normal range of motion and neck supple  Comments: Kyphosis   Lymphadenopathy:      Cervical: No cervical adenopathy  Skin:     Findings: No rash  Neurological:      General: No focal deficit present  Mental Status: He is alert and oriented to person, place, and time  Cranial Nerves: No cranial nerve deficit  Coordination: Coordination normal       Gait: Gait abnormal    Psychiatric:         Behavior: Behavior normal          Thought Content:  Thought content normal          Judgment: Judgment normal       Comments: Flat affect

## 2021-09-07 NOTE — ASSESSMENT & PLAN NOTE
His wife did not feel that the 5 mg of Aricept helped at all but she is willing to try 10 mg   I fear his poor hearing is also not helping

## 2021-09-07 NOTE — ASSESSMENT & PLAN NOTE
Lab Results   Component Value Date    HGBA1C 6 1 09/07/2021    he continues to have good control of his diabetes and that is   Only  On metformen 500 once a day

## 2021-09-07 NOTE — PATIENT INSTRUCTIONS
Alzheimer Disease, Ambulatory Care   GENERAL INFORMATION:   Alzheimer disease  is an irreversible brain disorder that results in the gradual loss of memory  In Alzheimer disease (AD), parts of the brain die and cannot make normal levels of brain chemicals  The disease usually starts at about age 72 to 79 years but can start earlier  Common symptoms include the following:   · Mild AD:  Early AD symptoms may be minor and last from 1 to 3 years       ¨ Memory loss: Remembering what happened years ago, but unable to remember things from yesterday or forgetting the names of common things or people you know    ¨ Confusion about what month or season it is    ¨ Forgetting to brush your teeth or comb your hair    ¨ Difficulty taking care of your home or finances or difficulty making decisions    ¨ Loss of interest in your usual activities    ¨ Feeling depressed, angry, or confused about the changes you notice    · Moderate AD:      ¨ Problems choosing what clothes to wear, doing simple jobs, or caring for your self    ¨ Unable to recognize people familiar to you    ¨ Difficulty finding words to say what you mean, trouble talking in normal sentences, or speech that is difficult to understand    ¨ Feeling anxious, restless, and agitated at night and seeming depressed or worried    ¨ Difficulty controlling emotions and becoming loud, violent, and hard to control    ¨ Becoming confused and wandering off or pacing    ¨ Unable to plan and follow through with activities    ¨ Thinking something is true even though it is not, seeing things that are not actually there, or inability to control when you urinate    · Severe AD:      ¨ Complete loss of memory    ¨ Complete loss of speech    ¨ Loss of bladder and bowel control    ¨ Finding it very difficult to walk    ¨ Becoming angry and out of control or aggressive and destroying things    ¨ Unable to care for yourself and needing someone to take care of you  Contact a primary healthcare provider if:   · The person you are caring for:    ¨ Has a fever    ¨ Has a rash or his skin is itchy and swollen    ¨ Appears depressed and has difficulty coping with his symptoms    · You have questions or concerns about his condition or the care he is receiving  Treatment for Alzheimer disease  may include medicines to help increase the amount of normal chemicals in your brain or slow the death of brain cells  You may also need medicines to help control your mood or help you feel less nervous or restless  Medicines to help with bladder and bowel control or to help you sleep better may be needed  Some people may be given medicines to control delusions (false beliefs), hallucinations, or violent behaviors  Counseling can teach you ways to cope with your disease  Care for someone who has Alzheimer disease:   · Keep activities the same from day to day  People with AD do best with daily routines  Take breaks often  Save difficult activities for when the person seems the most alert  Choose activities that the person is interested in doing  Help the person get started and try to break difficult activities into small steps  · Keep mealtimes at the same time each day  It is best to limit food choices  Eating patterns may change in people with AD  It may help to have the person eat small meals and snacks  Ask healthcare providers about giving vitamins if you do not think the person is eating enough  · Get regular exercise  Regular exercise may help decrease depression and anxiety and improve sleep  Walking is a good exercise for people with AD  Check local senior centers for information about group exercise activities  · Learn to recognize pain or discomfort  Noisy breathing or rigid body movements may be a sign of pain  A sad or scared look may also mean the person is having discomfort  The person may also constantly make certain noises when he or she is in pain  · Provide personal care    Make sure to check the water temperature of the bath or shower so they do not burn themselves  It may help to choose and lay out clothes each night for them to wear the next day  Make sure to brush the person's teeth or dentures daily  Do not forget to take the person to the dentist for exams  · Provide a safe environment  Go through the house and remove things that could cause accidents  Make sure household  and medicines are out of reach  You may need to remove the stove burner knobs and hide matches and lighters to prevent injury  Remove loose rugs to prevent falls  Keep doors and windows tightly closed to prevent wandering or other injuries  If the person smokes, make sure cigarettes are always put out  · Keep the person with AD active and awake during the day  Limit how much liquid the person drinks in the evening  This may help him or her sleep through the night  Make sure that the person goes to bed at the same time every night  · Use short words or sentences when speaking to the person with AD  Call the person by name and speak slowly, clearly, and calmly  Use short words and sentences  Use the same words if you have to repeat yourself  Do not ask too many questions  Do not argue with the person  · Implement a bathroom schedule  This may mean reminding the person to urinate every 4 hours  Be aware of the person's bowel movement routine and keep it the same  · Prevent wandering  New door locks may be needed to keep the person from going outside alone  An alarm system near doors may tell you if the person wanders out  Have the person wear a necklace or bracelet with their name and phone number on it in case they wander away from you and are found by someone else  Follow up with your healthcare provider as directed:  Ask someone to go with you to help you remember what your healthcare provider tells you  The person can take notes for you during the visit and go over the notes with you later  Write down your questions so you remember to ask them during your visits  CARE AGREEMENT:   You have the right to help plan your care  Learn about your health condition and how it may be treated  Discuss treatment options with your caregivers to decide what care you want to receive  You always have the right to refuse treatment  The above information is an  only  It is not intended as medical advice for individual conditions or treatments  Talk to your doctor, nurse or pharmacist before following any medical regimen to see if it is safe and effective for you  © 2014 [address] is for End User's use only and may not be sold, redistributed or otherwise used for commercial purposes  All illustrations and images included in CareNotes® are the copyrighted property of [employer]  or [employer]

## 2021-09-15 ENCOUNTER — TELEPHONE (OUTPATIENT)
Dept: INTERNAL MEDICINE CLINIC | Age: 86
End: 2021-09-15

## 2021-09-15 NOTE — TELEPHONE ENCOUNTER
Patients wife called to let us know Elizabeth Shay wants to stop taking Aricept  He started vomiting from it and doesn't want to take it anymore

## 2021-09-29 LAB
ALBUMIN SERPL-MCNC: 4.1 G/DL (ref 3.6–5.1)
ALBUMIN/CREAT UR: 3 MCG/MG CREAT
ALBUMIN/GLOB SERPL: 2 (CALC) (ref 1–2.5)
ALP SERPL-CCNC: 51 U/L (ref 35–144)
ALT SERPL-CCNC: 9 U/L (ref 9–46)
AST SERPL-CCNC: 10 U/L (ref 10–35)
BILIRUB SERPL-MCNC: 0.5 MG/DL (ref 0.2–1.2)
BUN SERPL-MCNC: 23 MG/DL (ref 7–25)
BUN/CREAT SERPL: 17 (CALC) (ref 6–22)
CALCIUM SERPL-MCNC: 9.7 MG/DL (ref 8.6–10.3)
CHLORIDE SERPL-SCNC: 107 MMOL/L (ref 98–110)
CHOLEST SERPL-MCNC: 150 MG/DL
CHOLEST/HDLC SERPL: 3.9 (CALC)
CO2 SERPL-SCNC: 25 MMOL/L (ref 20–32)
CREAT SERPL-MCNC: 1.36 MG/DL (ref 0.7–1.11)
CREAT UR-MCNC: 190 MG/DL (ref 20–320)
GLOBULIN SER CALC-MCNC: 2.1 G/DL (CALC) (ref 1.9–3.7)
GLUCOSE SERPL-MCNC: 118 MG/DL (ref 65–99)
HDLC SERPL-MCNC: 38 MG/DL
LDLC SERPL CALC-MCNC: 89 MG/DL (CALC)
MICROALBUMIN UR-MCNC: 0.6 MG/DL
NONHDLC SERPL-MCNC: 112 MG/DL (CALC)
POTASSIUM SERPL-SCNC: 4.7 MMOL/L (ref 3.5–5.3)
PROT SERPL-MCNC: 6.2 G/DL (ref 6.1–8.1)
SL AMB EGFR AFRICAN AMERICAN: 55 ML/MIN/1.73M2
SL AMB EGFR NON AFRICAN AMERICAN: 47 ML/MIN/1.73M2
SODIUM SERPL-SCNC: 140 MMOL/L (ref 135–146)
TRIGL SERPL-MCNC: 125 MG/DL
TSH SERPL-ACNC: 2.28 MIU/L (ref 0.4–4.5)

## 2021-11-24 ENCOUNTER — TELEPHONE (OUTPATIENT)
Dept: UROLOGY | Facility: AMBULATORY SURGERY CENTER | Age: 86
End: 2021-11-24

## 2021-12-13 ENCOUNTER — OFFICE VISIT (OUTPATIENT)
Dept: AUDIOLOGY | Age: 86
End: 2021-12-13
Payer: COMMERCIAL

## 2021-12-13 DIAGNOSIS — H90.3 SENSORY HEARING LOSS, BILATERAL: Primary | ICD-10-CM

## 2021-12-20 ENCOUNTER — OFFICE VISIT (OUTPATIENT)
Dept: INTERNAL MEDICINE CLINIC | Age: 86
End: 2021-12-20
Payer: COMMERCIAL

## 2021-12-20 VITALS
HEIGHT: 69 IN | WEIGHT: 197 LBS | OXYGEN SATURATION: 98 % | DIASTOLIC BLOOD PRESSURE: 52 MMHG | BODY MASS INDEX: 29.18 KG/M2 | HEART RATE: 56 BPM | TEMPERATURE: 97.2 F | SYSTOLIC BLOOD PRESSURE: 110 MMHG

## 2021-12-20 DIAGNOSIS — Z00.00 MEDICARE ANNUAL WELLNESS VISIT, SUBSEQUENT: ICD-10-CM

## 2021-12-20 DIAGNOSIS — E11.9 CONTROLLED TYPE 2 DIABETES MELLITUS WITHOUT COMPLICATION, WITHOUT LONG-TERM CURRENT USE OF INSULIN (HCC): Primary | ICD-10-CM

## 2021-12-20 DIAGNOSIS — N18.31 STAGE 3A CHRONIC KIDNEY DISEASE (HCC): ICD-10-CM

## 2021-12-20 DIAGNOSIS — F33.9 DEPRESSION, RECURRENT (HCC): ICD-10-CM

## 2021-12-20 PROCEDURE — G0439 PPPS, SUBSEQ VISIT: HCPCS | Performed by: INTERNAL MEDICINE

## 2021-12-20 PROCEDURE — 1160F RVW MEDS BY RX/DR IN RCRD: CPT | Performed by: INTERNAL MEDICINE

## 2021-12-20 PROCEDURE — 99214 OFFICE O/P EST MOD 30 MIN: CPT | Performed by: INTERNAL MEDICINE

## 2021-12-20 PROCEDURE — 1170F FXNL STATUS ASSESSED: CPT | Performed by: INTERNAL MEDICINE

## 2021-12-20 PROCEDURE — 3288F FALL RISK ASSESSMENT DOCD: CPT | Performed by: INTERNAL MEDICINE

## 2021-12-20 PROCEDURE — 3725F SCREEN DEPRESSION PERFORMED: CPT | Performed by: INTERNAL MEDICINE

## 2021-12-20 PROCEDURE — 1100F PTFALLS ASSESS-DOCD GE2>/YR: CPT | Performed by: INTERNAL MEDICINE

## 2021-12-20 PROCEDURE — 1125F AMNT PAIN NOTED PAIN PRSNT: CPT | Performed by: INTERNAL MEDICINE

## 2021-12-20 PROCEDURE — 1036F TOBACCO NON-USER: CPT | Performed by: INTERNAL MEDICINE

## 2022-01-12 ENCOUNTER — OFFICE VISIT (OUTPATIENT)
Dept: AUDIOLOGY | Age: 87
End: 2022-01-12
Payer: COMMERCIAL

## 2022-01-12 DIAGNOSIS — H90.3 SENSORINEURAL HEARING LOSS, BILATERAL: Primary | ICD-10-CM

## 2022-01-12 NOTE — PROGRESS NOTES
Hearing Aid Visit:    Name:  Mallory Garcia  :  1935  Age:  81 y o  Date of Evaluation: 22     Mallory Garcia is being seen for a hearing aid visit  Patient is fit with [employer]onak Stateo B50-R hearing aid(s)  Right serial number [id number]  Left serial number [id number]     serial number [id number]  Warranty date: 3/11/20 (Loss/Damage and repair)  Patient reports that right instrument is not working  Action:  Otoscopy revealed excessive cerumen in left ear, right ear clear  Left instrument found to be in good working order, right aid not functioning  Changed wax trap - instrument working, good sound quality  Recommendations:   Patient will contact center with any concerns        Carole Hernández   Clinical Audiologist

## 2022-02-08 ENCOUNTER — OFFICE VISIT (OUTPATIENT)
Dept: UROLOGY | Facility: AMBULATORY SURGERY CENTER | Age: 87
End: 2022-02-08
Payer: COMMERCIAL

## 2022-02-08 VITALS
HEIGHT: 69 IN | BODY MASS INDEX: 28.58 KG/M2 | WEIGHT: 193 LBS | SYSTOLIC BLOOD PRESSURE: 124 MMHG | HEART RATE: 62 BPM | DIASTOLIC BLOOD PRESSURE: 76 MMHG

## 2022-02-08 DIAGNOSIS — N32.81 OAB (OVERACTIVE BLADDER): ICD-10-CM

## 2022-02-08 DIAGNOSIS — N40.1 BENIGN PROSTATIC HYPERPLASIA WITH URINARY FREQUENCY: Primary | ICD-10-CM

## 2022-02-08 DIAGNOSIS — R35.0 BENIGN PROSTATIC HYPERPLASIA WITH URINARY FREQUENCY: Primary | ICD-10-CM

## 2022-02-08 LAB — POST-VOID RESIDUAL VOLUME, ML POC: 50 ML

## 2022-02-08 PROCEDURE — 99213 OFFICE O/P EST LOW 20 MIN: CPT | Performed by: NURSE PRACTITIONER

## 2022-02-08 PROCEDURE — 1036F TOBACCO NON-USER: CPT | Performed by: NURSE PRACTITIONER

## 2022-02-08 PROCEDURE — 51798 US URINE CAPACITY MEASURE: CPT | Performed by: NURSE PRACTITIONER

## 2022-02-08 PROCEDURE — 1160F RVW MEDS BY RX/DR IN RCRD: CPT | Performed by: NURSE PRACTITIONER

## 2022-02-08 NOTE — PROGRESS NOTES
2/8/2022    Chief Complaint   Patient presents with    Follow-up    Benign Prostatic Hypertrophy       Assessment and Plan    81 y o  male     1  OAB (overactive bladder)  -Continue oxybutynin 5mg daily as well as Myrbetriq 50 mg daily  These can be prescribed by his PCP going forward   -We discussed side effects related to anticholinergics in patients with advanced age  -AUA score 13  -He will follow-up on an as needed basis  2  Benign prostatic hyperplasia with urinary frequency  -Continue on finasteride 5 mg daily  This is managed by his PCP  -PVR 50mL    I discussed with patient and wife that he no longer requires annual follow-up  His care may be managed by his PCP going forward  He may follow-up on a as needed basis or call the office with any questions or concerns  Patient and wife agreeable to plan and verbalizes understanding  History of Present Illness  Sonali Mayorga is a 81 y o  male here for follow-up evaluation of BPH and urinary urgency  His was last seen 1 year ago via telemedicine  He offers no new urinary complaints during this visit  He continues to have occasional urinary leakage and nocturia 1-2 times per night, but does not feel this is impacting his quality of life  He remains on oxybutynin 5 mg daily, finasteride 5 mg daily, and Myrbetric 50 mg daily  He denies pain, gross hematuria, or dysuria  Review of Systems   Constitutional: Negative for chills and fever  HENT: Negative for congestion and sore throat  Respiratory: Negative for cough and shortness of breath  Cardiovascular: Negative for chest pain and leg swelling  Gastrointestinal: Negative for abdominal pain, constipation and diarrhea  Genitourinary: Positive for frequency  Negative for difficulty urinating, dysuria, hematuria and urgency  Musculoskeletal: Negative for back pain and gait problem  Skin: Negative for wound  Allergic/Immunologic: Negative for immunocompromised state     Neurological: Negative for dizziness, syncope and weakness  Hematological: Does not bruise/bleed easily  Urinary Incontinence Screening      Most Recent Value   Urinary Incontinence    Urinary Incontinence? Yes  [pads = 2-3x]   Incomplete emptying? Yes   Urinary frequency? Yes   Urinary urgency? Yes   Urinary hesitancy? Yes   Dysuria (painful difficult urination)? No   Nocturia (waking up to use the bathroom)? Yes  [2x]   Straining (having to push to go)? No   Weak stream? Yes   Intermittent stream? Yes          AUA SYMPTOM SCORE      Most Recent Value   AUA SYMPTOM SCORE    How often have you had a sensation of not emptying your bladder completely after you finished urinating? 1   How often have you had to urinate again less than two hours after you finished urinating? 3   How often have you found you stopped and started again several times when you urinate? 1   How often have you found it difficult to postpone urination? 5   How often have you had a weak urinary stream? 1   How often have you had to push or strain to begin urination? 0   How many times did you most typically get up to urinate from the time you went to bed at night until the time you got up in the morning? 2   Quality of Life: If you were to spend the rest of your life with your urinary condition just the way it is now, how would you feel about that? 3   AUA SYMPTOM SCORE 13          Vitals  Vitals:    02/08/22 1116   BP: 124/76   BP Location: Left arm   Patient Position: Sitting   Cuff Size: Adult   Pulse: 62   Weight: 87 5 kg (193 lb)   Height: 5' 9" (1 753 m)       Physical Exam  Vitals reviewed  Constitutional:       Appearance: Normal appearance  HENT:      Head: Normocephalic and atraumatic  Abdominal:      Tenderness: There is no right CVA tenderness or left CVA tenderness  Hernia: No hernia is present  Musculoskeletal:      Right lower leg: No edema  Left lower leg: No edema  Skin:     General: Skin is warm and dry  Coloration: Skin is not jaundiced or pale  Neurological:      General: No focal deficit present  Mental Status: He is alert  Gait: Gait normal    Psychiatric:         Mood and Affect: Mood normal          Behavior: Behavior normal          Past History  Past Medical History:   Diagnosis Date    Balanitis     last assessed 14    BPH (benign prostatic hyperplasia)     Diabetes mellitus (HCC)     blood sugar 167 this am at 0630    GERD (gastroesophageal reflux disease)     Heme + stool     Hypertension     Lumbar radiculopathy     Myocardial infarction ([facility] [address] )     35 years ago    Phimosis     last assessed 16    Sciatica     right side, last assessed 16     Social History     Socioeconomic History    Marital status: /Civil Union     Spouse name: None    Number of children: None    Years of education: None    Highest education level: None   Occupational History    None   Tobacco Use    Smoking status: Former Smoker     Packs/day: 1 00     Years: 30 00     Pack years: 30 00     Quit date: 1998     Years since quittin 1    Smokeless tobacco: Never Used    Tobacco comment: quit 25 yrs ago   Substance and Sexual Activity    Alcohol use: No     Alcohol/week: 0 0 standard drinks    Drug use: No    Sexual activity: None   Other Topics Concern    None   Social History Narrative    None     Social Determinants of Health     Financial Resource Strain: Not on file   Food Insecurity: Not on file   Transportation Needs: Not on file   Physical Activity: Not on file   Stress: Not on file   Social Connections: Not on file   Intimate Partner Violence: Not on file   Housing Stability: Not on file     Social History     Tobacco Use   Smoking Status Former Smoker    Packs/day: 1 00    Years: 30 00    Pack years: 30 00    Quit date: 1998    Years since quittin 1   Smokeless Tobacco Never Used   Tobacco Comment    quit 25 yrs ago     History reviewed   No pertinent family history      The following portions of the patient's history were reviewed and updated as appropriate allergies, current medications, past medical history, past social history, past surgical history and problem list    Results  Recent Results (from the past 1 hour(s))   POCT Measure PVR    Collection Time: 02/08/22 11:20 AM   Result Value Ref Range    POST-VOID RESIDUAL VOLUME, ML POC 50 mL   ]  No results found for: PSA  Lab Results   Component Value Date    GLUCOSE 125 09/07/2014    CALCIUM 9 7 09/28/2021     11/28/2017    K 4 7 09/28/2021    CO2 25 09/28/2021     09/28/2021    BUN 23 09/28/2021    CREATININE 1 36 (H) 09/28/2021     Lab Results   Component Value Date    WBC 4 26 (L) 12/05/2018    HGB 14 4 12/05/2018    HCT 43 0 12/05/2018    MCV 93 12/05/2018     12/05/2018

## 2022-02-21 DIAGNOSIS — E11.9 CONTROLLED TYPE 2 DIABETES MELLITUS WITHOUT COMPLICATION, WITHOUT LONG-TERM CURRENT USE OF INSULIN (HCC): Primary | ICD-10-CM

## 2022-03-30 LAB
BUN SERPL-MCNC: 27 MG/DL (ref 7–25)
BUN/CREAT SERPL: 20 (CALC) (ref 6–22)
CALCIUM SERPL-MCNC: 9.8 MG/DL (ref 8.6–10.3)
CHLORIDE SERPL-SCNC: 105 MMOL/L (ref 98–110)
CO2 SERPL-SCNC: 27 MMOL/L (ref 20–32)
CREAT SERPL-MCNC: 1.38 MG/DL (ref 0.7–1.11)
GLUCOSE SERPL-MCNC: 111 MG/DL (ref 65–99)
HBA1C MFR BLD: 6 % OF TOTAL HGB
POTASSIUM SERPL-SCNC: 4.7 MMOL/L (ref 3.5–5.3)
SL AMB EGFR AFRICAN AMERICAN: 53 ML/MIN/1.73M2
SL AMB EGFR NON AFRICAN AMERICAN: 46 ML/MIN/1.73M2
SODIUM SERPL-SCNC: 139 MMOL/L (ref 135–146)

## 2022-04-08 ENCOUNTER — OFFICE VISIT (OUTPATIENT)
Dept: AUDIOLOGY | Age: 87
End: 2022-04-08
Payer: COMMERCIAL

## 2022-04-08 DIAGNOSIS — H90.3 SENSORY HEARING LOSS, BILATERAL: Primary | ICD-10-CM

## 2022-04-08 NOTE — PROGRESS NOTES
Progress Note    Name:  Afshin Bower  :  1935  Age:  81 y o  Date of Evaluation: 22     Patient's right hearing aid ([employer]ak Audeo B50-R hearing aid(s)  Right serial number [id number], out of warranty) was dropped off for repair  Push button missing  Called [employer] - can't replace button here - aid needs to be sent in  Called and spoke to patient's wife  She paid $295 for the repair  Call to schedule HAV or  at  if patient prefers when RFF   Patient's Gaurav Shown is above simplifyMDCarole   Clinical Audiologist

## 2022-04-11 DIAGNOSIS — N39.498 OTHER URINARY INCONTINENCE: ICD-10-CM

## 2022-04-11 DIAGNOSIS — N32.81 OAB (OVERACTIVE BLADDER): ICD-10-CM

## 2022-04-18 NOTE — PROGRESS NOTES
Progress Note    Name:  Naomi Starks  :  1935  Age:  81 y o    Date of Evaluation: 22     Hunter Hussein [phone]    [facility] [place]  SN: [id number]  Warranty ends: 10/13/22    Karlo sent to Carole Fisher   Clinical Audiologist

## 2022-04-19 ENCOUNTER — OFFICE VISIT (OUTPATIENT)
Dept: AUDIOLOGY | Age: 87
End: 2022-04-19

## 2022-04-19 DIAGNOSIS — H90.3 SENSORY HEARING LOSS, BILATERAL: Primary | ICD-10-CM

## 2022-04-19 NOTE — PROGRESS NOTES
Hearing Aid Visit:    Name:  Paulino Forrester  :  1935  Age:  81 y o  Date of Evaluation: 22     Paulino Forrester is being seen for a hearing aid visit  Patient is fit with [employer]onak Audeo B50-R hearing aid(s)  Right serial number [id number]  Left serial number [id number]     serial number [id number]  Warranty date: 3/11/20 (Loss/Damage and repair)  Patient was seen to  repaired right hearing aid        Action:  Patient was happy with fit and sound quality  Recommendations:    Follow Carole Mendez , CCC-A  Clinical Audiologist

## 2022-04-28 ENCOUNTER — OFFICE VISIT (OUTPATIENT)
Dept: INTERNAL MEDICINE CLINIC | Age: 87
End: 2022-04-28
Payer: COMMERCIAL

## 2022-04-28 VITALS
HEIGHT: 69 IN | OXYGEN SATURATION: 96 % | SYSTOLIC BLOOD PRESSURE: 124 MMHG | TEMPERATURE: 97.4 F | DIASTOLIC BLOOD PRESSURE: 62 MMHG | HEART RATE: 64 BPM | WEIGHT: 195.6 LBS | BODY MASS INDEX: 28.97 KG/M2

## 2022-04-28 DIAGNOSIS — H90.3 SENSORINEURAL HEARING LOSS (SNHL) OF BOTH EARS: Primary | ICD-10-CM

## 2022-04-28 PROBLEM — H93.93 PROBLEM OF BOTH EARS: Status: ACTIVE | Noted: 2022-04-28

## 2022-04-28 PROCEDURE — 99213 OFFICE O/P EST LOW 20 MIN: CPT | Performed by: INTERNAL MEDICINE

## 2022-04-28 PROCEDURE — 1160F RVW MEDS BY RX/DR IN RCRD: CPT | Performed by: INTERNAL MEDICINE

## 2022-04-28 PROCEDURE — 1036F TOBACCO NON-USER: CPT | Performed by: INTERNAL MEDICINE

## 2022-04-28 NOTE — ASSESSMENT & PLAN NOTE
Twice wants his ears checked for wax    But when he was examined he had no wax in his ears is hearing aids

## 2022-04-28 NOTE — PROGRESS NOTES
Assessment/Plan:    Problem of both ears  Twice wants his ears checked for wax  But when he was examined he had no wax in his ears is hearing aids    Sensorineural hearing loss (SNHL) of both ears  He does since has significant hearing loss of both ears and has to hearing aids bilaterally  Shows white uses Debrox on a regular basis to keep his wax at a minimum  She thought she saw some in his hears and she is a former nurse but on exam she did not       There are no diagnoses linked to this encounter  Subjective:      Patient ID: Onofre Baron is a 81 y o  male  Patient is doing well on and both following with [employer] since he is having memory loss she appears to be quite satisfied and content at present  She was just concerned about his hearing and wax          Review of Systems   Constitutional: Negative for chills, fatigue, fever and unexpected weight change  HENT: Positive for hearing loss  Negative for congestion, ear pain, postnasal drip, sinus pressure, sore throat, trouble swallowing and voice change  Eyes: Negative for visual disturbance  Respiratory: Negative for cough, chest tightness, shortness of breath and wheezing  Cardiovascular: Negative for chest pain, palpitations and leg swelling  Gastrointestinal: Negative for abdominal distention, abdominal pain, anal bleeding, blood in stool, constipation, diarrhea and nausea  Endocrine: Negative for cold intolerance, polydipsia, polyphagia and polyuria  Genitourinary: Negative for dysuria, flank pain, frequency, hematuria and urgency  Musculoskeletal: Negative for arthralgias, back pain, gait problem, joint swelling, myalgias and neck pain  Skin: Negative for rash  Allergic/Immunologic: Negative for immunocompromised state  Neurological: Negative for dizziness, syncope, facial asymmetry, weakness, light-headedness, numbness and headaches  Hematological: Negative for adenopathy     Psychiatric/Behavioral: Positive for confusion and decreased concentration  Negative for sleep disturbance and suicidal ideas  Objective:      /62 (BP Location: Left arm, Patient Position: Sitting, Cuff Size: Standard)   Pulse 64   Temp (!) 97 4 °F (36 3 °C)   Ht 5' 9" (1 753 m)   Wt 88 7 kg (195 lb 9 6 oz)   SpO2 96%   BMI 28 89 kg/m²          Physical Exam  Constitutional:       General: He is not in acute distress  Appearance: He is well-developed  HENT:      Right Ear: Tympanic membrane, ear canal and external ear normal       Left Ear: Tympanic membrane, ear canal and external ear normal       Nose: Nose normal       Mouth/Throat:      Pharynx: No oropharyngeal exudate  Eyes:      Pupils: Pupils are equal, round, and reactive to light  Neck:      Thyroid: No thyromegaly  Vascular: No JVD  Cardiovascular:      Rate and Rhythm: Normal rate and regular rhythm  Heart sounds: Normal heart sounds  No murmur heard  No gallop  Pulmonary:      Effort: Pulmonary effort is normal  No respiratory distress  Breath sounds: Normal breath sounds  No wheezing or rales  Abdominal:      General: Bowel sounds are normal  There is no distension  Palpations: Abdomen is soft  There is no mass  Tenderness: There is no abdominal tenderness  Musculoskeletal:         General: No tenderness  Normal range of motion  Cervical back: Normal range of motion and neck supple  Lymphadenopathy:      Cervical: No cervical adenopathy  Skin:     Findings: No rash  Neurological:      Mental Status: He is alert and oriented to person, place, and time  Cranial Nerves: No cranial nerve deficit  Coordination: Coordination normal    Psychiatric:         Behavior: Behavior normal          Thought Content:  Thought content normal          Judgment: Judgment normal

## 2022-04-28 NOTE — PATIENT INSTRUCTIONS
Hearing Loss   AMBULATORY CARE:   Hearing loss  means you have trouble hearing or you cannot hear at all in one or both ears  Hearing loss can happen suddenly or slowly over time  Common signs and symptoms include the following:   · You often ask others to repeat what they just said  You may think people are mumbling or not speaking clearly  Family members ask you if your hearing is okay  · You cup your hand behind one of your ears when you listen  · You need to have the radio or television louder than usual     · You need to lean forward or turn your head to be able to hear  · You have ringing or buzzing in your ears, or you are dizzy  · You avoid certain situations because you have a hard time hearing  Seek care immediately if:   · You have fluid, pus, or blood leaking from your ear  · You have sudden, severe hearing loss  Contact your healthcare provider if:   · You have a fever  · You have ear pain that is getting worse  · You have ringing in your ears or dizziness that will not go away  · You have questions or concerns about your condition or care  Treatment  depends on the cause of your hearing loss  Removal of earwax or treatment for any medical conditions that have caused your hearing loss may be needed  You may need any of the following:  · A hearing aid  is a small device that fits inside your ear and helps you hear better  Your healthcare provider can help you choose a hearing aid that is right for you  · A cochlear implant  is a tiny device that is put into your cochlea (part of your inner ear) during surgery  This device can only be used in people with sensorineural hearing loss  · Assistive listening devices  (ALDs)  sound and send it through earphones or a headset  ALDs can help you hear better when you are in a place with background noise  Examples include theaters, classrooms, or auditoriums  ALDs are also available for phones   ALDs can be used alone or with hearing aids or cochlear implants  · Surgery  may be needed if your hearing loss is caused by otosclerosis  Surgery may also be done to place small tubes in your ear  These tubes help drain fluid and help prevent ear infections  Manage your hearing loss:   · Protect your hearing  Use ear plugs or ear protectors if you do activities that are very loud  These include using a lawnmower and power tools or going to a concert that has loud music  Use well-fitting foam earplugs that completely block your ear canal  Do not listen to loud music through headphones or earphones  · Tell people that you have hearing loss  Ask people to face you directly when they speak to you, and to slow down if they are speaking too fast  When you are in a group setting, sit in a location where you can clearly see the faces of the people who are speaking  Ask people not to speak loudly or shout when they are speaking to you  Try to talk with others in a quiet place  Background noise makes it harder for you to hear  · Pay close attention to your surroundings when you drive  Do not talk to people in your car while you are driving  Watch for problems on the road or approaching emergency vehicles  Follow up with your healthcare provider or audiologist as directed:  Write down your questions so you remember to ask them during your visits  © Copyright [employer] 2022 Information is for End User's use only and may not be sold, redistributed or otherwise used for commercial purposes  All illustrations and images included in CareNotes® are the copyrighted property of A D A M , Inc  or [employer]  The above information is an  only  It is not intended as medical advice for individual conditions or treatments  Talk to your doctor, nurse or pharmacist before following any medical regimen to see if it is safe and effective for you

## 2022-04-28 NOTE — ASSESSMENT & PLAN NOTE
He does since has significant hearing loss of both ears and has to hearing aids bilaterally  Shows white uses Debrox on a regular basis to keep his wax at a minimum    She thought she saw some in his hears and she is a former nurse but on exam she did not

## 2022-06-07 ENCOUNTER — RA CDI HCC (OUTPATIENT)
Dept: OTHER | Facility: HOSPITAL | Age: 87
End: 2022-06-07

## 2022-06-07 NOTE — PROGRESS NOTES
[facility]  coding opportunities       Chart reviewed, no opportunity found: CHART REVIEWED, NO OPPORTUNITY FOUND        Patients Insurance     Medicare Insurance: [employer]

## 2022-06-21 ENCOUNTER — OFFICE VISIT (OUTPATIENT)
Dept: INTERNAL MEDICINE CLINIC | Age: 87
End: 2022-06-21
Payer: COMMERCIAL

## 2022-06-21 VITALS
DIASTOLIC BLOOD PRESSURE: 74 MMHG | OXYGEN SATURATION: 96 % | HEIGHT: 69 IN | HEART RATE: 62 BPM | BODY MASS INDEX: 28.56 KG/M2 | WEIGHT: 192.8 LBS | TEMPERATURE: 97.3 F | SYSTOLIC BLOOD PRESSURE: 118 MMHG

## 2022-06-21 DIAGNOSIS — N18.31 STAGE 3A CHRONIC KIDNEY DISEASE (HCC): ICD-10-CM

## 2022-06-21 DIAGNOSIS — E11.9 CONTROLLED TYPE 2 DIABETES MELLITUS WITHOUT COMPLICATION, WITHOUT LONG-TERM CURRENT USE OF INSULIN (HCC): ICD-10-CM

## 2022-06-21 DIAGNOSIS — R26.9 NEUROLOGIC GAIT DYSFUNCTION: ICD-10-CM

## 2022-06-21 DIAGNOSIS — R41.3 MEMORY DEFICITS: ICD-10-CM

## 2022-06-21 DIAGNOSIS — H90.3 SENSORINEURAL HEARING LOSS (SNHL) OF BOTH EARS: Primary | ICD-10-CM

## 2022-06-21 PROBLEM — H93.93 PROBLEM OF BOTH EARS: Status: RESOLVED | Noted: 2022-04-28 | Resolved: 2022-06-21

## 2022-06-21 PROCEDURE — 1036F TOBACCO NON-USER: CPT | Performed by: INTERNAL MEDICINE

## 2022-06-21 PROCEDURE — 99214 OFFICE O/P EST MOD 30 MIN: CPT | Performed by: INTERNAL MEDICINE

## 2022-06-21 PROCEDURE — 1160F RVW MEDS BY RX/DR IN RCRD: CPT | Performed by: INTERNAL MEDICINE

## 2022-06-21 NOTE — ASSESSMENT & PLAN NOTE
Lab Results   Component Value Date    EGFR 47 12/05/2018    CREATININE 1 38 (H) 03/29/2022    CREATININE 1 36 (H) 09/28/2021    CREATININE 1 51 (H) 10/20/2020   Despite his diabetes his renal function remains stable

## 2022-06-21 NOTE — ASSESSMENT & PLAN NOTE
Patient is having increasing minor falls probably on the bed fluid of his numb feet and some balance issues    Will refer to physical therapy for gait training

## 2022-06-21 NOTE — PROGRESS NOTES
Assessment/Plan:    Controlled type 2 diabetes mellitus without complication, without long-term current use of insulin ([facility])    Lab Results   Component Value Date    HGBA1C 6 0 (H) 03/29/2022   controlled on current regimen Metformin 500mg    Sensorineural hearing loss (SNHL) of both ears  Recent visit with audiology, more cognitive decline than hearing related    Memory deficits  Increased cognitive decline is going to a program through [place] [id number]    Neurologic gait dysfunction  Patient is having increasing minor falls probably on the bed fluid of his numb feet and some balance issues  Will refer to physical therapy for gait training    Stage 3a chronic kidney disease ([facility] )  Lab Results   Component Value Date    EGFR 47 12/05/2018    CREATININE 1 38 (H) 03/29/2022    CREATININE 1 36 (H) 09/28/2021    CREATININE 1 51 (H) 10/20/2020   Despite his diabetes his renal function remains stable       Diagnoses and all orders for this visit:    Sensorineural hearing loss (SNHL) of both ears    Memory deficits    Neurologic gait dysfunction  -     Ambulatory Referral to Physical Therapy; Future    Controlled type 2 diabetes mellitus without complication, without long-term current use of insulin ([facility])    Stage 3a chronic kidney disease ([facility] )          Subjective:      Patient ID: Shane Mcpherson is a 81 y o  male  [facility] every 2 weeks has seen an improvement in his mood and emotional support   Increased cognitive decline per wife  Audiology is on board and has made some changes to his hearing aids  Will be getting a helper through [facility] starting this week to help with his care  Wife will also look into podiatrist to help with foot care  Diabetes  He presents for his follow-up diabetic visit  He has type 2 diabetes mellitus  No MedicAlert identification noted  His disease course has been stable  Hypoglycemia symptoms include confusion (Wife states he has increased cognitive decrease)   Pertinent negatives for hypoglycemia include no dizziness, headaches or tremors  There are no diabetic associated symptoms  Pertinent negatives for diabetes include no chest pain, no fatigue and no weakness  There are no hypoglycemic complications  Symptoms are stable  Diabetic complications include heart disease and peripheral neuropathy  Risk factors for coronary artery disease include diabetes mellitus, obesity and sedentary lifestyle  Current diabetic treatment includes oral agent (monotherapy)  He is compliant with treatment all of the time  He is following a diabetic diet  When asked about meal planning, he reported none  He has not had a previous visit with a dietitian  He participates in exercise intermittently  There is no change in his home blood glucose trend  An ACE inhibitor/angiotensin II receptor blocker is being taken  He does not see a podiatrist Eye exam is current  The following portions of the patient's history were reviewed and updated as appropriate: allergies, current medications, past family history, past medical history, past social history, past surgical history and problem list     Review of Systems   Constitutional: Negative for activity change, diaphoresis, fatigue and unexpected weight change  HENT: Negative for congestion, postnasal drip, sinus pressure and sore throat  Respiratory: Negative for apnea, cough, choking and shortness of breath  Cardiovascular: Negative for chest pain and palpitations  Gastrointestinal: Negative for abdominal distention, blood in stool, diarrhea, nausea and vomiting  Endocrine: Positive for cold intolerance  Genitourinary: Negative for difficulty urinating, flank pain, frequency and urgency  Musculoskeletal: Negative for arthralgias, gait problem and joint swelling  Skin: Negative  Neurological: Negative for dizziness, tremors, weakness and headaches  Hematological: Negative      Psychiatric/Behavioral: Positive for confusion (Wife states he has increased cognitive decrease)  Objective:      /74 (BP Location: Left arm, Patient Position: Sitting, Cuff Size: Standard)   Pulse 62   Temp (!) 97 3 °F (36 3 °C)   Ht 5' 9" (1 753 m)   Wt 87 5 kg (192 lb 12 8 oz)   SpO2 96%   BMI 28 47 kg/m²          Physical Exam  Vitals reviewed  Constitutional:       General: He is not in acute distress  Appearance: Normal appearance  He is well-developed  He is not ill-appearing  HENT:      Head: Normocephalic  Right Ear: External ear normal       Left Ear: External ear normal       Ears:      Comments: Hard of hearing     Nose: Nose normal       Mouth/Throat:      Pharynx: No oropharyngeal exudate  Eyes:      Extraocular Movements: Extraocular movements intact  Pupils: Pupils are equal, round, and reactive to light  Neck:      Thyroid: No thyromegaly  Vascular: No JVD  Cardiovascular:      Rate and Rhythm: Normal rate and regular rhythm  Pulses: no weak pulses          Dorsalis pedis pulses are 1+ on the right side and 1+ on the left side  Posterior tibial pulses are 1+ on the right side and 1+ on the left side  Heart sounds: Normal heart sounds  No murmur heard  No gallop  Pulmonary:      Effort: Pulmonary effort is normal  No respiratory distress  Breath sounds: Normal breath sounds  No wheezing or rales  Abdominal:      General: Bowel sounds are normal  There is no distension  Palpations: Abdomen is soft  There is no mass  Tenderness: There is no abdominal tenderness  Musculoskeletal:         General: No tenderness  Normal range of motion  Cervical back: Normal range of motion and neck supple  Right lower leg: No edema  Left lower leg: No edema  Feet:      Right foot:      Skin integrity: Dry skin present  No ulcer, skin breakdown, erythema, warmth or callus  Left foot:      Skin integrity: Dry skin present   No ulcer, skin breakdown, erythema, warmth or callus  Lymphadenopathy:      Cervical: No cervical adenopathy  Skin:     General: Skin is warm and dry  Capillary Refill: Capillary refill takes 2 to 3 seconds  Findings: No rash  Neurological:      General: No focal deficit present  Mental Status: He is alert and oriented to person, place, and time  Mental status is at baseline  Cranial Nerves: No cranial nerve deficit  Coordination: Coordination normal    Psychiatric:         Mood and Affect: Mood normal          Behavior: Behavior normal          Thought Content: Thought content normal          Judgment: Judgment normal        Patient's shoes and socks removed  Right Foot/Ankle   Right Foot Inspection  Skin Exam: skin normal, skin intact and dry skin  No warmth, no callus, no erythema, no maceration, no abnormal color, no pre-ulcer, no ulcer and no callus  Toe Exam: ROM and strength within normal limits  Erythema    Sensory   Vibration: absent  Monofilament testing: intact    Vascular  Capillary refills: < 3 seconds  The right DP pulse is 1+  The right PT pulse is 1+  Left Foot/Ankle  Left Foot Inspection  Skin Exam: skin normal, skin intact and dry skin  No warmth, no erythema, no maceration, normal color, no pre-ulcer, no ulcer and no callus  Toe Exam: ROM and strength within normal limits  No erythema  Sensory   Vibration: absent  Monofilament testing: intact    Vascular  Capillary refills: < 3 seconds  The left DP pulse is 1+  The left PT pulse is 1+       Assign Risk Category  No deformity present  Loss of protective sensation  No weak pulses  Risk: 1

## 2022-06-21 NOTE — PATIENT INSTRUCTIONS
Fall Prevention   AMBULATORY CARE:   Fall prevention  includes ways to make your home and other areas safer  It also includes ways you can move more carefully to prevent a fall  Health conditions that cause changes in your blood pressure, vision, or muscle strength and coordination may increase your risk for falls  Medicines may also increase your risk for falls if they make you dizzy, weak, or sleepy  Call 911 or have someone else call if:   You have fallen and are unconscious  You have fallen and cannot move part of your body  Contact your healthcare provider if:   You have fallen and have pain or a headache  You have questions or concerns about your condition or care  Fall prevention tips:   Stand or sit up slowly  This may help you keep your balance and prevent falls  Use assistive devices as directed  Your healthcare provider may suggest that you use a cane or walker to help you keep your balance  You may need to have grab bars put in your bathroom near the toilet or in the shower  Wear shoes that fit well and have soles that   Wear shoes both inside and outside  Use slippers with good   Do not wear shoes with high heels  Wear a personal alarm  This is a device that allows you to call 911 if you fall and need help  Ask your healthcare provider for more information  Stay active  Exercise can help strengthen your muscles and improve your balance  Your healthcare provider may recommend water aerobics or walking  He or she may also recommend physical therapy to improve your coordination  Never start an exercise program without talking to your healthcare provider first          Manage your medical conditions  Keep all appointments with your healthcare providers  Visit your eye doctor as directed  Home safety tips: Add items to prevent falls in the bathroom  Put nonslip strips on your bath or shower floor to prevent you from slipping   Use a bath mat if you do not have carpet in the bathroom  This will prevent you from falling when you step out of the bath or shower  Use a shower seat so you do not need to stand while you shower  Sit on the toilet or a chair in your bathroom to dry yourself and put on clothing  This will prevent you from losing your balance from drying or dressing yourself while you are standing  Keep paths clear  Remove books, shoes, and other objects from walkways and stairs  Place cords for telephones and lamps out of the way so that you do not need to walk over them  Tape them down if you cannot move them  Remove small rugs  If you cannot remove a rug, secure it with double-sided tape  This will prevent you from tripping  Install bright lights in your home  Use night lights to help light paths to the bathroom or kitchen  Always turn on the light before you start walking  Keep items you use often on shelves within reach  Do not use a step stool to help you reach an item  Paint or place reflective tape on the edges of your stairs  This will help you see the stairs better  Follow up with your doctor as directed:  Write down your questions so you remember to ask them during your visits  © Copyright [employer] 2022 Information is for End User's use only and may not be sold, redistributed or otherwise used for commercial purposes  All illustrations and images included in CareNotes® are the copyrighted property of [employer]  or [employer]  The above information is an  only  It is not intended as medical advice for individual conditions or treatments  Talk to your doctor, nurse or pharmacist before following any medical regimen to see if it is safe and effective for you

## 2022-06-21 NOTE — ASSESSMENT & PLAN NOTE
Lab Results   Component Value Date    HGBA1C 6 0 (H) 03/29/2022   controlled on current regimen Metformin 500mg

## 2022-07-12 DIAGNOSIS — I10 ESSENTIAL HYPERTENSION: ICD-10-CM

## 2022-07-12 DIAGNOSIS — N32.81 OAB (OVERACTIVE BLADDER): ICD-10-CM

## 2022-07-12 DIAGNOSIS — N39.498 OTHER URINARY INCONTINENCE: ICD-10-CM

## 2022-07-12 RX ORDER — LISINOPRIL 10 MG/1
10 TABLET ORAL DAILY
Qty: 90 TABLET | Refills: 3 | Status: SHIPPED | OUTPATIENT
Start: 2022-07-12

## 2022-07-21 DIAGNOSIS — N40.1 BENIGN PROSTATIC HYPERPLASIA WITH URINARY FREQUENCY: ICD-10-CM

## 2022-07-21 DIAGNOSIS — R35.0 BENIGN PROSTATIC HYPERPLASIA WITH URINARY FREQUENCY: ICD-10-CM

## 2022-07-21 RX ORDER — FINASTERIDE 5 MG/1
5 TABLET, FILM COATED ORAL DAILY
Qty: 90 TABLET | Refills: 2 | Status: SHIPPED | OUTPATIENT
Start: 2022-07-21

## 2022-07-21 RX ORDER — OXYBUTYNIN CHLORIDE 5 MG/1
5 TABLET, EXTENDED RELEASE ORAL DAILY
Qty: 90 TABLET | Refills: 2 | Status: SHIPPED | OUTPATIENT
Start: 2022-07-21

## 2022-07-22 ENCOUNTER — TELEPHONE (OUTPATIENT)
Dept: INTERNAL MEDICINE CLINIC | Age: 87
End: 2022-07-22

## 2022-07-22 NOTE — TELEPHONE ENCOUNTER
Pts wife called stating Alla Lagunas is getting silke and irate in the evenings  She wants to know if there is med for him or what she could do for him

## 2022-07-25 DIAGNOSIS — R41.0 CONFUSION: Primary | ICD-10-CM

## 2022-07-25 RX ORDER — QUETIAPINE FUMARATE 25 MG/1
25 TABLET, FILM COATED ORAL
Qty: 30 TABLET | Refills: 1 | Status: SHIPPED | OUTPATIENT
Start: 2022-07-25 | End: 2022-08-01 | Stop reason: SDUPTHER

## 2022-07-26 ENCOUNTER — EVALUATION (OUTPATIENT)
Dept: PHYSICAL THERAPY | Age: 87
End: 2022-07-26
Payer: COMMERCIAL

## 2022-07-26 DIAGNOSIS — R26.9 NEUROLOGIC GAIT DYSFUNCTION: ICD-10-CM

## 2022-07-26 DIAGNOSIS — M62.81 MUSCLE WEAKNESS: Primary | ICD-10-CM

## 2022-07-26 PROCEDURE — 97161 PT EVAL LOW COMPLEX 20 MIN: CPT | Performed by: PHYSICAL THERAPIST

## 2022-07-26 PROCEDURE — 97110 THERAPEUTIC EXERCISES: CPT | Performed by: PHYSICAL THERAPIST

## 2022-07-26 NOTE — PROGRESS NOTES
PT Evaluation     Today's date: 2022  Patient name: Geri Trejo  : 1935  MRN: [id number]  Referring provider: Adair Goodwin MD  Dx:   Encounter Diagnosis     ICD-10-CM    1  Muscle weakness  M62 81    2  Neurologic gait dysfunction  R26 9 Ambulatory Referral to Physical Therapy       Start Time: 1405  Stop Time: 1440  Total time in clinic (min): 35 minutes    Assessment  Assessment details: Pt is a 82 y/o male who presents with lumbar pain and generalized weakness  No further referral is necessary at this time  Pt is experiencing pain, decreased strength, and decreased ROM  Pt has a positive/negative prognosis  Pt would benefit from PT to address these impairments leading to increased functional capacity and improved quality of life  Pt was provided HEP to address impairments due to patient not wanting to continue with treatment  Impairments: abnormal or restricted ROM, impaired physical strength, lacks appropriate home exercise program, pain with function, poor posture  and poor body mechanics  Understanding of Dx/Px/POC: good   Prognosis: good    Goals  Consult only    Plan  Patient would benefit from: skilled physical therapy  Planned modality interventions: cryotherapy and thermotherapy: hydrocollator packs  Planned therapy interventions: neuromuscular re-education, patient education, stretching, strengthening, therapeutic activities, therapeutic exercise, therapeutic training, home exercise program and graded activity  Frequency: Consult only  Treatment plan discussed with: patient        Subjective Evaluation    History of Present Illness  Mechanism of injury: Per MD and wife, patient has issues with balance and functional transfers  Pt was willing to participate in evaluation but did not want to return for further treatment     Quality of life: good    Pain  Current pain ratin  At best pain ratin  At worst pain ratin  Quality: sharp, radiating and dull ache  Aggravating factors: stair climbing, walking and standing    Hand dominance: right    Patient Goals  Patient goals for therapy: decreased pain, independence with ADLs/IADLs, return to sport/leisure activities, increased strength and increased motion          Objective     Functional Assessment        Comments  Gait:   Decreased toe clearance and shuffling gait pattern  Patients can ambulate 76' before getting fatigued and needing break    Sit to stand transfer:    Mod assistance with UE to perform transfer    MMT:  4-/5 in BLE             Precautions: Dementia        Manuals                                                                 Neuro Re-Ed                                                                                                        Ther Ex             Marches HEP            Ankle pumps HEP            STS HEP                                                                             Ther Activity                                       Gait Training                                       Modalities

## 2022-08-01 DIAGNOSIS — F29 PSYCHOSIS, UNSPECIFIED PSYCHOSIS TYPE (HCC): Primary | ICD-10-CM

## 2022-08-01 DIAGNOSIS — R41.0 CONFUSION: ICD-10-CM

## 2022-08-01 RX ORDER — QUETIAPINE FUMARATE 25 MG/1
25 TABLET, FILM COATED ORAL
Qty: 30 TABLET | Refills: 1 | Status: SHIPPED | OUTPATIENT
Start: 2022-08-01 | End: 2022-08-01 | Stop reason: SDUPTHER

## 2022-08-01 RX ORDER — QUETIAPINE FUMARATE 25 MG/1
25 TABLET, FILM COATED ORAL
Qty: 30 TABLET | Refills: 1 | Status: SHIPPED | OUTPATIENT
Start: 2022-08-01 | End: 2022-09-27 | Stop reason: SDUPTHER

## 2022-09-27 ENCOUNTER — OFFICE VISIT (OUTPATIENT)
Dept: INTERNAL MEDICINE CLINIC | Age: 87
End: 2022-09-27
Payer: COMMERCIAL

## 2022-09-27 VITALS
BODY MASS INDEX: 28.73 KG/M2 | HEART RATE: 52 BPM | TEMPERATURE: 97.6 F | OXYGEN SATURATION: 97 % | HEIGHT: 69 IN | SYSTOLIC BLOOD PRESSURE: 110 MMHG | WEIGHT: 194 LBS | DIASTOLIC BLOOD PRESSURE: 56 MMHG

## 2022-09-27 DIAGNOSIS — R46.89 COGNITIVE AND BEHAVIORAL CHANGES: ICD-10-CM

## 2022-09-27 DIAGNOSIS — Z23 NEED FOR IMMUNIZATION AGAINST INFLUENZA: ICD-10-CM

## 2022-09-27 DIAGNOSIS — H90.3 SENSORINEURAL HEARING LOSS (SNHL) OF BOTH EARS: ICD-10-CM

## 2022-09-27 DIAGNOSIS — R41.3 MEMORY DEFICITS: ICD-10-CM

## 2022-09-27 DIAGNOSIS — G89.29 CHRONIC MIDLINE LOW BACK PAIN WITHOUT SCIATICA: ICD-10-CM

## 2022-09-27 DIAGNOSIS — F02.818 LATE ONSET ALZHEIMER'S DEMENTIA WITH BEHAVIORAL DISTURBANCE (HCC): ICD-10-CM

## 2022-09-27 DIAGNOSIS — G30.1 LATE ONSET ALZHEIMER'S DEMENTIA WITH BEHAVIORAL DISTURBANCE (HCC): ICD-10-CM

## 2022-09-27 DIAGNOSIS — F29 PSYCHOSIS, UNSPECIFIED PSYCHOSIS TYPE (HCC): ICD-10-CM

## 2022-09-27 DIAGNOSIS — R41.89 COGNITIVE AND BEHAVIORAL CHANGES: ICD-10-CM

## 2022-09-27 DIAGNOSIS — E11.9 CONTROLLED TYPE 2 DIABETES MELLITUS WITHOUT COMPLICATION, WITHOUT LONG-TERM CURRENT USE OF INSULIN (HCC): Primary | ICD-10-CM

## 2022-09-27 DIAGNOSIS — M54.50 CHRONIC MIDLINE LOW BACK PAIN WITHOUT SCIATICA: ICD-10-CM

## 2022-09-27 PROBLEM — F02.80 ALZHEIMER'S DEMENTIA (HCC): Status: ACTIVE | Noted: 2022-09-27

## 2022-09-27 PROBLEM — G30.9 ALZHEIMER'S DEMENTIA (HCC): Status: ACTIVE | Noted: 2022-09-27

## 2022-09-27 LAB — SL AMB POCT HEMOGLOBIN AIC: 6.2 (ref ?–6.5)

## 2022-09-27 PROCEDURE — 99213 OFFICE O/P EST LOW 20 MIN: CPT | Performed by: INTERNAL MEDICINE

## 2022-09-27 PROCEDURE — 3725F SCREEN DEPRESSION PERFORMED: CPT | Performed by: INTERNAL MEDICINE

## 2022-09-27 PROCEDURE — G0008 ADMIN INFLUENZA VIRUS VAC: HCPCS | Performed by: INTERNAL MEDICINE

## 2022-09-27 PROCEDURE — 90662 IIV NO PRSV INCREASED AG IM: CPT | Performed by: INTERNAL MEDICINE

## 2022-09-27 PROCEDURE — 1160F RVW MEDS BY RX/DR IN RCRD: CPT | Performed by: INTERNAL MEDICINE

## 2022-09-27 PROCEDURE — 83036 HEMOGLOBIN GLYCOSYLATED A1C: CPT | Performed by: INTERNAL MEDICINE

## 2022-09-27 RX ORDER — QUETIAPINE FUMARATE 50 MG/1
50 TABLET, FILM COATED ORAL
Qty: 30 TABLET | Refills: 2 | Status: SHIPPED | OUTPATIENT
Start: 2022-09-27 | End: 2022-10-06 | Stop reason: SDUPTHER

## 2022-09-27 NOTE — ASSESSMENT & PLAN NOTE
His weight is relatively stable although he did lose a couple of lb his wife states that he is eating okay

## 2022-09-27 NOTE — PATIENT INSTRUCTIONS
Dementia   AMBULATORY CARE:   Dementia  is a condition that causes loss of memory, thought control, and judgment  Dementia may develop quickly over a few months after a head injury or stroke  It may develop slowly over many years if you have Alzheimer disease  Dementia cannot be cured or prevented, but treatment may slow or reduce your symptoms  Common symptoms include the following:   Loss of short-term memory, followed by loss of long-term memory    Trouble remembering to go to the bathroom, to urinate, or have a bowel movement    Anger or violent behavior    Depression, anxiety, or hallucinations    Seek care immediately if  you or someone close to you notices: You have signs of delirium, such as extreme confusion, and seeing or hearing things that are not there  You become angry or violent, and cannot be calmed down  You faint and cannot be woken  Call your doctor or have someone else call if:   You have a fever  You have increased confusion, behavior, or mood changes  You have questions or concerns about your condition or care  Treatment for dementia  may include medicines to slow memory loss  You may also need medicines to help control anger, decrease anxiety, or improve your mood  Manage dementia:  You may begin to need an in-home aide to help you remember your daily tasks  Ask your healthcare provider for a list of organizations that can help  It is best to arrange for help while you are thinking clearly  The following may also help you manage dementia:  Keep your mind and body active  Do activities that you love, such as art, gardening, or listening to music  Call or visit people often  This will keep your social skills sharp, and may help reduce depression  Take all of your medicines as directed  This will help control medical conditions, such as high blood pressure, high cholesterol, and diabetes  Write daily schedules and routines    Record medical appointments, times to take your medicines, meal times, or any other things to remember  Write down reminders to use the bathroom if you have trouble remembering  You may need to ask someone to write things down for you  Place clocks and calendars where you can see them  This will help you remember appointments and tasks  Do not smoke  Nicotine and other chemicals in cigarettes and cigars can cause blood vessel damage  Ask your healthcare provider for information if you currently smoke and need help to quit  E-cigarettes or smokeless tobacco still contain nicotine  Talk to your healthcare provider before you use these products  Eat healthy foods  Examples are fruits, vegetables, whole-grain breads, low-fat dairy products, beans, lean meats, and fish  Ask if you need to be on a special diet  Follow up with your healthcare provider as directed:  Ask someone to go with you to help you remember what your healthcare provider tells you  The person can take notes for you during the visit and go over the notes with you later  Write down your questions so you remember to ask them during your visits  © Copyright [employer] 2022 Information is for End User's use only and may not be sold, redistributed or otherwise used for commercial purposes  All illustrations and images included in CareNotes® are the copyrighted property of A D A M , Inc  or [facility] [employer]   The above information is an  only  It is not intended as medical advice for individual conditions or treatments  Talk to your doctor, nurse or pharmacist before following any medical regimen to see if it is safe and effective for you

## 2022-09-27 NOTE — ASSESSMENT & PLAN NOTE
Patient is on multiple medications for his urinary frequency incontinence issues now is on Proscar oxybutynin and mirabegron

## 2022-09-27 NOTE — ASSESSMENT & PLAN NOTE
Patient continues to become increasingly confused with poor memory and some behavioral issues  She does have some help coming into the house and has done talk to [employer]    She would like to try increasing the dose of Seroquel to 50 mg

## 2022-09-27 NOTE — PROGRESS NOTES
Assessment/Plan:    Alzheimer's dementia [facility])  Patient continues to become increasingly confused with poor memory and some behavioral issues  She does have some help coming into the house and has done talk to [employer]  She would like to try increasing the dose of Seroquel to 50 mg    BMI 29 0-29 9,adult  His weight is relatively stable although he did lose a couple of lb his wife states that he is eating okay    Benign prostatic hyperplasia with urinary frequency  Patient is on multiple medications for his urinary frequency incontinence issues now is on Proscar oxybutynin and mirabegron         Diagnoses and all orders for this visit:    Controlled type 2 diabetes mellitus without complication, without long-term current use of insulin ([facility])  -     POCT hemoglobin A1c    Memory deficits    Cognitive and behavioral changes    Chronic midline low back pain without sciatica    Sensorineural hearing loss (SNHL) of both ears    Psychosis, unspecified [facility]  -     QUEtiapine (SEROquel) 50 mg tablet; Take 1 tablet (50 mg total) by mouth daily at bedtime    Need for immunization against influenza  -     influenza vaccine, high-dose, PF 0 7 mL (FLUZONE HIGH-DOSE)    Late onset Alzheimer's dementia with behavioral disturbance (HCC)          Subjective:      Patient ID: Merline Lewis is a 36 y o  female  Patient continues to slowly have decreased memory and increasing periods of confusion  His sleeping is better since starting the Seroquel that he is having more behavioral issues  Patient would like to have him on a higher dose of Seroquel at night          Review of Systems   Constitutional: Negative  HENT: Negative  Eyes: Negative  Respiratory: Negative  Cardiovascular: Negative  Gastrointestinal: Negative  Endocrine: Negative  Genitourinary: Negative  Musculoskeletal: Negative  Skin: Negative  Allergic/Immunologic: Negative  Neurological: Negative  Hematological: Negative  Psychiatric/Behavioral: Positive for behavioral problems, confusion and hallucinations  Objective:      /82 (BP Location: Left arm, Patient Position: Sitting, Cuff Size: Standard)   Pulse 76   Temp 98 6 °F (37 °C) (Tympanic)   Ht 5' (1 524 m)   Wt 112 kg (247 lb)   BMI 48 24 kg/m²          Physical Exam  HENT:      Ears:      Comments: Bilateral hearing aids  Neck:      Vascular: No carotid bruit  Cardiovascular:      Rate and Rhythm: Normal rate and regular rhythm  Pulmonary:      Effort: Pulmonary effort is normal       Breath sounds: Normal breath sounds  Abdominal:      General: Abdomen is flat  Musculoskeletal:      Comments: Kyphosis   Skin:     General: Skin is warm and dry  Neurological:      Mental Status: He is alert  He is disoriented  Cranial Nerves: No cranial nerve deficit        Gait: Gait abnormal       Comments: Increasing periods of confusion   Psychiatric:      Comments: Angry           [unfilled]    [unfilled]

## 2022-09-28 ENCOUNTER — OFFICE VISIT (OUTPATIENT)
Dept: AUDIOLOGY | Age: 87
End: 2022-09-28
Payer: COMMERCIAL

## 2022-09-28 DIAGNOSIS — H90.3 SENSORY HEARING LOSS, BILATERAL: Primary | ICD-10-CM

## 2022-09-28 NOTE — PROGRESS NOTES
Progress Note    Name:  Mohit Dejesus  :  1935  Age:  81 y o  Date of Evaluation: 22     Patient is fit with [employer]on[employer] WhoSayeo B50-R hearing aid(s)  Right serial number [id number]  Left serial number [id number]     serial number [id number]  Warranty date: 3/11/20 (Loss/Damage and repair)  Patient dropped off left hearing aid   wire was broken  Replaced wire with in stock supply  Patient paid $100 00       Carole Ratliff , CCC-A  Clinical Audiologist

## 2022-10-06 DIAGNOSIS — F29 PSYCHOSIS, UNSPECIFIED PSYCHOSIS TYPE (HCC): ICD-10-CM

## 2022-10-06 DIAGNOSIS — H90.3 SENSORINEURAL HEARING LOSS (SNHL) OF BOTH EARS: Primary | ICD-10-CM

## 2022-10-07 DIAGNOSIS — N39.498 OTHER URINARY INCONTINENCE: ICD-10-CM

## 2022-10-07 DIAGNOSIS — N32.81 OAB (OVERACTIVE BLADDER): ICD-10-CM

## 2022-10-07 RX ORDER — QUETIAPINE FUMARATE 25 MG/1
25 TABLET, FILM COATED ORAL
Qty: 30 TABLET | Refills: 3 | Status: SHIPPED | OUTPATIENT
Start: 2022-10-07

## 2022-10-21 ENCOUNTER — OFFICE VISIT (OUTPATIENT)
Dept: AUDIOLOGY | Age: 87
End: 2022-10-21
Payer: COMMERCIAL

## 2022-10-21 DIAGNOSIS — H90.3 SENSORY HEARING LOSS, BILATERAL: Primary | ICD-10-CM

## 2022-10-21 PROCEDURE — 92567 TYMPANOMETRY: CPT | Performed by: AUDIOLOGIST

## 2022-10-21 PROCEDURE — 92556 SPEECH AUDIOMETRY COMPLETE: CPT | Performed by: AUDIOLOGIST

## 2022-10-21 PROCEDURE — 92552 PURE TONE AUDIOMETRY AIR: CPT | Performed by: AUDIOLOGIST

## 2022-10-21 NOTE — PROGRESS NOTES
Hearing Aid Visit:    Name:  Pacheco Cardenas  :  1935  Age:  81 y o  Date of Evaluation: 10/21/22     Pacheco Cardenas is being seen for a hearing aid visit  Patient is fit with [employer]onak Unified Inboxeo B50-R hearing aid(s)  Right serial number [id number]  Left serial number [id number]     serial number [id number]  Warranty date: 3/11/20 (Loss/Damage and   repair)  Patient reports difficulty understanding speech  Action:  Counseled on decrease in speech understanding with no change in hearing thresholds  No adjustments made today  Recommendations:    Follow up Carole Blank , CCC-A  Clinical Audiologist

## 2022-10-21 NOTE — PROGRESS NOTES
HEARING EVALUATION    Name:  Pacheco Cardenas  :  1935  Age:  81 y o  Date of Evaluation: 10/21/22     History: Known Hearing Loss binaurally  Reason for visit: Pacheco Cardenas is being seen today at the request of Dr Ching Méndez for an evaluation of hearing  Patient reports concerns for understanding ability, maybe due to a shift in hearing ability  EVALUATION:    Otoscopic Evaluation:   Right Ear: Minimum cerumen , Could visualize tympanic membrane   Left Ear: Occluded, Could not visualize tympanic membrane    Tympanometry:   Right: Type A - normal middle ear pressure and compliance   Left: Type Ad - hypermobile compliance    Audiogram Results:  Pure tone testing revealed a mild sloping to profound sensorineural hearing loss in the  right ear and a moderately severe sloping to profound sensorineural hearing loss in the  left  ear  SRT and PTA are in agreement indicating good test reliability  Word recognition scores were poor bilaterally  Hearing ability remained stable since last hearing test () however, speech understanding decreased  In 2020 right ear WRS = 78%  Today right ear WRS = 48%       *see attached audiogram      RECOMMENDATIONS:  Annual hearing eval, Return to [facility]  for F/U and Copy to Patient/Caregiver   Discussed communication strategies:   Limit background noise  Decrease distance when speaking  Speak slowly  Get attention prior to talking  Re-Word don't Repeat  Use Closed Captioning for the TV    PATIENT EDUCATION:   Discussed results and recommendations with patient and wife  Questions were addressed and the patient was encouraged to contact our department should concerns arise        Carole Wharton , CCC-A  Clinical Audiologist

## 2022-10-31 ENCOUNTER — 1 YEAR FOLLOW-UP (OUTPATIENT)
Dept: URBAN - METROPOLITAN AREA CLINIC 6 | Facility: CLINIC | Age: 87
End: 2022-10-31

## 2022-10-31 DIAGNOSIS — H35.363: ICD-10-CM

## 2022-10-31 DIAGNOSIS — E11.9: ICD-10-CM

## 2022-10-31 DIAGNOSIS — Z96.1: ICD-10-CM

## 2022-10-31 PROCEDURE — 92014 COMPRE OPH EXAM EST PT 1/>: CPT

## 2022-10-31 ASSESSMENT — VISUAL ACUITY
OS_CC: 20/100
OD_PH: 20/70-2
OD_CC: 20/200
OS_PH: 20/70

## 2022-10-31 ASSESSMENT — TONOMETRY
OS_IOP_MMHG: 8
OD_IOP_MMHG: 10

## 2022-12-27 ENCOUNTER — HOSPITAL ENCOUNTER (EMERGENCY)
Facility: HOSPITAL | Age: 87
Discharge: HOME | End: 2022-12-27
Attending: EMERGENCY MEDICINE

## 2022-12-27 ENCOUNTER — APPOINTMENT (EMERGENCY)
Dept: RADIOLOGY | Facility: HOSPITAL | Age: 87
End: 2022-12-27

## 2022-12-27 VITALS
OXYGEN SATURATION: 96 % | HEART RATE: 60 BPM | DIASTOLIC BLOOD PRESSURE: 107 MMHG | TEMPERATURE: 98.3 F | SYSTOLIC BLOOD PRESSURE: 156 MMHG | RESPIRATION RATE: 18 BRPM

## 2022-12-27 DIAGNOSIS — F03.90 DEMENTIA (HCC): ICD-10-CM

## 2022-12-27 DIAGNOSIS — U07.1 COVID-19: Primary | ICD-10-CM

## 2022-12-27 LAB
2HR DELTA HS TROPONIN: -1 NG/L
ALBUMIN SERPL BCP-MCNC: 3.4 G/DL (ref 3.5–5)
ALP SERPL-CCNC: 57 U/L (ref 46–116)
ALT SERPL W P-5'-P-CCNC: 16 U/L (ref 12–78)
ANION GAP SERPL CALCULATED.3IONS-SCNC: 3 MMOL/L (ref 4–13)
AST SERPL W P-5'-P-CCNC: 9 U/L (ref 5–45)
ATRIAL RATE: 66 BPM
BACTERIA UR QL AUTO: ABNORMAL /HPF
BASOPHILS # BLD AUTO: 0.02 THOUSANDS/ÂΜL (ref 0–0.1)
BASOPHILS NFR BLD AUTO: 1 % (ref 0–1)
BILIRUB SERPL-MCNC: 0.6 MG/DL (ref 0.2–1)
BILIRUB UR QL STRIP: NEGATIVE
BUN SERPL-MCNC: 26 MG/DL (ref 5–25)
CALCIUM ALBUM COR SERPL-MCNC: 9.9 MG/DL (ref 8.3–10.1)
CALCIUM SERPL-MCNC: 9.4 MG/DL (ref 8.3–10.1)
CARDIAC TROPONIN I PNL SERPL HS: 5 NG/L
CARDIAC TROPONIN I PNL SERPL HS: 6 NG/L
CHLORIDE SERPL-SCNC: 108 MMOL/L (ref 96–108)
CLARITY UR: CLEAR
CO2 SERPL-SCNC: 28 MMOL/L (ref 21–32)
COLOR UR: YELLOW
CREAT SERPL-MCNC: 1.55 MG/DL (ref 0.6–1.3)
EOSINOPHIL # BLD AUTO: 0.01 THOUSAND/ÂΜL (ref 0–0.61)
EOSINOPHIL NFR BLD AUTO: 0 % (ref 0–6)
ERYTHROCYTE [DISTWIDTH] IN BLOOD BY AUTOMATED COUNT: 12.9 % (ref 11.6–15.1)
FLUAV RNA RESP QL NAA+PROBE: NEGATIVE
FLUBV RNA RESP QL NAA+PROBE: NEGATIVE
GFR SERPL CREATININE-BSD FRML MDRD: 39 ML/MIN/1.73SQ M
GLUCOSE SERPL-MCNC: 184 MG/DL (ref 65–140)
GLUCOSE UR STRIP-MCNC: ABNORMAL MG/DL
HCT VFR BLD AUTO: 42.8 % (ref 36.5–49.3)
HGB BLD-MCNC: 13.8 G/DL (ref 12–17)
HGB UR QL STRIP.AUTO: NEGATIVE
IMM GRANULOCYTES # BLD AUTO: 0 THOUSAND/UL (ref 0–0.2)
IMM GRANULOCYTES NFR BLD AUTO: 0 % (ref 0–2)
KETONES UR STRIP-MCNC: NEGATIVE MG/DL
LACTATE SERPL-SCNC: 1.3 MMOL/L (ref 0.5–2)
LEUKOCYTE ESTERASE UR QL STRIP: NEGATIVE
LYMPHOCYTES # BLD AUTO: 0.59 THOUSANDS/ÂΜL (ref 0.6–4.47)
LYMPHOCYTES NFR BLD AUTO: 20 % (ref 14–44)
MCH RBC QN AUTO: 31.4 PG (ref 26.8–34.3)
MCHC RBC AUTO-ENTMCNC: 32.2 G/DL (ref 31.4–37.4)
MCV RBC AUTO: 97 FL (ref 82–98)
MONOCYTES # BLD AUTO: 0.51 THOUSAND/ÂΜL (ref 0.17–1.22)
MONOCYTES NFR BLD AUTO: 17 % (ref 4–12)
MUCOUS THREADS UR QL AUTO: ABNORMAL
NEUTROPHILS # BLD AUTO: 1.82 THOUSANDS/ÂΜL (ref 1.85–7.62)
NEUTS SEG NFR BLD AUTO: 62 % (ref 43–75)
NITRITE UR QL STRIP: NEGATIVE
NON-SQ EPI CELLS URNS QL MICRO: ABNORMAL /HPF
NRBC BLD AUTO-RTO: 0 /100 WBCS
PH UR STRIP.AUTO: 5.5 [PH]
PLATELET # BLD AUTO: 143 THOUSANDS/UL (ref 149–390)
PMV BLD AUTO: 10.8 FL (ref 8.9–12.7)
POTASSIUM SERPL-SCNC: 4.2 MMOL/L (ref 3.5–5.3)
PROCALCITONIN SERPL-MCNC: 0.08 NG/ML
PROT SERPL-MCNC: 6.7 G/DL (ref 6.4–8.4)
PROT UR STRIP-MCNC: ABNORMAL MG/DL
QRS AXIS: -26 DEGREES
QRSD INTERVAL: 98 MS
QT INTERVAL: 396 MS
QTC INTERVAL: 415 MS
RBC # BLD AUTO: 4.4 MILLION/UL (ref 3.88–5.62)
RBC #/AREA URNS AUTO: ABNORMAL /HPF
RSV RNA RESP QL NAA+PROBE: NEGATIVE
SARS-COV-2 RNA RESP QL NAA+PROBE: POSITIVE
SODIUM SERPL-SCNC: 139 MMOL/L (ref 135–147)
SP GR UR STRIP.AUTO: 1.02 (ref 1–1.03)
T WAVE AXIS: 37 DEGREES
TSH SERPL DL<=0.05 MIU/L-ACNC: 0.9 UIU/ML (ref 0.45–4.5)
UROBILINOGEN UR STRIP-ACNC: <2 MG/DL
VENTRICULAR RATE: 66 BPM
WBC # BLD AUTO: 2.95 THOUSAND/UL (ref 4.31–10.16)
WBC #/AREA URNS AUTO: ABNORMAL /HPF

## 2022-12-27 NOTE — ED ATTENDING ATTESTATION
Frida Linton MD, saw and evaluated the patient  I have discussed the patient with the resident and agree with the resident's findings, Plan of Care, and MDM as documented in the resident's note, except where noted  All available labs and Radiology studies were reviewed  I was present for key portions of any procedure(s) performed by the resident and I was immediately available to provide assistance  At this point I agree with the current assessment done in the Emergency Department  I have conducted an independent evaluation of this patient a history and physical is as follows:    82 yo male with a history of DM, BPH, CKD, Alzheimer's dementia, CAD, and chronic back pain brought to the ED by EMS for evaluation of a mental status change  Medics say that the patient's wife reported increased confusion and urinary frequency x several days  (+) Generalized weakness per report  The patient has no specific complaints  No chest pain, shortness of breath, or cough  He denies nausea, vomiting, and abdominal pain  No fevers/chills  ROS: per resident physician note    Gen: NAD, only oriented to self  HEENT: PERRL, EOMI  Neck: supple  CV: RRR  Lungs: CTA B/L  Abdomen: soft, NT/ND  Ext: no swelling or deformity  Neuro: 5/5 strength all extremities, sensation grossly intact  Skin: no rash    ED Course  The patient is comfortable appearing with stable vital signs and a benign exam  He is heavily demented but has no appreciable complaints  He is pleasant and cooperative with the exam/history  Wife not in ED to corroborate history at present  Plan for EKG, CXR, basic labs, TSH, troponin, and UA  Will continue to monitor in the ED  Disposition per workup and reassessment  Wife reportedly en route --> will obtain additional history when she arrives        Critical Care Time  Procedures

## 2022-12-27 NOTE — ED PROVIDER NOTES
History  Chief Complaint   Patient presents with   • Altered Mental Status     Pt brought in via EMS from home, wife reports increased confusion and urinary frequency  Increased weakness reported, disoriented to events  80 y/o male with hx of dementia, HTN, diabetes, and CAD presents to the ED via EMS from home for evaluation of generalized weakness and confusion  The patient has dementia at baseline and is a poor historian, history limited however patient's wife is present to provide additional information  She reports that he is only somewhat confused due to his dementia, however over the last 1 to 2 days he has been more "ornery" than usual   She notes that he has had a slight cough since yesterday and also appears to have slightly increased urinary frequency  She denies any other symptoms or complaints  Prior to Admission Medications   Prescriptions Last Dose Informant Patient Reported? Taking? Easy Touch Lancets 33G/Twist MISC   No No   Sig: BY DOES NOT APPLY ROUTE 2 (TWO) TIMES A DAY TEST ONES DAILY   Mirabegron ER 50 MG TB24   No No   Sig: Take 1 tablet (50 mg total) by mouth daily   QUEtiapine (SEROquel) 25 mg tablet   No No   Sig: Take 1 tablet (25 mg total) by mouth daily at bedtime   finasteride (PROSCAR) 5 mg tablet   No No   Sig: TAKE 1 TABLET (5 MG TOTAL) BY MOUTH DAILY   glucose blood (Easy Touch Test) test strip   No No   Si each by Other route daily CONTOUR TEST STRIPS     DX CODE  E11 9    TEST BLOOD SUGAR TWICE DAILY     lisinopril (ZESTRIL) 10 mg tablet   No No   Sig: Take 1 tablet (10 mg total) by mouth daily   metFORMIN (GLUCOPHAGE) 500 mg tablet   No No   Sig: Take 1 tablet (500 mg total) by mouth daily with breakfast   oxybutynin (DITROPAN-XL) 5 mg 24 hr tablet   No No   Sig: TAKE 1 TABLET (5 MG TOTAL) BY MOUTH DAILY   traMADol (ULTRAM) 50 mg tablet   No No   Sig: Take 1 tablet (50 mg total) by mouth every 6 (six) hours as needed for moderate pain Facility-Administered Medications: None       Past Medical History:   Diagnosis Date   • Balanitis     last assessed 14   • BPH (benign prostatic hyperplasia)    • Diabetes mellitus ([facility] [address] )     blood sugar 167 this am at 0630   • GERD (gastroesophageal reflux disease)    • Heme + stool    • Hypertension    • Lumbar radiculopathy    • Myocardial infarction ([facility] [address] )     35 years ago   • Phimosis     last assessed 16   • Sciatica     right side, last assessed 16       Past Surgical History:   Procedure Laterality Date   • BACK SURGERY      laminectomy Lumbar   • CATARACT EXTRACTION, BILATERAL     • CIRCUMCISION     • HERNIA REPAIR Right    • LA COLONOSCOPY FLX DX W/COLLJ SPEC WHEN PFRMD N/A 2017    Procedure: EGD AND COLONOSCOPY;  Surgeon: Bipin Orantes DO;  Location: [facility]; Service: General       History reviewed  No pertinent family history  I have reviewed and agree with the history as documented      E-Cigarette/Vaping     E-Cigarette/Vaping Substances     Social History     Tobacco Use   • Smoking status: Former     Packs/day: 1      Years: 30 00     Pack years: 30 00     Types: Cigarettes     Quit date: 1998     Years since quittin 0   • Smokeless tobacco: Never   • Tobacco comments:     quit 25 yrs ago   Substance Use Topics   • Alcohol use: No     Alcohol/week: 0 0 standard drinks   • Drug use: No        Review of Systems   Unable to perform ROS: Dementia       Physical Exam  ED Triage Vitals [22 1106]   Temperature Pulse Respirations Blood Pressure SpO2   98 3 °F (36 8 °C) 72 20 143/88 96 %      Temp Source Heart Rate Source Patient Position - Orthostatic VS BP Location FiO2 (%)   Oral Monitor Sitting Right arm --      Pain Score       No Pain             Orthostatic Vital Signs  Vitals:    22 1106 22 1230 22 1400   BP: 143/88 144/67 (!) 156/107   Pulse: 72 66 60   Patient Position - Orthostatic VS: Sitting Sitting Sitting       Physical Exam  Vitals and nursing note reviewed  Constitutional:       General: He is not in acute distress  Appearance: Normal appearance  He is normal weight  HENT:      Head: Normocephalic and atraumatic  Right Ear: External ear normal       Left Ear: External ear normal       Nose: Nose normal       Mouth/Throat:      Mouth: Mucous membranes are moist       Pharynx: Oropharynx is clear  No oropharyngeal exudate or posterior oropharyngeal erythema  Eyes:      Extraocular Movements: Extraocular movements intact  Conjunctiva/sclera: Conjunctivae normal       Pupils: Pupils are equal, round, and reactive to light  Cardiovascular:      Rate and Rhythm: Normal rate and regular rhythm  Pulses: Normal pulses  Heart sounds: Normal heart sounds  Pulmonary:      Effort: Pulmonary effort is normal  No respiratory distress  Breath sounds: Normal breath sounds  No wheezing or rales  Abdominal:      General: Abdomen is flat  Bowel sounds are normal  There is no distension  Palpations: Abdomen is soft  Tenderness: There is no abdominal tenderness  There is no right CVA tenderness, left CVA tenderness or guarding  Musculoskeletal:         General: No swelling or tenderness  Normal range of motion  Cervical back: Normal range of motion and neck supple  No tenderness  Skin:     General: Skin is warm and dry  Neurological:      General: No focal deficit present  Mental Status: He is alert and oriented to person, place, and time           ED Medications  Medications - No data to display    Diagnostic Studies  Results Reviewed     Procedure Component Value Units Date/Time    HS Troponin I 2hr [[id number]]  (Normal) Collected: 12/27/22 1430    Lab Status: Final result Specimen: Blood from Arm, Right Updated: 12/27/22 1512     hs TnI 2hr 5 ng/L      Delta 2hr hsTnI -1 ng/L     FLU/RSV/COVID - if FLU/RSV clinically relevant [[id number]]  (Abnormal) Collected: 12/27/22 1100    Lab Status: Final result Specimen: Nares from Nose Updated: 12/27/22 1219     SARS-CoV-2 Positive     INFLUENZA A PCR Negative     INFLUENZA B PCR Negative     RSV PCR Negative    Narrative:      FOR PEDIATRIC PATIENTS - copy/paste COVID Guidelines URL to browser: [email or web address]    SARS-CoV-2 assay is a Nucleic Acid Amplification assay intended for the  qualitative detection of nucleic acid from SARS-CoV-2 in nasopharyngeal  swabs  Results are for the presumptive identification of SARS-CoV-2 RNA  Positive results are indicative of infection with SARS-CoV-2, the virus  causing COVID-19, but do not rule out bacterial infection or co-infection  with other viruses  Laboratories within the [facility] and its  territories are required to report all positive results to the appropriate  public health authorities  Negative results do not preclude SARS-CoV-2  infection and should not be used as the sole basis for treatment or other  patient management decisions  Negative results must be combined with  clinical observations, patient history, and epidemiological information  This test has not been FDA cleared or approved  This test has been authorized by FDA under an Emergency Use Authorization  (EUA)  This test is only authorized for the duration of time the  declaration that circumstances exist justifying the authorization of the  emergency use of an in vitro diagnostic tests for detection of SARS-CoV-2  virus and/or diagnosis of COVID-19 infection under section 564(b)(1) of  the Act, 21 U  S C  061UNQ-3(I)(4), unless the authorization is terminated  or revoked sooner  The test has been validated but independent review by FDA  and CLIA is pending  Test performed using [employer] GeneXpert: This RT-PCR assay targets N2,  a region unique to SARS-CoV-2   A conserved region in the E-gene was chosen  for pan-Sarbecovirus detection which includes SARS-CoV-2  According to CMS-2020-01-R, this platform meets the definition of high-throughput technology  Urine Microscopic [[id number]]  (Abnormal) Collected: 12/27/22 1126    Lab Status: Final result Specimen: Urine, Straight Cath Updated: 12/27/22 1217     RBC, UA None Seen /hpf      WBC, UA None Seen /hpf      Epithelial Cells Occasional /hpf      Bacteria, UA None Seen /hpf      MUCUS THREADS Occasional    TSH, 3rd generation with Free T4 reflex [[id number]]  (Normal) Collected: 12/27/22 1100    Lab Status: Final result Specimen: Blood from Arm, Right Updated: 12/27/22 1214     TSH 3RD GENERATON 0 895 uIU/mL     Narrative:      Patients undergoing fluorescein dye angiography may retain small amounts of fluorescein in the body for 48-72 hours post procedure  Samples containing fluorescein can produce falsely depressed TSH values  If the patient had this procedure,a specimen should be resubmitted post fluorescein clearance        Procalcitonin [[id number]]  (Normal) Collected: 12/27/22 1100    Lab Status: Final result Specimen: Blood from Arm, Right Updated: 12/27/22 1212     Procalcitonin 0 08 ng/ml     HS Troponin 0hr (reflex protocol) [[id number]]  (Normal) Collected: 12/27/22 1100    Lab Status: Final result Specimen: Blood from Arm, Right Updated: 12/27/22 1211     hs TnI 0hr 6 ng/L     Comprehensive metabolic panel [[id number]]  (Abnormal) Collected: 12/27/22 1100    Lab Status: Final result Specimen: Blood from Arm, Right Updated: 12/27/22 1207     Sodium 139 mmol/L      Potassium 4 2 mmol/L      Chloride 108 mmol/L      CO2 28 mmol/L      ANION GAP 3 mmol/L      BUN 26 mg/dL      Creatinine 1 55 mg/dL      Glucose 184 mg/dL      Calcium 9 4 mg/dL      Corrected Calcium 9 9 mg/dL      AST 9 U/L      ALT 16 U/L      Alkaline Phosphatase 57 U/L      Total Protein 6 7 g/dL      Albumin 3 4 g/dL      Total Bilirubin 0 60 mg/dL      eGFR 39 ml/min/1 73sq m     Narrative:      [place] guidelines for Chronic Kidney Disease (CKD):   •  Stage 1 with normal or high GFR (GFR > 90 mL/min/1 73 square meters)  •  Stage 2 Mild CKD (GFR = 60-89 mL/min/1 73 square meters)  •  Stage 3A Moderate CKD (GFR = 45-59 mL/min/1 73 square meters)  •  Stage 3B Moderate CKD (GFR = 30-44 mL/min/1 73 square meters)  •  Stage 4 Severe CKD (GFR = 15-29 mL/min/1 73 square meters)  •  Stage 5 End Stage CKD (GFR <15 mL/min/1 73 square meters)  Note: GFR calculation is accurate only with a steady state creatinine    Lactic acid [[id number]]  (Normal) Collected: 12/27/22 1100    Lab Status: Final result Specimen: Blood from Arm, Right Updated: 12/27/22 1206     LACTIC ACID 1 3 mmol/L     Narrative:      Result may be elevated if tourniquet was used during collection      CBC and differential [[id number]]  (Abnormal) Collected: 12/27/22 1100    Lab Status: Final result Specimen: Blood from Arm, Right Updated: 12/27/22 1152     WBC 2 95 Thousand/uL      RBC 4 40 Million/uL      Hemoglobin 13 8 g/dL      Hematocrit 42 8 %      MCV 97 fL      MCH 31 4 pg      MCHC 32 2 g/dL      RDW 12 9 %      MPV 10 8 fL      Platelets 838 Thousands/uL      nRBC 0 /100 WBCs      Neutrophils Relative 62 %      Immat GRANS % 0 %      Lymphocytes Relative 20 %      Monocytes Relative 17 %      Eosinophils Relative 0 %      Basophils Relative 1 %      Neutrophils Absolute 1 82 Thousands/µL      Immature Grans Absolute 0 00 Thousand/uL      Lymphocytes Absolute 0 59 Thousands/µL      Monocytes Absolute 0 51 Thousand/µL      Eosinophils Absolute 0 01 Thousand/µL      Basophils Absolute 0 02 Thousands/µL     UA w Reflex to Microscopic w Reflex to Culture [[id number]]  (Abnormal) Collected: 12/27/22 1126    Lab Status: Final result Specimen: Urine, Straight Cath Updated: 12/27/22 1151     Color, UA Yellow     Clarity, UA Clear     Specific Gravity, UA 1 019     pH, UA 5 5     Leukocytes, UA Negative     Nitrite, UA Negative     Protein, UA Trace mg/dl Glucose,  (3/10%) mg/dl      Ketones, UA Negative mg/dl      Urobilinogen, UA <2 0 mg/dl      Bilirubin, UA Negative     Occult Blood, UA Negative                 XR chest 1 view portable   Final Result by Frannie Gonzales MD (12/27 1252)      No acute cardiopulmonary disease  Findings are stable            Workstation performed: [id number]         CT head without contrast   Final Result by Jessi Hollis MD (12/27 1242)      No acute intracranial abnormality  Workstation performed: [id number]               Procedures  Procedures      ED Course  ED Course as of 12/31/22 2156   Tue Dec 27, 2022   1119 Procedure Note: EKG  Date/Time: 12/27/22 11:01 AM   Interpreted by: Mack Oleary MD  Indications / Diagnosis: Confusion  ECG reviewed by me, the ED Physician: yes   The EKG demonstrates:  Rhythm: sinus rhythm 66 bpm with PVC noted  Intervals: normal intervals  Axis: normal axis  QRS/Blocks: normal QRS  ST Changes: No acute ST Changes, no STD/CARLA  No STEMI  No significant change compared to ECG from 12/5/2018 with the exception of presence of PVC    1233 SARS-COV-2(!): Positive   1326 CT head without contrast   1326 XR chest 1 view portable  NAD   1519 Delta 2hr hsTnI: -1                             SBIRT 20yo+    Flowsheet Row Most Recent Value   SBIRT (23 yo +)    In order to provide better care to our patients, we are screening all of our patients for alcohol and drug use  Would it be okay to ask you these screening questions? No Filed at: 12/27/2022 1108                MDM  Number of Diagnoses or Management Options  COVID-19  Dementia [facility])  Diagnosis management comments: This is an 60-year-old male with hx of dementia, HTN, diabetes, and CAD presenting via EMS from home for evaluation of generalized weakness and confusion  The patient has dementia at baseline and is a poor historian, history limited however patient's wife is present to provide additional information    She reports that he is only somewhat confused due to his dementia, however over the last 1 to 2 days he has been more "ornery" than usual   She notes that he has had a slight cough since yesterday and also appears to have slightly increased urinary frequency  She denies any other symptoms or complaints  The patient is pleasant and cooperative on exam, appears in no acute distress  Will evaluate with labs, ECG, chest x-ray, troponin, TSH, and UA  Also obtain viral testing  Patient found to be COVID-19 positive  Ambulatory pulse ox 95%  Patient's wife feels comfortable with discharge home and outpatient follow-up  Discussed all findings, treatment, red flags/return precautions, and outpatient follow-up and the patient/family understands and agrees  Stable for discharge  Disposition  Final diagnoses:   COVID-19   Dementia ([address] )     Time reflects when diagnosis was documented in both MDM as applicable and the Disposition within this note     Time User Action Codes Description Comment    12/27/2022  3:32 PM Excell [place] Add [U07 1] COVID-19     12/27/2022  3:32 PM Excell [place] Add [F03 90] Dementia [facility])       ED Disposition     ED Disposition   Discharge    Condition   Stable    Date/Time   Tue Dec 27, 2022  3:32 PM    Comment   Thomas Medina discharge to home/self care                 Follow-up Information     Follow up With Specialties Details Why Contact Info    Jovana Mercado MD Internal Medicine Call in 1 week For follow up [address] [place] [address]  [phone]            Discharge Medication List as of 12/27/2022  3:32 PM      CONTINUE these medications which have NOT CHANGED    Details   lisinopril (ZESTRIL) 10 mg tablet Take 1 tablet (10 mg total) by mouth daily, Starting Tue 7/12/2022, Normal      Easy Touch Lancets 33G/Twist MISC BY DOES NOT APPLY ROUTE 2 (TWO) TIMES A DAY TEST ONES DAILY, Normal      finasteride (PROSCAR) 5 mg tablet TAKE 1 TABLET (5 MG TOTAL) BY MOUTH DAILY, Starting Thu 7/21/2022, Normal      glucose blood (Easy Touch Test) test strip 2 each by Other route daily CONTOUR TEST STRIPS     DX CODE  E11 9    TEST BLOOD SUGAR TWICE DAILY  , Starting Mon 6/1/2020, Normal      metFORMIN (GLUCOPHAGE) 500 mg tablet Take 1 tablet (500 mg total) by mouth daily with breakfast, Starting Mon 2/21/2022, Normal      Mirabegron ER 50 MG TB24 Take 1 tablet (50 mg total) by mouth daily, Starting Fri 10/7/2022, Normal      oxybutynin (DITROPAN-XL) 5 mg 24 hr tablet TAKE 1 TABLET (5 MG TOTAL) BY MOUTH DAILY, Starting Thu 7/21/2022, Normal      QUEtiapine (SEROquel) 25 mg tablet Take 1 tablet (25 mg total) by mouth daily at bedtime, Starting Fri 10/7/2022, Normal      traMADol (ULTRAM) 50 mg tablet Take 1 tablet (50 mg total) by mouth every 6 (six) hours as needed for moderate pain, Starting Tue 6/15/2021, Normal           No discharge procedures on file  PDMP Review     None           ED Provider  Attending physically available and evaluated Soledad Emerson I managed the patient along with the ED Attending      Electronically Signed by         Andrea Jaime MD  12/31/22 5748

## 2022-12-27 NOTE — ED NOTES
BLS transportation home requested via [employer], p/u eta unknown at this time  Patient and wife aware       Elvis Bradford RN  12/27/22 2890

## 2022-12-29 ENCOUNTER — OFFICE VISIT (OUTPATIENT)
Dept: AUDIOLOGY | Age: 87
End: 2022-12-29

## 2022-12-29 DIAGNOSIS — H90.3 SENSORY HEARING LOSS, BILATERAL: Primary | ICD-10-CM

## 2022-12-29 NOTE — PROGRESS NOTES
Hearing Aid Visit:    Name:  Renetta Mckeon  :  1935  Age:  81 y o  Date of Evaluation: 22     Renetta Mckeon is being seen for a hearing aid visit  Patient is fit with [employer] B50-R hearing aid(s)  Right serial number [id number]  Left serial number [id number]   serial number [id number]   Warranty date: 3/11/20 (Loss/Damage and   repair)  Right hearing aid was dropped off  Reported as charging, but no sound  Action:  Listening check revealed good sound quality  Cleaned and re-positioned , thinking that maybe the  was up against canal wall  Called patient and spoke to his wife to explain that aid seems to be working  I suggested that it is possible Benoit's ear has cerumen or fluid  Recommendations:   Patient's wife will  at  and contact us with any concerns      Carole Mercado   Clinical Audiologist

## 2023-01-10 ENCOUNTER — OFFICE VISIT (OUTPATIENT)
Dept: INTERNAL MEDICINE CLINIC | Age: 88
End: 2023-01-10

## 2023-01-10 VITALS
DIASTOLIC BLOOD PRESSURE: 68 MMHG | SYSTOLIC BLOOD PRESSURE: 122 MMHG | HEART RATE: 60 BPM | BODY MASS INDEX: 28.65 KG/M2 | OXYGEN SATURATION: 97 % | HEIGHT: 69 IN | TEMPERATURE: 97.8 F

## 2023-01-10 DIAGNOSIS — N39.498 OTHER URINARY INCONTINENCE: ICD-10-CM

## 2023-01-10 DIAGNOSIS — I48.0 PAROXYSMAL ATRIAL FIBRILLATION (HCC): ICD-10-CM

## 2023-01-10 DIAGNOSIS — N40.1 BENIGN PROSTATIC HYPERPLASIA WITH URINARY FREQUENCY: ICD-10-CM

## 2023-01-10 DIAGNOSIS — F29 PSYCHOSIS, UNSPECIFIED PSYCHOSIS TYPE (HCC): ICD-10-CM

## 2023-01-10 DIAGNOSIS — Z13.6 SCREENING FOR CARDIOVASCULAR CONDITION: ICD-10-CM

## 2023-01-10 DIAGNOSIS — D69.6 PLATELETS DECREASED (HCC): ICD-10-CM

## 2023-01-10 DIAGNOSIS — E11.9 CONTROLLED TYPE 2 DIABETES MELLITUS WITHOUT COMPLICATION, WITHOUT LONG-TERM CURRENT USE OF INSULIN (HCC): Primary | ICD-10-CM

## 2023-01-10 DIAGNOSIS — R35.0 BENIGN PROSTATIC HYPERPLASIA WITH URINARY FREQUENCY: ICD-10-CM

## 2023-01-10 DIAGNOSIS — N18.31 STAGE 3A CHRONIC KIDNEY DISEASE (HCC): ICD-10-CM

## 2023-01-10 DIAGNOSIS — G30.1 MODERATE LATE ONSET ALZHEIMER'S DEMENTIA WITHOUT BEHAVIORAL DISTURBANCE, PSYCHOTIC DISTURBANCE, MOOD DISTURBANCE, OR ANXIETY (HCC): ICD-10-CM

## 2023-01-10 DIAGNOSIS — Z00.00 MEDICARE ANNUAL WELLNESS VISIT, SUBSEQUENT: ICD-10-CM

## 2023-01-10 DIAGNOSIS — F02.B0 MODERATE LATE ONSET ALZHEIMER'S DEMENTIA WITHOUT BEHAVIORAL DISTURBANCE, PSYCHOTIC DISTURBANCE, MOOD DISTURBANCE, OR ANXIETY (HCC): ICD-10-CM

## 2023-01-10 NOTE — PROGRESS NOTES
BMI Counseling: Body mass index is 28 65 kg/m²  The BMI {VB BMI Counselin} Answers for HPI/ROS submitted by the patient on 2023  How would you rate your overall health?: fair  Compared to last year, how is your physical health?: slightly worse  In general, how satisfied are you with your life?: satisfied  Compared to last year, how is your eyesight?: same  Compared to last year, how is your hearing?: slightly worse  Compared to last year, how is your emotional/mental health?: slightly worse  How often is anger a problem for you?: sometimes  How often do you feel unusually tired/fatigued?: often  In the past 7 days, how much pain have you experienced?: some  If you answered "some" or "a lot", please rate the severity of your pain on a scale of 1 to 10 (1 being the least severe pain and 10 being the most intense pain)  : 2/10  In the past 6 months, have you lost or gained 10 pounds without trying?: No  One or more falls in the last year: Yes  Do you have trouble with the stairs inside or outside your home?: No  Does your home have working smoke alarms?: Yes  Does your home have a carbon monoxide monitor?: No  Which safety hazards (if any) have you experienced in your home? Please select all that apply : loose rugs on the floor  How would you describe your current diet?  Please select all that apply : Regular, Diabetic  In addition to prescription medications, are you taking any over-the-counter supplements?: No  Can you manage your medications?: No  Are you currently taking any opioid medications?: No  Can you walk and transfer into and out of your bed and chair?: Yes  Can you dress and groom yourself?: Yes  Can you bathe or shower yourself?: Yes  Can you feed yourself?: Yes  Can you do your laundry/ housekeeping?: No  Can you manage your money, pay your bills, and track your expenses?: No  Can you make your own meals?: No  Can you do your own shopping?: No  Within the last 12 months, have you had any hospitalizations or Emergency Department visits?: Yes  If yes, how many times have you been hospitalized within the past year?: 1-2  Do you have a living will?: Yes  Do you have a Durable POA ([address]) for healthcare decisions?: Yes  Do you have an Advanced Directive for end of life decisions?: Yes  How often have you used an illegal drug (including marijuana) or a prescription medication for non-medical reasons in the past year?: never  What is the typical number of drinks you consume in a day?: 0  What is the typical number of drinks you consume in a week?: 0  How often did you have a drink containing alcohol in the past year?: never  How many drinks did you have on a typical day  when you were drinking in the past year?: 0  How often did you have 6 or more drinks on one occasion in the past year?: never    Answers for HPI/ROS submitted by the patient on 1/9/2023  How would you rate your overall health?: fair  Compared to last year, how is your physical health?: slightly worse  In general, how satisfied are you with your life?: satisfied  Compared to last year, how is your eyesight?: same  Compared to last year, how is your hearing?: slightly worse  Compared to last year, how is your emotional/mental health?: slightly worse  How often is anger a problem for you?: sometimes  How often do you feel unusually tired/fatigued?: often  In the past 7 days, how much pain have you experienced?: some  If you answered "some" or "a lot", please rate the severity of your pain on a scale of 1 to 10 (1 being the least severe pain and 10 being the most intense pain)  : 2/10  In the past 6 months, have you lost or gained 10 pounds without trying?: No  One or more falls in the last year: Yes  Do you have trouble with the stairs inside or outside your home?: No  Does your home have working smoke alarms?: Yes  Does your home have a carbon monoxide monitor?: No  Which safety hazards (if any) have you experienced in your home? Please select all that apply : loose rugs on the floor  How would you describe your current diet?  Please select all that apply : Regular, Diabetic  In addition to prescription medications, are you taking any over-the-counter supplements?: No  Can you manage your medications?: No  Are you currently taking any opioid medications?: No  Can you walk and transfer into and out of your bed and chair?: Yes  Can you dress and groom yourself?: Yes  Can you bathe or shower yourself?: Yes  Can you feed yourself?: Yes  Can you do your laundry/ housekeeping?: No  Can you manage your money, pay your bills, and track your expenses?: No  Can you make your own meals?: No  Can you do your own shopping?: No  Within the last 12 months, have you had any hospitalizations or Emergency Department visits?: Yes  If yes, how many times have you been hospitalized within the past year?: 1-2  Do you have a living will?: Yes  Do you have a Durable POA (Power of ) for healthcare decisions?: Yes  Do you have an Advanced Directive for end of life decisions?: Yes  How often have you used an illegal drug (including marijuana) or a prescription medication for non-medical reasons in the past year?: never  What is the typical number of drinks you consume in a day?: 0  What is the typical number of drinks you consume in a week?: 0  How often did you have a drink containing alcohol in the past year?: never  How many drinks did you have on a typical day  when you were drinking in the past year?: 0  How often did you have 6 or more drinks on one occasion in the past year?: never

## 2023-01-10 NOTE — ASSESSMENT & PLAN NOTE
Reportedly he had some A  fib in the ER with COVID but at present is not in A  fib and no one is anxious to start him on a coagulation with his unsteady gait

## 2023-01-10 NOTE — PROGRESS NOTES
Assessment and Plan:     Problem List Items Addressed This Visit        Endocrine    Controlled type 2 diabetes mellitus without complication, without long-term current use of insulin ([facility] ) - Primary       Lab Results   Component Value Date    HGBA1C 6 2 09/27/2022   Patient really does not check his blood sugars at home and his blood sugar in the ER when he had COVID was elevated we will recheck an A1c         Relevant Medications    metFORMIN (GLUCOPHAGE) 500 mg tablet    Other Relevant Orders    Comprehensive metabolic panel    Hemoglobin A1C       Cardiovascular and Mediastinum    Paroxysmal atrial fibrillation ([facility] )     Reportedly he had some A  fib in the ER with COVID but at present is not in A  fib and no one is anxious to start him on a coagulation with his unsteady gait            Nervous and Auditory    Alzheimer's dementia ([facility] )     Continues to have periods of lucidity and overall has extremely poor memory and does not talk much            Genitourinary    Stage 3a chronic kidney disease ([place] [id number] )     Lab Results   Component Value Date    EGFR 39 12/27/2022    EGFR 47 12/05/2018    CREATININE 1 55 (H) 12/27/2022    CREATININE 1 38 (H) 03/29/2022    CREATININE 1 36 (H) 09/28/2021   He has relatively stable kidney function            Other    BMI 29 0-29 9,adult    Benign prostatic hyperplasia with urinary frequency    Psychosis, unspecified psychosis type ([place] [id number] )    Platelets decreased ([place] [address] )   Other Visit Diagnoses     Other urinary incontinence        Relevant Medications    Mirabegron ER 50 MG TB24    Medicare annual wellness visit, subsequent        BMI 28 0-28 9,adult        Screening for cardiovascular condition        Relevant Orders    POCT ECG        BMI Counseling: Body mass index is 28 65 kg/m²   The BMI is above normal  Nutrition recommendations include decreasing portion sizes, decreasing fast food intake, consuming healthier snacks, limiting drinks that contain sugar and moderation in carbohydrate intake  Exercise recommendations include exercising 3-5 times per week  Rationale for BMI follow-up plan is due to patient being overweight or obese  Depression Screening and Follow-up Plan: Patient was screened for depression during today's encounter  They screened negative with a PHQ-2 score of 2  Falls Plan of Care: balance, strength, and gait training instructions were provided  Recommended assistive device to help with gait and balance  Cognitive screening performed  Preventive health issues were discussed with patient, and age appropriate screening tests were ordered as noted in patient's After Visit Summary  Personalized health advice and appropriate referrals for health education or preventive services given if needed, as noted in patient's After Visit Summary  History of Present Illness:     Patient presents for a Medicare Wellness Visit    Patient did have COVID recently and was even seen in the emergency room for same and given medications however he is done quite well with no long-term consequences and no residual complaints  Otherwise he continues to show slow decline but otherwise is relatively stable     Patient Care Team:  Rona Cade MD as PCP - General  Rona Cade MD as PCP - [facility]CP-[facility]  MD Wesly Cornelius MD Roxene Killian, DO as Endoscopist     Review of Systems:     Review of Systems   Constitutional: Positive for fatigue  Negative for chills, fever and unexpected weight change  HENT: Negative for congestion, ear pain, hearing loss, postnasal drip, sinus pressure, sore throat, trouble swallowing and voice change  Eyes: Negative for visual disturbance  Respiratory: Negative for cough, chest tightness, shortness of breath and wheezing  Cardiovascular: Negative for chest pain, palpitations and leg swelling     Gastrointestinal: Negative for abdominal distention, abdominal pain, anal bleeding, blood in stool, constipation, diarrhea and nausea  Endocrine: Negative for cold intolerance, polydipsia, polyphagia and polyuria  Genitourinary: Positive for urgency  Negative for dysuria, flank pain, frequency and hematuria  Musculoskeletal: Negative for arthralgias, back pain, gait problem, joint swelling, myalgias and neck pain  Skin: Negative for rash  Allergic/Immunologic: Negative for immunocompromised state  Neurological: Negative for dizziness, syncope, facial asymmetry, weakness, light-headedness, numbness and headaches  Hematological: Negative for adenopathy  Psychiatric/Behavioral: Positive for decreased concentration  Negative for agitation, behavioral problems, confusion, sleep disturbance and suicidal ideas          Problem List:     Patient Active Problem List   Diagnosis   • Chronic low back pain   • Enlarged prostate with lower urinary tract symptoms (LUTS)   • Sensorineural hearing loss (SNHL) of both ears   • Old myocardial infarction   • Seasonal allergies   • Spinal stenosis   • Left medial knee pain   • Chronic fatigue   • BMI 29 0-29 9,adult   • Controlled type 2 diabetes mellitus without complication, without long-term current use of insulin ([facility])   • OAB (overactive bladder)   • Benign prostatic hyperplasia with urinary frequency   • Stage 3a chronic kidney disease (HCC)   • Neurologic gait dysfunction   • Alzheimer's dementia ([facility] )   • Psychosis, unspecified psychosis type ([facility] )   • Paroxysmal atrial fibrillation ([facility] )   • Platelets decreased ([facility] )      Past Medical and Surgical History:     Past Medical History:   Diagnosis Date   • Balanitis     last assessed 12/19/14   • BPH (benign prostatic hyperplasia)    • Diabetes mellitus ([facility] )     blood sugar 167 this am at 0630   • GERD (gastroesophageal reflux disease)    • Heme + stool    • Hypertension    • Lumbar radiculopathy    • Myocardial infarction ([facility] )     35 years ago   • Phimosis     last assessed 16   • Sciatica     right side, last assessed 16     Past Surgical History:   Procedure Laterality Date   • BACK SURGERY      laminectomy Lumbar   • CATARACT EXTRACTION, BILATERAL     • CIRCUMCISION     • HERNIA REPAIR Right    • GA COLONOSCOPY FLX DX W/COLLJ SPEC WHEN PFRMD N/A 2017    Procedure: EGD AND COLONOSCOPY;  Surgeon: Alana Nuñez DO;  Location: [facility]; Service: General      Family History:     No family history on file  Social History:     Social History     Socioeconomic History   • Marital status: /Civil Union     Spouse name: None   • Number of children: None   • Years of education: None   • Highest education level: None   Occupational History   • None   Tobacco Use   • Smoking status: Former     Packs/day:      Years: 30 00     Pack years: 30 00     Types: Cigarettes     Quit date: 1998     Years since quittin 0   • Smokeless tobacco: Never   • Tobacco comments:     quit 25 yrs ago   Substance and Sexual Activity   • Alcohol use: No     Alcohol/week: 0 0 standard drinks   • Drug use: No   • Sexual activity: None   Other Topics Concern   • None   Social History Narrative   • None     Social Determinants of Health     Financial Resource Strain: Low Risk    • Difficulty of Paying Living Expenses: Not hard at all   Food Insecurity: Not on file   Transportation Needs: No Transportation Needs   • Lack of Transportation (Medical): No   • Lack of Transportation (Non-Medical):  No   Physical Activity: Not on file   Stress: Not on file   Social Connections: Not on file   Intimate Partner Violence: Not on file   Housing Stability: Not on file      Medications and Allergies:     Current Outpatient Medications   Medication Sig Dispense Refill   • lisinopril (ZESTRIL) 10 mg tablet Take 1 tablet (10 mg total) by mouth daily 90 tablet 3   • metFORMIN (GLUCOPHAGE) 500 mg tablet Take 1 tablet (500 mg total) by mouth daily with breakfast 90 tablet 3   • Mirabegron ER 50 MG TB24 Take 1 tablet (50 mg total) by mouth daily 90 tablet 0   • finasteride (PROSCAR) 5 mg tablet TAKE 1 TABLET (5 MG TOTAL) BY MOUTH DAILY 90 tablet 2   • glucose blood (Easy Touch Test) test strip 2 each by Other route daily CONTOUR TEST STRIPS     DX CODE  E11 9    TEST BLOOD SUGAR TWICE DAILY  100 each 2   • oxybutynin (DITROPAN-XL) 5 mg 24 hr tablet TAKE 1 TABLET (5 MG TOTAL) BY MOUTH DAILY 90 tablet 2   • QUEtiapine (SEROquel) 25 mg tablet Take 1 tablet (25 mg total) by mouth daily at bedtime 30 tablet 3     No current facility-administered medications for this visit  No Known Allergies   Immunizations:     Immunization History   Administered Date(s) Administered   • COVID-19 PFIZER VACCINE 0 3 ML IM 02/13/2021, 03/06/2021, 11/05/2021, 07/13/2022   • INFLUENZA 09/27/2022   • Influenza Split High Dose Preservative Free IM 10/09/2013, 10/07/2014, 10/16/2015, 10/21/2016, 10/23/2017   • Influenza, high dose seasonal 0 7 mL 11/08/2018, 09/25/2019, 09/28/2020, 09/27/2022   • Pneumococcal Conjugate 13-Valent 10/21/2016   • Pneumococcal Polysaccharide PPV23 11/16/2004   • Tdap 12/05/2018      Health Maintenance: There are no preventive care reminders to display for this patient  Topic Date Due   • Hepatitis B Vaccine (1 of 3 - 3-dose series) Never done   • COVID-19 Vaccine (5 - Booster for Pfizer series) 09/07/2022      Medicare Screening Tests and Risk Assessments:     Tio Peres is here for his Subsequent Wellness visit  Health Risk Assessment:   Patient rates overall health as fair  Patient feels that their physical health rating is slightly worse  Patient is satisfied with their life  Eyesight was rated as same  Hearing was rated as slightly worse  Patient feels that their emotional and mental health rating is slightly worse  Patients states they are sometimes angry  Patient states they are often unusually tired/fatigued  Pain experienced in the last 7 days has been some   Patient's pain rating has been 2/10  Patient states that he has experienced no weight loss or gain in last 6 months  Depression Screening:   PHQ-2 Score: 2      Fall Risk Screening: In the past year, patient has experienced: history of falling in past year    Number of falls: 2 or more  Injured during fall?: No      Home Safety:  Patient does not have trouble with stairs inside or outside of their home  Patient has working smoke alarms and has no working carbon monoxide detector  Home safety hazards include: loose rugs on the floor  Nutrition:   Current diet is Regular and Diabetic  Medications:   Patient is not currently taking any over-the-counter supplements  Patient is not able to manage medications  Activities of Daily Living (ADLs)/Instrumental Activities of Daily Living (IADLs):   Walk and transfer into and out of bed and chair?: Yes  Dress and groom yourself?: Yes    Bathe or shower yourself?: Yes    Feed yourself? Yes  Do your laundry/housekeeping?: No  Manage your money, pay your bills and track your expenses?: No  Make your own meals?: No    Do your own shopping?: No    Previous Hospitalizations:   Any hospitalizations or ED visits within the last 12 months?: Yes    How many hospitalizations have you had in the last year?: 1-2    Advance Care Planning:   Living will: Yes    Durable POA for healthcare:  Yes    Advanced directive: Yes      Cognitive Screening:   Provider or family/friend/caregiver concerned regarding cognition?: Yes    PREVENTIVE SCREENINGS      Cardiovascular Screening:    General: Screening Current      Diabetes Screening:     General: Screening Not Indicated and History Diabetes      Colorectal Cancer Screening:     General: Screening Not Indicated      Prostate Cancer Screening:    General: Screening Not Indicated      Osteoporosis Screening:    General: Patient Declines      Abdominal Aortic Aneurysm (AAA) Screening:    Risk factors include: tobacco use        Lung Cancer Screening:     General: Screening Not Indicated      Hepatitis C Screening:    General: Patient Declines    Screening, Brief Intervention, and Referral to Treatment (SBIRT)    Screening  Typical number of drinks in a day: 0  Typical number of drinks in a week: 0  Interpretation: Low risk drinking behavior  AUDIT-C Screenin) How often did you have a drink containing alcohol in the past year? never  2) How many drinks did you have on a typical day when you were drinking in the past year? 0  3) How often did you have 6 or more drinks on one occasion in the past year? never    AUDIT-C Score: 0  Interpretation: Score 0-3 (male): Negative screen for alcohol misuse    Single Item Drug Screening:  How often have you used an illegal drug (including marijuana) or a prescription medication for non-medical reasons in the past year? never    Single Item Drug Screen Score: 0  Interpretation: Negative screen for possible drug use disorder    Other Counseling Topics:   Regular weightbearing exercise and calcium and vitamin D intake  No results found  Physical Exam:     /68 (BP Location: Left arm, Patient Position: Sitting, Cuff Size: Standard)   Pulse 60   Temp 97 8 °F (36 6 °C)   Ht 5' 9" (1 753 m)   SpO2 97%   BMI 28 65 kg/m²     Physical Exam  Vitals and nursing note reviewed  Constitutional:       General: He is not in acute distress  Appearance: He is well-developed  HENT:      Head: Normocephalic and atraumatic  Eyes:      Conjunctiva/sclera: Conjunctivae normal    Cardiovascular:      Rate and Rhythm: Normal rate and regular rhythm  Heart sounds: No murmur heard  Pulmonary:      Effort: Pulmonary effort is normal  No respiratory distress  Breath sounds: Normal breath sounds  Abdominal:      Palpations: Abdomen is soft  Tenderness: There is no abdominal tenderness  Musculoskeletal:         General: No swelling  Cervical back: Neck supple  Right lower leg: No edema        Left lower leg: Edema present  Skin:     General: Skin is warm and dry  Capillary Refill: Capillary refill takes less than 2 seconds  Neurological:      Mental Status: He is alert  Gait: Gait abnormal (Mildly unsteady and slow)     Psychiatric:         Mood and Affect: Mood normal       Comments: poor memory          Lexus Levi MD

## 2023-01-10 NOTE — PATIENT INSTRUCTIONS
Weight Management   AMBULATORY CARE:   Why it is important to manage your weight:  Being overweight increases your risk of health conditions such as heart disease, high blood pressure, type 2 diabetes, and certain types of cancer  It can also increase your risk for osteoarthritis, sleep apnea, and other respiratory problems  Aim for a slow, steady weight loss  Even a small amount of weight loss can lower your risk of health problems  Risks of being overweight:  Extra weight can cause many health problems, including the following:  · Diabetes (high blood sugar level)    · High blood pressure or high cholesterol    · Heart disease    · Stroke    · Gallbladder or liver disease    · Cancer of the colon, breast, prostate, liver, or kidney    · Sleep apnea    · Arthritis or gout    Screening  is done to check for health conditions before you have signs or symptoms  If you are 28to 79years old, your blood sugar level may be checked every 3 years for signs of prediabetes or diabetes  Your healthcare provider will check your blood pressure at each visit  High blood pressure can lead to a stroke or other problems  Your provider may check for signs of heart disease, cancer, or other health problems  How to lose weight safely:  A safe and healthy way to lose weight is to eat fewer calories and get regular exercise  · You can lose up about 1 pound a week by decreasing the number of calories you eat by 500 calories each day  You can decrease calories by eating smaller portion sizes or by cutting out high-calorie foods  Read labels to find out how many calories are in the foods you eat  · You can also burn calories with exercise such as walking, swimming, or biking  You will be more likely to keep weight off if you make these changes part of your lifestyle  Exercise at least 30 minutes per day on most days of the week   You can also fit in more physical activity by taking the stairs instead of the elevator or parking farther away from stores  Ask your healthcare provider about the best exercise plan for you  Healthy meal plan for weight management:  A healthy meal plan includes a variety of foods, contains fewer calories, and helps you stay healthy  A healthy meal plan includes the following:     · Eat whole-grain foods more often  A healthy meal plan should contain fiber  Fiber is the part of grains, fruits, and vegetables that is not broken down by your body  Whole-grain foods are healthy and provide extra fiber in your diet  Some examples of whole-grain foods are whole-wheat breads and pastas, oatmeal, brown rice, and bulgur  · Eat a variety of vegetables every day  Include dark, leafy greens such as spinach, kale, moy greens, and mustard greens  Eat yellow and orange vegetables such as carrots, sweet potatoes, and winter squash  · Eat a variety of fruits every day  Choose fresh or canned fruit (canned in its own juice or light syrup) instead of juice  Fruit juice has very little or no fiber  · Eat low-fat dairy foods  Drink fat-free (skim) milk or 1% milk  Eat fat-free yogurt and low-fat cottage cheese  Try low-fat cheeses such as mozzarella and other reduced-fat cheeses  · Choose meat and other protein foods that are low in fat  Choose beans or other legumes such as split peas or lentils  Choose fish, skinless poultry (chicken or turkey), or lean cuts of red meat (beef or pork)  Before you cook meat or poultry, cut off any visible fat  · Use less fat and oil  Try baking foods instead of frying them  Add less fat, such as margarine, sour cream, regular salad dressing and mayonnaise to foods  Eat fewer high-fat foods  Some examples of high-fat foods include french fries, doughnuts, ice cream, and cakes  · Eat fewer sweets  Limit foods and drinks that are high in sugar  This includes candy, cookies, regular soda, and sweetened drinks  Ways to decrease calories:   · Eat smaller portions  ? Use a small plate with smaller servings  ? Do not eat second helpings  ? When you eat at a restaurant, ask for a box and place half of your meal in the box before you eat  ? Share an entrée with someone else  · Replace high-calorie snacks with healthy, low-calorie snacks  ? Choose fresh fruit, vegetables, fat-free rice cakes, or air-popped popcorn instead of potato chips, nuts, or chocolate  ? Choose water or calorie-free drinks instead of soda or sweetened drinks  · Do not shop for groceries when you are hungry  You may be more likely to make unhealthy food choices  Take a grocery list of healthy foods and shop after you have eaten  · Eat regular meals  Do not skip meals  Skipping meals can lead to overeating later in the day  This can make it harder for you to lose weight  Eat a healthy snack in place of a meal if you do not have time to eat a regular meal  Talk with a dietitian to help you create a meal plan and schedule that is right for you  Other things to consider as you try to lose weight:   · Be aware of situations that may give you the urge to overeat, such as eating while watching television  Find ways to avoid these situations  For example, read a book, go for a walk, or do crafts  · Meet with a weight loss support group or friends who are also trying to lose weight  This may help you stay motivated to continue working on your weight loss goals  © Copyright [employer] 2022 Information is for End User's use only and may not be sold, redistributed or otherwise used for commercial purposes  All illustrations and images included in CareNotes® are the copyrighted property of A D A M , Inc  or [facility] Marco A Lynn   The above information is an  only  It is not intended as medical advice for individual conditions or treatments   Talk to your doctor, nurse or pharmacist before following any medical regimen to see if it is safe and effective for you     Heart Healthy Diet   AMBULATORY CARE:   A heart healthy diet  is an eating plan low in unhealthy fats and sodium (salt)  The plan is high in healthy fats and fiber  A heart healthy diet helps improve your cholesterol levels and lowers your risk for heart disease and stroke  A dietitian will teach you how to read and understand food labels  Heart healthy diet guidelines to follow:   · Choose foods that contain healthy fats  ? Unsaturated fats  include monounsaturated and polyunsaturated fats  Unsaturated fat is found in foods such as soybean, canola, olive, corn, and safflower oils  It is also found in soft tub margarine that is made with liquid vegetable oil  ? Omega-3 fat  is found in certain fish, such as salmon, tuna, and trout, and in walnuts and flaxseed  Eat fish high in omega-3 fats at least 2 times a week  · Get 20 to 30 grams of fiber each day  Fruits, vegetables, whole-grain foods, and legumes (cooked beans) are good sources of fiber  · Limit or do not have unhealthy fats  ? Cholesterol  is found in animal foods, such as eggs and lobster, and in dairy products made from whole milk  Limit cholesterol to less than 200 mg each day  ? Saturated fat  is found in meats, such as bynum and hamburger  It is also found in chicken or turkey skin, whole milk, and butter  Limit saturated fat to less than 7% of your total daily calories  ? Trans fat  is found in packaged foods, such as potato chips and cookies  It is also in hard margarine, some fried foods, and shortening  Do not eat foods that contain trans fats  · Limit sodium as directed  You may be told to limit sodium to 2,000 to 2,300 mg each day  Choose low-sodium or no-salt-added foods  Add little or no salt to food you prepare  Use herbs and spices in place of salt         Include the following in your heart healthy plan:  Ask your dietitian or healthcare provider how many servings to have from each of the following food groups:  · Grains:      ? Whole-wheat breads, cereals, and pastas, and brown rice    ? Low-fat, low-sodium crackers and chips    · Vegetables:      ? Broccoli, green beans, green peas, and spinach    ? Collards, kale, and lima beans    ? Carrots, sweet potatoes, tomatoes, and peppers    ? Canned vegetables with no salt added    · Fruits:      ? Bananas, peaches, pears, and pineapple    ? Grapes, raisins, and dates    ? Oranges, tangerines, grapefruit, orange juice, and grapefruit juice    ? Apricots, mangoes, melons, and papaya    ? Raspberries and strawberries    ? Canned fruit with no added sugar    · Low-fat dairy:      ? Nonfat (skim) milk, 1% milk, and low-fat almond, cashew, or soy milks fortified with calcium    ? Low-fat cheese, regular or frozen yogurt, and cottage cheese    · Meats and proteins:      ? Lean cuts of beef and pork (loin, leg, round), skinless chicken and turkey    ? Legumes, soy products, egg whites, or nuts    Limit or do not include the following in your heart healthy plan:   · Unhealthy fats and oils:      ? Whole or 2% milk, cream cheese, sour cream, or cheese    ? High-fat cuts of beef (T-bone steaks, ribs), chicken or turkey with skin, and organ meats such as liver    ? Butter, stick margarine, shortening, and cooking oils such as coconut or palm oil    · Foods and liquids high in sodium:      ? Packaged foods, such as frozen dinners, cookies, macaroni and cheese, and cereals with more than 300 mg of sodium per serving    ? Vegetables with added sodium, such as instant potatoes, vegetables with added sauces, or regular canned vegetables    ? Cured or smoked meats, such as hot dogs, bynum, and sausage    ? High-sodium ketchup, barbecue sauce, salad dressing, pickles, olives, soy sauce, or miso    · Foods and liquids high in sugar:      ? Candy, cake, cookies, pies, or doughnuts    ? Soft drinks (soda), sports drinks, or sweetened tea    ?  Canned or dry mixes for cakes, soups, sauces, or gravies    Other healthy heart guidelines:   · Do not smoke  Nicotine and other chemicals in cigarettes and cigars can cause lung and heart damage  Ask your healthcare provider for information if you currently smoke and need help to quit  E-cigarettes or smokeless tobacco still contain nicotine  Talk to your healthcare provider before you use these products  · Limit or do not drink alcohol as directed  Alcohol can damage your heart and raise your blood pressure  Your healthcare provider may give you specific daily and weekly limits  The general recommended limit is 1 drink a day for women 21 or older and for men 72 or older  Do not have more than 3 drinks in a day or 7 in a week  The recommended limit is 2 drinks a day for men 24to 59years of age  Do not have more than 4 drinks in a day or 14 in a week  A drink of alcohol is 12 ounces of beer, 5 ounces of wine, or 1½ ounces of liquor  · Exercise regularly  Exercise can help you maintain a healthy weight and improve your blood pressure and cholesterol levels  Regular exercise can also decrease your risk for heart problems  Ask your healthcare provider about the best exercise plan for you  Do not start an exercise program without asking your healthcare provider  Follow up with your doctor or cardiologist as directed:  Write down your questions so you remember to ask them during your visits  © Copyright [employer] 2022 Information is for End User's use only and may not be sold, redistributed or otherwise used for commercial purposes  All illustrations and images included in CareNotes® are the copyrighted property of A D A M , Inc  or [facility] Marco A Lynn   The above information is an  only  It is not intended as medical advice for individual conditions or treatments  Talk to your doctor, nurse or pharmacist before following any medical regimen to see if it is safe and effective for you      Medicare Preventive Visit Patient Instructions  Thank you for completing your Welcome to Medicare Visit or Medicare Annual Wellness Visit today  Your next wellness visit will be due in one year (1/11/2024)  The screening/preventive services that you may require over the next 5-10 years are detailed below  Some tests may not apply to you based off risk factors and/or age  Screening tests ordered at today's visit but not completed yet may show as past due  Also, please note that scanned in results may not display below  Preventive Screenings:  Service Recommendations Previous Testing/Comments   Colorectal Cancer Screening  · Colonoscopy    · Fecal Occult Blood Test (FOBT)/Fecal Immunochemical Test (FIT)  · Fecal DNA/Cologuard Test  · Flexible Sigmoidoscopy Age: 39-70 years old   Colonoscopy: every 10 years (May be performed more frequently if at higher risk)  OR  FOBT/FIT: every 1 year  OR  Cologuard: every 3 years  OR  Sigmoidoscopy: every 5 years  Screening may be recommended earlier than age 39 if at higher risk for colorectal cancer  Also, an individualized decision between you and your healthcare provider will decide whether screening between the ages of 74-80 would be appropriate   Colonoscopy: Not on file  FOBT/FIT: Not on file  Cologuard: Not on file  Sigmoidoscopy: Not on file          Prostate Cancer Screening Individualized decision between patient and health care provider in men between ages of 53-78   Medicare will cover every 12 months beginning on the day after your 50th birthday PSA: No results in last 5 years           Hepatitis C Screening Once for adults born between [facility]  More frequently in patients at high risk for Hepatitis C Hep C Antibody: Not on file        Diabetes Screening 1-2 times per year if you're at risk for diabetes or have pre-diabetes Fasting glucose: No results in last 5 years (No results in last 5 years)  A1C: 6 2 (9/27/2022)      Cholesterol Screening Once every 5 years if you don't have a lipid disorder  May order more often based on risk factors  Lipid panel: 09/28/2021         Other Preventive Screenings Covered by Medicare:  1  Abdominal Aortic Aneurysm (AAA) Screening: covered once if your at risk  You're considered to be at risk if you have a family history of AAA or a male between the age of 73-68 who smoking at least 100 cigarettes in your lifetime  2  Lung Cancer Screening: covers low dose CT scan once per year if you meet all of the following conditions: (1) Age 50-69; (2) No signs or symptoms of lung cancer; (3) Current smoker or have quit smoking within the last 15 years; (4) You have a tobacco smoking history of at least 20 pack years (packs per day x number of years you smoked); (5) You get a written order from a healthcare provider  3  Glaucoma Screening: covered annually if you're considered high risk: (1) You have diabetes OR (2) Family history of glaucoma OR (3)  aged 48 and older OR (3)  American aged 72 and older  3  Osteoporosis Screening: covered every 2 years if you meet one of the following conditions: (1) Have a vertebral abnormality; (2) On glucocorticoid therapy for more than 3 months; (3) Have primary hyperparathyroidism; (4) On osteoporosis medications and need to assess response to drug therapy  5  HIV Screening: covered annually if you're between the age of 12-76  Also covered annually if you are younger than 13 and older than 72 with risk factors for HIV infection  For pregnant patients, it is covered up to 3 times per pregnancy      Immunizations:  Immunization Recommendations   Influenza Vaccine Annual influenza vaccination during flu season is recommended for all persons aged >= 6 months who do not have contraindications   Pneumococcal Vaccine   * Pneumococcal conjugate vaccine = PCV13 (Prevnar 13), PCV15 (Vaxneuvance), PCV20 (Prevnar 20)  * Pneumococcal polysaccharide vaccine = PPSV23 (Pneumovax) Adults 25-60 years old: 1-3 doses may be recommended based on certain risk factors  Adults 72 years old: 1-2 doses may be recommended based off what pneumonia vaccine you previously received   Hepatitis B Vaccine 3 dose series if at intermediate or high risk (ex: diabetes, end stage renal disease, liver disease)   Tetanus (Td) Vaccine - COST NOT COVERED BY MEDICARE PART B Following completion of primary series, a booster dose should be given every 10 years to maintain immunity against tetanus  Td may also be given as tetanus wound prophylaxis  Tdap Vaccine - COST NOT COVERED BY MEDICARE PART B Recommended at least once for all adults  For pregnant patients, recommended with each pregnancy  Shingles Vaccine (Shingrix) - COST NOT COVERED BY MEDICARE PART B  2 shot series recommended in those aged 48 and above     Health Maintenance Due:  There are no preventive care reminders to display for this patient  Immunizations Due:      Topic Date Due   • Hepatitis B Vaccine (1 of 3 - 3-dose series) Never done   • COVID-19 Vaccine (5 - Booster for Pfizer series) 09/07/2022     Advance Directives   What are advance directives? Advance directives are legal documents that state your wishes and plans for medical care  These plans are made ahead of time in case you lose your ability to make decisions for yourself  Advance directives can apply to any medical decision, such as the treatments you want, and if you want to donate organs  What are the types of advance directives? There are many types of advance directives, and each state has rules about how to use them  You may choose a combination of any of the following:  · Living will: This is a written record of the treatment you want  You can also choose which treatments you do not want, which to limit, and which to stop at a certain time  This includes surgery, medicine, IV fluid, and tube feedings  · Durable power of  for healthcare [facility]):   This is a written record that states who you want to make healthcare choices for you when you are unable to make them for yourself  This person, called a proxy, is usually a family member or a friend  You may choose more than 1 proxy  · Do not resuscitate (DNR) order:  A DNR order is used in case your heart stops beating or you stop breathing  It is a request not to have certain forms of treatment, such as CPR  A DNR order may be included in other types of advance directives  · Medical directive: This covers the care that you want if you are in a coma, near death, or unable to make decisions for yourself  You can list the treatments you want for each condition  Treatment may include pain medicine, surgery, blood transfusions, dialysis, IV or tube feedings, and a ventilator (breathing machine)  · Values history: This document has questions about your views, beliefs, and how you feel and think about life  This information can help others choose the care that you would choose  Why are advance directives important? An advance directive helps you control your care  Although spoken wishes may be used, it is better to have your wishes written down  Spoken wishes can be misunderstood, or not followed  Treatments may be given even if you do not want them  An advance directive may make it easier for your family to make difficult choices about your care  Weight Management   Why it is important to manage your weight:  Being overweight increases your risk of health conditions such as heart disease, high blood pressure, type 2 diabetes, and certain types of cancer  It can also increase your risk for osteoarthritis, sleep apnea, and other respiratory problems  Aim for a slow, steady weight loss  Even a small amount of weight loss can lower your risk of health problems  How to lose weight safely:  A safe and healthy way to lose weight is to eat fewer calories and get regular exercise   You can lose up about 1 pound a week by decreasing the number of calories you eat by 500 calories each day  Healthy meal plan for weight management:  A healthy meal plan includes a variety of foods, contains fewer calories, and helps you stay healthy  A healthy meal plan includes the following:  · Eat whole-grain foods more often  A healthy meal plan should contain fiber  Fiber is the part of grains, fruits, and vegetables that is not broken down by your body  Whole-grain foods are healthy and provide extra fiber in your diet  Some examples of whole-grain foods are whole-wheat breads and pastas, oatmeal, brown rice, and bulgur  · Eat a variety of vegetables every day  Include dark, leafy greens such as spinach, kale, moy greens, and mustard greens  Eat yellow and orange vegetables such as carrots, sweet potatoes, and winter squash  · Eat a variety of fruits every day  Choose fresh or canned fruit (canned in its own juice or light syrup) instead of juice  Fruit juice has very little or no fiber  · Eat low-fat dairy foods  Drink fat-free (skim) milk or 1% milk  Eat fat-free yogurt and low-fat cottage cheese  Try low-fat cheeses such as mozzarella and other reduced-fat cheeses  · Choose meat and other protein foods that are low in fat  Choose beans or other legumes such as split peas or lentils  Choose fish, skinless poultry (chicken or turkey), or lean cuts of red meat (beef or pork)  Before you cook meat or poultry, cut off any visible fat  · Use less fat and oil  Try baking foods instead of frying them  Add less fat, such as margarine, sour cream, regular salad dressing and mayonnaise to foods  Eat fewer high-fat foods  Some examples of high-fat foods include french fries, doughnuts, ice cream, and cakes  · Eat fewer sweets  Limit foods and drinks that are high in sugar  This includes candy, cookies, regular soda, and sweetened drinks  Exercise:  Exercise at least 30 minutes per day on most days of the week  Some examples of exercise include walking, biking, dancing, and swimming  You can also fit in more physical activity by taking the stairs instead of the elevator or parking farther away from stores  Ask your healthcare provider about the best exercise plan for you  © Copyright [employer] 2018 Information is for End User's use only and may not be sold, redistributed or otherwise used for commercial purposes   All illustrations and images included in CareNotes® are the copyrighted property of A [employer] A M , Inc  or [address] [place] [address] [employer]

## 2023-01-10 NOTE — ASSESSMENT & PLAN NOTE
Lab Results   Component Value Date    EGFR 39 12/27/2022    EGFR 47 12/05/2018    CREATININE 1 55 (H) 12/27/2022    CREATININE 1 38 (H) 03/29/2022    CREATININE 1 36 (H) 09/28/2021   He has relatively stable kidney function

## 2023-01-10 NOTE — ASSESSMENT & PLAN NOTE
Lab Results   Component Value Date    HGBA1C 6 2 09/27/2022   Patient really does not check his blood sugars at home and his blood sugar in the ER when he had COVID was elevated we will recheck an A1c

## 2023-01-20 ENCOUNTER — TELEPHONE (OUTPATIENT)
Dept: INTERNAL MEDICINE CLINIC | Age: 88
End: 2023-01-20

## 2023-01-20 NOTE — TELEPHONE ENCOUNTER
Wife called in and stated his "sundowning "is getting worse  Asked if dementia meds could be taken earlier in the day? Could med be increased to 2x daily?  Please advise

## 2023-02-20 DIAGNOSIS — F29 PSYCHOSIS, UNSPECIFIED PSYCHOSIS TYPE (HCC): ICD-10-CM

## 2023-02-20 RX ORDER — QUETIAPINE FUMARATE 25 MG/1
25 TABLET, FILM COATED ORAL
Qty: 30 TABLET | Refills: 3 | Status: SHIPPED | OUTPATIENT
Start: 2023-02-20

## 2023-02-22 ENCOUNTER — OFFICE VISIT (OUTPATIENT)
Dept: FAMILY MEDICINE CLINIC | Facility: CLINIC | Age: 88
End: 2023-02-22

## 2023-02-22 VITALS
HEIGHT: 69 IN | WEIGHT: 189 LBS | BODY MASS INDEX: 27.99 KG/M2 | RESPIRATION RATE: 15 BRPM | TEMPERATURE: 98 F | SYSTOLIC BLOOD PRESSURE: 148 MMHG | DIASTOLIC BLOOD PRESSURE: 64 MMHG

## 2023-02-22 DIAGNOSIS — R39.9 UTI SYMPTOMS: ICD-10-CM

## 2023-02-22 PROBLEM — N30.01 ACUTE CYSTITIS WITH HEMATURIA: Status: ACTIVE | Noted: 2023-02-22

## 2023-02-22 LAB
SL AMB  POCT GLUCOSE, UA: NORMAL
SL AMB LEUKOCYTE ESTERASE,UA: POSITIVE
SL AMB POCT BILIRUBIN,UA: POSITIVE
SL AMB POCT BLOOD,UA: NORMAL
SL AMB POCT CLARITY,UA: CLEAR
SL AMB POCT COLOR,UA: YELLOW
SL AMB POCT KETONES,UA: NORMAL
SL AMB POCT NITRITE,UA: NORMAL
SL AMB POCT PH,UA: 5
SL AMB POCT SPECIFIC GRAVITY,UA: 1.02
SL AMB POCT URINE PROTEIN: NORMAL
SL AMB POCT UROBILINOGEN: NORMAL

## 2023-02-22 RX ORDER — CIPROFLOXACIN 500 MG/1
500 TABLET, FILM COATED ORAL EVERY 12 HOURS SCHEDULED
Qty: 20 TABLET | Refills: 0 | Status: SHIPPED | OUTPATIENT
Start: 2023-02-22 | End: 2023-03-04

## 2023-02-22 NOTE — PROGRESS NOTES
Name: Cyn Kulkarni      : 1935      MRN: [id number]  Encounter Provider: Mia Goodwin MD  Encounter Date: 2023   Encounter department: [address] [place] [address]  UTI symptoms  Assessment & Plan:  Having increasing frequency lower abdominal discomfort with dysuria last 1 to 2 weeks intermittently  UA is consistent with a UTI we will treat with Floxin    Orders:  -     POCT urine dip  -     Urine culture  -     ciprofloxacin (CIPRO) 500 mg tablet; Take 1 tablet (500 mg total) by mouth every 12 (twelve) hours for 10 days           Subjective      Urinary Tract Infection   This is a new problem  The current episode started 1 to 4 weeks ago  The problem occurs every urination  The problem has been gradually worsening  The quality of the pain is described as burning  The pain is at a severity of 4/10  The pain is moderate  There has been no fever  He is not sexually active  There is no history of pyelonephritis  Associated symptoms include frequency and urgency  Pertinent negatives include no chills, flank pain, hematuria or nausea  He has tried increased fluids and acetaminophen for the symptoms  The treatment provided no relief  His past medical history is significant for recurrent UTIs  Review of Systems   Constitutional: Negative for chills, fatigue, fever and unexpected weight change  HENT: Negative for congestion, ear pain, hearing loss, postnasal drip, sinus pressure, sore throat, trouble swallowing and voice change  Eyes: Negative for visual disturbance  Respiratory: Negative for cough, chest tightness, shortness of breath and wheezing  Cardiovascular: Negative for chest pain, palpitations and leg swelling  Gastrointestinal: Negative for abdominal distention, abdominal pain, anal bleeding, blood in stool, constipation, diarrhea and nausea  Endocrine: Negative for cold intolerance, polydipsia, polyphagia and polyuria     Genitourinary: Positive for frequency and urgency  Negative for dysuria, flank pain and hematuria  Musculoskeletal: Negative for arthralgias, back pain, gait problem, joint swelling, myalgias and neck pain  Skin: Negative for rash  Allergic/Immunologic: Negative for immunocompromised state  Neurological: Negative for dizziness, syncope, facial asymmetry, weakness, light-headedness, numbness and headaches  Hematological: Negative for adenopathy  Psychiatric/Behavioral: Positive for confusion  Negative for sleep disturbance and suicidal ideas  Current Outpatient Medications on File Prior to Visit   Medication Sig   • finasteride (PROSCAR) 5 mg tablet TAKE 1 TABLET (5 MG TOTAL) BY MOUTH DAILY   • glucose blood (Easy Touch Test) test strip 2 each by Other route daily CONTOUR TEST STRIPS     DX CODE  E11 9    TEST BLOOD SUGAR TWICE DAILY  • lisinopril (ZESTRIL) 10 mg tablet Take 1 tablet (10 mg total) by mouth daily   • metFORMIN (GLUCOPHAGE) 500 mg tablet Take 1 tablet (500 mg total) by mouth daily with breakfast   • Mirabegron ER 50 MG TB24 Take 1 tablet (50 mg total) by mouth daily   • oxybutynin (DITROPAN-XL) 5 mg 24 hr tablet TAKE 1 TABLET (5 MG TOTAL) BY MOUTH DAILY   • QUEtiapine (SEROquel) 25 mg tablet Take 1 tablet (25 mg total) by mouth daily at bedtime       Objective     /64 (BP Location: Left arm, Patient Position: Sitting, Cuff Size: Large)   Temp 98 °F (36 7 °C) (Temporal)   Resp 15   Ht 5' 9" (1 753 m)   Wt 85 7 kg (189 lb)   BMI 27 91 kg/m²     Physical Exam  Constitutional:       General: He is not in acute distress  Appearance: He is well-developed  HENT:      Right Ear: External ear normal       Left Ear: External ear normal       Nose: Nose normal       Mouth/Throat:      Pharynx: No oropharyngeal exudate  Eyes:      Pupils: Pupils are equal, round, and reactive to light  Neck:      Thyroid: No thyromegaly  Vascular: No JVD     Cardiovascular:      Rate and Rhythm: Normal rate and regular rhythm  Heart sounds: Normal heart sounds  No murmur heard  No gallop  Pulmonary:      Effort: Pulmonary effort is normal  No respiratory distress  Breath sounds: Normal breath sounds  No wheezing or rales  Abdominal:      General: Bowel sounds are normal  There is no distension  Palpations: Abdomen is soft  There is no mass  Tenderness: There is no abdominal tenderness  Musculoskeletal:         General: No tenderness  Normal range of motion  Cervical back: Normal range of motion and neck supple  Lymphadenopathy:      Cervical: No cervical adenopathy  Skin:     Findings: No rash  Neurological:      General: No focal deficit present  Mental Status: He is alert and oriented to person, place, and time  Mental status is at baseline  Cranial Nerves: No cranial nerve deficit  Coordination: Coordination normal       Gait: Gait abnormal    Psychiatric:         Behavior: Behavior normal          Thought Content:  Thought content normal          Judgment: Judgment normal        Priya Yang MD

## 2023-02-22 NOTE — PATIENT INSTRUCTIONS
Dysuria   AMBULATORY CARE:   Dysuria  is trouble urinating, or pain, burning, or discomfort when you urinate  Dysuria is usually a symptom of another problem, such as a blockage or urinary tract infection  Common symptoms include the following:   Fever     Cloudy, bad smelling urine     Urge to urinate often but urinating little     Back, side, or abdominal pain     Blood in your urine     Discharge that smells bad     Itching    Seek care immediately if:   You have severe back, side, or abdominal pain  You have fever and shaking chills  You vomit several times in a row  Contact your healthcare provider if:   Your symptoms do not go away, even after treatment  You have questions or concerns about your condition or care  Treatment for dysuria  may include medicines to treat a bacterial infection or help decrease bladder spasms  Manage your dysuria:   Drink more liquids  Liquids help flush out bacteria that may be causing an infection  Ask your healthcare provider how much liquid to drink each day and which liquids are best for you  Take sitz baths as directed  Fill a bathtub with 4 to 6 inches of warm water  You may also use a sitz bath pan that fits over a toilet  Sit in the sitz bath for 20 minutes  Do this 2 to 3 times a day, or as directed  The warm water can help decrease pain and swelling  Follow up with your doctor as directed:  Write down your questions so you remember to ask them during your visits  © Copyright [facility] [employer] 2022 Information is for End User's use only and may not be sold, redistributed or otherwise used for commercial purposes  The above information is an  only  It is not intended as medical advice for individual conditions or treatments  Talk to your doctor, nurse or pharmacist before following any medical regimen to see if it is safe and effective for you

## 2023-02-22 NOTE — ASSESSMENT & PLAN NOTE
Having increasing frequency lower abdominal discomfort with dysuria last 1 to 2 weeks intermittently    UA is consistent with a UTI we will treat with Floxin

## 2023-02-25 LAB — BACTERIA UR CULT: ABNORMAL

## 2023-03-08 ENCOUNTER — TELEPHONE (OUTPATIENT)
Dept: FAMILY MEDICINE CLINIC | Facility: CLINIC | Age: 88
End: 2023-03-08

## 2023-03-08 DIAGNOSIS — F02.B0 MODERATE LATE ONSET ALZHEIMER'S DEMENTIA WITHOUT BEHAVIORAL DISTURBANCE, PSYCHOTIC DISTURBANCE, MOOD DISTURBANCE, OR ANXIETY (HCC): Primary | ICD-10-CM

## 2023-03-08 DIAGNOSIS — G30.1 MODERATE LATE ONSET ALZHEIMER'S DEMENTIA WITHOUT BEHAVIORAL DISTURBANCE, PSYCHOTIC DISTURBANCE, MOOD DISTURBANCE, OR ANXIETY (HCC): Primary | ICD-10-CM

## 2023-03-08 NOTE — TELEPHONE ENCOUNTER
Patients wife called in regards to the patient, went to have blood work done at [facility] the other day and patient refused to get out of the car, patient has a history of dementia  Patients wife spoke to [facility] and they suggested placing an order for [facility] to come out to the home for a home draw

## 2023-03-17 ENCOUNTER — OFFICE VISIT (OUTPATIENT)
Dept: FAMILY MEDICINE CLINIC | Facility: CLINIC | Age: 88
End: 2023-03-17

## 2023-03-17 VITALS
DIASTOLIC BLOOD PRESSURE: 62 MMHG | BODY MASS INDEX: 28.08 KG/M2 | HEIGHT: 69 IN | SYSTOLIC BLOOD PRESSURE: 120 MMHG | HEART RATE: 48 BPM | WEIGHT: 189.6 LBS | TEMPERATURE: 97.7 F

## 2023-03-17 DIAGNOSIS — M54.16 LUMBAR RADICULOPATHY: Primary | ICD-10-CM

## 2023-03-17 DIAGNOSIS — E11.9 CONTROLLED TYPE 2 DIABETES MELLITUS WITHOUT COMPLICATION, WITHOUT LONG-TERM CURRENT USE OF INSULIN (HCC): ICD-10-CM

## 2023-03-17 LAB — SL AMB POCT HEMOGLOBIN AIC: 5.8 (ref ?–6.5)

## 2023-03-17 NOTE — PROGRESS NOTES
Name: Johny Kaur      : 1935      MRN: [id number]  Encounter Provider: Mony Knowles MD  Encounter Date: 3/17/2023   Encounter department: [address] [place] [address]  Lumbar radiculopathy  Assessment & Plan:  Chronic lumbar back pain secondary to spinal stenosis, facet arthropathy etc   Unable to pinpoint areas of tenderness  Patient has problem with transitioning  We will start conservative management with Tylenol arthritis, 650 mg twice a day  If patient is not improving may increase to 650 mg 2 tablets twice a day  If patient does not show any significant improvement return in 2 weeks for reevaluation, may consider short course steroid, gabapentin, Cymbalta for management of chronic lumbar back pain           Subjective     HPI     25-year-old male patient presents for exacerbation of his chronic back pain  Patient is accompanied by his wife and caretaker today  Of note patient does have baseline tension and has difficulty communicating and relating his history  According to patient's wife and caretaker, he does have history of back surgery, MRI from  shows moderate central canal stenosis, degenerative disc disease  Patient does have pain with transitioning from sitting to standing  He does have a baseline shuffling and slow gait caretakers noticed recent slowdown of patient's mobility  He does have significant kyphosis but posture seems to be worsening  Review of Systems   Constitutional: Negative for appetite change, chills and fever  HENT: Negative for congestion, rhinorrhea and sore throat  Respiratory: Negative for shortness of breath  Cardiovascular: Negative for chest pain  Gastrointestinal: Negative for abdominal pain, constipation and diarrhea  Genitourinary:        Patient had UTI 3 weeks ago  Wears a pad, denies any dysuria   Musculoskeletal: Positive for back pain and gait problem  Negative for arthralgias     Skin:        No skin ulcers   Psychiatric/Behavioral: Negative for sleep disturbance  Past Medical History:   Diagnosis Date   • Balanitis     last assessed 14   • BPH (benign prostatic hyperplasia)    • Diabetes mellitus ([facility] [address] )     blood sugar 167 this am at 0630   • GERD (gastroesophageal reflux disease)    • Heme + stool    • Hypertension    • Lumbar radiculopathy    • [facility] )     35 years ago   • Phimosis     last assessed 16   • Sciatica     right side, last assessed 16     Past Surgical History:   Procedure Laterality Date   • BACK SURGERY      laminectomy Lumbar   • CATARACT EXTRACTION, BILATERAL     • CIRCUMCISION     • HERNIA REPAIR Right    • UT COLONOSCOPY FLX DX W/COLLJ SPEC WHEN PFRMD N/A 2017    Procedure: EGD AND COLONOSCOPY;  Surgeon: Ok Nance DO;  Location: [facility]; Service: General     No family history on file  Social History     Socioeconomic History   • Marital status: /Civil Union     Spouse name: None   • Number of children: None   • Years of education: None   • Highest education level: None   Occupational History   • None   Tobacco Use   • Smoking status: Former     Packs/day: 1      Years: 30 00     Pack years: 30 00     Types: Cigarettes     Quit date: 1998     Years since quittin 2   • Smokeless tobacco: Never   • Tobacco comments:     quit 25 yrs ago   Vaping Use   • Vaping Use: Never used   Substance and Sexual Activity   • Alcohol use: No     Alcohol/week: 0 0 standard drinks   • Drug use: No   • Sexual activity: None   Other Topics Concern   • None   Social History Narrative   • None     Social Determinants of Health     Financial Resource Strain: Low Risk    • Difficulty of Paying Living Expenses: Not hard at all   Food Insecurity: Not on file   Transportation Needs: No Transportation Needs   • Lack of Transportation (Medical): No   • Lack of Transportation (Non-Medical):  No   Physical Activity: Not on file   Stress: Not on file Social Connections: Not on file   Intimate Partner Violence: Not on file   Housing Stability: Not on file     Current Outpatient Medications on File Prior to Visit   Medication Sig   • finasteride (PROSCAR) 5 mg tablet TAKE 1 TABLET (5 MG TOTAL) BY MOUTH DAILY   • glucose blood (Easy Touch Test) test strip 2 each by Other route daily CONTOUR TEST STRIPS     DX CODE  E11 9    TEST BLOOD SUGAR TWICE DAILY  • lisinopril (ZESTRIL) 10 mg tablet Take 1 tablet (10 mg total) by mouth daily   • metFORMIN (GLUCOPHAGE) 500 mg tablet Take 1 tablet (500 mg total) by mouth daily with breakfast   • Mirabegron ER 50 MG TB24 Take 1 tablet (50 mg total) by mouth daily   • oxybutynin (DITROPAN-XL) 5 mg 24 hr tablet TAKE 1 TABLET (5 MG TOTAL) BY MOUTH DAILY   • QUEtiapine (SEROquel) 25 mg tablet Take 1 tablet (25 mg total) by mouth daily at bedtime     No Known Allergies  Immunization History   Administered Date(s) Administered   • COVID-19 PFIZER VACCINE 0 3 ML IM 02/13/2021, 03/06/2021, 11/05/2021, 07/13/2022   • INFLUENZA 09/27/2022   • Influenza Split High Dose Preservative Free IM 10/09/2013, 10/07/2014, 10/16/2015, 10/21/2016, 10/23/2017   • Influenza, high dose seasonal 0 7 mL 11/08/2018, 09/25/2019, 09/28/2020, 09/27/2022   • Pneumococcal Conjugate 13-Valent 10/21/2016   • Pneumococcal Polysaccharide PPV23 11/16/2004   • Tdap 12/05/2018       Objective     /62 (BP Location: Left arm, Patient Position: Sitting, Cuff Size: Extra-Large)   Pulse (!) 48   Temp 97 7 °F (36 5 °C) (Temporal)   Ht 5' 9" (1 753 m)   Wt 86 kg (189 lb 9 6 oz)   BMI 28 00 kg/m²     Physical Exam  Vitals reviewed  Constitutional:       Appearance: Normal appearance  Cardiovascular:      Rate and Rhythm: Normal rate and regular rhythm  Pulses: Normal pulses  Heart sounds: Normal heart sounds  No murmur heard  Pulmonary:      Effort: Pulmonary effort is normal  No respiratory distress  Breath sounds: Normal breath sounds  Abdominal:      General: Abdomen is flat  Musculoskeletal:         General: No swelling  Comments: Significant kyphosis  Unable to point out specific areas of tenderness, no spinous process tenderness, no tenderness over the SI joint, no greater trochanter tenderness  No paraspinal tenderness   Skin:     General: Skin is warm  Capillary Refill: Capillary refill takes less than 2 seconds  Coloration: Skin is not jaundiced  Neurological:      General: No focal deficit present  Mental Status: He is alert     Psychiatric:         Mood and Affect: Mood normal             Rosy Siddiqui MD

## 2023-03-17 NOTE — ASSESSMENT & PLAN NOTE
Chronic lumbar back pain secondary to spinal stenosis, facet arthropathy etc   Unable to pinpoint areas of tenderness  Patient has problem with transitioning  We will start conservative management with Tylenol arthritis, 650 mg twice a day  If patient is not improving may increase to 650 mg 2 tablets twice a day  If patient does not show any significant improvement return in 2 weeks for reevaluation, may consider short course steroid, gabapentin, Cymbalta for management of chronic lumbar back pain

## 2023-03-20 NOTE — PROGRESS NOTES
[facility] [employer]  coding opportunities             Chart reviewed, (number of) suggestions sent to provider: 1            Number of suggestions actually used: 1      Number of suggestions NOT actually used: 0     Patients insurance company: [employer] (Medicare Advantage and Commercial)     Visit status: Patient arrived for their scheduled appointment   dx is on the bill  Provider never responded to [facility] [employer]  coding request     [facility] [employer]  coding opportunities             Chart reviewed, (number of) suggestions sent to provider: 1      DX:  E11 9-Type 2 diabetes mellitus without complications       Patients insurance company: [employer] Expiration Date (Month Year): 04/25

## 2023-04-12 PROBLEM — R46.89 BEHAVIORAL CHANGE: Status: ACTIVE | Noted: 2023-04-12

## 2023-04-23 PROBLEM — N30.01 ACUTE CYSTITIS WITH HEMATURIA: Status: RESOLVED | Noted: 2023-02-22 | Resolved: 2023-04-23

## 2023-04-27 DIAGNOSIS — R35.0 BENIGN PROSTATIC HYPERPLASIA WITH URINARY FREQUENCY: ICD-10-CM

## 2023-04-27 DIAGNOSIS — N40.1 BENIGN PROSTATIC HYPERPLASIA WITH URINARY FREQUENCY: ICD-10-CM

## 2023-04-27 RX ORDER — OXYBUTYNIN CHLORIDE 5 MG/1
5 TABLET, EXTENDED RELEASE ORAL DAILY
Qty: 90 TABLET | Refills: 1 | Status: SHIPPED | OUTPATIENT
Start: 2023-04-27

## 2023-05-15 ENCOUNTER — TELEPHONE (OUTPATIENT)
Dept: FAMILY MEDICINE CLINIC | Facility: CLINIC | Age: 88
End: 2023-05-15

## 2023-05-15 NOTE — TELEPHONE ENCOUNTER
"Patients wife called stating patient has been anxious lately, \"wanting to go to [facility]\", restless, patient is on seroquel at night which helps settle him but is not working as well as it was  Any suggestions, patient does have an appt in June   Patients wife has appt tomorrow at 6  "

## 2023-05-16 ENCOUNTER — TELEPHONE (OUTPATIENT)
Age: 88
End: 2023-05-16

## 2023-05-16 DIAGNOSIS — F02.B0 MODERATE LATE ONSET ALZHEIMER'S DEMENTIA WITHOUT BEHAVIORAL DISTURBANCE, PSYCHOTIC DISTURBANCE, MOOD DISTURBANCE, OR ANXIETY (HCC): Primary | ICD-10-CM

## 2023-05-16 DIAGNOSIS — G30.1 MODERATE LATE ONSET ALZHEIMER'S DEMENTIA WITHOUT BEHAVIORAL DISTURBANCE, PSYCHOTIC DISTURBANCE, MOOD DISTURBANCE, OR ANXIETY (HCC): Primary | ICD-10-CM

## 2023-05-16 NOTE — TELEPHONE ENCOUNTER
[facility]  [address] [facility], [address] [place] [address], [address] [place] [address]    [phone]    Telephone Intake: Geriatric Assessment     Referral source: Rd Valencia MD    Caller who is scheduling/relationship to pt: Spouse, Dena Durant phone number: [phone]    Reason for referral: Patient concerns , Family member concerns and Provider concerns regarding memory concerns, behavior changes/concerns, home safety concerns and mood concerns  If there are behavioral concerns, is the pt prescribed medications to manage these? no   If so, how many? none   Has the patient ever had an inpatient psychiatric hospitalization? No,   What is the goal of the visit? initial assessment and diagnosis; address sundowning; depressed mood; and gets fixated on something for hours upon hours  Has the patient been seen by a Neurologist or Geriatrician? No   If yes, is this appointment for a second opinion? No  Has the patient ever been diagnosed with dementia? Yes by PCP  Has the patient had an MRI NeuroQuant within the last 1 year? No (If so, please route to provider to determine if assessment + conference are needed or if only assessment should be scheduled)      Preferred language? English  Highest education level? High School Graduate  Does the patient wear glasses? Yes   Does the patient use hearing aids? Yes      Is there a living will/healthcare POA in place/If so, who? Yes , Proxy, Ion Coon    Does the pt/caregiver have access for a virtual visit (computer/smart phone with audio/video)? Yes     Caller was informed:   • Please make sure the pt is accompanied by someone who knows them well / caregiver / family member to participate in this appointment  Who will accompany the pt (name and relationship)?  Ion Coon, spouse  Phone number of person accompanying pt: [phone]  • Please make sure the pt attends all appointments, including the assessment, care conference, follow-up, whether in-person or virtual   • For virtual visits, pt must be physically present in [facility] [place]  Office packet mailed out to: [address] [place] [address]  Added to wait list for sooner appointments? Yes       :  Has this patient been seen by our department in the last 3 years?  No    Please route to provider for chart review prior to scheduling and let the caller know that this phone intake will be reviewed IF -  • Pt was recently hospitalized  • Pt is prescribed medications for behavior management or has a history of psychiatric hospitalization  • Pt plans to attend alone

## 2023-05-24 ENCOUNTER — OFFICE VISIT (OUTPATIENT)
Age: 88
End: 2023-05-24

## 2023-05-24 VITALS
OXYGEN SATURATION: 98 % | SYSTOLIC BLOOD PRESSURE: 122 MMHG | HEART RATE: 50 BPM | WEIGHT: 189.2 LBS | TEMPERATURE: 98 F | BODY MASS INDEX: 28.67 KG/M2 | HEIGHT: 68 IN | DIASTOLIC BLOOD PRESSURE: 58 MMHG | RESPIRATION RATE: 16 BRPM

## 2023-05-24 DIAGNOSIS — Z91.81 AT HIGH RISK FOR FALLS: ICD-10-CM

## 2023-05-24 DIAGNOSIS — K59.01 SLOW TRANSIT CONSTIPATION: ICD-10-CM

## 2023-05-24 DIAGNOSIS — E11.9 CONTROLLED TYPE 2 DIABETES MELLITUS WITHOUT COMPLICATION, WITHOUT LONG-TERM CURRENT USE OF INSULIN (HCC): ICD-10-CM

## 2023-05-24 DIAGNOSIS — G30.1 MODERATE LATE ONSET ALZHEIMER'S DEMENTIA WITH PSYCHOTIC DISTURBANCE (HCC): Primary | ICD-10-CM

## 2023-05-24 DIAGNOSIS — F02.B2 MODERATE LATE ONSET ALZHEIMER'S DEMENTIA WITH PSYCHOTIC DISTURBANCE (HCC): Primary | ICD-10-CM

## 2023-05-24 DIAGNOSIS — N18.32 STAGE 3B CHRONIC KIDNEY DISEASE (HCC): Chronic | ICD-10-CM

## 2023-05-24 DIAGNOSIS — N39.42 URINARY INCONTINENCE WITHOUT SENSORY AWARENESS: Chronic | ICD-10-CM

## 2023-05-24 PROBLEM — F02.C2 SEVERE LATE ONSET ALZHEIMER'S DEMENTIA WITH PSYCHOTIC DISTURBANCE (HCC): Chronic | Status: ACTIVE | Noted: 2022-09-27

## 2023-05-24 PROBLEM — F02.C2 SEVERE LATE ONSET ALZHEIMER'S DEMENTIA WITH PSYCHOTIC DISTURBANCE (HCC): Status: ACTIVE | Noted: 2022-09-27

## 2023-05-24 PROBLEM — R53.82 CHRONIC FATIGUE: Status: RESOLVED | Noted: 2018-03-27 | Resolved: 2023-05-24

## 2023-05-24 PROBLEM — F29 PSYCHOSIS, UNSPECIFIED PSYCHOSIS TYPE (HCC): Status: RESOLVED | Noted: 2023-01-10 | Resolved: 2023-05-24

## 2023-05-24 PROBLEM — M54.50 LUMBAR BACK PAIN: Status: RESOLVED | Noted: 2017-07-12 | Resolved: 2023-05-24

## 2023-05-24 PROBLEM — N18.31 STAGE 3A CHRONIC KIDNEY DISEASE (HCC): Chronic | Status: ACTIVE | Noted: 2021-12-20

## 2023-05-24 PROBLEM — M25.562 LEFT MEDIAL KNEE PAIN: Status: RESOLVED | Noted: 2018-03-27 | Resolved: 2023-05-24

## 2023-05-24 RX ORDER — QUETIAPINE FUMARATE 25 MG/1
TABLET, FILM COATED ORAL
Qty: 135 TABLET | Refills: 3 | Status: SHIPPED | OUTPATIENT
Start: 2023-05-24

## 2023-05-24 RX ORDER — SENNOSIDES 8.6 MG
8.6 TABLET ORAL
Qty: 90 TABLET | Refills: 3 | Status: SHIPPED | OUTPATIENT
Start: 2023-05-24

## 2023-05-24 NOTE — ASSESSMENT & PLAN NOTE
With full incontinence, unclear benefits of oxybutynin and mirabegron  At this point will stop oxybutynin considering significant risks  As well will stop finasteride as unclear as to how much BPH is contributing to symptoms considering relatively low PVR in the past and finasteride may increase depressive symptoms in 15% of patients

## 2023-05-24 NOTE — PROGRESS NOTES
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 ([facility] )  Assessment & Plan: With significant behavioral and psychotic symptoms  Will increase quetiapine to 12 5 mg in AM along with 25 mg at bedtime  Oxybutynin may exacerbate memory symptoms as well  Will stop oxybutynin and follow symptoms  Decision-making capacity: Dose not have capacity for medical or financial decisions    Staging: Alzheimer's Dementia (FAST Stage 6b)    Medications Review: Reviewed polypharmacy  Stop finasteride, oxybutynin, metformin  Orders:  -     Ambulatory Referral to [facility]  -     QUEtiapine (SEROquel) 25 mg tablet; Take 1 tablet (25 mg total) by mouth daily at bedtime AND 0 5 tablets (12 5 mg total) every morning  2  Stage 3b chronic kidney disease [facility])  Assessment & Plan:  May continue to monitor BP and continue lisinopril  If BP is usually low, may need to stop lisinopril as well  3  Urinary incontinence without sensory awareness  Assessment & Plan: With full incontinence, unclear benefits of oxybutynin and mirabegron  At this point will stop oxybutynin considering significant risks  As well will stop finasteride as unclear as to how much BPH is contributing to symptoms considering relatively low PVR in the past and finasteride may increase depressive symptoms in 15% of patients  4  Controlled type 2 diabetes mellitus without complication, without long-term current use of insulin ([facility] )  Assessment & Plan:  Patient has excellent control of diabetes  Considering goal A1c of <8 with significant CKD and mild weight loss, stop metformin and simply follow with A1c over time  5  Slow transit constipation  Assessment & Plan: May be caused by oxybutynin  Will stop oxybutynin and start daily senna and follow symptoms  Orders:  -     senna (SENOKOT) 8 6 mg; Take 1 tablet (8 6 mg total) by mouth daily at bedtime    6   At high risk for falls  Assessment & Plan:  Poor prognosis for improvement considering dementia  Continue supportive care  HPI:    We had the pleasure of evaluating Julia Bridges who is a 81 y o  male in Geriatric consultation today  Mr Marco Manriquez is in the office with his wife and care-giver  He lives with his wife  HISTORY AS PER WIFE AND DAUGHTER:  Stopped driving a few years ago  Stopped writing checks a few years ago  He no longer knows how old he is  Forgets who his wife is at times  COGNITION:  Has long diagnosis of Alzheimer's dementia  FUNCTIONAL STATUS:  BADLs  Does patient require assistance with:  Bathing: Yes  Dressing: Yes  Transferring: No  Continence: Yes  Toileting: No  Feeding: No    IADLs  Dose patient require assistance with:  Telephone: Yes  Shopping: Yes  Food Preparation: Yes  Housekeeping: Yes  Laundry: Yes  Transportation: Yes  Medications: Yes  Finances: Yes    NEUROPSYCH SYMPTOMS:  Does patient get angry or hostile? Resist care from others? Yes  Does patient see or hear things that no one else can see or hear? Yes  Does patient act impatient and cranky? Does mood frequently change for no apparent reason? Yes  Does patient act suspicious without good reason (example: believes that others are stealing from him or her, or planning to harm him or her in some way)? Yes  Does patient less interested in his or her usual activities or in the activities and plans of others? Yes  Does patient have trouble sleeping at night? Yes  Dose patient have abnormal movements while asleep?  No    SAFETY:  Hearing and vision issue: Yes, wears hearing aids  Any gait or balance disorder: Yes  Uses: no assistive devices  Any falls in the last year: Yes, several  Any history of wandering: No  Are there firearms or guns in the home: Yes, gun is there, but no bullets  Does patient drive: No  Any driving accidents or citations in the last year: No  Any concerns about patient's ability to drive: Yes    ACP REVIEW:  Does patient have POA: Yes  Does patient have a Living will: Yes  Any legal assistance needed for healthcare planning?: No    No Known Allergies    Medications:    Current Outpatient Medications on File Prior to Visit   Medication Sig Dispense Refill   • lisinopril (ZESTRIL) 10 mg tablet Take 1 tablet (10 mg total) by mouth daily 90 tablet 3   • Mirabegron ER 50 MG TB24 Take 1 tablet (50 mg total) by mouth daily 90 tablet 0   • [DISCONTINUED] finasteride (PROSCAR) 5 mg tablet Take 1 tablet (5 mg total) by mouth daily 90 tablet 2   • [DISCONTINUED] glucose blood (Easy Touch Test) test strip 2 each by Other route daily CONTOUR TEST STRIPS     DX CODE  E11 9    TEST BLOOD SUGAR TWICE DAILY  100 each 2   • [DISCONTINUED] metFORMIN (GLUCOPHAGE) 500 mg tablet Take 1 tablet (500 mg total) by mouth daily with breakfast 90 tablet 3   • [DISCONTINUED] oxybutynin (DITROPAN-XL) 5 mg 24 hr tablet Take 1 tablet (5 mg total) by mouth daily 90 tablet 1   • [DISCONTINUED] QUEtiapine (SEROquel) 25 mg tablet Take 1 tablet (25 mg total) by mouth daily at bedtime 30 tablet 3     No current facility-administered medications on file prior to visit  History:  Past Medical History:   Diagnosis Date   • Balanitis     last assessed 12/19/14   • BPH (benign prostatic hyperplasia)    • Diabetes mellitus ([facility] )     blood sugar 167 this am at 0630   • GERD (gastroesophageal reflux disease)    • Heme + stool    • Hypertension    • Lumbar radiculopathy    • Myocardial infarction ([facility] )     35 years ago   • Phimosis     last assessed 04/27/16   • Sciatica     right side, last assessed 04/25/16     Past Surgical History:   Procedure Laterality Date   • BACK SURGERY      laminectomy Lumbar   • CATARACT EXTRACTION, BILATERAL     • CIRCUMCISION     • HERNIA REPAIR Right    • CT COLONOSCOPY FLX DX W/COLLJ SPEC WHEN PFRMD N/A 1/16/2017    Procedure: EGD AND COLONOSCOPY;  Surgeon: Pat Malhotra DO;  Location: [facility];   Service: General     No family history on file   Social History     Socioeconomic History   • Marital status: /Civil Union     Spouse name: Not on file   • Number of children: Not on file   • Years of education: Not on file   • Highest education level: Not on file   Occupational History   • Not on file   Tobacco Use   • Smoking status: Former     Packs/day:  00     Years: 30 00     Total pack years: 30 00     Types: Cigarettes     Quit date: 1998     Years since quittin 4   • Smokeless tobacco: Never   • Tobacco comments:     quit 25 yrs ago   Vaping Use   • Vaping Use: Never used   Substance and Sexual Activity   • Alcohol use: No     Alcohol/week: 0 0 standard drinks of alcohol   • Drug use: No   • Sexual activity: Not on file   Other Topics Concern   • Not on file   Social History Narrative   • Not on file     Social Determinants of Health     Financial Resource Strain: Low Risk  (2023)    Overall Financial Resource Strain (CARDIA)    • Difficulty of Paying Living Expenses: Not hard at all   Food Insecurity: Not on file   Transportation Needs: No Transportation Needs (2023)    PRAPARE - Transportation    • Lack of Transportation (Medical): No    • Lack of Transportation (Non-Medical): No   Physical Activity: Not on file   Stress: Not on file   Social Connections: Not on file   Intimate Partner Violence: Not on file   Housing Stability: Not on file     Past Surgical History:   Procedure Laterality Date   • BACK SURGERY      laminectomy Lumbar   • CATARACT EXTRACTION, BILATERAL     • CIRCUMCISION     • HERNIA REPAIR Right    • [place] COLONOSCOPY FLX DX W/COLLJ SPEC WHEN PFRMD N/A 2017    Procedure: EGD AND COLONOSCOPY;  Surgeon: Iván Sibley DO;  Location: [facility];   Service: General       OBJECTIVE:  Vitals:    23 1009   BP: 122/58   BP Location: Left arm   Patient Position: Sitting   Cuff Size: Adult   Pulse: (!) 50   Resp: 16   Temp: 98 °F (36 7 °C)   TempSrc: Temporal   SpO2: 98%   Weight: 85 8 kg (189 lb 3 2 oz) "  Height: 5' 8\" (1 727 m)     Body mass index is 28 77 kg/m²  Physical Exam  Constitutional:       General: He is not in acute distress  Appearance: He is well-developed  He is not diaphoretic  HENT:      Head: Normocephalic and atraumatic  Eyes:      General: No scleral icterus  Right eye: No discharge  Left eye: No discharge  Conjunctiva/sclera: Conjunctivae normal    Cardiovascular:      Rate and Rhythm: Normal rate and regular rhythm  Heart sounds: Normal heart sounds  Pulmonary:      Effort: Pulmonary effort is normal  No respiratory distress  Breath sounds: Normal breath sounds  Abdominal:      General: Bowel sounds are normal  There is no distension  Palpations: Abdomen is soft  Musculoskeletal:         General: No tenderness  Skin:     General: Skin is warm and dry  Neurological:      General: No focal deficit present  Mental Status: He is alert  Cranial Nerves: No cranial nerve deficit  Gait: Gait abnormal (low step height  (high fall risk))     Psychiatric:         Mood and Affect: Mood normal       Comments: Poor insight         MoCA: 0/30      Labs & Imaging:  Lab Results   Component Value Date    HCT 42 8 12/27/2022    HGB 13 8 12/27/2022    MCV 97 12/27/2022     (L) 12/27/2022    WBC 2 95 (L) 12/27/2022     Lab Results   Component Value Date    AGAP 3 (L) 12/27/2022    ALKPHOS 57 12/27/2022    ALT 16 12/27/2022    ANIONGAP 9 09/07/2014    AST 9 12/27/2022    BILITOT 0 6 11/28/2017    BUN 26 (H) 12/27/2022    CALCIUM 9 4 12/27/2022     12/27/2022    CO2 28 12/27/2022    CREATININE 1 55 (H) 12/27/2022    EGFR 39 12/27/2022    GLUC 184 (H) 12/27/2022    K 4 2 12/27/2022     11/28/2017    PROT 6 4 11/28/2017    SODIUM 139 12/27/2022    TBILI 0 60 12/27/2022    TP 6 7 12/27/2022     Lab Results   Component Value Date    HGBA1C 5 8 03/17/2023     Lab Results   Component Value Date    CHOLESTEROL 150 09/28/2021    " CHOLESTEROL 153 10/20/2020    CHOLESTEROL 168 01/15/2019     Lab Results   Component Value Date    HDL 38 (L) 09/28/2021    HDL 38 (L) 10/20/2020    HDL 41 01/15/2019     Lab Results   Component Value Date    TRIG 125 09/28/2021    TRIG 126 10/20/2020    TRIG 139 01/15/2019     Lab Results   Component Value Date    [place] 130 (H) 11/28/2017    [place] 127 03/08/2017    [place] 128 06/17/2016     Lab Results   Component Value Date    LDLCALC 89 09/28/2021    [address] 10/20/2020     Lab Results   Component Value Date    TSH 2 28 09/28/2021    [id number] 0 895 12/27/2022     Lab Results   Component Value Date    [id number] 36 05/21/2014      Results for orders placed during the hospital encounter of 12/27/22    CT head without contrast    Narrative  CT BRAIN - WITHOUT CONTRAST    INDICATION:   Mental status change, persistent or worsening  Confusion  COMPARISON:  CT 12/5/2018  TECHNIQUE:  CT examination of the brain was performed  In addition to axial images, sagittal and coronal 2D reformatted images were created and submitted for interpretation  Radiation dose length product (DLP) for this visit:  890 86 mGy-cm   This examination, like all CT scans performed in the [facility], was performed utilizing techniques to minimize radiation dose exposure, including the use of iterative  reconstruction and automated exposure control  IMAGE QUALITY:  Diagnostic  FINDINGS:    PARENCHYMA: Decreased attenuation is noted in periventricular and subcortical white matter demonstrating an appearance that is statistically most likely to represent mild microangiopathic change  No CT signs of acute infarction  No intracranial mass, mass effect or midline shift  No acute parenchymal hemorrhage  VENTRICLES AND EXTRA-AXIAL SPACES:  Normal for the patient's age  VISUALIZED ORBITS AND PARANASAL SINUSES:  Normal visualized orbits  Normal visualized paranasal sinuses      CALVARIUM AND EXTRACRANIAL SOFT TISSUES:  Normal     Impression  No acute intracranial abnormality  I have spent 65 minutes with Patient and family today including taking history from family, patient, review of records, charting, and counseling regarding diagnosis and management of conditions

## 2023-05-24 NOTE — ASSESSMENT & PLAN NOTE
Patient has excellent control of diabetes  Considering goal A1c of <8 with significant CKD and mild weight loss, stop metformin and simply follow with A1c over time

## 2023-05-24 NOTE — ASSESSMENT & PLAN NOTE
May continue to monitor BP and continue lisinopril  If BP is usually low, may need to stop lisinopril as well

## 2023-05-24 NOTE — ASSESSMENT & PLAN NOTE
With significant behavioral and psychotic symptoms  Will increase quetiapine to 12 5 mg in AM along with 25 mg at bedtime  Oxybutynin may exacerbate memory symptoms as well  Will stop oxybutynin and follow symptoms  Decision-making capacity: Dose not have capacity for medical or financial decisions    Staging: Alzheimer's Dementia (FAST Stage 6b)    Medications Review: Reviewed polypharmacy  Stop finasteride, oxybutynin, metformin

## 2023-05-24 NOTE — PROGRESS NOTES
[facility]  [address], [address], [address]  [phone]    Social Work Intake    Chloe Turcios presents today for a geriatric assessment, accompanied by wife-Yenny COLÓNW attempted to complete MoCA/Mini Cog with patient, patient was unable to complete, had difficulty following direction, declined to attempt to put numbers on the clock for Mini Cog, and unable to repeat 3 words back  Provider aware

## 2023-06-14 ENCOUNTER — TELEPHONE (OUTPATIENT)
Age: 88
End: 2023-06-14

## 2023-06-14 DIAGNOSIS — F02.B2 MODERATE LATE ONSET ALZHEIMER'S DEMENTIA WITH PSYCHOTIC DISTURBANCE (HCC): ICD-10-CM

## 2023-06-14 DIAGNOSIS — G30.1 MODERATE LATE ONSET ALZHEIMER'S DEMENTIA WITH PSYCHOTIC DISTURBANCE (HCC): ICD-10-CM

## 2023-06-14 RX ORDER — QUETIAPINE FUMARATE 25 MG/1
25 TABLET, FILM COATED ORAL
Qty: 90 TABLET | Refills: 3 | Status: ON HOLD | OUTPATIENT
Start: 2023-06-14

## 2023-06-14 NOTE — TELEPHONE ENCOUNTER
Called and reviewed issues  Will continue with quetiapine only as 25 mg in the evening  Discussed options for medical therapy for urinary frequency  Will continue mirabegron daily  Could consider retrial of tamsulosin; however he seems to have full incontinence without awareness so less clear benefit

## 2023-06-14 NOTE — TELEPHONE ENCOUNTER
"Patient's spouse, Roxy Haas ([phone]) called with an update on patient  At most recent office visit, provider prescribed Seroquel 25 mg BID  When patient was taking twice a day, he became like a \"zombie\"  Spouse reduced dose to bedtime only, and patient is doing well on this dosage  No longer \"zombie like\" during the day  Patient is experiencing increased urination, and spouse questions if it could be related to the two urology prescribed medications that were discontinued at the patient's last visit  Additionally, patient's metformin was discontinued at last visit  Patient will not allow spouse to check his blood sugar; however he is NOT showing any symptoms of increased blood sugar  Caller would like provider's input/recommendations    "

## 2023-06-20 ENCOUNTER — HOSPITAL ENCOUNTER (INPATIENT)
Facility: HOSPITAL | Age: 88
LOS: 12 days | Discharge: NURSING, REHAB OR LONG-TERM CARE | DRG: 057 | End: 2023-07-03
Attending: EMERGENCY MEDICINE | Admitting: FAMILY MEDICINE
Payer: COMMERCIAL

## 2023-06-20 ENCOUNTER — APPOINTMENT (EMERGENCY)
Dept: RADIOLOGY | Facility: HOSPITAL | Age: 88
DRG: 057 | End: 2023-06-20
Payer: COMMERCIAL

## 2023-06-20 ENCOUNTER — TELEPHONE (OUTPATIENT)
Age: 88
End: 2023-06-20

## 2023-06-20 DIAGNOSIS — F03.90 DEMENTIA (HCC): ICD-10-CM

## 2023-06-20 DIAGNOSIS — R26.2 AMBULATORY DYSFUNCTION: ICD-10-CM

## 2023-06-20 DIAGNOSIS — R45.1 AGITATION: ICD-10-CM

## 2023-06-20 DIAGNOSIS — R46.89 BEHAVIORAL CHANGE: Primary | ICD-10-CM

## 2023-06-20 DIAGNOSIS — I10 HYPERTENSION: ICD-10-CM

## 2023-06-20 DIAGNOSIS — R41.0 DELIRIUM: ICD-10-CM

## 2023-06-20 PROBLEM — N17.9 ACUTE KIDNEY INJURY SUPERIMPOSED ON CHRONIC KIDNEY DISEASE (HCC): Status: ACTIVE | Noted: 2023-06-20

## 2023-06-20 PROBLEM — N18.9 ACUTE KIDNEY INJURY SUPERIMPOSED ON CHRONIC KIDNEY DISEASE (HCC): Status: ACTIVE | Noted: 2023-06-20

## 2023-06-20 LAB
ALBUMIN SERPL BCP-MCNC: 3.6 G/DL (ref 3.5–5)
ALP SERPL-CCNC: 55 U/L (ref 46–116)
ALT SERPL W P-5'-P-CCNC: 17 U/L (ref 12–78)
ANION GAP SERPL CALCULATED.3IONS-SCNC: 3 MMOL/L
AST SERPL W P-5'-P-CCNC: 9 U/L (ref 5–45)
BASOPHILS # BLD AUTO: 0.02 THOUSANDS/ÂΜL (ref 0–0.1)
BASOPHILS NFR BLD AUTO: 0 % (ref 0–1)
BILIRUB SERPL-MCNC: 0.56 MG/DL (ref 0.2–1)
BILIRUB UR QL STRIP: NEGATIVE
BUN SERPL-MCNC: 33 MG/DL (ref 5–25)
CALCIUM SERPL-MCNC: 9.6 MG/DL (ref 8.3–10.1)
CHLORIDE SERPL-SCNC: 112 MMOL/L (ref 96–108)
CLARITY UR: CLEAR
CO2 SERPL-SCNC: 25 MMOL/L (ref 21–32)
COLOR UR: NORMAL
CREAT SERPL-MCNC: 1.57 MG/DL (ref 0.6–1.3)
EOSINOPHIL # BLD AUTO: 0.07 THOUSAND/ÂΜL (ref 0–0.61)
EOSINOPHIL NFR BLD AUTO: 2 % (ref 0–6)
ERYTHROCYTE [DISTWIDTH] IN BLOOD BY AUTOMATED COUNT: 13 % (ref 11.6–15.1)
GFR SERPL CREATININE-BSD FRML MDRD: 39 ML/MIN/1.73SQ M
GLUCOSE SERPL-MCNC: 108 MG/DL (ref 65–140)
GLUCOSE UR STRIP-MCNC: NEGATIVE MG/DL
HCT VFR BLD AUTO: 41.8 % (ref 36.5–49.3)
HGB BLD-MCNC: 14.3 G/DL (ref 12–17)
HGB UR QL STRIP.AUTO: NEGATIVE
IMM GRANULOCYTES # BLD AUTO: 0.01 THOUSAND/UL (ref 0–0.2)
IMM GRANULOCYTES NFR BLD AUTO: 0 % (ref 0–2)
KETONES UR STRIP-MCNC: NEGATIVE MG/DL
LEUKOCYTE ESTERASE UR QL STRIP: NEGATIVE
LYMPHOCYTES # BLD AUTO: 1.13 THOUSANDS/ÂΜL (ref 0.6–4.47)
LYMPHOCYTES NFR BLD AUTO: 24 % (ref 14–44)
MCH RBC QN AUTO: 32.4 PG (ref 26.8–34.3)
MCHC RBC AUTO-ENTMCNC: 34.2 G/DL (ref 31.4–37.4)
MCV RBC AUTO: 95 FL (ref 82–98)
MONOCYTES # BLD AUTO: 0.51 THOUSAND/ÂΜL (ref 0.17–1.22)
MONOCYTES NFR BLD AUTO: 11 % (ref 4–12)
NEUTROPHILS # BLD AUTO: 3.02 THOUSANDS/ÂΜL (ref 1.85–7.62)
NEUTS SEG NFR BLD AUTO: 63 % (ref 43–75)
NITRITE UR QL STRIP: NEGATIVE
NRBC BLD AUTO-RTO: 0 /100 WBCS
PH UR STRIP.AUTO: 5.5 [PH]
PLATELET # BLD AUTO: 173 THOUSANDS/UL (ref 149–390)
PMV BLD AUTO: 10.6 FL (ref 8.9–12.7)
POTASSIUM SERPL-SCNC: 4.2 MMOL/L (ref 3.5–5.3)
PROT SERPL-MCNC: 6.7 G/DL (ref 6.4–8.4)
PROT UR STRIP-MCNC: NEGATIVE MG/DL
RBC # BLD AUTO: 4.41 MILLION/UL (ref 3.88–5.62)
SODIUM SERPL-SCNC: 140 MMOL/L (ref 135–147)
SP GR UR STRIP.AUTO: 1.02 (ref 1–1.03)
UROBILINOGEN UR STRIP-ACNC: <2 MG/DL
WBC # BLD AUTO: 4.76 THOUSAND/UL (ref 4.31–10.16)

## 2023-06-20 PROCEDURE — 70450 CT HEAD/BRAIN W/O DYE: CPT

## 2023-06-20 PROCEDURE — 80053 COMPREHEN METABOLIC PANEL: CPT | Performed by: EMERGENCY MEDICINE

## 2023-06-20 PROCEDURE — 99285 EMERGENCY DEPT VISIT HI MDM: CPT

## 2023-06-20 PROCEDURE — 36415 COLL VENOUS BLD VENIPUNCTURE: CPT | Performed by: EMERGENCY MEDICINE

## 2023-06-20 PROCEDURE — 99222 1ST HOSP IP/OBS MODERATE 55: CPT | Performed by: HOSPITALIST

## 2023-06-20 PROCEDURE — G1004 CDSM NDSC: HCPCS

## 2023-06-20 PROCEDURE — 99285 EMERGENCY DEPT VISIT HI MDM: CPT | Performed by: EMERGENCY MEDICINE

## 2023-06-20 PROCEDURE — 85025 COMPLETE CBC W/AUTO DIFF WBC: CPT | Performed by: EMERGENCY MEDICINE

## 2023-06-20 PROCEDURE — 81003 URINALYSIS AUTO W/O SCOPE: CPT | Performed by: EMERGENCY MEDICINE

## 2023-06-20 RX ORDER — OLANZAPINE 10 MG/1
2.5 INJECTION, POWDER, LYOPHILIZED, FOR SOLUTION INTRAMUSCULAR EVERY 6 HOURS PRN
Status: DISCONTINUED | OUTPATIENT
Start: 2023-06-20 | End: 2023-06-20

## 2023-06-20 RX ORDER — HEPARIN SODIUM 5000 [USP'U]/ML
5000 INJECTION, SOLUTION INTRAVENOUS; SUBCUTANEOUS EVERY 8 HOURS SCHEDULED
Status: DISCONTINUED | OUTPATIENT
Start: 2023-06-20 | End: 2023-07-03 | Stop reason: HOSPADM

## 2023-06-20 RX ORDER — LISINOPRIL 10 MG/1
10 TABLET ORAL DAILY
Status: DISCONTINUED | OUTPATIENT
Start: 2023-06-21 | End: 2023-06-21

## 2023-06-20 RX ORDER — QUETIAPINE FUMARATE 25 MG/1
25 TABLET, FILM COATED ORAL ONCE
Status: COMPLETED | OUTPATIENT
Start: 2023-06-20 | End: 2023-06-20

## 2023-06-20 RX ORDER — HEPARIN SODIUM 5000 [USP'U]/ML
5000 INJECTION, SOLUTION INTRAVENOUS; SUBCUTANEOUS EVERY 8 HOURS SCHEDULED
Status: DISCONTINUED | OUTPATIENT
Start: 2023-06-20 | End: 2023-06-20

## 2023-06-20 RX ORDER — OXYBUTYNIN CHLORIDE 10 MG/1
10 TABLET, EXTENDED RELEASE ORAL DAILY
Status: DISCONTINUED | OUTPATIENT
Start: 2023-06-21 | End: 2023-06-21

## 2023-06-20 RX ORDER — QUETIAPINE FUMARATE 25 MG/1
25 TABLET, FILM COATED ORAL
Status: DISCONTINUED | OUTPATIENT
Start: 2023-06-21 | End: 2023-06-22

## 2023-06-20 RX ORDER — WATER 1000 ML/1000ML
INJECTION, SOLUTION INTRAVENOUS
Status: COMPLETED
Start: 2023-06-20 | End: 2023-06-20

## 2023-06-20 RX ORDER — SODIUM CHLORIDE 9 MG/ML
50 INJECTION, SOLUTION INTRAVENOUS CONTINUOUS
Status: DISCONTINUED | OUTPATIENT
Start: 2023-06-20 | End: 2023-06-21

## 2023-06-20 RX ORDER — HALOPERIDOL 5 MG/ML
0.25 INJECTION INTRAMUSCULAR EVERY 4 HOURS PRN
Status: DISCONTINUED | OUTPATIENT
Start: 2023-06-20 | End: 2023-06-21

## 2023-06-20 RX ORDER — OLANZAPINE 10 MG/1
2.5 INJECTION, POWDER, LYOPHILIZED, FOR SOLUTION INTRAMUSCULAR ONCE
Status: COMPLETED | OUTPATIENT
Start: 2023-06-20 | End: 2023-06-20

## 2023-06-20 RX ORDER — SENNOSIDES 8.6 MG
8.6 TABLET ORAL
Status: DISCONTINUED | OUTPATIENT
Start: 2023-06-20 | End: 2023-06-22

## 2023-06-20 RX ADMIN — OLANZAPINE 2.5 MG: 10 INJECTION, POWDER, FOR SOLUTION INTRAMUSCULAR at 19:06

## 2023-06-20 RX ADMIN — QUETIAPINE FUMARATE 25 MG: 25 TABLET ORAL at 18:07

## 2023-06-20 RX ADMIN — WATER 2.1 ML: 1 INJECTION INTRAMUSCULAR; INTRAVENOUS; SUBCUTANEOUS at 20:18

## 2023-06-20 RX ADMIN — SODIUM CHLORIDE 50 ML/HR: 0.9 INJECTION, SOLUTION INTRAVENOUS at 20:16

## 2023-06-20 NOTE — ASSESSMENT & PLAN NOTE
· Probably secondary to worsening Alzheimer's dementia  · CT head shows diffuse moderate to severe chronic microangiopathic changes  · TSH within normal limits  · Appreciate geriatric service input  · Check B12  · Continue Seroquel 25 mg at bedtime  · Add Depakote 125 mg at bedtime for mood stabilization  · Monitor LFTs in 2 days  · Change Zyprexa to Haldol until stabilized on consistent daily medications  · Continue one-to-one watch  · Fall/aspiration precautions  · We will likely need placement

## 2023-06-20 NOTE — ED NOTES
Governor Lizzie at bedside     [facility], RN  06/20/23 [id number]

## 2023-06-20 NOTE — ASSESSMENT & PLAN NOTE
· Probably secondary to worsening Alzheimer's dementia  · Patient will need to be on one-to-one watch  · Will order delirium protocol  · Fall/aspiration precautions  · Consult  for placement  · We will also consult geriatrician.   The patient normally follows up with them as an outpatient
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
## 2023-06-20 NOTE — ASSESSMENT & PLAN NOTE
Lab Results   Component Value Date    HGBA1C 5.8 03/17/2023       No results for input(s): "POCGLU" in the last 72 hours.     Blood Sugar Average: Last 72 hrs:     · Was taken off metformin  · a1c well controled
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
## 2023-06-20 NOTE — ASSESSMENT & PLAN NOTE
· Was recently taken off finasteride  · Geriatrics recommends discontinuing Oxybutynin   · Monitor for urinary retention  · Bowel regimen  · Last BM 6/21
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
## 2023-06-20 NOTE — ASSESSMENT & PLAN NOTE
Lab Results   Component Value Date    EGFR 39 06/20/2023    EGFR 39 12/27/2022    EGFR 47 12/05/2018    CREATININE 1.57 (H) 06/20/2023    CREATININE 1.55 (H) 12/27/2022    CREATININE 1.38 (H) 03/29/2022     · Isolyte at 40 mL/hr which will provide about 1 L at day until oral intake improves   · Change Lisinopril to Norvasc   · Monitor I+O, monitor PVRs, monitor for constipation  · Limit nephrotoxic agents

## 2023-06-20 NOTE — H&P
[facility] Internal Medicine - History and Physical:       Jolie Eckert 81 y.o. male MRN: [id number]  Unit/Bed#: ED 29 Encounter: [id number]  Admitting Physician: Oscar Francis MD  PCP: Deborah Burton MD  Date of Admission:  06/20/23        Assessment and Plan:     * Behavioral change  Assessment & Plan  · Probably secondary to worsening Alzheimer's dementia  · Patient will need to be on one-to-one watch  · Will order delirium protocol  · Fall/aspiration precautions  · Consult  for placement  · We will also consult geriatrician. 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 ([address])  Assessment & Plan  · Same as above    Acute kidney injury superimposed on chronic kidney disease [facility])  Assessment & Plan  Lab Results   Component Value Date    EGFR 39 06/20/2023    EGFR 39 12/27/2022    EGFR 47 12/05/2018    CREATININE 1.57 (H) 06/20/2023    CREATININE 1.55 (H) 12/27/2022    CREATININE 1.38 (H) 03/29/2022     · Gentle IV fluid hydration overnight    Benign prostatic hyperplasia with urinary frequency  Assessment & Plan  · Was recently taken off finasteride    Controlled type 2 diabetes mellitus without complication, without long-term current use of insulin ([address])  Assessment & Plan  Lab Results   Component Value Date    HGBA1C 5.8 03/17/2023       No results for input(s): "POCGLU" in the last 72 hours. Blood Sugar Average: Last 72 hrs:     · Was taken off metformin    Sensorineural hearing loss (SNHL) of both ears  Assessment & Plan  · Noted            VTE Prophylaxis: Heparin  / sequential compression device   Code Status: full  Discussion with family: Wife present at bedside    Anticipated Length of Stay:  Patient will be admitted on an Observation basis with an anticipated length of stay of  2 midnights.    Justification for Hospital Stay: Behavioral changes    Total Time for Visit, including Counseling / Coordination of Care: 30 minutes. Greater than 50% of this total time spent on direct patient counseling and coordination of care. Chief Complaint:   Behavioral change    History of Present Illness:    Yohana Meza is a 81 y.o. male with a past medical history of Alzheimer's dementia and who normally follows up with a geriatrician. According to the wife the geriatrician discontinued some of his home medications including metformin and finasteride. The patient was brought to the emergency room by his wife because of worsening confusion and aggression for the past 3 days. The patient has not been very cooperative and the wife was concerned about his aggressive behavior. The patient's geriatrician recommended that the patient come to the hospital to be evaluated. In the emergency room the patient needed to be placed on restraints for risk of self-harm. The patient is confused and therefore not a reliable historian. According to the wife she is willing to discuss with  regarding placement options. Review of Systems:    Review of Systems   Unable to perform ROS: Dementia       Past Medical and Surgical History:     Past Medical History:   Diagnosis Date   • Balanitis     last assessed 12/19/14   • BPH (benign prostatic hyperplasia)    • Diabetes mellitus ([address])     blood sugar 167 this am at 0630   • GERD (gastroesophageal reflux disease)    • Heme + stool    • Hypertension    • Lumbar radiculopathy    • Myocardial infarction ([address])     35 years ago   • Phimosis     last assessed 04/27/16   • Sciatica     right side, last assessed 04/25/16       Past Surgical History:   Procedure Laterality Date   • BACK SURGERY      laminectomy Lumbar   • CATARACT EXTRACTION, BILATERAL     • CIRCUMCISION     • HERNIA REPAIR Right    • SC COLONOSCOPY FLX DX W/COLLJ SPEC WHEN PFRMD N/A 1/16/2017    Procedure: EGD AND COLONOSCOPY;  Surgeon: Pama Lanes, DO;  Location: [facility];   Service: General Meds/Allergies:    Prior to Admission medications    Medication Sig Start Date End Date Taking? Authorizing Provider   lisinopril (ZESTRIL) 10 mg tablet Take 1 tablet (10 mg total) by mouth daily 22   Carmina Garcia MD   Mirabegron ER 50 MG TB24 Take 1 tablet (50 mg total) by mouth daily 4/10/23   Kacie Montes De Oca MD   QUEtiapine (SEROquel) 25 mg tablet Take 1 tablet (25 mg total) by mouth daily at bedtime 23   Son Damon MD   senna (SENOKOT) 8.6 mg Take 1 tablet (8.6 mg total) by mouth daily at bedtime 23   Son Damon MD     I have reviewed home medications with patient family member. Allergies: No Known Allergies    Social History:     Marital Status: /Civil Union     Social History     Substance and Sexual Activity   Alcohol Use No   • Alcohol/week: 0.0 standard drinks of alcohol     Social History     Tobacco Use   Smoking Status Former   • Packs/day: 1.00   • Years: 30.00   • Total pack years: 30.00   • Types: Cigarettes   • Quit date: 1998   • Years since quittin.4   Smokeless Tobacco Never   Tobacco Comments    quit 25 yrs ago     Social History     Substance and Sexual Activity   Drug Use No       Family History:    non-contributory    Physical Exam:     Vitals:   Blood Pressure: 155/95 (23 1329)  Pulse: 66 (23 1329)  Temperature: 98 °F (36.7 °C) (23 1329)  Temp Source: Oral (23 1329)  Respirations: 16 (23 1329)  SpO2: 98 % (23 1329)    Physical Exam  HENT:      Head: Normocephalic and atraumatic. Eyes:      Extraocular Movements: Extraocular movements intact. Pupils: Pupils are equal, round, and reactive to light. Cardiovascular:      Rate and Rhythm: Normal rate and regular rhythm. Pulmonary:      Effort: Pulmonary effort is normal.      Breath sounds: Normal breath sounds. Abdominal:      General: Bowel sounds are normal.      Palpations: Abdomen is soft.    Musculoskeletal:         General: Normal range of motion. Cervical back: Neck supple. Skin:     General: Skin is warm and dry. Neurological:      Mental Status: He is alert. He is disoriented. Psychiatric:         Mood and Affect: Mood normal.       Additional Data:     Lab Results: I have personally reviewed pertinent reports. Results from last 7 days   Lab Units 06/20/23  1438   WBC Thousand/uL 4.76   HEMOGLOBIN g/dL 14.3   HEMATOCRIT % 41.8   PLATELETS Thousands/uL 173   NEUTROS PCT % 63   LYMPHS PCT % 24   MONOS PCT % 11   EOS PCT % 2     Results from last 7 days   Lab Units 06/20/23  1438   SODIUM mmol/L 140   POTASSIUM mmol/L 4.2   CHLORIDE mmol/L 112*   CO2 mmol/L 25   BUN mg/dL 33*   CREATININE mg/dL 1.57*   ANION GAP mmol/L 3   CALCIUM mg/dL 9.6   ALBUMIN g/dL 3.6   TOTAL BILIRUBIN mg/dL 0.56   ALK PHOS U/L 55   ALT U/L 17   AST U/L 9   GLUCOSE RANDOM mg/dL 108                       Imaging: I have personally reviewed pertinent reports. CT head without contrast   ED Interpretation by Sunny Gusman DO (06/20 1748)   No acute intracranial abnormality. Will await formal radiology interpretation. Final Result by Coley Seip, MD (06/20 1759)      No acute intracranial abnormality. Workstation performed: [id number]             [facility] Records Reviewed: Yes     ** Please Note: This note has been constructed using a voice recognition system.  **

## 2023-06-20 NOTE — ED ATTENDING ATTESTATION
6/20/2023  IFelton DO, saw and evaluated the patient. I have discussed the patient with the resident/non-physician practitioner and agree with the resident's/non-physician practitioner's findings, Plan of Care, and MDM as documented in the resident's/non-physician practitioner's note, except where noted. All available labs and Radiology studies were reviewed. I was present for key portions of any procedure(s) performed by the resident/non-physician practitioner and I was immediately available to provide assistance. At this point I agree with the current assessment done in the Emergency Department. I have conducted an independent evaluation of this patient a history and physical is as follows:    Patient is an 81-year-old male with a history of dementia, CKD, paroxysmal atrial fibrillation, comedy by his wife. She says he has a history of dementia but she is normally able to care for him at home but over the last 3 days has been increasingly agitated, angry, aggressive, forgetting who she is, and not very cooperative. He has required more help than she can provide at home. She has not seen a sudden instant change, has not noticed any change in bladder or bowel habits. Not heard him complaining of chest pain, palpitations, fever or chills or other complaints. She has not witnessed any slurred speech, focal weakness or facial droop. The patient himself says he knows he is in the hospital, says he just wants to go home. He recognizes his wife, he denies headache, chest pain, shortness of breath, fever, chills, any pain anywhere, denies dysuria hematuria. Wife called the patient's geriatric physician who recommended evaluation in the emergency department.     General:  Patient is well-appearing  Head:  Atraumatic  Eyes:  Conjunctiva pink, Extraocular muscle intact, PERRL  ENT:  Mucous membranes are moist  Neck:  Supple  Cardiac:  S1-S2, without murmurs  Lungs:  Clear to auscultation bilaterally  Abdomen:  Soft, nontender, normal bowel sounds, no CVA tenderness, no tympany, no rigidity, no guarding  Extremities:  Normal range of motion  Neurologic:  Awake, fluent speech, normal comprehension. Oriented to person and place only, cranial nerves 2-12 are intact, strength is 5/5 in the bilateral upper & lower extremities, no slurred speech, no facial droop, no deficit on finger-to-nose testing, no pronator drift. Sensation to light touch is equal and symmetric throughout the whole body  Skin:  Pink warm and dry, no rash  Psychiatric:  Alert, slightly angry            ED Course     CT head without contrast   ED Interpretation   No acute intracranial abnormality. Will await formal radiology interpretation. Final Result      No acute intracranial abnormality. Workstation performed: [id number]             Labs Reviewed   COMPREHENSIVE METABOLIC PANEL - Abnormal       Result Value Ref Range Status    Sodium 140  135 - 147 mmol/L Final    Potassium 4.2  3.5 - 5.3 mmol/L Final    Chloride 112 (*) 96 - 108 mmol/L Final    CO2 25  21 - 32 mmol/L Final    ANION GAP 3  mmol/L Final    BUN 33 (*) 5 - 25 mg/dL Final    Creatinine 1.57 (*) 0.60 - 1.30 mg/dL Final    Comment: Standardized to IDMS reference method    Glucose 108  65 - 140 mg/dL Final    Comment: If the patient is fasting, the ADA then defines impaired fasting glucose as > 100 mg/dL and diabetes as > or equal to 123 mg/dL. Specimen collection should occur prior to Sulfasalazine administration due to the potential for falsely depressed results. Specimen collection should occur prior to Sulfapyridine administration due to the potential for falsely elevated results. Calcium 9.6  8.3 - 10.1 mg/dL Final    AST 9  5 - 45 U/L Final    Comment: Specimen collection should occur prior to Sulfasalazine administration due to the potential for falsely depressed results.      ALT 17  12 - 78 U/L Final    Comment: Specimen collection should occur prior to Sulfasalazine and/or Sulfapyridine administration due to the potential for falsely depressed results. Alkaline Phosphatase 55  46 - 116 U/L Final    Total Protein 6.7  6.4 - 8.4 g/dL Final    Albumin 3.6  3.5 - 5.0 g/dL Final    Total Bilirubin 0.56  0.20 - 1.00 mg/dL Final    Comment: Use of this assay is not recommended for patients undergoing treatment with eltrombopag due to the potential for falsely elevated results.     eGFR 39  ml/min/1.73sq m Final    Narrative:     National Kidney Disease Foundation guidelines for Chronic Kidney Disease (CKD):   •  Stage 1 with normal or high GFR (GFR > 90 mL/min/1.73 square meters)  •  Stage 2 Mild CKD (GFR = 60-89 mL/min/1.73 square meters)  •  Stage 3A Moderate CKD (GFR = 45-59 mL/min/1.73 square meters)  •  Stage 3B Moderate CKD (GFR = 30-44 mL/min/1.73 square meters)  •  Stage 4 Severe CKD (GFR = 15-29 mL/min/1.73 square meters)  •  Stage 5 End Stage CKD (GFR <15 mL/min/1.73 square meters)  Note: GFR calculation is accurate only with a steady state creatinine   CBC AND DIFFERENTIAL    WBC 4.76  4.31 - 10.16 Thousand/uL Final    RBC 4.41  3.88 - 5.62 Million/uL Final    Hemoglobin 14.3  12.0 - 17.0 g/dL Final    Hematocrit 41.8  36.5 - 49.3 % Final    MCV 95  82 - 98 fL Final    MCH 32.4  26.8 - 34.3 pg Final    MCHC 34.2  31.4 - 37.4 g/dL Final    RDW 13.0  11.6 - 15.1 % Final    MPV 10.6  8.9 - 12.7 fL Final    Platelets 320  983 - 390 Thousands/uL Final    nRBC 0  /100 WBCs Final    Neutrophils Relative 63  43 - 75 % Final    Immat GRANS % 0  0 - 2 % Final    Lymphocytes Relative 24  14 - 44 % Final    Monocytes Relative 11  4 - 12 % Final    Eosinophils Relative 2  0 - 6 % Final    Basophils Relative 0  0 - 1 % Final    Neutrophils Absolute 3.02  1.85 - 7.62 Thousands/µL Final    Immature Grans Absolute 0.01  0.00 - 0.20 Thousand/uL Final    Lymphocytes Absolute 1.13  0.60 - 4.47 Thousands/µL Final    Monocytes Absolute 0.51  0.17 - 1.22 Thousand/µL Final    Eosinophils Absolute 0.07  0.00 - 0.61 Thousand/µL Final    Basophils Absolute 0.02  0.00 - 0.10 Thousands/µL Final   UA W REFLEX TO MICROSCOPIC WITH REFLEX TO CULTURE    Color, UA Light Yellow   Final    Clarity, UA Clear   Final    Specific Gravity, UA 1.019  1.003 - 1.030 Final    pH, UA 5.5  4.5, 5.0, 5.5, 6.0, 6.5, 7.0, 7.5, 8.0 Final    Leukocytes, UA Negative  Negative Final    Nitrite, UA Negative  Negative Final    Protein, UA Negative  Negative mg/dl Final    Glucose, UA Negative  Negative mg/dl Final    Ketones, UA Negative  Negative mg/dl Final    Urobilinogen, UA <2.0  <2.0 mg/dl mg/dl Final    Bilirubin, UA Negative  Negative Final    Occult Blood, UA Negative  Negative Final   PLATELET COUNT         The patient was evaluated for sepsis in the emergency department. After that assessment, at the time of admission, no sepsis, severe sepsis, or septic shock was found. DIAGNOSIS:  Exacerbation of chronic dementia, acute agitation, chronic kidney disease    MEDICAL DECISION MAKING CODING    The differential diagnosis before testing included (but is not limited to) acute exacerbation of chronic dementia, acute electrolyte abnormality, acute urinary tract infection, and acute intracranial hemorrhage which is a medical condition that poses a threat to life/function. Patient presents with acute new problem with:  Threat to life or bodily function      Chronic conditions affecting care: As per HPI    COLLECTION AND INTERPRETATION OF DATA  Additional history obtained from: Wife  I reviewed prior external notes, including geriatrics telephone note from earlier today and geriatrics office visit from May 24, 2023    I ordered each unique test  Tests reviewed personally by me:  ECG: See my ED course  Labs: See above  Imaging: I independently reviewed the head CT and found no acute pathology.     Discussion with other providers: Admitting medicine physician      RISK    Treatment:  Patient admitted    Surgery  -I considered surgery may be necessary prior to completion of the work up but afterwards there is no indication for immediate surgery        Critical Care Time  Procedures

## 2023-06-21 PROBLEM — R26.2 AMBULATORY DYSFUNCTION: Status: ACTIVE | Noted: 2023-06-21

## 2023-06-21 PROBLEM — R46.89 BEHAVIORAL CHANGE: Status: RESOLVED | Noted: 2023-04-12 | Resolved: 2023-06-21

## 2023-06-21 LAB
ANION GAP SERPL CALCULATED.3IONS-SCNC: 2 MMOL/L
BUN SERPL-MCNC: 29 MG/DL (ref 5–25)
CALCIUM SERPL-MCNC: 9.3 MG/DL (ref 8.3–10.1)
CHLORIDE SERPL-SCNC: 113 MMOL/L (ref 96–108)
CO2 SERPL-SCNC: 26 MMOL/L (ref 21–32)
CREAT SERPL-MCNC: 1.45 MG/DL (ref 0.6–1.3)
ERYTHROCYTE [DISTWIDTH] IN BLOOD BY AUTOMATED COUNT: 12.9 % (ref 11.6–15.1)
GFR SERPL CREATININE-BSD FRML MDRD: 42 ML/MIN/1.73SQ M
GLUCOSE SERPL-MCNC: 113 MG/DL (ref 65–140)
HCT VFR BLD AUTO: 42.3 % (ref 36.5–49.3)
HGB BLD-MCNC: 14 G/DL (ref 12–17)
MAGNESIUM SERPL-MCNC: 1.9 MG/DL (ref 1.6–2.6)
MCH RBC QN AUTO: 31.5 PG (ref 26.8–34.3)
MCHC RBC AUTO-ENTMCNC: 33.1 G/DL (ref 31.4–37.4)
MCV RBC AUTO: 95 FL (ref 82–98)
PLATELET # BLD AUTO: 144 THOUSANDS/UL (ref 149–390)
PMV BLD AUTO: 10.8 FL (ref 8.9–12.7)
POTASSIUM SERPL-SCNC: 4.4 MMOL/L (ref 3.5–5.3)
RBC # BLD AUTO: 4.45 MILLION/UL (ref 3.88–5.62)
SODIUM SERPL-SCNC: 141 MMOL/L (ref 135–147)
TSH SERPL DL<=0.05 MIU/L-ACNC: 2.94 UIU/ML (ref 0.45–4.5)
VIT B12 SERPL-MCNC: 195 PG/ML (ref 180–914)
WBC # BLD AUTO: 3.78 THOUSAND/UL (ref 4.31–10.16)

## 2023-06-21 PROCEDURE — 80048 BASIC METABOLIC PNL TOTAL CA: CPT | Performed by: HOSPITALIST

## 2023-06-21 PROCEDURE — 99232 SBSQ HOSP IP/OBS MODERATE 35: CPT | Performed by: NURSE PRACTITIONER

## 2023-06-21 PROCEDURE — 84443 ASSAY THYROID STIM HORMONE: CPT | Performed by: HOSPITALIST

## 2023-06-21 PROCEDURE — 97163 PT EVAL HIGH COMPLEX 45 MIN: CPT

## 2023-06-21 PROCEDURE — 97167 OT EVAL HIGH COMPLEX 60 MIN: CPT

## 2023-06-21 PROCEDURE — 99205 OFFICE O/P NEW HI 60 MIN: CPT | Performed by: INTERNAL MEDICINE

## 2023-06-21 PROCEDURE — 82607 VITAMIN B-12: CPT | Performed by: NURSE PRACTITIONER

## 2023-06-21 PROCEDURE — 85027 COMPLETE CBC AUTOMATED: CPT | Performed by: HOSPITALIST

## 2023-06-21 PROCEDURE — 83735 ASSAY OF MAGNESIUM: CPT | Performed by: HOSPITALIST

## 2023-06-21 RX ORDER — ACETAMINOPHEN 325 MG/1
650 TABLET ORAL EVERY 6 HOURS PRN
Status: DISCONTINUED | OUTPATIENT
Start: 2023-06-21 | End: 2023-07-03 | Stop reason: HOSPADM

## 2023-06-21 RX ORDER — SODIUM CHLORIDE, SODIUM GLUCONATE, SODIUM ACETATE, POTASSIUM CHLORIDE, MAGNESIUM CHLORIDE, SODIUM PHOSPHATE, DIBASIC, AND POTASSIUM PHOSPHATE .53; .5; .37; .037; .03; .012; .00082 G/100ML; G/100ML; G/100ML; G/100ML; G/100ML; G/100ML; G/100ML
40 INJECTION, SOLUTION INTRAVENOUS CONTINUOUS
Status: DISCONTINUED | OUTPATIENT
Start: 2023-06-21 | End: 2023-06-21

## 2023-06-21 RX ORDER — DIVALPROEX SODIUM 125 MG/1
125 CAPSULE, COATED PELLETS ORAL
Status: DISCONTINUED | OUTPATIENT
Start: 2023-06-21 | End: 2023-06-22

## 2023-06-21 RX ORDER — AMLODIPINE BESYLATE 5 MG/1
5 TABLET ORAL DAILY
Status: DISCONTINUED | OUTPATIENT
Start: 2023-06-21 | End: 2023-07-03 | Stop reason: HOSPADM

## 2023-06-21 RX ORDER — HALOPERIDOL 5 MG/ML
0.5 INJECTION INTRAMUSCULAR EVERY 4 HOURS PRN
Status: DISCONTINUED | OUTPATIENT
Start: 2023-06-21 | End: 2023-07-03 | Stop reason: HOSPADM

## 2023-06-21 RX ORDER — ONDANSETRON 2 MG/ML
4 INJECTION INTRAMUSCULAR; INTRAVENOUS EVERY 6 HOURS PRN
Status: DISCONTINUED | OUTPATIENT
Start: 2023-06-21 | End: 2023-07-03 | Stop reason: HOSPADM

## 2023-06-21 RX ADMIN — HALOPERIDOL LACTATE 0.5 MG: 5 INJECTION, SOLUTION INTRAMUSCULAR at 18:48

## 2023-06-21 RX ADMIN — LISINOPRIL 10 MG: 10 TABLET ORAL at 08:37

## 2023-06-21 RX ADMIN — HALOPERIDOL LACTATE 0.25 MG: 5 INJECTION, SOLUTION INTRAMUSCULAR at 12:22

## 2023-06-21 RX ADMIN — SODIUM CHLORIDE, SODIUM GLUCONATE, SODIUM ACETATE, POTASSIUM CHLORIDE, MAGNESIUM CHLORIDE, SODIUM PHOSPHATE, DIBASIC, AND POTASSIUM PHOSPHATE 40 ML/HR: .53; .5; .37; .037; .03; .012; .00082 INJECTION, SOLUTION INTRAVENOUS at 10:08

## 2023-06-21 RX ADMIN — HALOPERIDOL LACTATE 0.5 MG: 5 INJECTION, SOLUTION INTRAMUSCULAR at 14:57

## 2023-06-21 RX ADMIN — HALOPERIDOL LACTATE 0.25 MG: 5 INJECTION, SOLUTION INTRAMUSCULAR at 02:16

## 2023-06-21 RX ADMIN — AMLODIPINE BESYLATE 5 MG: 5 TABLET ORAL at 10:09

## 2023-06-21 NOTE — PROGRESS NOTES
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 (HCC)  Assessment & Plan  · Probably secondary to worsening Alzheimer's dementia  · CT head shows diffuse moderate to severe chronic microangiopathic changes  · TSH within normal limits  · Appreciate geriatric service input  · Check B12  · Continue Seroquel 25 mg at bedtime  · Add Depakote 125 mg at bedtime for mood stabilization  · Monitor LFTs in 2 days  · Change Zyprexa to Haldol until stabilized on consistent daily medications  · Continue one-to-one watch  · Fall/aspiration precautions  · We will likely need placement    Acute kidney injury superimposed on chronic kidney disease [facility])  Assessment & Plan  Lab Results   Component Value Date    EGFR 39 06/20/2023    EGFR 39 12/27/2022    EGFR 47 12/05/2018    CREATININE 1.57 (H) 06/20/2023    CREATININE 1.55 (H) 12/27/2022    CREATININE 1.38 (H) 03/29/2022     · Isolyte at 40 mL/hr which will provide about 1 L at day until oral intake improves   · Change Lisinopril to Norvasc   · Monitor I+O, monitor PVRs, monitor for constipation  · Limit nephrotoxic agents     Benign prostatic hyperplasia with urinary frequency  Assessment & Plan  · Was recently taken off finasteride  · Geriatrics recommends discontinuing Oxybutynin   · Monitor for urinary retention  · Bowel regimen  · Last BM 6/21    Ambulatory dysfunction  Assessment & Plan  Unsteady gait with history of recurrent falls  · Fall precautions  · PT/OT eval    Controlled type 2 diabetes mellitus without complication, without long-term current use of insulin (HCC)  Assessment & Plan  Lab Results   Component Value Date    HGBA1C 5.8 03/17/2023       No results for input(s): "POCGLU" in the last 72 hours.     Blood Sugar Average: Last 72 hrs:     · Was taken off metformin  · a1c well controled     Sensorineural hearing loss (SNHL) of both ears  Assessment & Plan  · Noted         VTE Pharmacologic Prophylaxis:   Moderate Risk (Score 3-4) - Pharmacological DVT Prophylaxis Ordered: heparin. Patient Centered Rounds: I performed bedside rounds with nursing staff today. Discussions with Specialists or Other Care Team Provider: nursing, CM, geriatrics     Education and Discussions with Family / Patient: Updated  (wife) at bedside. Total Time Spent on Date of Encounter in care of patient: 35 minutes This time was spent on one or more of the following: performing physical exam; counseling and coordination of care; obtaining or reviewing history; documenting in the medical record; reviewing/ordering tests, medications or procedures; communicating with other healthcare professionals and discussing with patient's family/caregivers. Current Length of Stay: 0 day(s)  Current Patient Status: Observation   Certification Statement: The patient will continue to require additional inpatient hospital stay due to Mood stabilization  Discharge Plan: Anticipate discharge in 48-72 hrs to discharge location to be determined pending rehab evaluations. Code Status: Level 1 - Full Code    Subjective:   Patient seen and examined at bedside. He is a poor historian given dementia. His wife is at bedside along with a one-to-one for safety. Patient somewhat participates in examination. He is slightly cooperative. He is mostly nonverbal.  He has no signs of discomfort. He was able to eat with the assistance of the one-to-one. One-to-one notes that he is urinating well and had a bowel movement this morning.     Objective:     Vitals:   Temp (24hrs), Av °F (36.7 °C), Min:98 °F (36.7 °C), Max:98 °F (36.7 °C)    Temp:  [98 °F (36.7 °C)] 98 °F (36.7 °C)  HR:  [62-66] 62  Resp:  [16-20] 18  BP: (131-179)/(67-95) 143/69  SpO2:  [94 %-98 %] 94 %  There is no height or weight on file to calculate BMI. Input and Output Summary (last 24 hours): Intake/Output Summary (Last 24 hours) at 6/21/2023 1105  Last data filed at 6/20/2023 1448  Gross per 24 hour   Intake --   Output 200 ml   Net -200 ml       Physical Exam:   Physical Exam  Vitals and nursing note reviewed. Constitutional:       General: He is not in acute distress. Appearance: He is ill-appearing. He is not toxic-appearing or diaphoretic. HENT:      Head: Normocephalic. Mouth/Throat:      Mouth: Mucous membranes are moist.   Eyes:      Conjunctiva/sclera: Conjunctivae normal.   Cardiovascular:      Rate and Rhythm: Normal rate. Pulmonary:      Effort: Pulmonary effort is normal.      Breath sounds: Normal breath sounds. No wheezing, rhonchi or rales. Abdominal:      General: Bowel sounds are normal. There is no distension. Palpations: Abdomen is soft. Tenderness: There is no abdominal tenderness. Musculoskeletal:         General: Normal range of motion. Cervical back: Normal range of motion. Right lower leg: No edema. Left lower leg: No edema. Skin:     General: Skin is warm and dry. Capillary Refill: Capillary refill takes less than 2 seconds. Neurological:      Mental Status: He is alert. Mental status is at baseline. He is disoriented. Motor: Weakness present. Psychiatric:         Speech: He is noncommunicative. Behavior: Behavior is cooperative. Cognition and Memory: Memory is impaired. Judgment: Judgment is impulsive and inappropriate.           Additional Data:     Labs:  Results from last 7 days   Lab Units 06/21/23  0442 06/20/23  1438   WBC Thousand/uL 3.78* 4.76   HEMOGLOBIN g/dL 14.0 14.3   HEMATOCRIT % 42.3 41.8   PLATELETS Thousands/uL 144* 173   NEUTROS PCT %  --  63   LYMPHS PCT %  --  24   MONOS PCT %  --  11   EOS PCT %  --  2     Results from last 7 days   Lab Units 06/21/23  0442 06/20/23  1438   SODIUM mmol/L 141 140   POTASSIUM mmol/L 4.4 4.2   CHLORIDE mmol/L 113* 112*   CO2 mmol/L 26 25   BUN mg/dL 29* 33*   CREATININE mg/dL 1.45* 1.57*   ANION GAP mmol/L 2 3   CALCIUM mg/dL 9.3 9.6   ALBUMIN g/dL  --  3.6   TOTAL BILIRUBIN mg/dL  --  0.56   ALK PHOS U/L  --  55   ALT U/L  --  17   AST U/L  --  9   GLUCOSE RANDOM mg/dL 113 108                       Lines/Drains:  Invasive Devices     Peripheral Intravenous Line  Duration           Peripheral IV 06/20/23 Right Antecubital <1 day                      Imaging: Reviewed radiology reports from this admission including: CT head    Recent Cultures (last 7 days):         Last 24 Hours Medication List:   Current Facility-Administered Medications   Medication Dose Route Frequency Provider Last Rate   • acetaminophen  650 mg Oral Q6H PRN JONAS Alfaro     • amLODIPine  5 mg Oral Daily JONAS Alfaro     • divalproex sodium  125 mg Oral HS JONAS Alfaro     • haloperidol lactate  0.25 mg Intramuscular Q4H PRN Augustus Shin MD     • heparin (porcine)  5,000 Units Subcutaneous [facility] Augustus Shin MD     • multi-electrolyte  40 mL/hr Intravenous Continuous JONAS Alfaro 40 mL/hr (06/21/23 1008)   • ondansetron  4 mg Intravenous Q6H PRN JONAS Alfaro     • QUEtiapine  25 mg Oral HS Augustus Shin MD     • senna  8.6 mg Oral HS Augustus Shin MD          Today, Patient Was Seen By: JONAS Alfaro    **Please Note: This note may have been constructed using a voice recognition system. **

## 2023-06-21 NOTE — CASE MANAGEMENT
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 [facility])   Patient Active Problem List    Diagnosis Date Noted   • Ambulatory dysfunction 2023   • Acute kidney injury superimposed on chronic kidney disease ([address]) 2023   • Urinary incontinence without sensory awareness 2023   • Slow transit constipation 2023   • At high risk for falls 2023   • Lumbar radiculopathy    • UTI symptoms 2023   • Paroxysmal atrial fibrillation ([address]) 01/10/2023   • Platelets decreased ([address]) 01/10/2023   • 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 ([address]) 2022   • Neurologic gait dysfunction 2022   • Stage 3b chronic kidney disease ([address]) 2021   • OAB (overactive bladder) 2021   • Benign prostatic hyperplasia with urinary frequency 2021   • Controlled type 2 diabetes mellitus without complication, without long-term current use of insulin ([address]) 2019   • Sensorineural hearing loss (SNHL) of both ears 10/21/2016   • Seasonal allergies 2015   • Spinal stenosis 2012      LOS (days): 0  Geometric Mean LOS (GMLOS) (days):   Days to GMLOS:     OBJECTIVE:              Current admission status: Observation       Preferred Pharmacy:   [address], [address] [place] [address]  Phone: [phone] Fax: [phone]    Primary Care Provider: La Ortega MD    Primary Insurance: [facility]  Secondary Insurance:     ASSESSMENT:  [address], [address] - Spouse   Primary Phone: [phone] (Mobile)  Home Phone: [phone]                         Readmission Root Cause  30 Day Readmission: No    Patient Information  Admitted from[de-identified] Home  Mental Status: Confused  During Assessment patient was accompanied by: Spouse  Assessment information provided by[de-identified] Spouse  Primary Caregiver: Spouse  Caregiver's Name[de-identified] Sujata Suh, wife  Caregiver's Relationship to Patient[de-identified] Family Member  Caregiver's Telephone Number[de-identified] [phone]  Support Systems: Spouse/significant other, Family members  [place] of Residence: [facility] [address] [facility] do you live in?: [place]  Type of Current Residence: Bi-level  Upon entering residence, is there a bedroom on the main floor (no further steps)?: No  A bedroom is located on the following floor levels of residence (select all that apply):: 2nd Floor  In the last 12 months, was there a time when you were not able to pay the mortgage or rent on time?: No  In the last 12 months, was there a time when you did not have a steady place to sleep or slept in a shelter (including now)?: No  Homeless/housing insecurity resource given?: N/A  Living Arrangements: Lives w/ Spouse/significant other  Is patient a ?: Yes (Crista)  Is patient active with [facility]: No    Activities of Daily Living Prior to Admission  Functional Status: Assistance  Completes ADLs independently?: No  Level of ADL dependence: Assistance  Ambulates independently?: Yes  Does patient use assisted devices?: Yes  Assisted Devices (DME) used: Axel Raya (Comment) (3-pronged cane, grab bars on toilet)  Does patient currently own DME?: Yes  What DME does the patient currently own?: Axel Raya (Comment) (3-pronged cane, grab bars on the toilet)  Does patient have a history of Outpatient Therapy (PT/OT)?: No  Does the patient have a history of Short-Term Rehab?: No  Does patient have a history of HHC?: No  Does patient currently have [facility]?: No         Patient Information Continued  Income Source: Pension/jail  Does patient have prescription coverage?: Yes  Within the past 12 months, you worried that your food would run out before you got the money to buy more.: Never true  Within the past 12 months, the food you bought just didn't last and you didn't have money to get more.: Never true  Food insecurity resource given?: N/A  Does patient receive dialysis treatments?: No  Does patient have a history of substance abuse?: No  Does patient have a history of Mental Health Diagnosis?: No         Means of Transportation  Means of Transport to Appts[de-identified] Family transport  In the past 12 months, has lack of transportation kept you from medical appointments or from getting medications?: No  In the past 12 months, has lack of transportation kept you from meetings, work, or from getting things needed for daily living?: No  Was application for public transport provided?: N/A        DISCHARGE DETAILS:    Discharge planning discussed with[de-identified] patient's wife William Kapadia of Choice: Yes  Comments - Freedom of Choice: would like referral to [address] [place] [address] contacted family/caregiver?: Yes  Were Treatment Team discharge recommendations reviewed with patient/caregiver?: Yes  Did patient/caregiver verbalize understanding of patient care needs?: Yes  Were patient/caregiver advised of the risks associated with not following Treatment Team discharge recommendations?: Yes    Contacts  Patient Contacts: Roxann Gardner, wife  Relationship to Patient[de-identified] Family  Contact Method: Phone  Phone Number: [phone]  Reason/Outcome: Continuity of Care, Emergency Contact, Discharge Planning              Other Referral/Resources/Interventions Provided:  Interventions: SNF         Treatment Team Recommendation: SNF  Discharge Destination Plan[de-identified] SNF  Transport at Discharge : Other (Comment) (TBD)                Patient/caregiver received discharge checklist.  Content reviewed. Patient/caregiver encouraged to participate in discharge plan of care prior to discharge home.   CM reviewed d/c planning process including the following: identifying help at home, patient preference for d/c planning needs, Discharge Lounge, [facility] Meds to Bed program, availability of treatment team to discuss questions or concerns patient and/or family may have regarding understanding medications and recognizing signs and symptoms once discharged. CM also encouraged patient to follow up with all recommended appointments after discharge. Patient advised of importance for patient and family to participate in managing patient’s medical well being. Additional Comments: Introduced self and role to patient's wife Carl Unger. Carl Unger provides full-time care for patient, who has Alzheimer's dementia. Patient does have home health aides through [employer] on Tuesdays from 9-12, Wednesdays & Fridays from 1030 to 230; they also attend the "[employer]."  Patient does have a long-term care policy, and had been working with an OP CM to gather information for eventual placement. Wife is interested in [facility], discussed that CM will make a referral to [facility] to see if patient can be admitted to rehab with eventual long-term placement. CM did discuss with wife that it depends on bed availability, weaning of 1:1 sitter, and insurance authorization. Long-term care policy is through [facility] [id number]. Referral placed to [facility], CM will continue to follow.

## 2023-06-21 NOTE — OCCUPATIONAL THERAPY NOTE
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)  Active Problems:    Sensorineural hearing loss (SNHL) of both ears    Controlled type 2 diabetes mellitus without complication, without long-term current use of insulin (HCC)    Benign prostatic hyperplasia with urinary frequency    Acute kidney injury superimposed on chronic kidney disease [facility])    Ambulatory dysfunction    Past Medical History  Past Medical History:   Diagnosis Date    Balanitis     last assessed 12/19/14    BPH (benign prostatic hyperplasia)     Diabetes mellitus (HCC)     blood sugar 167 this am at 0630    GERD (gastroesophageal reflux disease)     Heme + stool     Hypertension     Lumbar radiculopathy     Myocardial infarction ([address])     35 years ago    Phimosis     last assessed 04/27/16    Sciatica     right side, last assessed 04/25/16     Past Surgical History  Past Surgical History:   Procedure Laterality Date    BACK SURGERY      laminectomy Lumbar    CATARACT EXTRACTION, BILATERAL      CIRCUMCISION      HERNIA REPAIR Right     KS COLONOSCOPY FLX DX W/COLLJ SPEC WHEN PFRMD N/A 1/16/2017    Procedure: EGD AND COLONOSCOPY;  Surgeon: Fauzia Hernandez DO;  Location: [facility];   Service: General         06/21/23 1414   OT Last Visit   OT Visit Date 06/21/23   Note Type   Note type Evaluation   Pain Assessment   Pain Assessment Tool FLACC   Pain Rating: FLACC (Rest) - Face 0   Pain Rating: FLACC (Rest) - Legs 0   Pain Rating: FLACC (Rest) - Activity 0   Pain Rating: FLACC (Rest) - Cry 0   Pain Rating: FLACC (Rest) - Consolability 0   Score: FLACC (Rest) 0   Pain Rating: FLACC (Activity) - Face 0   Pain Rating: FLACC (Activity) - Legs 0   Pain Rating: FLACC (Activity) - Activity 0   Pain Rating: FLACC (Activity) - Cry 0   Pain Rating: FLACC (Activity) - Consolability 0   Score: FLACC (Activity) 0 Restrictions/Precautions   Weight Bearing Precautions Per Order No   Other Precautions 1:1;Cognitive; Chair Alarm; Bed Alarm; Fall Risk;Hard of hearing; Impulsive;Agitated  (pt wears B/L hearing aides)   Home Living   Type of [address] [place] [address] Dr Two level  (Bi-level)   [address] [facility] bars in shower;Commode; Shower chair   Home Equipment Walker;Cane   Additional Comments Pt's wife at bedside. Pt's wife reports home setup 2* to pt being a poor historian. Pt's wife reports residing in a bi-level house and owns DME. Prior Function   Level of [place] Needs assistance with ADLs; Needs assistance with functional mobility; Needs assistance with IADLS   Lives With Spouse   Receives Help From Family;Personal care attendant  (Pt's wife reports receiving home care t/o the week)   IADLs Family/Friend/Other provides transportation; Family/Friend/Other provides meals; Family/Friend/Other provides medication management   Falls in the last 6 months 5 to 10  (Pt's wife reports about 5 falls in the past 6 months- she thinks it might be more.)   Vocational Retired   Comments PTA, pt's wife reports pt needs assistance with ADLs, IADLs, and functional mobility. Pt does not use any AD or DME at baseline. Pt's wife reports home health aide comes in t/o week to help   Lifestyle   Autonomy PTA, pt's wife reports pt needs assistance with ADLs, IADLs, and functional mobility. Pt does not use any AD or DME at baseline. Pt's wife reports home health aide comes in t/o week to help   Reciprocal Relationships Supportive spouse   Service to Others Retired   [address] [place] [address] Enjoys listening to music   General   Family/Caregiver Present Yes   Additional General Comments Pt's wife at bedside during the session.    ADL   Where Assessed Chair   Eating Assistance 4  Minimal Assistance   Grooming Assistance 3  Moderate Assistance   UB Bathing Assistance 2  Maximal Assistance   LB Bathing Assistance 2  Maximal Assistance   UB Dressing Assistance 2  Maximal Assistance   LB Dressing Assistance 2  Maximal Assistance   Toileting Assistance  2  Maximal Assistance   Functional Assistance 2  Maximal Assistance   Additional Comments Pt limited in ADL status due to decreased endurance and decreased cognition and agitation. Bed Mobility   Supine to Sit Unable to assess   Sit to Supine 3  Moderate assistance   Additional items Assist x 2; Increased time required;LE management   Additional Comments Pt greeted walking in hallway with PCA. Transfers   Sit to Stand 3  Moderate assistance   Additional items Assist x 1; Increased time required;Verbal cues   Stand to Sit 2  Maximal assistance   Additional items Increased time required;Verbal cues; Assist x 2  (Pt required max Ax2 with VC and LE management to sit on EOB at the end of the session.)   Additional Comments Pt mod Ax1 for sit to stand transfers w/ B/L HHA.  Pt required max Ax2 w/ HHA to sit EOB at the end of the session due to patient resistance   Functional Mobility   Functional Mobility 4  Minimal assistance   Additional Comments Pt mod Ax1 w/ HHA at a short household distance w/ SBAx1 with chair follow for safety   Additional items Hand hold assistance   Balance   Static Sitting Fair -   Dynamic Sitting Poor +   Static Standing Poor   Dynamic Standing Poor -   Ambulatory Poor -   Activity Tolerance   Activity Tolerance Patient limited by fatigue;Treatment limited secondary to agitation   Medical Staff Made Aware Co-eval with BRIDGETTE Kimble 2* to pt's medical complexities and decreased endurance   Nurse Made Aware RN cleared and updated   RUE Assessment   RUE Assessment WFL  (Unable to formally assess 2* to pt's decreased command following)   LUE Assessment   LUE Assessment WFL   Hand Function   Gross Motor Coordination Functional   Fine Motor Coordination Functional   Sensation   Light Touch No apparent deficits   Psychosocial   Psychosocial (WDL) WDL   Cognition   Overall Cognitive Status (S)  Impaired   Arousal/Participation Uncooperative; Sedated;Poorly responsive   Attention Difficulty dividing attention   Orientation Level Oriented to person;Disoriented to person;Disoriented to place; Disoriented to time;Disoriented to situation   Memory Decreased short term memory;Decreased long term memory;Decreased recall of recent events;Decreased recall of precautions   Following Commands Follows one step commands inconsistently   Comments Pt was pleasant t/o session, but became agitated at the end of the session when directing pt back to bed. Pt withdrawn and irritable at times. Pt only able to follow one step commands rarely with VC and tactile cues. Assessment   Limitation Decreased ADL status; Decreased UE ROM; Decreased UE strength;Decreased cognition;Decreased endurance;Decreased Safe judgement during ADL;Decreased high-level ADLs; Decreased self-care trans   Prognosis Fair   Assessment Pt is 82 y/o male who presented to [facility] on 6/20/23 with worsening confusion and aggression . Pt has a diagnosis of moderate late onset Alzheimer's dementia with psychotic disturbance. Pt has PMH of Balanitis, BPH (benign prostatic hyperplasia), Diabetes mellitus ([address]), GERD (gastroesophageal reflux disease), Heme + stool, Hypertension, Lumbar radiculopathy, Myocardial infarction ([address]), Phimosis, and Sciatica. Pt greeted for OT evaluation on 6/21/23. Pt's wife reports pt lives in a bi-level house. PTA, pt's wife reports pt requires assistance with ADLs, IADLs, and functional mobility w/o device. Pt (-) does not drive. Pt is demonstrating the following occupational performance levels: min A for eating, Mod A for grooming, max A for UB bathing/dressing, max A for LB bathing/dressing, and max A for toileting. Pt is mod Ax2 for bed mobility, mod Ax1 w/ HHA for sit to stand transfers, and min Ax1 w/ HHA and SBAx1 with chair follow for safety for functional mobility at a short household distance.  Pt becomes agitated when being directed to sit EOB at the end of the session. Pt with max Ax2 for stand to sit on EOB. Pt limitations that are impacting occupational performance are decreased ADL status, decreased UE ROM, decreased UE strength, decreased safe judgement during ADLs, decreased cognition, decreased endurance, decreased self care transfers and decreased high level ADLs. Occupational interventions to be addressed are: ADL retraining, functional transfer training, UE strengthening/ROM, endurance training, cognitive reorientation, Pt/caregiver education, equipment evaluation/education, energy conservation and activity engagement . Pt will benefit from skilled OT services while in the hospital to maximize independence with ADLs. Upon d/c, Pt will benefit from post acute rehab services to maximize independence and safety with ADLs. Goals   Patient Goals to go downstairs and use the phone   LTG Time Frame 10-14   Long Term Goal #1 See goals listed below   Plan   Treatment Interventions ADL retraining;Functional transfer training;UE strengthening/ROM; Endurance training;Cognitive reorientation;Patient/family training;Equipment evaluation/education; Compensatory technique education; Energy conservation; Activityengagement   Goal Expiration Date 07/05/23   OT Frequency 1-2x/wk   Recommendation   OT Discharge Recommendation Post acute rehabilitation services   Additional Comments  The patient's raw score on the AM-PAC Daily Activity Inpatient Short Form is 13. A raw score of less than 19 suggests the patient may benefit from discharge to post-acute rehabilitation services. Please refer to the recommendation of the Occupational Therapist for safe discharge planning.    AM-PAC Daily Activity Inpatient   Lower Body Dressing 2   Bathing 2   Toileting 2   Upper Body Dressing 2   Grooming 2   Eating 3   Daily Activity Raw Score 13   Daily Activity Standardized Score (Calc for Raw Score >=11) 32.03   AM-[facility] Applied Cognition Inpatient   Following a Speech/Presentation 1   Understanding Ordinary Conversation 2   Taking Medications 1   Remembering Where Things Are Placed or Put Away 1   Remembering List of 4-5 Errands 1   Taking Care of Complicated Tasks 1   Applied Cognition Raw Score 7   Applied Cognition Standardized Score 15.17   End of Consult   Education Provided Family or social support of family present for education by provider;Yes   Patient Position at End of Consult Supine;Bed/Chair alarm activated; All needs within reach   Nurse Communication Nurse aware of consult     Goals:  1. Pt will complete UB ADLs at an min A level. 2. Pt will complete a grooming task at a S level. 3. Pt will complete LB ADLs at a min A level. 4. Pt will complete functional bed mobility at a min A level. 5. Pt will complete functional transfers at a min A level. 6. Pt will complete toileting at a min A level. 7. Pt will tolerate a dynamic standing task for more than 15 minutes   8. Pt will increase standing balance to F+ in  order to increase safety during ADLs. 9. Pt will be attentive 100% of the time for on-going functional/formal cognitive assessment to assist with safe d/c planning. 10. Pt will complete functional mobility with AD PRN for item retrieval task at S level in order to increase participation with ADLs.      Donna Fillers, OTS

## 2023-06-21 NOTE — PLAN OF CARE
Problem: OCCUPATIONAL THERAPY ADULT  Goal: Performs self-care activities at highest level of function for planned discharge setting. See evaluation for individualized goals. Description: Treatment Interventions: ADL retraining, Functional transfer training, UE strengthening/ROM, Endurance training, Cognitive reorientation, Patient/family training, Equipment evaluation/education, Compensatory technique education, Energy conservation, Activityengagement          See flowsheet documentation for full assessment, interventions and recommendations. Note: Limitation: Decreased ADL status, Decreased UE ROM, Decreased UE strength, Decreased cognition, Decreased endurance, Decreased Safe judgement during ADL, Decreased high-level ADLs, Decreased self-care trans  Prognosis: Fair  Assessment: Pt is 80 y/o male who presented to [facility] on 6/20/23 with worsening confusion and aggression . Pt has a diagnosis of moderate late onset Alzheimer's dementia with psychotic disturbance. Pt has PMH of Balanitis, BPH (benign prostatic hyperplasia), Diabetes mellitus ([address]), GERD (gastroesophageal reflux disease), Heme + stool, Hypertension, Lumbar radiculopathy, Myocardial infarction ([address]), Phimosis, and Sciatica. Pt greeted for OT evaluation on 6/21/23. Pt's wife reports pt lives in a bi-level house. PTA, pt's wife reports pt requires assistance with ADLs, IADLs, and functional mobility w/o device. Pt (-) does not drive. Pt is demonstrating the following occupational performance levels: min A for eating, Mod A for grooming, max A for UB bathing/dressing, max A for LB bathing/dressing, and max A for toileting. Pt is mod Ax2 for bed mobility, mod Ax1 w/ HHA for sit to stand transfers, and min Ax1 w/ HHA and SBAx1 with chair follow for safety for functional mobility at a short household distance. Pt becomes agitated when being directed to sit EOB at the end of the session. Pt with max Ax2 for stand to sit on EOB.   Pt limitations that are impacting occupational performance are decreased ADL status, decreased UE ROM, decreased UE strength, decreased safe judgement during ADLs, decreased cognition, decreased endurance, decreased self care transfers and decreased high level ADLs. Occupational interventions to be addressed are: ADL retraining, functional transfer training, UE strengthening/ROM, endurance training, cognitive reorientation, Pt/caregiver education, equipment evaluation/education, energy conservation and activity engagement . Pt will benefit from skilled OT services while in the hospital to maximize independence with ADLs. Upon d/c, Pt will benefit from post acute rehab services to maximize independence and safety with ADLs.      OT Discharge Recommendation: Post acute rehabilitation services

## 2023-06-21 NOTE — UTILIZATION REVIEW
Initial Clinical Review    Observation 6/20/23 @ [id number] converted to inpatient admission 6/21/23 @ [id number] for continued care & tx for dementia with psychotic disturbance. Admission: Date/Time/Statement:   Admission Orders (From admission, onward)     Ordered        06/21/23 1338  Inpatient Admission  Once            06/20/23 1828  Place in Observation  Once                      Orders Placed This Encounter   Procedures   • Inpatient Admission     Standing Status:   Standing     Number of Occurrences:   1     Order Specific Question:   Level of Care     Answer:   Med Surg [16]     Order Specific Question:   Estimated length of stay     Answer:   More than 2 Midnights     Order Specific Question:   Certification     Answer:   I certify that inpatient services are medically necessary for this patient for a duration of greater than two midnights. See H&P and MD Progress Notes for additional information about the patient's course of treatment. ED Arrival Information     Expected   -    Arrival   6/20/2023 13:21    Acuity   Less Urgent            Means of arrival   Ambulance    Escorted by   [facility]    Service   Hospitalist    Admission type   Emergency            Arrival complaint   dementia           Chief Complaint   Patient presents with   • Dementia     Per EMS, pt dementia was worse today and getting combative per pt's wife. Initial Presentation:   52-year-old male, past medical history significant for Alzheimer's dementia, presenting for evaluation of worsening confusion, aggression. Normally able to care for himself at home over the past 3 days, and he has forgotten who his wife is, is not cooperative, also aggressive with his wife over the weekend. Presents angry & agitated. Admission work-up showing GLORIA. Placed in observation status for behavioral change, GLORIA. Started on IVF, gerontology consulted. Observation to IP admission 6/21/23:  Remains on 1:1 at bedside for safety.  Continue Seroquel, add Depakote, change Zyprexa to Haldol (used x2). IVF continued for GLORIA, creatinine improving. Per gerontology: Alzheimer's dementia with psychotic disturbance  Home regimen include Seroquel 12.5 Mg in am and 25 Mg at bedtime (am dose recently added back at reduced dose, previously on 25mg BID which family felt was too sedating)   -behaviors reportedly now not responding to seroquel despite resuming morning dose. Recommend addition of low-dose Depakote 125mg HS for mood stabilization, LFTs WNL, recheck in two days following start Depakote, will require periodic monitoring with long term use to ensure stability  -continue scheduled Seroquel for now with consideration of tapering off in long term as possible after titrated up on depakote if responds favorably. Recommend discontinuation of oxybutynin as may contribute to further confusion. Date: 6/22/23    Day 3: Has surpassed a 2nd midnight with active treatments and services. Able to follow simple commands, more cooperative, more calm. Trial off restraints, 1:1 continued. Med adjustments in progress.  Depakote increased      ED Triage Vitals   Temperature Pulse Respirations Blood Pressure SpO2   06/20/23 1329 06/20/23 1329 06/20/23 1329 06/20/23 1329 06/20/23 1329   98 °F (36.7 °C) 66 16 155/95 98 %      Temp Source Heart Rate Source Patient Position - Orthostatic VS BP Location FiO2 (%)   06/20/23 1329 06/20/23 1329 06/20/23 1329 06/20/23 1329 --   Oral Monitor Lying Right arm       Pain Score       06/20/23 2300       No Pain          Wt Readings from Last 1 Encounters:   05/24/23 85.8 kg (189 lb 3.2 oz)     Additional Vital Signs:   Date/Time Temp Pulse Resp BP MAP (mmHg) SpO2 O2 Device Patient Position - Orthostatic VS   06/21/23 0832 -- -- -- -- -- -- None (Room air) --   06/21/23 07:41:17 -- -- -- 143/69 94 -- -- --   06/20/23 22:29:18 -- -- -- 131/67 88 -- -- --   06/20/23 2126 -- 62 18 162/70 100 94 % -- --   06/20/23 1915 -- -- 20 179/79 Abnormal  113 98 % None (Room air) --   06/20/23 1329 98 °F (36.7 °C) 66 16 155/95 119 98 % None (Room air) Lying     Pertinent Labs/Diagnostic Test Results:   CT head without contrast   ED Interpretation  (06/20 1748)   No acute intracranial abnormality. Will await formal radiology interpretation. Final Result  (06/20 1759)      No acute intracranial abnormality.      Results from last 7 days   Lab Units 06/21/23  0442 06/20/23  1438   WBC Thousand/uL 3.78* 4.76   HEMOGLOBIN g/dL 14.0 14.3   HEMATOCRIT % 42.3 41.8   PLATELETS Thousands/uL 144* 173   NEUTROS ABS Thousands/µL  --  3.02     Results from last 7 days   Lab Units 06/21/23  0442 06/20/23  1438   SODIUM mmol/L 141 140   POTASSIUM mmol/L 4.4 4.2   CHLORIDE mmol/L 113* 112*   CO2 mmol/L 26 25   ANION GAP mmol/L 2 3   BUN mg/dL 29* 33*   CREATININE mg/dL 1.45* 1.57*   EGFR ml/min/1.73sq m 42 39   CALCIUM mg/dL 9.3 9.6   MAGNESIUM mg/dL 1.9  --      Results from last 7 days   Lab Units 06/20/23  1438   AST U/L 9   ALT U/L 17   ALK PHOS U/L 55   TOTAL PROTEIN g/dL 6.7   ALBUMIN g/dL 3.6   TOTAL BILIRUBIN mg/dL 0.56     Results from last 7 days   Lab Units 06/21/23  0442 06/20/23  1438   GLUCOSE RANDOM mg/dL 113 108     Results from last 7 days   Lab Units 06/21/23  0442   TSH 3RD GENERATON uIU/mL 2.945     Results from last 7 days   Lab Units 06/20/23  1440   CLARITY UA  Clear   COLOR UA  Light Yellow   SPEC GRAV UA  1.019   PH UA  5.5   GLUCOSE UA mg/dl Negative   KETONES UA mg/dl Negative   BLOOD UA  Negative   PROTEIN UA mg/dl Negative   NITRITE UA  Negative   BILIRUBIN UA  Negative   UROBILINOGEN UA (BE) mg/dl <2.0   LEUKOCYTES UA  Negative     ED Treatment:   Medication Administration from 06/20/2023 1321 to 06/20/2023 2222       Date/Time Order Dose Route Action     06/20/2023 1807 EDT QUEtiapine (SEROquel) tablet 25 mg 25 mg Oral Given     06/20/2023 2016 EDT sodium chloride 0.9 % infusion 50 mL/hr Intravenous New Bag     06/20/2023 1906 EDT OLANZapine (ZyPREXA) IM injection 2.5 mg 2.5 mg Intramuscular Given     06/20/2023 2018 EDT sterile water injection **ADS Override Pull** 2.1 mL  Given        Past Medical History:   Diagnosis Date   • Balanitis     last assessed 12/19/14   • BPH (benign prostatic hyperplasia)    • Diabetes mellitus ([address])     blood sugar 167 this am at 0630   • GERD (gastroesophageal reflux disease)    • Heme + stool    • Hypertension    • Lumbar radiculopathy    • Myocardial infarction ([address])     35 years ago   • Phimosis     last assessed 04/27/16   • Sciatica     right side, last assessed 04/25/16     Present on Admission:  • Benign prostatic hyperplasia with urinary frequency  • Controlled type 2 diabetes mellitus without complication, without long-term current use of insulin ([facility])  • Sensorineural hearing loss (SNHL) of both ears  • 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 male  Admission Orders:  Restraints prn  Aspiration precautions  Seizure precautions  Consult gerontology  Scd/foot pumps  Nursing dysphagia screen  Pt/ot eval & tx  1:1 observation    Scheduled Medications:  Medications 06/13 06/14 06/15 06/16 06/17 06/18 06/19 06/20 06/21 06/22   amLODIPine (NORVASC) tablet 5 mg  Dose: 5 mg  Freq: Daily Route: PO  Start: 06/21/23 0900   Admin Instructions:   LOOK [facility]IKE S[facility] ALIKE MED   Order specific questions:   Hold for systolic blood pressure less than (mmHg) 130            1009      1059        cyanocobalamin (VITAMIN B-12) tablet 1,000 mcg  Dose: 1,000 mcg  Freq: Daily Route: PO  Start: 06/22/23 0900             1059        divalproex sodium (DEPAKOTE SPRINKLE) capsule 125 mg  Dose: 125 mg  Freq: 2 times daily Route: PO  Start: 06/22/23 1045   Admin Instructions:   Capsules may be swallowed whole or opened and sprinkled on 1 teaspoonful of soft food (eg.applesauce,pudding) to be used immediately. Do not store or chew. 1059     2100        divalproex sodium (DEPAKOTE SPRINKLE) capsule 125 mg  Dose: 125 mg  Freq: Daily at bedtime Route: PO  Start: 06/22/23 1800 End: 06/22/23 1035   Admin Instructions:   Capsules may be swallowed whole or opened and sprinkled on 1 teaspoonful of soft food (eg.applesauce,pudding) to be used immediately. Do not store or chew. 1035-D/C'd      divalproex sodium (DEPAKOTE SPRINKLE) capsule 125 mg  Dose: 125 mg  Freq: Daily at bedtime Route: PO  Start: 06/21/23 2200 End: 06/22/23 0850   Admin Instructions:   Capsules may be swallowed whole or opened and sprinkled on 1 teaspoonful of soft food (eg.applesauce,pudding) to be used immediately. Do not store or chew. (1452) 4848-D/C'd       heparin (porcine) subcutaneous injection 5,000 Units  Dose: 5,000 Units  Freq: Every 8 hours scheduled Route: SC  Start: 06/20/23 2200   Admin Instructions:   Check for allergies to pork or pork derivatives/dietary restrictions before administration. High alert medication. L[employer] ALIKE S[employer] ALIKE MED            (5049) (3480) (5648) [phone]     2200         heparin (porcine) subcutaneous injection 5,000 Units  Dose: 5,000 Units  Freq: Every 8 hours scheduled Route: SC  Start: 06/20/23 2200 End: 06/20/23 1959   Admin Instructions:   Check for allergies to pork or pork derivatives/dietary restrictions before administration. High alert medication. LOOK ALIKE SOUND ALIKE MED           1959-D/C'd        lisinopril (ZESTRIL) tablet 10 mg  Dose: 10 mg  Freq: Daily Route: PO  Start: 06/21/23 0900 End: 06/21/23 0847   Admin Instructions:   LOOK ALIKE S[employer] ALIKE MED   Order specific questions:   Hold for systolic blood pressure less than (mmHg) 110            0837     0847-D/C'd       OLANZapine (ZyPREXA) IM injection 2.5 mg  Dose: 2.5 mg  Freq:  Once Route: IM  Indications Comment: severe delusions/hallucinations/disruptive behaviors in delirium  Start: 06/20/23 1915 End: 06/20/23 1906   Admin Instructions:   First line agent for severe delusions/hallucinations/disruptive behaviors in delirium  Reconstitute with 2.1 mL sterile water for injection (concentration 5 mg/mL); Use with 1 hour of reconstitution; Maximum daily dose 30 mg. LOOK ALIKE SOUND ALIKE MED           1906          oxybutynin (DITROPAN-XL) 24 hr tablet 10 mg  Dose: 10 mg  Freq: Daily Route: PO  Start: 06/21/23 0900 End: 06/21/23 0836   Admin Instructions:   LOOK ALIKE SOUND ALIKE MED            0836-D/C'd       QUEtiapine (SEROquel) tablet 25 mg  Dose: 25 mg  Freq: Daily at bedtime Route: PO  Start: 06/22/23 1800   Admin Instructions:   LOOK ALIKE SOUND ALIKE MED             1800        QUEtiapine (SEROquel) tablet 25 mg  Dose: 25 mg  Freq: Daily at bedtime Route: PO  Start: 06/21/23 2200 End: 06/22/23 0850   Admin Instructions:   LOOK ALIKE SOUND ALIKE MED             0025     0850-D/C'd      QUEtiapine (SEROquel) tablet 25 mg  Dose: 25 mg  Freq:  Once Route: PO  Start: 06/20/23 1745 End: 06/20/23 1807   Admin Instructions:   LOOK ALIKE SOUND ALIKE MED           1807          senna (SENOKOT) tablet 8.6 mg  Dose: 8.6 mg  Freq: Daily at bedtime Route: PO  Start: 06/20/23 2200 End: 06/22/23 0850            (1161)      0025     0850-D/C'd      sterile water injection **ADS Override Pull**  Start: 06/20/23 1905 End: 06/20/23 2018   Admin Instructions:   Sadiq Barbosa: cabinet override           2018          Medications 06/13 06/14 06/15 06/16 06/17 06/18 06/19 06/20 06/21 06/22         Continuous Meds Sorted by Name  for Anirudh Rai as of 06/22/23 8068  Legend:                          Inactive     Active     Other Encounter     Linked                 Medications 06/13 06/14 06/15 06/16 06/17 06/18 06/19 06/20 06/21 06/22   multi-electrolyte (ISOLYTE-S PH 7.4 equivalent) IV solution  Rate: 40 mL/hr Dose: 40 mL/hr  Freq: Continuous Route: IV  Indications of Use: IV Hydration  Last Dose: Stopped (06/21/23 2049)  Start: 06/21/23 0845 End: 06/21/23 2046 1008     2046-D/C'd  2049         sodium chloride 0.9 % infusion  Rate: 50 mL/hr Dose: 50 mL/hr  Freq: Continuous Route: IV  Last Dose: Stopped (06/21/23 1000)  Start: 06/20/23 1915 End: 06/21/23 0815 2016 0815-D/C'd  1000               PRN Meds Sorted by Name  for Ancelmo Clark as of 06/22/23 1513  Legend:                          Inactive     Active     Other Encounter     Linked                 Medications 06/13 06/14 06/15 06/16 06/17 06/18 06/19 06/20 06/21 06/22   acetaminophen (TYLENOL) tablet 650 mg  Dose: 650 mg  Freq: Every 6 hours PRN Route: PO  PRN Reasons: mild pain,headaches,fever  Start: 06/21/23 0842                haloperidol lactate (HALDOL) injection 0.25 mg  Dose: 0.25 mg  Freq: Every 4 hours PRN Route: IM  PRN Reason: agitation  PRN Comment: for severe delusions/hallucinations/disruptive behaviors in delirium  Start: 06/20/23 1904 End: 06/21/23 1440   Admin Instructions:   Second Line Agent for severe delusions/hallucinations/disruptive behaviors in delirium  If administered intravenously cardiac/telemetry monitoring is required for 24 hours after last dose, except for patients on comfort care/hospice or emergency department. Emergency department to follow IV haloperidol for ED administration guideline. 0216     1222     1440-D/C'd       haloperidol lactate (HALDOL) injection 0.5 mg  Dose: 0.5 mg  Freq: Every 4 hours PRN Route: IM  PRN Reason: agitation  PRN Comment: for severe delusions/hallucinations/disruptive behaviors in delirium  Start: 06/21/23 1440   Admin Instructions:   Violetta Dominguez to give now   If administered intravenously cardiac/telemetry monitoring is required for 24 hours after last dose, except for patients on comfort care/hospice or emergency department.   Emergency department to follow IV haloperidol for ED administration guideline. 1457     1848         hydrALAZINE (APRESOLINE) injection 5 mg  Dose: 5 mg  Freq: Every 6 hours PRN Route: IV  PRN Reason: high blood pressure  PRN Comment: SBP > 170 mmHg  Start: 06/22/23 0851   Admin Instructions:   Administer as slow IV push, 5 mg/min. LOOK ALIKE S[facility] ALIKE MED   Order specific questions:   Hold for systolic blood pressure less than (mmHg) 110                OLANZapine (ZyPREXA) IM injection 2.5 mg  Dose: 2.5 mg  Freq: Every 6 hours PRN Route: IM  PRN Reason: agitation  Start: 06/20/23 1827 End: 06/20/23 1904   Admin Instructions:   Reconstitute with 2.1 mL sterile water for injection (concentration 5 mg/mL); Use with 1 hour of reconstitution; Maximum daily dose 30 mg. LOOK ALIKE SOUND ALIKE MED           1904-D/C'd  (1905) [C]          ondansetron (ZOFRAN) injection 4 mg  Dose: 4 mg  Freq: Every 6 hours PRN Route: IV  PRN Reasons: nausea,vomiting  Start: 06/21/23 0842   Admin Instructions:   Push over 2 minutes. senna (SENOKOT) tablet 8.6 mg  Dose: 8.6 mg  Freq: Daily at bedtime PRN Route: PO  PRN Reason: constipation  Start: 06/22/23 0900                sterile water injection **ADS Override Pull**  Start: 06/20/23 1905 End: 06/20/23 2018   Admin Instructions:   Janice Mcintosh: cabinet override           2018                Network Utilization Review Department  ATTENTION: Please call with any questions or concerns to [phone] and carefully listen to the prompts so that you are directed to the right person. All voicemails are confidential.  Guicho Jurado all requests for admission clinical reviews, approved or denied determinations and any other requests to dedicated fax number below belonging to the campus where the patient is receiving treatment.  List of dedicated fax numbers for the Facilities:  [place] DENIALS (Administrative/Medical Necessity) [phone]   [address] [facility] (Maternity/NICU/Pediatrics) [phone]   [facility] Edis Sow [address] [facility] [phone] [address] [address] [facility] [address] [place] [address] [facility] [phone]
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
## 2023-06-21 NOTE — PHYSICAL THERAPY NOTE
Physical Therapy Evaluation     Patient's Name: Marcie Rubio    Admitting Diagnosis  Delirium [R41.0]  Agitation [R45.1]  Dementia ([address]) [F03.90]  Behavioral change [R46.89]    Problem List  Patient Active Problem List   Diagnosis    Sensorineural hearing loss (SNHL) of both ears    Seasonal allergies    Spinal stenosis    Controlled type 2 diabetes mellitus without complication, without long-term current use of insulin (HCC)    OAB (overactive bladder)    Benign prostatic hyperplasia with urinary frequency    Stage 3b chronic kidney disease ([address])    Neurologic gait dysfunction    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 (HCC)    Paroxysmal atrial fibrillation (HCC)    Platelets decreased ([address])    UTI symptoms    Lumbar radiculopathy    Urinary incontinence without sensory awareness    Slow transit constipation    At high risk for falls    Acute kidney injury superimposed on chronic kidney disease [facility])    Ambulatory dysfunction       Past Medical History  Past Medical History:   Diagnosis Date    Balanitis     last assessed 12/19/14    BPH (benign prostatic hyperplasia)     Diabetes mellitus (HCC)     blood sugar 167 this am at 0630    GERD (gastroesophageal reflux disease)     Heme + stool     Hypertension     Lumbar radiculopathy     Myocardial infarction ([address])     35 years ago    Phimosis     last assessed 04/27/16    Sciatica     right side, last assessed 04/25/16       Past Surgical History  Past Surgical History:   Procedure Laterality Date    BACK SURGERY      laminectomy Lumbar    CATARACT EXTRACTION, BILATERAL      CIRCUMCISION      HERNIA REPAIR Right     OR COLONOSCOPY FLX DX W/COLLJ SPEC WHEN PFRMD N/A 1/16/2017    Procedure: EGD AND COLONOSCOPY;  Surgeon: Raj Shearer DO;  Location: [facility];   Service: General          06/21/23 1345   PT Last Visit   PT Visit Date 06/21/23   Note Type   Note type Evaluation   Pain Assessment   Pain Assessment Tool 0-10   Pain Score No Pain Restrictions/Precautions   Weight Bearing Precautions Per Order No   Other Precautions Cognitive; Chair Alarm; Bed Alarm;1:1;Fall Risk;Hard of hearing   Home Living   Type of Home House   Home Layout Two level;Bed/bath upstairs  (Bi-level)   Bathroom Shower/Tub Tub/shower unit   Bathroom Toilet Standard   Bathroom Equipment Commode;Grab bars in shower   Home Equipment Walker;Cane  (3 pronged cane)   Additional Comments Pt is poor historian, information obtained from wife at bedside. Prior Function   Level of [place] Needs assistance with ADLs; Needs assistance with functional mobility; Needs assistance with IADLS   Lives With Spouse   Receives Help From Family;Personal care attendant   IADLs Family/Friend/Other provides transportation; Family/Friend/Other provides meals; Family/Friend/Other provides medication management   Falls in the last 6 months 5 to 10   Vocational Retired   Comments Pt's spouse states pt does not use AD at baseline, was requiring assist for ADLs ,mobility. Has [facility] aides come in to help   General   Family/Caregiver Present Yes   Cognition   Overall Cognitive Status Impaired   Arousal/Participation Alert   Orientation Level Oriented to person   Following Commands   (Initially follows simple commands inconsistently , once fatigued does not follow commands)   Comments Pt requires TC + VC throughout session , pleasant for most of sesison, gets irritable when assisting back to bed. RLE Assessment   RLE Assessment   (grossly 3+-4/5)   LLE Assessment   LLE Assessment   (grossly 3+-4/5)   Bed Mobility   Supine to Sit Unable to assess   Sit to Supine 3  Moderate assistance   Additional items Assist x 2; Increased time required;LE management   Additional Comments Pt noted to be up in hallway with PCA   Transfers   Sit to Stand 3  Moderate assistance   Additional items Assist x 1; Increased time required;Verbal cues   Stand to Sit 2  Maximal assistance   Additional items Assist x 2;Bed elevated  (Pt resistive to sitting on bed, required VC + TC to reposition back in bed)   Additional Comments Transfers completed with HHA   Ambulation/Elevation   Gait pattern Narrow SHAHRZAD; Inconsistent aide;Decreased foot clearance;Shuffling; Short stride   Gait Assistance 4  Minimal assist   Additional items Assist x 1;Verbal cues; Tactile cues   Assistive Device Other (Comment)  (B/L HHA)   Distance 80'   Ambulation/Elevation Additional Comments Pt ambulates with cueing throughout task and with B/L HHA   Balance   Static Sitting Fair   Dynamic Sitting Fair -   Static Standing Poor +   Dynamic Standing Poor   Ambulatory Poor -   Activity Tolerance   Activity Tolerance Treatment limited secondary to agitation;Patient limited by fatigue   Medical Staff Made Aware Co-eval with OT 2* medical complexity and decreased activity tolerance   Nurse Made Aware '   Assessment   Prognosis Fair   Problem List Decreased endurance;Decreased strength;Decreased mobility; Decreased cognition;Decreased safety awareness   Assessment Pt is a 81 y.o. male seen for PT evaluation s/p admit to [facility] on 6/20/2023. Pt was admitted with a primary dx of: 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, Ambulatory dysfunction, Acute kidney injury  Superimposed on CKD. PT now consulted for assessment of mobility and d/c needs. Pt with Ambulate orders. Pts current comorbidities affecting treatment include: Spinal stenosis, CKD, Neurolgic gait dysfunction, Urinary incontinence. Pts current clinical presentation is Unstable/ Unpredictable (high complexity) due to Ongoing medical management for primary dx, Decreased activity tolerance compared to baseline, Fall risk, Increased assistance needed from caregiver at current time, Cog status. Pt lives with spouse in [address] [place] [address] Street home and was needing assistance for ADLs, but able to mobilize.  Upon evaluation, pt currently is requiring assist x 2 for bed mobility, mod assist for STS transfers and assist x 1 for ambulation with B/L HHA. Nydia Randhawa Pt presents at PT eval functioning below baseline and currently w/ overall mobility deficits 2* to: impaired balance, gait deviations, decreased functional mobility tolerance compared to baseline, impaired judgement, fall risk. Pt currently at a fall risk 2* to impairments listed above. Pt will continue to benefit from skilled acute PT interventions to address stated impairments; to maximize functional mobility; and DME needs. At conclusion of PT session pt returned BTB, bed alarm engaged, B/L side rails up and all needs in reach , left in care of sitter. The patient's AM-PAC Basic Mobility Inpatient Short Form Raw Score is  13. A Raw score of less than or equal to 16 suggests the patient may benefit from discharge to post-acute rehabilitation services. Please also refer to the recommendation of the Physical Therapist for safe discharge planning. Recommend Rehab upon hospital D/C. Barriers to Discharge Decreased caregiver support   Goals   Patient Goals none stated   STG Expiration Date 07/05/23   Short Term Goal #1 STG 1. Pt will be able to perform bed mobility tasks with supervision in order to improve overall functional mobility and assist in safe d/c. STG 2. Pt will be able to perform functional transfer with Supervision in order to improve overall functional mobility and assist in safe d/c. STG 3. Pt will be able to ambulate at least 200 feet with least restrictive device with Supervision A in order to improve overall functional mobility and assist in safe d/c. STG 4. Pt will improve sitting/standing static/dynamic balance 1/2 grade in order to improve functional mobility and assist in safe d/c. STG 5. Pt will improve LE strength by 1/2 grade in order to improve functional mobility and assist in safe d/c.   PT Treatment Day 0   Plan   Treatment/Interventions Gait training;Functional transfer training; Therapeutic exercise;OT   PT Frequency 1-2x/wk Recommendation   PT Discharge Recommendation Post acute rehabilitation services   AM-PAC Basic Mobility Inpatient   Turning in Flat Bed Without Bedrails 2   Lying on Back to Sitting on Edge of Flat Bed Without Bedrails 2   Moving Bed to Chair 2   Standing Up From Chair Using Arms 2   Walk in Room 3   Climb 3-5 Stairs With Railing 2   Basic Mobility Inpatient Raw Score 13   Basic Mobility Standardized Score 33.99   Highest Level Of Mobility   JH-HLM Goal 4: Move to chair/commode   JH-HLM Achieved 7: Walk 25 feet or more   Modified [place] Scale   Modified [place] Scale 4   End of Consult   Patient Position at End of Consult All needs within reach; Supine;Bed/Chair alarm activated         Matt Godoy, PT DPT

## 2023-06-21 NOTE — PLAN OF CARE
Problem: PHYSICAL THERAPY ADULT  Goal: Performs mobility at highest level of function for planned discharge setting. See evaluation for individualized goals. Description: Treatment/Interventions: Gait training, Functional transfer training, Therapeutic exercise, OT          See flowsheet documentation for full assessment, interventions and recommendations. Note: Prognosis: Fair  Problem List: Decreased endurance, Decreased strength, Decreased mobility, Decreased cognition, Decreased safety awareness  Assessment: Pt is a 81 y.o. male seen for PT evaluation s/p admit to [address] [place] [address] on 6/20/2023. Pt was admitted with a primary dx of: 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, Ambulatory dysfunction, Acute kidney injury  Superimposed on CKD. PT now consulted for assessment of mobility and d/c needs. Pt with Ambulate orders. Pts current comorbidities affecting treatment include: Spinal stenosis, CKD, Neurolgic gait dysfunction, Urinary incontinence. Pts current clinical presentation is Unstable/ Unpredictable (high complexity) due to Ongoing medical management for primary dx, Decreased activity tolerance compared to baseline, Fall risk, Increased assistance needed from caregiver at current time, Cog status. Pt lives with spouse in [address] [place] [address] home and was needing assistance for ADLs, but able to mobilize. Upon evaluation, pt currently is requiring assist x 2 for bed mobility, mod assist for STS transfers and assist x 1 for ambulation with B/L HHA. Michel Stoddard Pt presents at PT eval functioning below baseline and currently w/ overall mobility deficits 2* to: impaired balance, gait deviations, decreased functional mobility tolerance compared to baseline, impaired judgement, fall risk. Pt currently at a fall risk 2* to impairments listed above. Pt will continue to benefit from skilled acute PT interventions to address stated impairments; to maximize functional mobility; and DME needs.  At conclusion of PT session pt returned BTB, bed alarm engaged, B/L side rails up and all needs in reach , left in care of sitter. The patient's AM-PAC Basic Mobility Inpatient Short Form Raw Score is  13. A Raw score of less than or equal to 16 suggests the patient may benefit from discharge to post-acute rehabilitation services. Please also refer to the recommendation of the Physical Therapist for safe discharge planning. Recommend Rehab upon hospital D/C. Barriers to Discharge: Decreased caregiver support     PT Discharge Recommendation: Post acute rehabilitation services    See flowsheet documentation for full assessment.

## 2023-06-22 PROBLEM — N18.30 ACUTE RENAL FAILURE SUPERIMPOSED ON STAGE 3 CHRONIC KIDNEY DISEASE (HCC): Status: ACTIVE | Noted: 2023-06-20

## 2023-06-22 PROCEDURE — 99232 SBSQ HOSP IP/OBS MODERATE 35: CPT | Performed by: INTERNAL MEDICINE

## 2023-06-22 PROCEDURE — 99232 SBSQ HOSP IP/OBS MODERATE 35: CPT | Performed by: NURSE PRACTITIONER

## 2023-06-22 RX ORDER — DIVALPROEX SODIUM 125 MG/1
125 CAPSULE, COATED PELLETS ORAL
Status: DISCONTINUED | OUTPATIENT
Start: 2023-06-22 | End: 2023-06-22

## 2023-06-22 RX ORDER — HYDRALAZINE HYDROCHLORIDE 20 MG/ML
5 INJECTION INTRAMUSCULAR; INTRAVENOUS EVERY 6 HOURS PRN
Status: DISCONTINUED | OUTPATIENT
Start: 2023-06-22 | End: 2023-07-03 | Stop reason: HOSPADM

## 2023-06-22 RX ORDER — DIVALPROEX SODIUM 125 MG/1
125 CAPSULE, COATED PELLETS ORAL 2 TIMES DAILY
Status: DISCONTINUED | OUTPATIENT
Start: 2023-06-22 | End: 2023-06-24

## 2023-06-22 RX ORDER — SENNOSIDES 8.6 MG
8.6 TABLET ORAL
Status: DISCONTINUED | OUTPATIENT
Start: 2023-06-22 | End: 2023-06-24

## 2023-06-22 RX ORDER — QUETIAPINE FUMARATE 25 MG/1
25 TABLET, FILM COATED ORAL
Status: DISCONTINUED | OUTPATIENT
Start: 2023-06-22 | End: 2023-07-03 | Stop reason: HOSPADM

## 2023-06-22 RX ADMIN — DIVALPROEX SODIUM 125 MG: 125 CAPSULE ORAL at 21:27

## 2023-06-22 RX ADMIN — HEPARIN SODIUM 5000 UNITS: 5000 INJECTION INTRAVENOUS; SUBCUTANEOUS at 00:25

## 2023-06-22 RX ADMIN — HEPARIN SODIUM 5000 UNITS: 5000 INJECTION INTRAVENOUS; SUBCUTANEOUS at 05:03

## 2023-06-22 RX ADMIN — HEPARIN SODIUM 5000 UNITS: 5000 INJECTION INTRAVENOUS; SUBCUTANEOUS at 14:59

## 2023-06-22 RX ADMIN — QUETIAPINE 25 MG: 25 TABLET ORAL at 17:40

## 2023-06-22 RX ADMIN — CYANOCOBALAMIN TAB 500 MCG 1000 MCG: 500 TAB at 10:59

## 2023-06-22 RX ADMIN — HEPARIN SODIUM 5000 UNITS: 5000 INJECTION INTRAVENOUS; SUBCUTANEOUS at 21:27

## 2023-06-22 RX ADMIN — SENNOSIDES 8.6 MG: 8.6 TABLET, FILM COATED ORAL at 00:25

## 2023-06-22 RX ADMIN — AMLODIPINE BESYLATE 5 MG: 5 TABLET ORAL at 10:59

## 2023-06-22 RX ADMIN — QUETIAPINE FUMARATE 25 MG: 25 TABLET ORAL at 00:25

## 2023-06-22 RX ADMIN — DIVALPROEX SODIUM 125 MG: 125 CAPSULE ORAL at 10:59

## 2023-06-22 NOTE — PLAN OF CARE
Problem: Prexisting or High Potential for Compromised Skin Integrity  Goal: Skin integrity is maintained or improved  Description: INTERVENTIONS:  - Identify patients at risk for skin breakdown  - Assess and monitor skin integrity  - Assess and monitor nutrition and hydration status  - Monitor labs   - Assess for incontinence   - Turn and reposition patient  - Assist with mobility/ambulation  - Relieve pressure over bony prominences  - Avoid friction and shearing  - Provide appropriate hygiene as needed including keeping skin clean and dry  - Evaluate need for skin moisturizer/barrier cream  - Collaborate with interdisciplinary team   - Patient/family teaching  - Consider wound care consult   Outcome: Progressing     Problem: Nutrition/Hydration-ADULT  Goal: Nutrient/Hydration intake appropriate for improving, restoring or maintaining nutritional needs  Description: Monitor and assess patient's nutrition/hydration status for malnutrition. Collaborate with interdisciplinary team and initiate plan and interventions as ordered. Monitor patient's weight and dietary intake as ordered or per policy. Utilize nutrition screening tool and intervene as necessary. Determine patient's food preferences and provide high-protein, high-caloric foods as appropriate.      INTERVENTIONS:  - Monitor oral intake, urinary output, labs, and treatment plans  - Assess nutrition and hydration status and recommend course of action  - Evaluate amount of meals eaten  - Assist patient with eating if necessary   - Allow adequate time for meals  - Recommend/ encourage appropriate diets, oral nutritional supplements, and vitamin/mineral supplements  - Order, calculate, and assess calorie counts as needed  - Recommend, monitor, and adjust tube feedings and TPN/PPN based on assessed needs  - Assess need for intravenous fluids  - Provide specific nutrition/hydration education as appropriate  - Include patient/family/caregiver in decisions related to nutrition  Outcome: Progressing     Problem: MOBILITY - ADULT  Goal: Maintain or return to baseline ADL function  Description: INTERVENTIONS:  -  Assess patient's ability to carry out ADLs; assess patient's baseline for ADL function and identify physical deficits which impact ability to perform ADLs (bathing, care of mouth/teeth, toileting, grooming, dressing, etc.)  - Assess/evaluate cause of self-care deficits   - Assess range of motion  - Assess patient's mobility; develop plan if impaired  - Assess patient's need for assistive devices and provide as appropriate  - Encourage maximum independence but intervene and supervise when necessary  - Involve family in performance of ADLs  - Assess for home care needs following discharge   - Consider OT consult to assist with ADL evaluation and planning for discharge  - Provide patient education as appropriate  Outcome: Progressing  Goal: Maintains/Returns to pre admission functional level  Description: INTERVENTIONS:  - Perform BMAT or MOVE assessment daily.   - Set and communicate daily mobility goal to care team and patient/family/caregiver.    - Collaborate with rehabilitation services on mobility goals if consulted  - Out of bed for toileting  - Record patient progress and toleration of activity level   Outcome: Progressing

## 2023-06-22 NOTE — RESTORATIVE TECHNICIAN NOTE
Restorative Technician Note      Patient Name: Chang Sharpe     Restorative Tech Visit Date: 06/22/23  Note Type: Mobility  Patient Position Upon Consult: Supine  Activity Performed: Transferred; Other (Comment) (commode)  Assistive Device: Other (Comment) (Ax2)  Education Provided: Yes  Patient Position at End of Consult: Supine; All needs within reach; Other (comment) (left in the care of 1:1)    Pt agitated; unable to participate in more activity at this time; pt also fixated on BM. Will follow up.     Kendra ANAYAT, Restorative Technician

## 2023-06-22 NOTE — UTILIZATION REVIEW
NOTIFICATION OF INPATIENT ADMISSION   AUTHORIZATION REQUEST   SERVICING FACILITY:   [address] [place] [address]  Address: [facility], [address] [place] [address] Place [address]  Tax ID: [id number]  NPI: [id number] ATTENDING PROVIDER:  Attending Name and NPI#: Jose Amato Md [[id number]]  Address: [address] [place] [address]  Phone: [phone]   ADMISSION INFORMATION:  Place of Service: [address] [place] [address]  Place of Service Code: 21  Inpatient Admission Date/Time: 6/21/23  1:38 PM  Discharge Date/Time: No discharge date for patient encounter. Admitting Diagnosis Code/Description:  Delirium [R41.0]  Agitation [R45.1]  Dementia ([address] [facility]) [F03.90]  Behavioral change [R46.89]     UTILIZATION REVIEW CONTACT:  Rica Munoz Utilization   Network Utilization Review Department  Phone: [phone]  Fax: [phone]  Email: [email or web address]  Contact for approvals/pending authorizations, clinical reviews, and discharge. PHYSICIAN ADVISORY SERVICES:  Medical Necessity Denial & [facility] Review  Phone: [phone]  Fax: [phone]  Email: [email or web address]

## 2023-06-22 NOTE — CASE MANAGEMENT
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 [facility])   Patient Active Problem List    Diagnosis Date Noted   • Ambulatory dysfunction 2023   • Acute kidney injury superimposed on chronic kidney disease ([address]) 2023   • Urinary incontinence without sensory awareness 2023   • Slow transit constipation 2023   • At high risk for falls 2023   • Lumbar radiculopathy    • UTI symptoms 2023   • Paroxysmal atrial fibrillation ([address]) 01/10/2023   • Platelets decreased ([address]) 01/10/2023   • 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 ([address]) 2022   • Neurologic gait dysfunction 2022   • Stage 3b chronic kidney disease ([address]) 2021   • OAB (overactive bladder) 2021   • Benign prostatic hyperplasia with urinary frequency 2021   • Controlled type 2 diabetes mellitus without complication, without long-term current use of insulin ([address]) 2019   • Sensorineural hearing loss (SNHL) of both ears 10/21/2016   • Seasonal allergies 2015   • Spinal stenosis 2012      LOS (days): 1  Geometric Mean LOS (GMLOS) (days): 3.80  Days to GMLOS:2.8     OBJECTIVE:  Risk of Unplanned Readmission Score: 15.7         Current admission status: Inpatient   [facility] [address] [place] [address]  Phone: [phone] Fax: [phone]    Primary Care Provider: Nicole Brewer MD    Primary Insurance: [facility] REP  Secondary Insurance:     DISCHARGE DETAILS:    Met at bedside with wife to discuss [facility] has denied patient and need to expand referrals. Wife agreeable to blanket referral. Referrals sent. CM to continue to support.

## 2023-06-22 NOTE — PROGRESS NOTES
Progress Note - Geriatric Medicine   Wilmer Choi 81 y.o. male MRN: [id number]  [facility] Encounter: [id number]      Assessment/Plan:    Alzheimer's dementia with psychotic disturbance  -Moderate and progressive, approx FAST scale 6b  -Requires assist with most ADLs, dependent for IADLs  -behaviors difficult to control as o/p, reportedly intolerant to BID dosing of Seroquel due to sedation per family, recommended to reduce am dose as o/p however behaviors uncontrolled with reduced dosing   -recommend initiation of depakote 125mg BID for stabilization, ok to give depakote this morning anytime patient cooperates as being utilized for behaviors not seizure affords more flexibility of dose timing  -IM Haldol avail PRN while titrating maintenance meds (depakote)   -if pt too sedate during day on BID depakote could consider use risperidone in place of morning depakote as can help w psychosis w/o sedating effect however would only consider once behaviors become and remain stabilized   -wean restraints as safely possible/appropriate   -continue to encourage calm quiet env to reduce risk overstimulation   -continue supportive cares     BPH with LUTS  -Encourage aggressive bowel regimen to reduce risk of patient  -Monitor for fecal and urinary retention, consider urinary retention protocol     Overactive bladder  -Recommend discontinuation of Myrbetriq due to increased risk confusion and falls in older adult population  -Avoid bladder irritating agents and consider timed voiding and limiting fluid intake within 2 hours of bed to reduce overnight awakening to void  -Check for fecal and urinary retention with any change in behaviors as patients with underlying dementia may have difficulty expressing needs at times and can often be precipitator of adverse behaviors     Ambulatory dysfunction   -patient reportedly unsteady on feet at baseline and has hx recurrent falls numerous over past year  -remains high fall risk, cont precautions  -PT and OT following      Impaired Vision  -recommend use of corrective lenses at all appropriate times  -consider large font for printed materials provided to patient     Impaired Hearing  -Encourage use of hearing aids at all appropriate times  -encourage providers and caregivers to speak slowly and clearly directly to patient  -minimize background noise to encourage patient engagement  -consider use of hearing amplifier to reduce risk of straining to hear if hearing aids are not present or are not sufficient     Impaired mastication  -Requires use of dentures -encourage use at appropriate times  -ensure meal consistency appropriate for abilities  -continue aspiration precautions     Frailty syndrome in geriatric patient   -Clinical frailty scale stage V, mildly frail  -encourage well balanced nutrition   -continue to optimize chronic conditions   -Continue psychosocial supports of patient and family    High risk caregiver burnout   -wife primary caregiver and high risk burnout   -consider o/p referral to caregiver support group and [facility] for additional community based resources and supports      High risk developing delirium   -continue strict delirium precautions   -ensure pain controlled   -reorient frequently as appropriate as if pt not receptive can trigger/escalate behaviors      Care coordination: rounded with Luis Escalante (RN) and Susie Arboleda (SLIM AP)    Subjective:     Homero Ruby is seen and examined at bedside, he is sleepy but awakens to voice, confused but not agitated at time of eval.     Review of Systems   Unable to perform ROS: Mental status change     Objective:     Vitals: Blood pressure (!) 152/103, pulse 70, temperature (!) 97.4 °F (36.3 °C), resp. rate 18, height 5' 8" (1.727 m), SpO2 94 %. ,Body mass index is 28.77 kg/m².       Intake/Output Summary (Last 24 hours) at 6/22/2023 0873  Last data filed at 6/21/2023 1300  Gross per 24 hour   Intake 360 ml   Output --   Net 360 ml Current Medications: Reviewed    Physical Exam:   Physical Exam  Vitals and nursing note reviewed. Constitutional:       General: He is not in acute distress. Appearance: He is not toxic-appearing. Comments: Frail elderly male no acute distress    HENT:      Head: Normocephalic and atraumatic. Nose: Nose normal.      Mouth/Throat:      Mouth: Mucous membranes are dry. Eyes:      General: No scleral icterus. Right eye: No discharge. Left eye: No discharge. Conjunctiva/sclera: Conjunctivae normal.   Neck:      Comments: Trachea midline   Cardiovascular:      Rate and Rhythm: Normal rate. Pulses: Normal pulses. Pulmonary:      Effort: Pulmonary effort is normal. No respiratory distress. Breath sounds: No wheezing. Abdominal:      General: There is no distension. Palpations: Abdomen is soft. Tenderness: There is no abdominal tenderness. Comments: Soft waist belt posey in place   Musculoskeletal:      Cervical back: Neck supple. Comments: Reduced overall muscle mass    Skin:     General: Skin is warm and dry. Neurological:      Mental Status: He is alert. Comments: Awake and alert, intermittently answering ques with yes/no but answers not always appropriate for ques asked    Psychiatric:      Comments: Not restless or agitated at time of eval        Invasive Devices     Peripheral Intravenous Line  Duration           Peripheral IV 06/20/23 Right Antecubital 1 day              Lab, Imaging and other studies: I have personally reviewed pertinent reports.

## 2023-06-22 NOTE — PROGRESS NOTES
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 (HCC)  Assessment & Plan  · Probably secondary to worsening Alzheimer's dementia  · CT head shows diffuse moderate to severe chronic microangiopathic changes  · TSH within normal limits  · Appreciate geriatric service input  · B12 low normal, add supplementation   · Continue Seroquel 25 mg at bedtime  · Increase newly started Depakote to 125 mg BID for mood stabilization  · Monitor LFTs in 2 days  · Continue Haldol PRN until stabilized on consistent daily medications  · Continue one-to-one watch  · Discontinue restraints   · Fall/aspiration precautions  · PT/OT eval     Acute kidney injury superimposed on chronic kidney disease [facility])  Assessment & Plan  Lab Results   Component Value Date    EGFR 42 06/21/2023    EGFR 39 06/20/2023    EGFR 39 12/27/2022    CREATININE 1.45 (H) 06/21/2023    CREATININE 1.57 (H) 06/20/2023    CREATININE 1.55 (H) 12/27/2022     · S/p IVF   · Changed Lisinopril to Norvasc   · Encourage oral intake   · Monitor I+O, monitor PVRs, monitor for constipation  · Limit nephrotoxic agents     Benign prostatic hyperplasia with urinary frequency  Assessment & Plan  · Was recently taken off finasteride  · Geriatrics recommended discontinuing Oxybutynin, done   · Monitor for urinary retention  · Bowel regimen  · Last BM 6/21    Ambulatory dysfunction  Assessment & Plan  Unsteady gait with history of recurrent falls  · Fall precautions  · PT/OT eval    Controlled type 2 diabetes mellitus without complication, without long-term current use of insulin (HCC)  Assessment & Plan  Lab Results   Component Value Date    HGBA1C 5.8 03/17/2023       No results for input(s): "POCGLU" in the last 72 hours.     Blood Sugar Average: Last 72 hrs:     · Was taken off metformin  · a1c well controled     Sensorineural hearing loss (SNHL) of both ears  Assessment & Plan  · Noted         VTE Pharmacologic Prophylaxis:   Moderate Risk (Score 3-4) - Pharmacological DVT Prophylaxis Ordered: heparin. Patient Centered Rounds: I performed bedside rounds with nursing staff today. Discussions with Specialists or Other Care Team Provider: nursing, CM, geriatrics     Education and Discussions with Family / Patient: Updated  (wife) at bedside. Total Time Spent on Date of Encounter in care of patient: 35 minutes This time was spent on one or more of the following: performing physical exam; counseling and coordination of care; obtaining or reviewing history; documenting in the medical record; reviewing/ordering tests, medications or procedures; communicating with other healthcare professionals and discussing with patient's family/caregivers. Current Length of Stay: 1 day(s)  Current Patient Status: Inpatient   Certification Statement: The patient will continue to require additional inpatient hospital stay due to Mood stabilization  Discharge Plan: Anticipate discharge in 24-48 hrs to rehab facility. Code Status: Level 1 - Full Code    Subjective:   Patient seen and examined at bedside. Wife at bedside. Patient is a poor historian given dementia and altered mental status. He is mostly nonverbal.  He does follow simple commands. He is more cooperative today. He appears more calm. Wife was able to give him his morning medications. No issues per primary RN. No signs of discomfort. Objective:     Vitals:   Temp (24hrs), Av.5 °F (36.4 °C), Min:97.4 °F (36.3 °C), Max:97.8 °F (36.6 °C)    Temp:  [97.4 °F (36.3 °C)-97.8 °F (36.6 °C)] 97.4 °F (36.3 °C)  HR:  [70-76] 70  Resp:  [18] 18  BP: (131-152)/() 152/103  SpO2:  [93 %-94 %] 94 %  Body mass index is 28.77 kg/m².      Input and Output Summary (last 24 hours): Intake/Output Summary (Last 24 hours) at 6/22/2023 1454  Last data filed at 6/22/2023 1237  Gross per 24 hour   Intake 100 ml   Output 0 ml   Net 100 ml       Physical Exam:   Physical Exam  Vitals and nursing note reviewed. Constitutional:       General: He is not in acute distress. Appearance: He is ill-appearing. He is not toxic-appearing or diaphoretic. Comments: Resting in bed on room air   HENT:      Head: Normocephalic. Mouth/Throat:      Mouth: Mucous membranes are moist.   Eyes:      Conjunctiva/sclera: Conjunctivae normal.   Cardiovascular:      Rate and Rhythm: Normal rate. Pulmonary:      Effort: Pulmonary effort is normal.      Breath sounds: Normal breath sounds. No wheezing, rhonchi or rales. Abdominal:      General: Bowel sounds are normal. There is no distension. Palpations: Abdomen is soft. Tenderness: There is no abdominal tenderness. Musculoskeletal:         General: Normal range of motion. Cervical back: Normal range of motion. Right lower leg: No edema. Left lower leg: No edema. Skin:     General: Skin is warm and dry. Capillary Refill: Capillary refill takes less than 2 seconds. Neurological:      Mental Status: He is alert. Mental status is at baseline. He is disoriented. Motor: Weakness present. Psychiatric:         Mood and Affect: Affect is labile and inappropriate. Speech: He is noncommunicative. Behavior: Behavior is slowed and withdrawn. Behavior is cooperative. Cognition and Memory: Cognition is impaired. Memory is impaired. Judgment: Judgment is inappropriate.           Additional Data:     Labs:  Results from last 7 days   Lab Units 06/21/23  0442 06/20/23  1438   WBC Thousand/uL 3.78* 4.76   HEMOGLOBIN g/dL 14.0 14.3   HEMATOCRIT % 42.3 41.8   PLATELETS Thousands/uL 144* 173   NEUTROS PCT %  --  63   LYMPHS PCT %  --  24   MONOS PCT %  --  11   EOS PCT %  --  2     Results from last 7 days   Lab Units 06/21/23  0442 06/20/23  1438   SODIUM mmol/L 141 140   POTASSIUM mmol/L 4.4 4.2   CHLORIDE mmol/L 113* 112*   CO2 mmol/L 26 25   BUN mg/dL 29* 33*   CREATININE mg/dL 1.45* 1.57*   ANION GAP mmol/L 2 3   CALCIUM mg/dL 9.3 9.6   ALBUMIN g/dL  --  3.6   TOTAL BILIRUBIN mg/dL  --  0.56   ALK PHOS U/L  --  55   ALT U/L  --  17   AST U/L  --  9   GLUCOSE RANDOM mg/dL 113 108                       Lines/Drains:  Invasive Devices     Peripheral Intravenous Line  Duration           Peripheral IV 06/20/23 Right Antecubital 1 day                      Imaging: Reviewed radiology reports from this admission including: CT head    Recent Cultures (last 7 days):         Last 24 Hours Medication List:   Current Facility-Administered Medications   Medication Dose Route Frequency Provider Last Rate   • acetaminophen  650 mg Oral Q6H PRN JONAS Ng     • amLODIPine  5 mg Oral Daily JONAS Ng     • vitamin B-12  1,000 mcg Oral Daily JONAS Ng     • divalproex sodium  125 mg Oral BID Laura Slice, DO     • haloperidol lactate  0.5 mg Intramuscular Q4H PRN JONAS Ng     • heparin (porcine)  5,000 Units Subcutaneous [facility] Caden Stinson MD     • hydrALAZINE  5 mg Intravenous Q6H PRN JONAS Ng     • ondansetron  4 mg Intravenous Q6H PRN JONAS Ng     • QUEtiapine  25 mg Oral HS JONAS Ng     • senna  8.6 mg Oral HS PRN JONAS Ng          Today, Patient Was Seen By: JONAS Ng    **Please Note: This note may have been constructed using a voice recognition system. **

## 2023-06-22 NOTE — ASSESSMENT & PLAN NOTE
Lab Results   Component Value Date    HGBA1C 5.8 03/17/2023       No results for input(s): "POCGLU" in the last 72 hours.     Blood Sugar Average: Last 72 hrs:     · Had been taken off Metformin prior to admission   · A1c well controled   · Monitor blood sugars

## 2023-06-22 NOTE — ASSESSMENT & PLAN NOTE
· Probably secondary to worsening Alzheimer's dementia  · CT head shows diffuse moderate to severe chronic microangiopathic changes  · TSH within normal limits  · Appreciate geriatric service input  · B12 low normal, supplementation added  · Continue Seroquel 25 mg at bedtime  · Newly started Depakote to 125 mg BID on 6/22/23 for mood stabilization  · Monitor LFTs in 2 days  · Continue Haldol PRN until stabilized on consistent daily medications  · Continue one-to-one watch  · Fall/aspiration precautions  · PT/OT eval

## 2023-06-22 NOTE — ASSESSMENT & PLAN NOTE
Unsteady gait with history of recurrent falls  · Fall precautions  · PT/OT recommend postacute rehab

## 2023-06-22 NOTE — PLAN OF CARE
Problem: MOBILITY - ADULT  Goal: Maintain or return to baseline ADL function  Description: INTERVENTIONS:  -  Assess patient's ability to carry out ADLs; assess patient's baseline for ADL function and identify physical deficits which impact ability to perform ADLs (bathing, care of mouth/teeth, toileting, grooming, dressing, etc.)  - Assess/evaluate cause of self-care deficits   - Assess range of motion  - Assess patient's mobility; develop plan if impaired  - Assess patient's need for assistive devices and provide as appropriate  - Encourage maximum independence but intervene and supervise when necessary  - Involve family in performance of ADLs  - Assess for home care needs following discharge   - Consider OT consult to assist with ADL evaluation and planning for discharge  - Provide patient education as appropriate  Outcome: Progressing  Goal: Maintains/Returns to pre admission functional level  Description: INTERVENTIONS:  - Perform BMAT or MOVE assessment daily.   - Set and communicate daily mobility goal to care team and patient/family/caregiver. - Collaborate with rehabilitation services on mobility goals if consulted  - Perform Range of Motion 3 times a day. - Reposition patient every 2 hours. - Dangle patient 3 times a day  - Stand patient 3 times a day  - Ambulate patient 3 times a day  - Out of bed to chair 3 times a day   - Out of bed for meals 3 times a day  - Out of bed for toileting  - Record patient progress and toleration of activity level   Outcome: Progressing     Problem: Nutrition/Hydration-ADULT  Goal: Nutrient/Hydration intake appropriate for improving, restoring or maintaining nutritional needs  Description: Monitor and assess patient's nutrition/hydration status for malnutrition. Collaborate with interdisciplinary team and initiate plan and interventions as ordered. Monitor patient's weight and dietary intake as ordered or per policy.  Utilize nutrition screening tool and intervene as necessary. Determine patient's food preferences and provide high-protein, high-caloric foods as appropriate.      INTERVENTIONS:  - Monitor oral intake, urinary output, labs, and treatment plans  - Assess nutrition and hydration status and recommend course of action  - Evaluate amount of meals eaten  - Assist patient with eating if necessary   - Allow adequate time for meals  - Recommend/ encourage appropriate diets, oral nutritional supplements, and vitamin/mineral supplements  - Order, calculate, and assess calorie counts as needed  - Recommend, monitor, and adjust tube feedings and TPN/PPN based on assessed needs  - Assess need for intravenous fluids  - Provide specific nutrition/hydration education as appropriate  - Include patient/family/caregiver in decisions related to nutrition  Outcome: Progressing     Problem: Prexisting or High Potential for Compromised Skin Integrity  Goal: Skin integrity is maintained or improved  Description: INTERVENTIONS:  - Identify patients at risk for skin breakdown  - Assess and monitor skin integrity  - Assess and monitor nutrition and hydration status  - Monitor labs   - Assess for incontinence   - Turn and reposition patient  - Assist with mobility/ambulation  - Relieve pressure over bony prominences  - Avoid friction and shearing  - Provide appropriate hygiene as needed including keeping skin clean and dry  - Evaluate need for skin moisturizer/barrier cream  - Collaborate with interdisciplinary team   - Patient/family teaching  - Consider wound care consult   Outcome: Progressing

## 2023-06-22 NOTE — ASSESSMENT & PLAN NOTE
· Was recently taken off finasteride  · Oxybutynin discontinued per geriatrics recommendation   · Monitor for urinary retention

## 2023-06-23 LAB
ANION GAP SERPL CALCULATED.3IONS-SCNC: 1 MMOL/L
BUN SERPL-MCNC: 26 MG/DL (ref 5–25)
CALCIUM SERPL-MCNC: 9.5 MG/DL (ref 8.3–10.1)
CHLORIDE SERPL-SCNC: 114 MMOL/L (ref 96–108)
CO2 SERPL-SCNC: 26 MMOL/L (ref 21–32)
CREAT SERPL-MCNC: 1.4 MG/DL (ref 0.6–1.3)
GFR SERPL CREATININE-BSD FRML MDRD: 44 ML/MIN/1.73SQ M
GLUCOSE SERPL-MCNC: 102 MG/DL (ref 65–140)
MAGNESIUM SERPL-MCNC: 2.1 MG/DL (ref 1.6–2.6)
PHOSPHATE SERPL-MCNC: 2.6 MG/DL (ref 2.3–4.1)
POTASSIUM SERPL-SCNC: 4.3 MMOL/L (ref 3.5–5.3)
SODIUM SERPL-SCNC: 141 MMOL/L (ref 135–147)

## 2023-06-23 PROCEDURE — 80048 BASIC METABOLIC PNL TOTAL CA: CPT | Performed by: NURSE PRACTITIONER

## 2023-06-23 PROCEDURE — 83735 ASSAY OF MAGNESIUM: CPT | Performed by: NURSE PRACTITIONER

## 2023-06-23 PROCEDURE — 99232 SBSQ HOSP IP/OBS MODERATE 35: CPT | Performed by: INTERNAL MEDICINE

## 2023-06-23 PROCEDURE — 84100 ASSAY OF PHOSPHORUS: CPT | Performed by: NURSE PRACTITIONER

## 2023-06-23 RX ADMIN — DIVALPROEX SODIUM 125 MG: 125 CAPSULE ORAL at 08:57

## 2023-06-23 RX ADMIN — HEPARIN SODIUM 5000 UNITS: 5000 INJECTION INTRAVENOUS; SUBCUTANEOUS at 13:54

## 2023-06-23 RX ADMIN — HALOPERIDOL LACTATE 0.5 MG: 5 INJECTION, SOLUTION INTRAMUSCULAR at 22:38

## 2023-06-23 RX ADMIN — CYANOCOBALAMIN TAB 500 MCG 1000 MCG: 500 TAB at 08:57

## 2023-06-23 RX ADMIN — DIVALPROEX SODIUM 125 MG: 125 CAPSULE ORAL at 20:34

## 2023-06-23 RX ADMIN — HEPARIN SODIUM 5000 UNITS: 5000 INJECTION INTRAVENOUS; SUBCUTANEOUS at 20:35

## 2023-06-23 RX ADMIN — AMLODIPINE BESYLATE 5 MG: 5 TABLET ORAL at 08:57

## 2023-06-23 RX ADMIN — QUETIAPINE 25 MG: 25 TABLET ORAL at 20:34

## 2023-06-23 NOTE — RESTORATIVE TECHNICIAN NOTE
Restorative Technician Note      Patient Name: Merlyn Braswell     Restorative Tech Visit Date: 06/23/23  Note Type: Mobility  Patient Position Upon Consult: Seated edge of bed  Activity Performed: Transferred; Dangled; Stood; Other (Comment) (sit to stands, side steps, transfers to and from commode)  Education Provided: Yes  Patient Position at End of Consult: Supine; All needs within reach;  Other (comment) (Left in the care of 1:1)    Eligio Elise  DPT, Restorative Technician

## 2023-06-24 LAB
ANION GAP SERPL CALCULATED.3IONS-SCNC: 3 MMOL/L
BUN SERPL-MCNC: 29 MG/DL (ref 5–25)
CALCIUM SERPL-MCNC: 9.9 MG/DL (ref 8.3–10.1)
CHLORIDE SERPL-SCNC: 112 MMOL/L (ref 96–108)
CO2 SERPL-SCNC: 26 MMOL/L (ref 21–32)
CREAT SERPL-MCNC: 1.46 MG/DL (ref 0.6–1.3)
GFR SERPL CREATININE-BSD FRML MDRD: 42 ML/MIN/1.73SQ M
GLUCOSE SERPL-MCNC: 116 MG/DL (ref 65–140)
GLUCOSE SERPL-MCNC: 117 MG/DL (ref 65–140)
MAGNESIUM SERPL-MCNC: 2.1 MG/DL (ref 1.6–2.6)
PHOSPHATE SERPL-MCNC: 2.3 MG/DL (ref 2.3–4.1)
POTASSIUM SERPL-SCNC: 4 MMOL/L (ref 3.5–5.3)
SODIUM SERPL-SCNC: 141 MMOL/L (ref 135–147)

## 2023-06-24 PROCEDURE — 99232 SBSQ HOSP IP/OBS MODERATE 35: CPT | Performed by: INTERNAL MEDICINE

## 2023-06-24 PROCEDURE — 80048 BASIC METABOLIC PNL TOTAL CA: CPT | Performed by: NURSE PRACTITIONER

## 2023-06-24 PROCEDURE — 82948 REAGENT STRIP/BLOOD GLUCOSE: CPT

## 2023-06-24 PROCEDURE — 83735 ASSAY OF MAGNESIUM: CPT | Performed by: NURSE PRACTITIONER

## 2023-06-24 PROCEDURE — 84100 ASSAY OF PHOSPHORUS: CPT | Performed by: NURSE PRACTITIONER

## 2023-06-24 RX ORDER — HALOPERIDOL 5 MG/ML
1 INJECTION INTRAMUSCULAR ONCE
Status: COMPLETED | OUTPATIENT
Start: 2023-06-24 | End: 2023-06-24

## 2023-06-24 RX ORDER — DIVALPROEX SODIUM 125 MG/1
125 CAPSULE, COATED PELLETS ORAL EVERY 8 HOURS SCHEDULED
Status: DISCONTINUED | OUTPATIENT
Start: 2023-06-24 | End: 2023-06-27

## 2023-06-24 RX ORDER — POLYETHYLENE GLYCOL 3350 17 G/17G
17 POWDER, FOR SOLUTION ORAL DAILY
Status: DISCONTINUED | OUTPATIENT
Start: 2023-06-24 | End: 2023-06-25

## 2023-06-24 RX ORDER — DOCUSATE SODIUM 100 MG/1
100 CAPSULE, LIQUID FILLED ORAL 2 TIMES DAILY
Status: DISCONTINUED | OUTPATIENT
Start: 2023-06-24 | End: 2023-07-03 | Stop reason: HOSPADM

## 2023-06-24 RX ORDER — SENNOSIDES 8.6 MG
1 TABLET ORAL
Status: DISCONTINUED | OUTPATIENT
Start: 2023-06-24 | End: 2023-07-03 | Stop reason: HOSPADM

## 2023-06-24 RX ADMIN — POLYETHYLENE GLYCOL 3350 17 G: 17 POWDER, FOR SOLUTION ORAL at 12:29

## 2023-06-24 RX ADMIN — CYANOCOBALAMIN TAB 500 MCG 1000 MCG: 500 TAB at 08:13

## 2023-06-24 RX ADMIN — DIVALPROEX SODIUM 125 MG: 125 CAPSULE ORAL at 08:14

## 2023-06-24 RX ADMIN — QUETIAPINE 25 MG: 25 TABLET ORAL at 17:54

## 2023-06-24 RX ADMIN — HALOPERIDOL LACTATE 1 MG: 5 INJECTION, SOLUTION INTRAMUSCULAR at 02:02

## 2023-06-24 RX ADMIN — HEPARIN SODIUM 5000 UNITS: 5000 INJECTION INTRAVENOUS; SUBCUTANEOUS at 21:08

## 2023-06-24 RX ADMIN — DOCUSATE SODIUM 100 MG: 100 CAPSULE, LIQUID FILLED ORAL at 12:29

## 2023-06-24 RX ADMIN — AMLODIPINE BESYLATE 5 MG: 5 TABLET ORAL at 08:13

## 2023-06-24 RX ADMIN — HALOPERIDOL LACTATE 0.5 MG: 5 INJECTION, SOLUTION INTRAMUSCULAR at 21:11

## 2023-06-24 RX ADMIN — HEPARIN SODIUM 5000 UNITS: 5000 INJECTION INTRAVENOUS; SUBCUTANEOUS at 14:17

## 2023-06-24 NOTE — PLAN OF CARE
Problem: MOBILITY - ADULT  Goal: Maintain or return to baseline ADL function  Description: INTERVENTIONS:  -  Assess patient's ability to carry out ADLs; assess patient's baseline for ADL function and identify physical deficits which impact ability to perform ADLs (bathing, care of mouth/teeth, toileting, grooming, dressing, etc.)  - Assess/evaluate cause of self-care deficits   - Assess range of motion  - Assess patient's mobility; develop plan if impaired  - Assess patient's need for assistive devices and provide as appropriate  - Encourage maximum independence but intervene and supervise when necessary  - Involve family in performance of ADLs  - Assess for home care needs following discharge   - Consider OT consult to assist with ADL evaluation and planning for discharge  - Provide patient education as appropriate  Outcome: Progressing  Goal: Maintains/Returns to pre admission functional level  Description: INTERVENTIONS:  - Perform BMAT or MOVE assessment daily.   - Set and communicate daily mobility goal to care team and patient/family/caregiver. - Collaborate with rehabilitation services on mobility goals if consulted  - Perform Range of Motion 3 times a day. - Reposition patient every 2 hours. - Dangle patient 3 times a day  - Stand patient 3 times a day  - Ambulate patient 3 times a day  - Out of bed to chair 3 times a day   - Out of bed for meals 3 times a day  - Out of bed for toileting  - Record patient progress and toleration of activity level   Outcome: Progressing     Problem: Nutrition/Hydration-ADULT  Goal: Nutrient/Hydration intake appropriate for improving, restoring or maintaining nutritional needs  Description: Monitor and assess patient's nutrition/hydration status for malnutrition. Collaborate with interdisciplinary team and initiate plan and interventions as ordered. Monitor patient's weight and dietary intake as ordered or per policy.  Utilize nutrition screening tool and intervene as necessary. Determine patient's food preferences and provide high-protein, high-caloric foods as appropriate.      INTERVENTIONS:  - Monitor oral intake, urinary output, labs, and treatment plans  - Assess nutrition and hydration status and recommend course of action  - Evaluate amount of meals eaten  - Assist patient with eating if necessary   - Allow adequate time for meals  - Recommend/ encourage appropriate diets, oral nutritional supplements, and vitamin/mineral supplements  - Order, calculate, and assess calorie counts as needed  - Recommend, monitor, and adjust tube feedings and TPN/PPN based on assessed needs  - Assess need for intravenous fluids  - Provide specific nutrition/hydration education as appropriate  - Include patient/family/caregiver in decisions related to nutrition  Outcome: Progressing     Problem: Prexisting or High Potential for Compromised Skin Integrity  Goal: Skin integrity is maintained or improved  Description: INTERVENTIONS:  - Identify patients at risk for skin breakdown  - Assess and monitor skin integrity  - Assess and monitor nutrition and hydration status  - Monitor labs   - Assess for incontinence   - Turn and reposition patient  - Assist with mobility/ambulation  - Relieve pressure over bony prominences  - Avoid friction and shearing  - Provide appropriate hygiene as needed including keeping skin clean and dry  - Evaluate need for skin moisturizer/barrier cream  - Collaborate with interdisciplinary team   - Patient/family teaching  - Consider wound care consult   Outcome: Progressing     Problem: SAFETY,RESTRAINT: NV/NON-SELF DESTRUCTIVE BEHAVIOR  Goal: Remains free of harm/injury (restraint for non violent/non self-detsructive behavior)  Description: INTERVENTIONS:  - Instruct patient/family regarding restraint use   - Assess and monitor physiologic and psychological status   - Provide interventions and comfort measures to meet assessed patient needs   - Identify and implement measures to help patient regain control  - Assess readiness for release of restraint   Outcome: Progressing  Goal: Returns to optimal restraint-free functioning  Description: INTERVENTIONS:  - Assess the patient's behavior and symptoms that indicate continued need for restraint  - Identify and implement measures to help patient regain control  - Assess readiness for release of restraint   Outcome: Progressing

## 2023-06-24 NOTE — PROGRESS NOTES
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 ([address])  Assessment & Plan  · Probably secondary to worsening Alzheimer's dementia  · CT head shows diffuse moderate to severe chronic microangiopathic changes  · TSH within normal limits  · Appreciate geriatric service input  · B12 low normal, supplementation added  · Continue Seroquel 25 mg at bedtime  · Newly started Depakote to 125 mg BID on 6/22/23 for mood stabilization  · Monitor LFTs in 2 days  · Continue Haldol PRN until stabilized on consistent daily medications  · Continue one-to-one watch  · Fall/aspiration precautions  · PT/OT eval     Controlled type 2 diabetes mellitus without complication, without long-term current use of insulin ([facility])  Assessment & Plan  Lab Results   Component Value Date    HGBA1C 5.8 03/17/2023       No results for input(s): "POCGLU" in the last 72 hours.     Blood Sugar Average: Last 72 hrs:     · Had been taken off Metformin prior to admission   · A1c well controled   · Monitor blood sugars    Stage 3 chronic kidney disease [facility])  Assessment & Plan  Lab Results   Component Value Date    EGFR 42 06/21/2023    EGFR 39 06/20/2023    EGFR 39 12/27/2022    CREATININE 1.45 (H) 06/21/2023    CREATININE 1.57 (H) 06/20/2023    CREATININE 1.55 (H) 12/27/2022     · Baseline creatinine 1.4 to 1.6  · Home medication Lisinopril 10 mg daily discontinued on admission and begun on Amlodipine 5 mg daily on 6/21    · Encourage oral intake   · Monitor I+O, monitor PVRs, monitor for constipation  · Limit nephrotoxic agents     Benign prostatic hyperplasia with urinary frequency  Assessment & Plan  · Was recently taken off finasteride  · Oxybutynin discontinued per geriatrics recommendation   · Monitor for urinary retention      Sensorineural hearing loss (SNHL) of both ears  Assessment & Plan  · Noted    Ambulatory dysfunction  Assessment & Plan  Unsteady gait with history of recurrent falls  · Fall precautions  · PT/OT recommend postacute rehab      VTE Pharmacologic Prophylaxis: VTE Score: 4 Moderate Risk (Score 3-4) - Pharmacological DVT Prophylaxis Ordered: heparin. Patient Centered Rounds: Discussed with RN    Education and Discussions with Family / Patient: Updated  (wife) via phone. Total Time Spent on Date of Encounter in care of patient: 35 minutes This time was spent on one or more of the following: performing physical exam; counseling and coordination of care; obtaining or reviewing history; documenting in the medical record; reviewing/ordering tests, medications or procedures; communicating with other healthcare professionals and discussing with patient's family/caregivers. Current Length of Stay: 2 day(s)  Current Patient Status: Inpatient   Certification Statement: The patient will continue to require additional inpatient hospital stay due to Awaiting placement    Code Status: Level 1 - Full Code    Subjective:   Calm at present    Objective:     Vitals:   Temp (24hrs), Av.9 °F (36.6 °C), Min:97.6 °F (36.4 °C), Max:98.1 °F (36.7 °C)    Temp:  [97.6 °F (36.4 °C)-98.1 °F (36.7 °C)] 98.1 °F (36.7 °C)  HR:  [70] 70  Resp:  [16] 16  BP: (153)/(84-85) 153/84  Body mass index is 28.77 kg/m². Physical Exam:   Physical Exam  Vitals reviewed. HENT:      Head: Normocephalic. Nose: Nose normal.      Mouth/Throat:      Mouth: Mucous membranes are moist.   Eyes:      Extraocular Movements: Extraocular movements intact. Cardiovascular:      Rate and Rhythm: Normal rate and regular rhythm. Pulmonary:      Effort: Pulmonary effort is normal. No respiratory distress. Breath sounds: Normal breath sounds. No wheezing.    Abdominal:      General: Bowel sounds are normal. There is no distension. Palpations: Abdomen is soft. Tenderness: There is no abdominal tenderness. Musculoskeletal:         General: No swelling. Cervical back: Neck supple. Skin:     General: Skin is warm and dry. Neurological:      General: No focal deficit present. Mental Status: He is alert. He is disoriented. Additional Data:     Labs:  Results from last 7 days   Lab Units 06/21/23  0442 06/20/23  1438   WBC Thousand/uL 3.78* 4.76   HEMOGLOBIN g/dL 14.0 14.3   HEMATOCRIT % 42.3 41.8   PLATELETS Thousands/uL 144* 173   NEUTROS PCT %  --  63   LYMPHS PCT %  --  24   MONOS PCT %  --  11   EOS PCT %  --  2     Results from last 7 days   Lab Units 06/23/23  0629 06/21/23  0442 06/20/23  1438   SODIUM mmol/L 141   < > 140   POTASSIUM mmol/L 4.3   < > 4.2   CHLORIDE mmol/L 114*   < > 112*   CO2 mmol/L 26   < > 25   BUN mg/dL 26*   < > 33*   CREATININE mg/dL 1.40*   < > 1.57*   ANION GAP mmol/L 1   < > 3   CALCIUM mg/dL 9.5   < > 9.6   ALBUMIN g/dL  --   --  3.6   TOTAL BILIRUBIN mg/dL  --   --  0.56   ALK PHOS U/L  --   --  55   ALT U/L  --   --  17   AST U/L  --   --  9   GLUCOSE RANDOM mg/dL 102   < > 108    < > = values in this interval not displayed.                        Lines/Drains:  Invasive Devices     Peripheral Intravenous Line  Duration           Peripheral IV 06/20/23 Right Antecubital 3 days              Last 24 Hours Medication List:   Current Facility-Administered Medications   Medication Dose Route Frequency Provider Last Rate   • acetaminophen  650 mg Oral Q6H PRN Seldon Dowling, CRNP     • amLODIPine  5 mg Oral Daily Selminnie Dowling, CRNP     • vitamin B-12  1,000 mcg Oral Daily Seldon Josey, CRNP     • divalproex sodium  125 mg Oral BID Suraj Rogers DO     • haloperidol lactate  0.5 mg Intramuscular Q4H PRN Seldon Dowling, CRNP     • heparin (porcine)  5,000 Units Subcutaneous [facility] Yefri Jarrett MD     • hydrALAZINE  5 mg Intravenous Q6H PRN Sam Fleischer, CRNP     • ondansetron  4 mg Intravenous Q6H PRN Sam Fleischer, CRNP     • QUEtiapine  25 mg Oral HS Sam Fleischer, CRNP     • senna  8.6 mg Oral HS PRN Sam Fleischer, CRNP          Today, Patient Was Seen By: Alis Donato MD    **Please Note: This note may have been constructed using a voice recognition system. **

## 2023-06-24 NOTE — PLAN OF CARE
Problem: MOBILITY - ADULT  Goal: Maintain or return to baseline ADL function  Description: INTERVENTIONS:  -  Assess patient's ability to carry out ADLs; assess patient's baseline for ADL function and identify physical deficits which impact ability to perform ADLs (bathing, care of mouth/teeth, toileting, grooming, dressing, etc.)  - Assess/evaluate cause of self-care deficits   - Assess range of motion  - Assess patient's mobility; develop plan if impaired  - Assess patient's need for assistive devices and provide as appropriate  - Encourage maximum independence but intervene and supervise when necessary  - Involve family in performance of ADLs  - Assess for home care needs following discharge   - Consider OT consult to assist with ADL evaluation and planning for discharge  - Provide patient education as appropriate  Outcome: Progressing  Goal: Maintains/Returns to pre admission functional level  Description: INTERVENTIONS:  - Perform BMAT or MOVE assessment daily.   - Set and communicate daily mobility goal to care team and patient/family/caregiver. - Collaborate with rehabilitation services on mobility goals if consulted  - Perform Range of Motion  times a day. - Reposition patient every  hours. - Dangle patient  times a day  - Stand patient  times a day  - Ambulate patient  times a day  - Out of bed to chair  times a day   - Out of bed for meals times a day  - Out of bed for toileting  - Record patient progress and toleration of activity level   Outcome: Progressing     Problem: Nutrition/Hydration-ADULT  Goal: Nutrient/Hydration intake appropriate for improving, restoring or maintaining nutritional needs  Description: Monitor and assess patient's nutrition/hydration status for malnutrition. Collaborate with interdisciplinary team and initiate plan and interventions as ordered. Monitor patient's weight and dietary intake as ordered or per policy.  Utilize nutrition screening tool and intervene as necessary. Determine patient's food preferences and provide high-protein, high-caloric foods as appropriate.      INTERVENTIONS:  - Monitor oral intake, urinary output, labs, and treatment plans  - Assess nutrition and hydration status and recommend course of action  - Evaluate amount of meals eaten  - Assist patient with eating if necessary   - Allow adequate time for meals  - Recommend/ encourage appropriate diets, oral nutritional supplements, and vitamin/mineral supplements  - Order, calculate, and assess calorie counts as needed  - Recommend, monitor, and adjust tube feedings and TPN/PPN based on assessed needs  - Assess need for intravenous fluids  - Provide specific nutrition/hydration education as appropriate  - Include patient/family/caregiver in decisions related to nutrition  Outcome: Progressing     Problem: Prexisting or High Potential for Compromised Skin Integrity  Goal: Skin integrity is maintained or improved  Description: INTERVENTIONS:  - Identify patients at risk for skin breakdown  - Assess and monitor skin integrity  - Assess and monitor nutrition and hydration status  - Monitor labs   - Assess for incontinence   - Turn and reposition patient  - Assist with mobility/ambulation  - Relieve pressure over bony prominences  - Avoid friction and shearing  - Provide appropriate hygiene as needed including keeping skin clean and dry  - Evaluate need for skin moisturizer/barrier cream  - Collaborate with interdisciplinary team   - Patient/family teaching  - Consider wound care consult   Outcome: Progressing

## 2023-06-25 LAB
ALBUMIN SERPL BCP-MCNC: 3.4 G/DL (ref 3.5–5)
ALP SERPL-CCNC: 64 U/L (ref 46–116)
ALT SERPL W P-5'-P-CCNC: 33 U/L (ref 12–78)
ANION GAP SERPL CALCULATED.3IONS-SCNC: 1 MMOL/L
AST SERPL W P-5'-P-CCNC: 27 U/L (ref 5–45)
BASOPHILS # BLD AUTO: 0.02 THOUSANDS/ÂΜL (ref 0–0.1)
BASOPHILS NFR BLD AUTO: 1 % (ref 0–1)
BILIRUB SERPL-MCNC: 0.57 MG/DL (ref 0.2–1)
BUN SERPL-MCNC: 34 MG/DL (ref 5–25)
CALCIUM ALBUM COR SERPL-MCNC: 10.1 MG/DL (ref 8.3–10.1)
CALCIUM SERPL-MCNC: 9.6 MG/DL (ref 8.3–10.1)
CHLORIDE SERPL-SCNC: 114 MMOL/L (ref 96–108)
CO2 SERPL-SCNC: 26 MMOL/L (ref 21–32)
CREAT SERPL-MCNC: 1.44 MG/DL (ref 0.6–1.3)
EOSINOPHIL # BLD AUTO: 0.13 THOUSAND/ÂΜL (ref 0–0.61)
EOSINOPHIL NFR BLD AUTO: 3 % (ref 0–6)
ERYTHROCYTE [DISTWIDTH] IN BLOOD BY AUTOMATED COUNT: 12.9 % (ref 11.6–15.1)
GFR SERPL CREATININE-BSD FRML MDRD: 43 ML/MIN/1.73SQ M
GLUCOSE SERPL-MCNC: 157 MG/DL (ref 65–140)
HCT VFR BLD AUTO: 45.4 % (ref 36.5–49.3)
HGB BLD-MCNC: 15.6 G/DL (ref 12–17)
IMM GRANULOCYTES # BLD AUTO: 0 THOUSAND/UL (ref 0–0.2)
IMM GRANULOCYTES NFR BLD AUTO: 0 % (ref 0–2)
LYMPHOCYTES # BLD AUTO: 1.27 THOUSANDS/ÂΜL (ref 0.6–4.47)
LYMPHOCYTES NFR BLD AUTO: 29 % (ref 14–44)
MCH RBC QN AUTO: 32.3 PG (ref 26.8–34.3)
MCHC RBC AUTO-ENTMCNC: 34.4 G/DL (ref 31.4–37.4)
MCV RBC AUTO: 94 FL (ref 82–98)
MONOCYTES # BLD AUTO: 0.54 THOUSAND/ÂΜL (ref 0.17–1.22)
MONOCYTES NFR BLD AUTO: 12 % (ref 4–12)
NEUTROPHILS # BLD AUTO: 2.39 THOUSANDS/ÂΜL (ref 1.85–7.62)
NEUTS SEG NFR BLD AUTO: 55 % (ref 43–75)
NRBC BLD AUTO-RTO: 0 /100 WBCS
PLATELET # BLD AUTO: 182 THOUSANDS/UL (ref 149–390)
PMV BLD AUTO: 10.3 FL (ref 8.9–12.7)
POTASSIUM SERPL-SCNC: 4.3 MMOL/L (ref 3.5–5.3)
PROT SERPL-MCNC: 6.9 G/DL (ref 6.4–8.4)
RBC # BLD AUTO: 4.83 MILLION/UL (ref 3.88–5.62)
SODIUM SERPL-SCNC: 141 MMOL/L (ref 135–147)
WBC # BLD AUTO: 4.35 THOUSAND/UL (ref 4.31–10.16)

## 2023-06-25 PROCEDURE — 80053 COMPREHEN METABOLIC PANEL: CPT | Performed by: INTERNAL MEDICINE

## 2023-06-25 PROCEDURE — 99232 SBSQ HOSP IP/OBS MODERATE 35: CPT | Performed by: INTERNAL MEDICINE

## 2023-06-25 PROCEDURE — 85025 COMPLETE CBC W/AUTO DIFF WBC: CPT | Performed by: INTERNAL MEDICINE

## 2023-06-25 RX ADMIN — CYANOCOBALAMIN TAB 500 MCG 1000 MCG: 500 TAB at 10:09

## 2023-06-25 RX ADMIN — HALOPERIDOL LACTATE 0.5 MG: 5 INJECTION, SOLUTION INTRAMUSCULAR at 11:34

## 2023-06-25 RX ADMIN — AMLODIPINE BESYLATE 5 MG: 5 TABLET ORAL at 10:09

## 2023-06-25 RX ADMIN — DIVALPROEX SODIUM 125 MG: 125 CAPSULE ORAL at 14:18

## 2023-06-25 RX ADMIN — DOCUSATE SODIUM 100 MG: 100 CAPSULE, LIQUID FILLED ORAL at 16:30

## 2023-06-25 RX ADMIN — HEPARIN SODIUM 5000 UNITS: 5000 INJECTION INTRAVENOUS; SUBCUTANEOUS at 05:11

## 2023-06-25 RX ADMIN — DIVALPROEX SODIUM 125 MG: 125 CAPSULE ORAL at 22:05

## 2023-06-25 RX ADMIN — DIVALPROEX SODIUM 125 MG: 125 CAPSULE ORAL at 09:54

## 2023-06-25 RX ADMIN — DOCUSATE SODIUM 100 MG: 100 CAPSULE, LIQUID FILLED ORAL at 10:09

## 2023-06-25 RX ADMIN — QUETIAPINE 25 MG: 25 TABLET ORAL at 16:30

## 2023-06-25 RX ADMIN — SENNOSIDES 8.6 MG: 8.6 TABLET, FILM COATED ORAL at 22:05

## 2023-06-25 RX ADMIN — HEPARIN SODIUM 5000 UNITS: 5000 INJECTION INTRAVENOUS; SUBCUTANEOUS at 22:05

## 2023-06-25 NOTE — ASSESSMENT & PLAN NOTE
Lab Results   Component Value Date    EGFR 43 06/25/2023    EGFR 42 06/24/2023    EGFR 44 06/23/2023    CREATININE 1.44 (H) 06/25/2023    CREATININE 1.46 (H) 06/24/2023    CREATININE 1.40 (H) 06/23/2023     · Baseline creatinine 1.4 to 1.6  · Home medication Lisinopril 10 mg daily discontinued on admission and begun on Amlodipine 5 mg daily on 6/21    · Encourage oral intake   · Monitor I+O, monitor PVRs, monitor for constipation  · Limit nephrotoxic agents

## 2023-06-25 NOTE — PROGRESS NOTES
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 ([address])  Assessment & Plan  · Probably secondary to worsening Alzheimer's dementia  · CT head shows diffuse moderate to severe chronic microangiopathic changes  · TSH within normal limits  · Appreciate geriatric service input  · B12 low normal, supplementation added  · Received Haldol last night  · Continue Seroquel 25 mg at bedtime  · Depakote 125 mg BID added on 6/22/23 for mood stabilization - increased to 125 mg q 8h on 6/24   · Monitor LFTs   · Continue Haldol PRN until stabilized on consistent daily medications  · Continue one-to-one watch  · Fall/aspiration precautions  · PT/OT eval     Controlled type 2 diabetes mellitus without complication, without long-term current use of insulin [facility])  Assessment & Plan  Lab Results   Component Value Date    HGBA1C 5.8 03/17/2023       Recent Labs     06/24/23  1553   POCGLU 117       Blood Sugar Average: Last 72 hrs:     · Had been taken off Metformin prior to admission   · A1c well controled   · Monitor blood sugars    Stage 3 chronic kidney disease [facility])  Assessment & Plan  Lab Results   Component Value Date    EGFR 43 06/25/2023    EGFR 42 06/24/2023    EGFR 44 06/23/2023    CREATININE 1.44 (H) 06/25/2023    CREATININE 1.46 (H) 06/24/2023    CREATININE 1.40 (H) 06/23/2023     · Baseline creatinine 1.4 to 1.6  · Home medication Lisinopril 10 mg daily discontinued on admission and begun on Amlodipine 5 mg daily on 6/21    · Encourage oral intake   · Monitor I+O, monitor PVRs, monitor for constipation  · Limit nephrotoxic agents     Benign prostatic hyperplasia with urinary frequency  Assessment & Plan  · Was recently taken off finasteride  · Oxybutynin discontinued per geriatrics recommendation   · Monitor for urinary retention      Sensorineural hearing loss (SNHL) of both ears  Assessment & Plan  · Noted    Ambulatory dysfunction  Assessment & Plan  Unsteady gait with history of recurrent falls  · Fall precautions  · PT/OT recommend postacute rehab      VTE Pharmacologic Prophylaxis: VTE Score: 4 Moderate Risk (Score 3-4) - Pharmacological DVT Prophylaxis Ordered: heparin. Patient Centered Rounds: Discussed with RN    Education and Discussions with Family / Patient: Updated  (wife) via phone. Total Time Spent on Date of Encounter in care of patient: 35 minutes This time was spent on one or more of the following: performing physical exam; counseling and coordination of care; obtaining or reviewing history; documenting in the medical record; reviewing/ordering tests, medications or procedures; communicating with other healthcare professionals and discussing with patient's family/caregivers. Current Length of Stay: 4 day(s)  Current Patient Status: Inpatient   Certification Statement: The patient will continue to require additional inpatient hospital stay due to agitation    Code Status: Level 1 - Full Code    Subjective: Intermittent agitation    Objective:     Vitals:   Temp (24hrs), Av °F (36.1 °C), Min:96.2 °F (35.7 °C), Max:97.8 °F (36.6 °C)    Temp:  [96.2 °F (35.7 °C)-97.8 °F (36.6 °C)] 96.2 °F (35.7 °C)  Resp:  [19-20] 20  BP: (155-174)/(66-87) 155/66  Body mass index is 28.77 kg/m². Physical Exam:   Physical Exam  Vitals reviewed. HENT:      Head: Normocephalic. Nose: Nose normal.      Mouth/Throat:      Mouth: Mucous membranes are moist.   Eyes:      Extraocular Movements: Extraocular movements intact. Cardiovascular:      Rate and Rhythm: Normal rate and regular rhythm. Pulmonary:      Effort: No respiratory distress. Breath sounds: Normal breath sounds. No wheezing.    Abdominal:      General: Bowel sounds are normal. There is no distension. Palpations: Abdomen is soft. Tenderness: There is no abdominal tenderness. Musculoskeletal:         General: No swelling. Cervical back: Neck supple. Skin:     General: Skin is warm and dry. Neurological:      General: No focal deficit present. Mental Status: He is alert. He is disoriented.           Additional Data:     Labs:  Results from last 7 days   Lab Units 06/25/23  1557   WBC Thousand/uL 4.35   HEMOGLOBIN g/dL 15.6   HEMATOCRIT % 45.4   PLATELETS Thousands/uL 182   NEUTROS PCT % 55   LYMPHS PCT % 29   MONOS PCT % 12   EOS PCT % 3     Results from last 7 days   Lab Units 06/25/23  1557   SODIUM mmol/L 141   POTASSIUM mmol/L 4.3   CHLORIDE mmol/L 114*   CO2 mmol/L 26   BUN mg/dL 34*   CREATININE mg/dL 1.44*   ANION GAP mmol/L 1   CALCIUM mg/dL 9.6   ALBUMIN g/dL 3.4*   TOTAL BILIRUBIN mg/dL 0.57   ALK PHOS U/L 64   ALT U/L 33   AST U/L 27   GLUCOSE RANDOM mg/dL 157*         Results from last 7 days   Lab Units 06/24/23  1553   POC GLUCOSE mg/dl 117               Lines/Drains:  Invasive Devices     None                 Last 24 Hours Medication List:   Current Facility-Administered Medications   Medication Dose Route Frequency Provider Last Rate   • acetaminophen  650 mg Oral Q6H PRN JONAS Howard     • amLODIPine  5 mg Oral Daily JONAS Howard     • vitamin B-12  1,000 mcg Oral Daily JONAS Howard     • divalproex sodium  125 mg Oral [facility] Fausto Kussmaul, MD     • docusate sodium  100 mg Oral BID Fausto Kussmaul, MD     • haloperidol lactate  0.5 mg Intramuscular Q4H PRN JONAS Howard     • heparin (porcine)  5,000 Units Subcutaneous [facility] Birgit Gonzáles MD     • hydrALAZINE  5 mg Intravenous Q6H PRN JONAS Howard     • ondansetron  4 mg Intravenous Q6H PRN JONAS Howard     • QUEtiapine  25 mg Oral HS JONAS Howard     • senna  1 tablet Oral HS Asmita Seth Cookie Bridges MD          Today, Patient Was Seen By: Magnolia Lind MD    **Please Note: This note may have been constructed using a voice recognition system. **

## 2023-06-25 NOTE — PROGRESS NOTES
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 ([facility])  Assessment & Plan  · Probably secondary to worsening Alzheimer's dementia  · CT head shows diffuse moderate to severe chronic microangiopathic changes  · TSH within normal limits  · Appreciate geriatric service input  · B12 low normal, supplementation added  · Received a 0.5 mg dose of Haldol last night and 1 mg early this am and additional 0.5 mg tonight  · Continue Seroquel 25 mg at bedtime  · Depakote 125 mg BID added on 6/22/23 for mood stabilization - increased to 125 mg q 8h today  · Monitor LFTs   · Continue Haldol PRN until stabilized on consistent daily medications  · Continue one-to-one watch  · Fall/aspiration precautions  · PT/OT eval     Controlled type 2 diabetes mellitus without complication, without long-term current use of insulin [facility])  Assessment & Plan  Lab Results   Component Value Date    HGBA1C 5.8 03/17/2023       Recent Labs     06/24/23  1553   POCGLU 117       Blood Sugar Average: Last 72 hrs:     · Had been taken off Metformin prior to admission   · A1c well controled   · Monitor blood sugars    Stage 3 chronic kidney disease [facility])  Assessment & Plan  Lab Results   Component Value Date    EGFR 42 06/24/2023    EGFR 44 06/23/2023    EGFR 42 06/21/2023    CREATININE 1.46 (H) 06/24/2023    CREATININE 1.40 (H) 06/23/2023    CREATININE 1.45 (H) 06/21/2023     · Baseline creatinine 1.4 to 1.6  · Home medication Lisinopril 10 mg daily discontinued on admission and begun on Amlodipine 5 mg daily on 6/21    · Encourage oral intake   · Monitor I+O, monitor PVRs, monitor for constipation  · Limit nephrotoxic agents     Benign prostatic hyperplasia with urinary frequency  Assessment & Plan  · Was recently taken off finasteride  · Oxybutynin discontinued per geriatrics recommendation   · Monitor for urinary retention      Sensorineural hearing loss (SNHL) of both ears  Assessment & Plan  · Noted    Ambulatory dysfunction  Assessment & Plan  Unsteady gait with history of recurrent falls  · Fall precautions  · PT/OT recommend postacute rehab      VTE Pharmacologic Prophylaxis: VTE Score: 4 Moderate Risk (Score 3-4) - Pharmacological DVT Prophylaxis Ordered: heparin. Patient Centered Rounds: Discussed with RN    Education and Discussions with Family / Patient: Updated  (wife) via phone. Total Time Spent on Date of Encounter in care of patient: 35 minutes This time was spent on one or more of the following: performing physical exam; counseling and coordination of care; obtaining or reviewing history; documenting in the medical record; reviewing/ordering tests, medications or procedures; communicating with other healthcare professionals and discussing with patient's family/caregivers. Current Length of Stay: 3 day(s)  Current Patient Status: Inpatient   Certification Statement: The patient will continue to require additional inpatient hospital stay due to Agitation    Code Status: Level 1 - Full Code    Subjective: Had episodes of agitation last night and tonight    Objective:     Vitals:   Temp (24hrs), Av.8 °F (36.6 °C), Min:97.2 °F (36.2 °C), Max:98.1 °F (36.7 °C)    Temp:  [97.2 °F (36.2 °C)-98.1 °F (36.7 °C)] 98.1 °F (36.7 °C)  HR:  [67-87] 87  Resp:  [15-16] 16  BP: (146-162)/(72-83) 154/83  SpO2:  [94 %-97 %] 94 %  Body mass index is 28.77 kg/m². Physical Exam:   Physical Exam  Vitals reviewed. HENT:      Head: Normocephalic. Nose: Nose normal.      Mouth/Throat:      Mouth: Mucous membranes are moist.   Eyes:      Extraocular Movements: Extraocular movements intact. Cardiovascular:      Rate and Rhythm: Normal rate and regular rhythm.    Pulmonary:      Effort: Pulmonary effort is normal. No respiratory distress. Breath sounds: No wheezing. Abdominal:      General: Bowel sounds are normal. There is no distension. Palpations: Abdomen is soft. Tenderness: There is no abdominal tenderness. Musculoskeletal:         General: No swelling. Cervical back: Neck supple. Skin:     General: Skin is warm and dry. Neurological:      General: No focal deficit present. Mental Status: He is alert. He is disoriented. Additional Data:     Labs:  Results from last 7 days   Lab Units 06/21/23  0442 06/20/23  1438   WBC Thousand/uL 3.78* 4.76   HEMOGLOBIN g/dL 14.0 14.3   HEMATOCRIT % 42.3 41.8   PLATELETS Thousands/uL 144* 173   NEUTROS PCT %  --  63   LYMPHS PCT %  --  24   MONOS PCT %  --  11   EOS PCT %  --  2     Results from last 7 days   Lab Units 06/24/23  0855 06/21/23  0442 06/20/23  1438   SODIUM mmol/L 141   < > 140   POTASSIUM mmol/L 4.0   < > 4.2   CHLORIDE mmol/L 112*   < > 112*   CO2 mmol/L 26   < > 25   BUN mg/dL 29*   < > 33*   CREATININE mg/dL 1.46*   < > 1.57*   ANION GAP mmol/L 3   < > 3   CALCIUM mg/dL 9.9   < > 9.6   ALBUMIN g/dL  --   --  3.6   TOTAL BILIRUBIN mg/dL  --   --  0.56   ALK PHOS U/L  --   --  55   ALT U/L  --   --  17   AST U/L  --   --  9   GLUCOSE RANDOM mg/dL 116   < > 108    < > = values in this interval not displayed.          Results from last 7 days   Lab Units 06/24/23  1553   POC GLUCOSE mg/dl 117               Lines/Drains:  Invasive Devices     Peripheral Intravenous Line  Duration           Peripheral IV 06/20/23 Right Antecubital 4 days                Last 24 Hours Medication List:   Current Facility-Administered Medications   Medication Dose Route Frequency Provider Last Rate   • acetaminophen  650 mg Oral Q6H PRN JONAS Rice     • amLODIPine  5 mg Oral Daily JONAS Rice     • vitamin B-12  1,000 mcg Oral Daily JONAS Rice     • divalproex sodium  125 mg Oral [facility] Milton Oliveros MD     • docusate sodium  100 mg Oral BID Milton Oliveros MD     • haloperidol lactate  0.5 mg Intramuscular Q4H PRN JONAS Reed     • heparin (porcine)  5,000 Units Subcutaneous [facility] Jeanne Cheng MD     • hydrALAZINE  5 mg Intravenous Q6H PRN JONAS Reed     • ondansetron  4 mg Intravenous Q6H PRN JONAS Reed     • polyethylene glycol  17 g Oral Daily Milton Oliveros MD     • QUEtiapine  25 mg Oral HS JONAS Reed     • senna  1 tablet Oral HS Milton Oliveros MD          Today, Patient Was Seen By: Milton Oliveros MD    **Please Note: This note may have been constructed using a voice recognition system. **
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
## 2023-06-25 NOTE — PLAN OF CARE
Problem: MOBILITY - ADULT  Goal: Maintain or return to baseline ADL function  Description: INTERVENTIONS:  -  Assess patient's ability to carry out ADLs; assess patient's baseline for ADL function and identify physical deficits which impact ability to perform ADLs (bathing, care of mouth/teeth, toileting, grooming, dressing, etc.)  - Assess/evaluate cause of self-care deficits   - Assess range of motion  - Assess patient's mobility; develop plan if impaired  - Assess patient's need for assistive devices and provide as appropriate  - Encourage maximum independence but intervene and supervise when necessary  - Involve family in performance of ADLs  - Assess for home care needs following discharge   - Consider OT consult to assist with ADL evaluation and planning for discharge  - Provide patient education as appropriate  Outcome: Progressing  Goal: Maintains/Returns to pre admission functional level  Description: INTERVENTIONS:  - Perform BMAT or MOVE assessment daily.   - Set and communicate daily mobility goal to care team and patient/family/caregiver. - Collaborate with rehabilitation services on mobility goals if consulted  - Perform Range of Motion  times a day. - Reposition patient every  hours. - Dangle patient  times a day  - Stand patient  times a day  - Ambulate patient  times a day  - Out of bed to chair  times a day   - Out of bed for meals times a day  - Out of bed for toileting  - Record patient progress and toleration of activity level   Outcome: Progressing     Problem: Nutrition/Hydration-ADULT  Goal: Nutrient/Hydration intake appropriate for improving, restoring or maintaining nutritional needs  Description: Monitor and assess patient's nutrition/hydration status for malnutrition. Collaborate with interdisciplinary team and initiate plan and interventions as ordered. Monitor patient's weight and dietary intake as ordered or per policy.  Utilize nutrition screening tool and intervene as necessary. Determine patient's food preferences and provide high-protein, high-caloric foods as appropriate.      INTERVENTIONS:  - Monitor oral intake, urinary output, labs, and treatment plans  - Assess nutrition and hydration status and recommend course of action  - Evaluate amount of meals eaten  - Assist patient with eating if necessary   - Allow adequate time for meals  - Recommend/ encourage appropriate diets, oral nutritional supplements, and vitamin/mineral supplements  - Order, calculate, and assess calorie counts as needed  - Recommend, monitor, and adjust tube feedings and TPN/PPN based on assessed needs  - Assess need for intravenous fluids  - Provide specific nutrition/hydration education as appropriate  - Include patient/family/caregiver in decisions related to nutrition  Outcome: Progressing     Problem: Prexisting or High Potential for Compromised Skin Integrity  Goal: Skin integrity is maintained or improved  Description: INTERVENTIONS:  - Identify patients at risk for skin breakdown  - Assess and monitor skin integrity  - Assess and monitor nutrition and hydration status  - Monitor labs   - Assess for incontinence   - Turn and reposition patient  - Assist with mobility/ambulation  - Relieve pressure over bony prominences  - Avoid friction and shearing  - Provide appropriate hygiene as needed including keeping skin clean and dry  - Evaluate need for skin moisturizer/barrier cream  - Collaborate with interdisciplinary team   - Patient/family teaching  - Consider wound care consult   Outcome: Progressing     Problem: SAFETY,RESTRAINT: NV/NON-SELF DESTRUCTIVE BEHAVIOR  Goal: Remains free of harm/injury (restraint for non violent/non self-detsructive behavior)  Description: INTERVENTIONS:  - Instruct patient/family regarding restraint use   - Assess and monitor physiologic and psychological status   - Provide interventions and comfort measures to meet assessed patient needs   - Identify and implement measures to help patient regain control  - Assess readiness for release of restraint   Outcome: Progressing  Goal: Returns to optimal restraint-free functioning  Description: INTERVENTIONS:  - Assess the patient's behavior and symptoms that indicate continued need for restraint  - Identify and implement measures to help patient regain control  - Assess readiness for release of restraint   Outcome: Progressing

## 2023-06-25 NOTE — ASSESSMENT & PLAN NOTE
· Probably secondary to worsening Alzheimer's dementia  · CT head shows diffuse moderate to severe chronic microangiopathic changes  · TSH within normal limits  · Appreciate geriatric service input  · B12 low normal, supplementation added  · Received Haldol last night  · Continue Seroquel 25 mg at bedtime  · Depakote 125 mg BID added on 6/22/23 for mood stabilization - increased to 125 mg q 8h on 6/24   · Monitor LFTs   · Continue Haldol PRN until stabilized on consistent daily medications  · Continue one-to-one watch  · Fall/aspiration precautions  · PT/OT eval

## 2023-06-25 NOTE — ASSESSMENT & PLAN NOTE
Lab Results   Component Value Date    EGFR 42 06/24/2023    EGFR 44 06/23/2023    EGFR 42 06/21/2023    CREATININE 1.46 (H) 06/24/2023    CREATININE 1.40 (H) 06/23/2023    CREATININE 1.45 (H) 06/21/2023     · Baseline creatinine 1.4 to 1.6  · Home medication Lisinopril 10 mg daily discontinued on admission and begun on Amlodipine 5 mg daily on 6/21    · Encourage oral intake   · Monitor I+O, monitor PVRs, monitor for constipation  · Limit nephrotoxic agents

## 2023-06-25 NOTE — ASSESSMENT & PLAN NOTE
Lab Results   Component Value Date    HGBA1C 5.8 03/17/2023       Recent Labs     06/24/23  1553   POCGLU 117       Blood Sugar Average: Last 72 hrs:     · Had been taken off Metformin prior to admission   · A1c well controled   · Monitor blood sugars

## 2023-06-25 NOTE — ASSESSMENT & PLAN NOTE
· Probably secondary to worsening Alzheimer's dementia  · CT head shows diffuse moderate to severe chronic microangiopathic changes  · TSH within normal limits  · Appreciate geriatric service input  · B12 low normal, supplementation added  · Received a 0.5 mg dose of Haldol last night and 1 mg early this am and additional 0.5 mg tonight  · Continue Seroquel 25 mg at bedtime  · Depakote 125 mg BID added on 6/22/23 for mood stabilization - increased to 125 mg q 8h today  · Monitor LFTs   · Continue Haldol PRN until stabilized on consistent daily medications  · Continue one-to-one watch  · Fall/aspiration precautions  · PT/OT eval

## 2023-06-26 LAB
ALBUMIN SERPL BCP-MCNC: 3.4 G/DL (ref 3.5–5)
ALP SERPL-CCNC: 67 U/L (ref 46–116)
ALT SERPL W P-5'-P-CCNC: 45 U/L (ref 12–78)
ANION GAP SERPL CALCULATED.3IONS-SCNC: 3 MMOL/L
AST SERPL W P-5'-P-CCNC: 37 U/L (ref 5–45)
BASOPHILS # BLD AUTO: 0.02 THOUSANDS/ÂΜL (ref 0–0.1)
BASOPHILS NFR BLD AUTO: 1 % (ref 0–1)
BILIRUB SERPL-MCNC: 0.71 MG/DL (ref 0.2–1)
BUN SERPL-MCNC: 36 MG/DL (ref 5–25)
CALCIUM ALBUM COR SERPL-MCNC: 10.5 MG/DL (ref 8.3–10.1)
CALCIUM SERPL-MCNC: 10 MG/DL (ref 8.3–10.1)
CHLORIDE SERPL-SCNC: 114 MMOL/L (ref 96–108)
CO2 SERPL-SCNC: 25 MMOL/L (ref 21–32)
CREAT SERPL-MCNC: 1.43 MG/DL (ref 0.6–1.3)
EOSINOPHIL # BLD AUTO: 0.15 THOUSAND/ÂΜL (ref 0–0.61)
EOSINOPHIL NFR BLD AUTO: 4 % (ref 0–6)
ERYTHROCYTE [DISTWIDTH] IN BLOOD BY AUTOMATED COUNT: 12.9 % (ref 11.6–15.1)
GFR SERPL CREATININE-BSD FRML MDRD: 43 ML/MIN/1.73SQ M
GLUCOSE SERPL-MCNC: 117 MG/DL (ref 65–140)
HCT VFR BLD AUTO: 45.9 % (ref 36.5–49.3)
HGB BLD-MCNC: 15.4 G/DL (ref 12–17)
IMM GRANULOCYTES # BLD AUTO: 0.01 THOUSAND/UL (ref 0–0.2)
IMM GRANULOCYTES NFR BLD AUTO: 0 % (ref 0–2)
LYMPHOCYTES # BLD AUTO: 1.18 THOUSANDS/ÂΜL (ref 0.6–4.47)
LYMPHOCYTES NFR BLD AUTO: 29 % (ref 14–44)
MAGNESIUM SERPL-MCNC: 1.9 MG/DL (ref 1.6–2.6)
MCH RBC QN AUTO: 31.9 PG (ref 26.8–34.3)
MCHC RBC AUTO-ENTMCNC: 33.6 G/DL (ref 31.4–37.4)
MCV RBC AUTO: 95 FL (ref 82–98)
MONOCYTES # BLD AUTO: 0.5 THOUSAND/ÂΜL (ref 0.17–1.22)
MONOCYTES NFR BLD AUTO: 12 % (ref 4–12)
NEUTROPHILS # BLD AUTO: 2.23 THOUSANDS/ÂΜL (ref 1.85–7.62)
NEUTS SEG NFR BLD AUTO: 54 % (ref 43–75)
NRBC BLD AUTO-RTO: 0 /100 WBCS
PLATELET # BLD AUTO: 199 THOUSANDS/UL (ref 149–390)
PMV BLD AUTO: 10.8 FL (ref 8.9–12.7)
POTASSIUM SERPL-SCNC: 4.1 MMOL/L (ref 3.5–5.3)
PROT SERPL-MCNC: 7.1 G/DL (ref 6.4–8.4)
RBC # BLD AUTO: 4.83 MILLION/UL (ref 3.88–5.62)
SODIUM SERPL-SCNC: 142 MMOL/L (ref 135–147)
WBC # BLD AUTO: 4.09 THOUSAND/UL (ref 4.31–10.16)

## 2023-06-26 PROCEDURE — 99232 SBSQ HOSP IP/OBS MODERATE 35: CPT | Performed by: INTERNAL MEDICINE

## 2023-06-26 PROCEDURE — 83735 ASSAY OF MAGNESIUM: CPT | Performed by: INTERNAL MEDICINE

## 2023-06-26 PROCEDURE — 97530 THERAPEUTIC ACTIVITIES: CPT

## 2023-06-26 PROCEDURE — 97116 GAIT TRAINING THERAPY: CPT

## 2023-06-26 PROCEDURE — 85025 COMPLETE CBC W/AUTO DIFF WBC: CPT | Performed by: INTERNAL MEDICINE

## 2023-06-26 PROCEDURE — 80053 COMPREHEN METABOLIC PANEL: CPT | Performed by: INTERNAL MEDICINE

## 2023-06-26 RX ADMIN — DIVALPROEX SODIUM 125 MG: 125 CAPSULE ORAL at 21:05

## 2023-06-26 RX ADMIN — SENNOSIDES 8.6 MG: 8.6 TABLET, FILM COATED ORAL at 21:10

## 2023-06-26 RX ADMIN — HALOPERIDOL LACTATE 0.5 MG: 5 INJECTION, SOLUTION INTRAMUSCULAR at 23:58

## 2023-06-26 RX ADMIN — CYANOCOBALAMIN TAB 500 MCG 1000 MCG: 500 TAB at 08:24

## 2023-06-26 RX ADMIN — AMLODIPINE BESYLATE 5 MG: 5 TABLET ORAL at 08:24

## 2023-06-26 RX ADMIN — HEPARIN SODIUM 5000 UNITS: 5000 INJECTION INTRAVENOUS; SUBCUTANEOUS at 05:04

## 2023-06-26 RX ADMIN — DOCUSATE SODIUM 100 MG: 100 CAPSULE, LIQUID FILLED ORAL at 08:24

## 2023-06-26 RX ADMIN — DOCUSATE SODIUM 100 MG: 100 CAPSULE, LIQUID FILLED ORAL at 17:33

## 2023-06-26 RX ADMIN — HEPARIN SODIUM 5000 UNITS: 5000 INJECTION INTRAVENOUS; SUBCUTANEOUS at 14:29

## 2023-06-26 RX ADMIN — DIVALPROEX SODIUM 125 MG: 125 CAPSULE ORAL at 14:29

## 2023-06-26 RX ADMIN — DIVALPROEX SODIUM 125 MG: 125 CAPSULE ORAL at 05:06

## 2023-06-26 RX ADMIN — QUETIAPINE 25 MG: 25 TABLET ORAL at 17:33
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
## 2023-06-26 NOTE — OCCUPATIONAL THERAPY NOTE
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 (HCC)  Active Problems:    Sensorineural hearing loss (SNHL) of both ears    Controlled type 2 diabetes mellitus without complication, without long-term current use of insulin (HCC)    Benign prostatic hyperplasia with urinary frequency    Stage 3 chronic kidney disease ([address])    Ambulatory dysfunction          06/26/23 0918   OT Last Visit   OT Visit Date 06/26/23   Note Type   Note Type Treatment   Pain Assessment   Pain Assessment Tool FLACC   Pain Rating: FLACC (Rest) - Face 0   Pain Rating: FLACC (Rest) - Legs 0   Pain Rating: FLACC (Rest) - Activity 0   Pain Rating: FLACC (Rest) - Cry 0   Pain Rating: FLACC (Rest) - Consolability 0   Score: FLACC (Rest) 0   Pain Rating: FLACC (Activity) - Face 0   Pain Rating: FLACC (Activity) - Legs 0   Pain Rating: FLACC (Activity) - Activity 0   Pain Rating: FLACC (Activity) - Cry 0   Pain Rating: FLACC (Activity) - Consolability 0   Score: FLACC (Activity) 0   Restrictions/Precautions   Weight Bearing Precautions Per Order No   Other Precautions 1:1;Cognitive; Impulsive; Chair Alarm; Bed Alarm; Fall Risk;Hard of hearing  (Pt w/ B/L hearing aides)   Lifestyle   [employer] PTA, pt's wife reports pt needs assistance with ADLs, IADLs, and functional mobility. Pt does not use any AD or DME at baseline. Pt's wife reports home health aide comes in t/o week to help   Reciprocal Relationships Supportive spouse   Service to Others Retired   [address] [place] [address] Enjoys listening to music   ADL   Where Assessed Edge of bed   Grooming Assistance 5  Supervision/Setup   Grooming Deficit Supervision/safety; Increased time to complete;Brushing hair;Verbal cueing   Grooming Comments Pt completed combing hair at S level on EOB. Pt required VC and TC to initiate and terminate activity.    Functional Standing Tolerance   Time x5 minutes   Activity Table top activity board   Comments Standing with min A HHA- pt focused on sequencing, [place], motor planning, and completing one step simple commands. Bed Mobility   Supine to Sit 4  Minimal assistance   Additional items Assist x 2;HOB elevated; Bedrails; Increased time required;Verbal cues;LE management   Sit to Supine Unable to assess   Additional Comments Pt greeted supine in bed upon arrival   Transfers   Sit to Stand 3  Moderate assistance   Additional items Assist x 1; Increased time required   Stand to Sit 3  Moderate assistance   Additional items Assist x 1; Increased time required   Additional Comments Pt completed sit to stand transfers w/ B/L HHA with mod Ax2   Functional Mobility   Functional Mobility 4  Minimal assistance   Additional Comments Pt mod Ax1 w/ HHA at a short household distance. Additional items Hand hold assistance   Coordination   Fine Motor Pt worked on fine motor coordination through manipulating zippers, buttons, and velcro during tabletop activity board task   Cognition   Overall Cognitive Status (S)  Impaired   Arousal/Participation Cooperative   Attention Difficulty dividing attention   Orientation Level Oriented to person;Disoriented to place; Disoriented to time;Disoriented to situation   Memory Decreased long term memory;Decreased short term memory;Decreased recall of recent events;Decreased recall of precautions   Following Commands Follows one step commands with increased time or repetition   Comments Pt was pleasant and cooperative t/o session. Pt benefits from one step directions with increased time. Pt benefits from VC/TC. Activity Tolerance   Activity Tolerance Patient tolerated treatment well   Medical Staff Made Aware Co-tx with BRIDGETTE Kimble 2* to pt's medical complexities and decreased endurance. OT focus on grooming, cognition, and functional activity tolerance.  RN cleared and updated   Assessment   Assessment Pt greeted bedside for OT treatment on 6/26/23 to maximize independence with ADLs. Pt is making progress with goals as evidenced by increased activity tolerance. Pt completed bed mobility at min Ax2, sit to stand transfer at mod Ax1 w/ HHA, and mod Ax1 for functional mobility at a short household distance w/ HHA. Pt participated in combing hair (grooming) on EOB with S. Pt required VC/TC to initiate and terminate task. Pt completed sit to stand transfer from EOB and completed functional mobility at a short household distance. Pt completed functional standing activity at min A w/ HHA for  x5 minutes with a tabletop activity board. Pt manipulated zippers, buttons, and velcro at mod A- working on sequencing, [facility], following commands, and motor planning. Pt in bedside chair with chair alarm and all needs met at the end of the session. Pt tolerated activity t/o session. Pt limitations that are impacting occupational performance are decreased ADL status, decreased UE ROM, decreased UE strength, decreased safe judgement during ADLs, decreased cognition, decreased endurance, decreased fine motor coordination, decreased self care transfers and decreased high level ADLs. Occupational interventions to be addressed are: ADL retraining, functional transfer training, UE strengthening/ROM, endurance training, cognitive reorientation, Pt/caregiver education, equipment evaluation/education, fine motor coordination activities, compensatory technique education, energy conservation and activity engagement . Pt will benefit from skilled OT services while in the hospital to maximize independence with ADLs. Upon d/c, Pt will benefit from post acute rehab services to maximize independence and safety with ADLs. Plan   Treatment Interventions ADL retraining;Functional transfer training;Energy conservation; Activityengagement;Cognitive reorientation; Endurance training;UE strengthening/ROM; Fine motor coordination activities   Goal Expiration Date 07/05/23   OT Treatment Day 1   OT Frequency 1-2x/wk   Recommendation   OT Discharge Recommendation Post acute rehabilitation services   Additional Comments  patient's raw score on the AM-PAC Daily Activity Inpatient Short Form is 14. A raw score of less than 19 suggests the patient may benefit from discharge to post-acute rehabilitation services. Please refer to the recommendation of the Occupational Therapist for safe discharge planning. AM-PAC Daily Activity Inpatient   Lower Body Dressing 2   Bathing 2   Toileting 2   Upper Body Dressing 2   Grooming 3   Eating 3   Daily Activity Raw Score 14   Daily Activity Standardized Score (Calc for Raw Score >=11) 33.39   AM-PAC Applied Cognition Inpatient   Following a Speech/Presentation 1   Understanding Ordinary Conversation 2   Taking Medications 1   Remembering Where Things Are Placed or Put Away 1   Remembering List of 4-5 Errands 1   Taking Care of Complicated Tasks 1   Applied Cognition Raw Score 7   Applied Cognition Standardized Score 15.17   End of Consult   Education Provided Yes   Patient Position at End of Consult Bedside chair;Bed/Chair alarm activated; All needs within reach   Nurse Communication Nurse aware of consult     TAMI Villanueva

## 2023-06-26 NOTE — PLAN OF CARE
Problem: MOBILITY - ADULT  Goal: Maintain or return to baseline ADL function  Description: INTERVENTIONS:  -  Assess patient's ability to carry out ADLs; assess patient's baseline for ADL function and identify physical deficits which impact ability to perform ADLs (bathing, care of mouth/teeth, toileting, grooming, dressing, etc.)  - Assess/evaluate cause of self-care deficits   - Assess range of motion  - Assess patient's mobility; develop plan if impaired  - Assess patient's need for assistive devices and provide as appropriate  - Encourage maximum independence but intervene and supervise when necessary  - Involve family in performance of ADLs  - Assess for home care needs following discharge   - Consider OT consult to assist with ADL evaluation and planning for discharge  - Provide patient education as appropriate  Outcome: Progressing  Goal: Maintains/Returns to pre admission functional level  Description: INTERVENTIONS:  - Perform BMAT or MOVE assessment daily.   - Set and communicate daily mobility goal to care team and patient/family/caregiver. - Collaborate with rehabilitation services on mobility goals if consulted  - Perform Range of Motion  times a day. - Reposition patient every  hours. - Dangle patient  times a day  - Stand patient  times a day  - Ambulate patient  times a day  - Out of bed to chair  times a day   - Out of bed for kip times a day  - Out of bed for toileting  - Record patient progress and toleration of activity level   Outcome: Progressing     Problem: Nutrition/Hydration-ADULT  Goal: Nutrient/Hydration intake appropriate for improving, restoring or maintaining nutritional needs  Description: Monitor and assess patient's nutrition/hydration status for malnutrition. Collaborate with interdisciplinary team and initiate plan and interventions as ordered. Monitor patient's weight and dietary intake as ordered or per policy. Utilize nutrition screening tool and intervene as necessary. Determine patient's food preferences and provide high-protein, high-caloric foods as appropriate.      INTERVENTIONS:  - Monitor oral intake, urinary output, labs, and treatment plans  - Assess nutrition and hydration status and recommend course of action  - Evaluate amount of meals eaten  - Assist patient with eating if necessary   - Allow adequate time for meals  - Recommend/ encourage appropriate diets, oral nutritional supplements, and vitamin/mineral supplements  - Order, calculate, and assess calorie counts as needed  - Recommend, monitor, and adjust tube feedings and TPN/PPN based on assessed needs  - Assess need for intravenous fluids  - Provide specific nutrition/hydration education as appropriate  - Include patient/family/caregiver in decisions related to nutrition  Outcome: Progressing     Problem: Prexisting or High Potential for Compromised Skin Integrity  Goal: Skin integrity is maintained or improved  Description: INTERVENTIONS:  - Identify patients at risk for skin breakdown  - Assess and monitor skin integrity  - Assess and monitor nutrition and hydration status  - Monitor labs   - Assess for incontinence   - Turn and reposition patient  - Assist with mobility/ambulation  - Relieve pressure over bony prominences  - Avoid friction and shearing  - Provide appropriate hygiene as needed including keeping skin clean and dry  - Evaluate need for skin moisturizer/barrier cream  - Collaborate with interdisciplinary team   - Patient/family teaching  - Consider wound care consult   Outcome: Progressing     Problem: SAFETY,RESTRAINT: NV/NON-SELF DESTRUCTIVE BEHAVIOR  Goal: Remains free of harm/injury (restraint for non violent/non self-detsructive behavior)  Description: INTERVENTIONS:  - Instruct patient/family regarding restraint use   - Assess and monitor physiologic and psychological status   - Provide interventions and comfort measures to meet assessed patient needs   - Identify and implement measures to help patient regain control  - Assess readiness for release of restraint   Outcome: Progressing  Goal: Returns to optimal restraint-free functioning  Description: INTERVENTIONS:  - Assess the patient's behavior and symptoms that indicate continued need for restraint  - Identify and implement measures to help patient regain control  - Assess readiness for release of restraint   Outcome: Progressing Anesthesia Type: 1% lidocaine with epinephrine

## 2023-06-26 NOTE — PLAN OF CARE
Problem: OCCUPATIONAL THERAPY ADULT  Goal: Performs self-care activities at highest level of function for planned discharge setting. See evaluation for individualized goals. Description: Treatment Interventions: ADL retraining, Functional transfer training, UE strengthening/ROM, Endurance training, Cognitive reorientation, Patient/family training, Equipment evaluation/education, Compensatory technique education, Energy conservation, Activityengagement          See flowsheet documentation for full assessment, interventions and recommendations. Note: Limitation: Decreased ADL status, Decreased UE ROM, Decreased UE strength, Decreased cognition, Decreased endurance, Decreased Safe judgement during ADL, Decreased high-level ADLs, Decreased self-care trans  Prognosis: Fair  Assessment: Pt greeted bedside for OT treatment on 6/26/23 to maximize independence with ADLs. Pt is making progress with goals as evidenced by increased activity tolerance. Pt completed bed mobility at min Ax2, sit to stand transfer at mod Ax1 w/ HHA, and mod Ax1 for functional mobility at a short household distance w/ HHA. Pt participated in combing hair (grooming) on EOB with S. Pt required VC/TC to initiate and terminate task. Pt completed sit to stand transfer from EOB and completed functional mobility at a short household distance. Pt completed functional standing activity at min A w/ HHA for  x5 minutes with a tabletop activity board. Pt manipulated zippers, buttons, and velcro at mod A- working on sequencing, [facility], following commands, and motor planning. Pt in bedside chair with chair alarm and all needs met at the end of the session. Pt tolerated activity t/o session.  Pt limitations that are impacting occupational performance are decreased ADL status, decreased UE ROM, decreased UE strength, decreased safe judgement during ADLs, decreased cognition, decreased endurance, decreased fine motor coordination, decreased self care transfers and decreased high level ADLs. Occupational interventions to be addressed are: ADL retraining, functional transfer training, UE strengthening/ROM, endurance training, cognitive reorientation, Pt/caregiver education, equipment evaluation/education, fine motor coordination activities, compensatory technique education, energy conservation and activity engagement . Pt will benefit from skilled OT services while in the hospital to maximize independence with ADLs. Upon d/c, Pt will benefit from post acute rehab services to maximize independence and safety with ADLs.      OT Discharge Recommendation: Post acute rehabilitation services

## 2023-06-27 PROCEDURE — 99232 SBSQ HOSP IP/OBS MODERATE 35: CPT | Performed by: FAMILY MEDICINE

## 2023-06-27 PROCEDURE — 99232 SBSQ HOSP IP/OBS MODERATE 35: CPT | Performed by: INTERNAL MEDICINE

## 2023-06-27 RX ORDER — DIVALPROEX SODIUM 125 MG/1
250 CAPSULE, COATED PELLETS ORAL
Status: DISCONTINUED | OUTPATIENT
Start: 2023-06-27 | End: 2023-07-03 | Stop reason: HOSPADM

## 2023-06-27 RX ORDER — DIVALPROEX SODIUM 125 MG/1
125 CAPSULE, COATED PELLETS ORAL 2 TIMES DAILY
Status: DISCONTINUED | OUTPATIENT
Start: 2023-06-27 | End: 2023-06-27

## 2023-06-27 RX ORDER — DIVALPROEX SODIUM 125 MG/1
250 CAPSULE, COATED PELLETS ORAL
Status: DISCONTINUED | OUTPATIENT
Start: 2023-06-27 | End: 2023-06-27

## 2023-06-27 RX ORDER — DIVALPROEX SODIUM 125 MG/1
125 CAPSULE, COATED PELLETS ORAL 2 TIMES DAILY
Status: DISCONTINUED | OUTPATIENT
Start: 2023-06-28 | End: 2023-07-03 | Stop reason: HOSPADM

## 2023-06-27 RX ADMIN — AMLODIPINE BESYLATE 5 MG: 5 TABLET ORAL at 08:30

## 2023-06-27 RX ADMIN — DIVALPROEX SODIUM 125 MG: 125 CAPSULE ORAL at 13:29

## 2023-06-27 RX ADMIN — QUETIAPINE 25 MG: 25 TABLET ORAL at 16:58

## 2023-06-27 RX ADMIN — DIVALPROEX SODIUM 125 MG: 125 CAPSULE ORAL at 06:34

## 2023-06-27 RX ADMIN — HALOPERIDOL LACTATE 0.5 MG: 5 INJECTION, SOLUTION INTRAMUSCULAR at 22:53

## 2023-06-27 RX ADMIN — DIVALPROEX SODIUM 250 MG: 125 CAPSULE ORAL at 21:50

## 2023-06-27 RX ADMIN — CYANOCOBALAMIN TAB 500 MCG 1000 MCG: 500 TAB at 08:29

## 2023-06-27 RX ADMIN — HEPARIN SODIUM 5000 UNITS: 5000 INJECTION INTRAVENOUS; SUBCUTANEOUS at 13:29

## 2023-06-27 NOTE — PLAN OF CARE
Problem: Nutrition/Hydration-ADULT  Goal: Nutrient/Hydration intake appropriate for improving, restoring or maintaining nutritional needs  Description: Monitor and assess patient's nutrition/hydration status for malnutrition. Collaborate with interdisciplinary team and initiate plan and interventions as ordered. Monitor patient's weight and dietary intake as ordered or per policy. Utilize nutrition screening tool and intervene as necessary. Determine patient's food preferences and provide high-protein, high-caloric foods as appropriate.      INTERVENTIONS:  - Monitor oral intake, urinary output, labs, and treatment plans  - Assess nutrition and hydration status and recommend course of action  - Evaluate amount of meals eaten  - Assist patient with eating if necessary   - Allow adequate time for meals  - Recommend/ encourage appropriate diets, oral nutritional supplements, and vitamin/mineral supplements  - Order, calculate, and assess calorie counts as needed  - Recommend, monitor, and adjust tube feedings and TPN/PPN based on assessed needs  - Assess need for intravenous fluids  - Provide specific nutrition/hydration education as appropriate  - Include patient/family/caregiver in decisions related to nutrition  Outcome: Progressing     Problem: PAIN - ADULT  Goal: Verbalizes/displays adequate comfort level or baseline comfort level  Description: Interventions:  - Encourage patient to monitor pain and request assistance  - Assess pain using appropriate pain scale  - Administer analgesics based on type and severity of pain and evaluate response  - Implement non-pharmacological measures as appropriate and evaluate response  - Consider cultural and social influences on pain and pain management  - Notify physician/advanced practitioner if interventions unsuccessful or patient reports new pain  Outcome: Progressing     Problem: INFECTION - ADULT  Goal: Absence or prevention of progression during hospitalization  Description: INTERVENTIONS:  - Assess and monitor for signs and symptoms of infection  - Monitor lab/diagnostic results  - Monitor all insertion sites, i.e. indwelling lines, tubes, and drains  - Monitor endotracheal if appropriate and nasal secretions for changes in amount and color  - [place]ott appropriate cooling/warming therapies per order  - Administer medications as ordered  - Instruct and encourage patient and family to use good hand hygiene technique  - Identify and instruct in appropriate isolation precautions for identified infection/condition  Outcome: Progressing     Problem: DISCHARGE PLANNING  Goal: Discharge to home or other facility with appropriate resources  Description: INTERVENTIONS:  - Identify barriers to discharge w/patient and caregiver  - Arrange for needed discharge resources and transportation as appropriate  - Identify discharge learning needs (meds, wound care, etc.)  - Arrange for interpretive services to assist at discharge as needed  - Refer to Case Management Department for coordinating discharge planning if the patient needs post-hospital services based on physician/advanced practitioner order or complex needs related to functional status, cognitive ability, or social support system  Outcome: Progressing     Problem: Knowledge Deficit  Goal: Patient/family/caregiver demonstrates understanding of disease process, treatment plan, medications, and discharge instructions  Description: Complete learning assessment and assess knowledge base.   Interventions:  - Provide teaching at level of understanding  - Provide teaching via preferred learning methods  Outcome: Progressing     Problem: NEUROSENSORY - ADULT  Goal: Achieves stable or improved neurological status  Description: INTERVENTIONS  - Monitor and report changes in neurological status  - Monitor vital signs such as temperature, blood pressure, glucose, and any other labs ordered   - Initiate measures to prevent increased intracranial pressure  - Monitor for seizure activity and implement precautions if appropriate      Outcome: Progressing     Problem: METABOLIC, FLUID AND ELECTROLYTES - ADULT  Goal: Electrolytes maintained within normal limits  Description: INTERVENTIONS:  - Monitor labs and assess patient for signs and symptoms of electrolyte imbalances  - Administer electrolyte replacement as ordered  - Monitor response to electrolyte replacements, including repeat lab results as appropriate  - Instruct patient on fluid and nutrition as appropriate  Outcome: Progressing

## 2023-06-27 NOTE — CASE MANAGEMENT
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 [facility])   Patient Active Problem List    Diagnosis Date Noted   • Ambulatory dysfunction 2023   • Stage 3 chronic kidney disease ([address]) 2023   • Urinary incontinence without sensory awareness 2023   • Slow transit constipation 2023   • At high risk for falls 2023   • Lumbar radiculopathy    • UTI symptoms 2023   • Paroxysmal atrial fibrillation ([address]) 01/10/2023   • Platelets decreased ([address]) 01/10/2023   • 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 ([address]) 2022   • Neurologic gait dysfunction 2022   • Stage 3b chronic kidney disease ([address]) 2021   • OAB (overactive bladder) 2021   • Benign prostatic hyperplasia with urinary frequency 2021   • Controlled type 2 diabetes mellitus without complication, without long-term current use of insulin ([address]) 2019   • Sensorineural hearing loss (SNHL) of both ears 10/21/2016   • Seasonal allergies 2015   • Spinal stenosis 2012      LOS (days): 6  Geometric Mean LOS (GMLOS) (days): 3.80  Days to GMLOS:-2.3     OBJECTIVE:  Risk of Unplanned Readmission Score: 16.11         Current admission status: Inpatient   Preferred Pharmacy:   [address] [address]  Phone: [phone] Fax: [phone]    Primary Care Provider: Fabiola Guzman MD    Primary Insurance: Devaughn wilson [facility]  Secondary Insurance:     DISCHARGE DETAILS:     SW resident completed PASSR packet which included MA51, referral form, consent form, PASSR form, facesheet, H&P, PT/OT notes, progress notes, med list. SW resident spoke with pt and wife at bedside regarding paperwork and wife signed portion of MA 46 form due to pt diagnosis of alzheimer's/dementia. PASSR packet was faxed to [facility].

## 2023-06-27 NOTE — ASSESSMENT & PLAN NOTE
· Presented to the ED on 6/20/23 after wife noted increasing agitation, with anger and aggressiveness at home over a 3 days period.  He has dementia and she  Is usually able to care for him at home  · CT head shows diffuse moderate to severe chronic microangiopathic changes  · TSH normal, B12 low normal,   · Geriatrics following  · On Seroquel 25 mg at bedtime  · Depakote added on 6/22/23 for mood stabilization - increased to 125 mg q 8h on 6/24   · Behavior much improved  · Discussed with geriatrics - can consider increase in bedtime dose of Depakote to 250 mg from tomorrow if needed  · Ct continual observation for now  · Monitor LFTs   · Continue Haldol PRN until stabilized on consistent daily medications  · Fall/aspiration precautions  · PT/OT   · Referrals sent for placement by case management

## 2023-06-27 NOTE — PHYSICAL THERAPY NOTE
PHYSICAL THERAPY NOTE          Patient Name: Meek Purvis  OOZPJ'H Date: 6/27/2023 06/26/23 3237   PT Last Visit   PT Visit Date 06/27/23   Note Type   Note Type Treatment   Education   Education Provided Yes   End of Consult   Patient Position at End of Consult All needs within reach;Bed/Chair alarm activated; Bedside chair   Pain Assessment   Pain Assessment Tool 0-10   Pain Score No Pain   Restrictions/Precautions   Weight Bearing Precautions Per Order No   Other Precautions 1:1;Cognitive; Chair Alarm; Bed Alarm; Fall Risk   General   Chart Reviewed Yes   Cognition   Overall Cognitive Status (S)  Impaired   Arousal/Participation Alert; Cooperative   Attention Difficulty dividing attention   Orientation Level Oriented to person;Disoriented to place; Disoriented to time;Disoriented to situation   Following Commands Follows one step commands with increased time or repetition   Comments Pt cooperative during session   Bed Mobility   Supine to Sit 4  Minimal assistance   Additional items Assist x 1;HOB elevated; Bedrails; Increased time required;Verbal cues;LE management   Sit to Supine Unable to assess   Additional Comments Pt noted to be in bed upon arrival.   Transfers   Sit to Stand 3  Moderate assistance   Additional items Assist x 1; Increased time required;Verbal cues   Stand to Sit 3  Moderate assistance   Additional items Assist x 1; Increased time required;Verbal cues   Additional Comments Pt completes STS transfers with B/L HHA   Ambulation/Elevation   Gait pattern Forward Flexion; Shuffling; Short stride;Narrow SHAHRZAD   Gait Assistance 4  Minimal assist   Additional items Assist x 1;Verbal cues; Tactile cues   Assistive Device Rolling walker   Distance 80 ft   Ambulation/Elevation Additional Comments Pt ambulates with assist x 1 using RW, required TC for walker management and VC for safety and direction   Balance   Static Sitting Fair + Dynamic Sitting Fair   Static Standing Fair -   Dynamic Standing Poor +   Ambulatory Poor   Activity Tolerance   Activity Tolerance Patient tolerated treatment well   Medical Staff Made Aware OT Flor Douglass   Nurse Made Aware RN cleared pt for therpy   Exercises   Balance Training Standing  (UE reaching, Activity apron)   Assessment   Prognosis Fair   Problem List Decreased mobility; Decreased cognition;Decreased safety awareness   Assessment Pt seen for treatment session this date. Pt pleasant and cooperative, follows commands with increased repetition and ferquent VC + TC for safety. Pt demonstrates improved activity tolerance, able to transfer and ambulate with assist , perform multiple STS transfers and tolerates standing balance training witha ctivity apron and UE reaching. Pt will benefit from continued PT to improve towards goals. The patient's AM-PAC Basic Mobility Inpatient Short Form Raw Score is 14. A Raw score of less than or equal to 16 suggests the patient may benefit from discharge to post-acute rehabilitation services. Please also refer to the recommendation of the Physical Therapist for safe discharge planning. Post d/c recommendation remains Rehab. Barriers to Discharge Decreased caregiver support   Goals   Patient Goals to get up   STG Expiration Date 07/05/23   PT Treatment Day 1   Plan   Treatment/Interventions Functional transfer training; Endurance training;Patient/family training;Spoke to nursing;OT   Progress Progressing toward goals   PT Frequency 2-3x/wk   Recommendation   PT Discharge Recommendation Post acute rehabilitation services   Equipment Recommended Walker   AM-PAC Basic Mobility Inpatient   Turning in Flat Bed Without Bedrails 2   Lying on Back to Sitting on Edge of Flat Bed Without Bedrails 2   Moving Bed to Chair 2   Standing Up From Chair Using Arms 3   Walk in Room 3   Climb 3-5 Stairs With Railing 2   Basic Mobility Inpatient Raw Score 14   Basic Mobility Standardized Score 35.55   Highest Level Of Mobility   JH-HLM Goal 4: Move to chair/commode   JH-HLM Achieved 7: Walk 25 feet or more   Education   Education Provided Mobility training   Patient Reinforcement needed   Kyle Rushing PT DPT

## 2023-06-27 NOTE — PROGRESS NOTES
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)  Assessment & Plan  · Presented to the ED on 6/20/23 after wife noted increasing agitation, with anger and aggressiveness at home over a 3 days period.  He has dementia and she  Is usually able to care for him at home  · CT head shows diffuse moderate to severe chronic microangiopathic changes  · TSH normal, B12 low normal,   · Geriatrics following  · On Seroquel 25 mg at bedtime  · Depakote added on 6/22/23 for mood stabilization - increased to 125 mg q 8h on 6/24   · Behavior much improved  · Discussed with geriatrics - can consider increase in bedtime dose of Depakote to 250 mg from tomorrow if needed  · Ct continual observation for now  · Monitor LFTs   · Continue Haldol PRN until stabilized on consistent daily medications  · Fall/aspiration precautions  · PT/OT   · Referrals sent for placement by case management    Controlled type 2 diabetes mellitus without complication, without long-term current use of insulin [facility])  Assessment & Plan  Lab Results   Component Value Date    HGBA1C 5.8 03/17/2023       Recent Labs     06/24/23  1553   POCGLU 117       Blood Sugar Average: Last 72 hrs:     · Had been taken off Metformin prior to admission   · A1c well controled   · Monitor blood sugars    Stage 3 chronic kidney disease [facility])  Assessment & Plan  Lab Results   Component Value Date    EGFR 43 06/26/2023    EGFR 43 06/25/2023    EGFR 42 06/24/2023    CREATININE 1.43 (H) 06/26/2023    CREATININE 1.44 (H) 06/25/2023    CREATININE 1.46 (H) 06/24/2023     · Baseline creatinine 1.4 to 1.6  · Home medication Lisinopril 10 mg daily discontinued on admission in the setting of fluctuating oral fluid intake and begun on Amlodipine 5 mg daily on     · Encourage oral intake   · Monitor I+O, monitor PVRs, monitor for constipation  · Limit nephrotoxic agents     Benign prostatic hyperplasia with urinary frequency  Assessment & Plan  · Was recently taken off finasteride  · Oxybutynin discontinued per geriatrics recommendation   · Monitor for urinary retention      Sensorineural hearing loss (SNHL) of both ears  Assessment & Plan  · Noted    Ambulatory dysfunction  Assessment & Plan  Unsteady gait with history of recurrent falls  · Fall precautions  · PT/OT recommend postacute rehab      VTE Pharmacologic Prophylaxis: VTE Score: 4 Moderate Risk (Score 3-4) - Pharmacological DVT Prophylaxis Ordered: heparin. Patient Centered Rounds: Discussed with RN  Discussions with Specialists or Other Care Team Provider: Discussed with geriatrics    Education and Discussions with Family / Patient: Updated  (wife) via phone. Total Time Spent on Date of Encounter in care of patient: 35 minutes This time was spent on one or more of the following: performing physical exam; counseling and coordination of care; obtaining or reviewing history; documenting in the medical record; reviewing/ordering tests, medications or procedures; communicating with other healthcare professionals and discussing with patient's family/caregivers. Current Length of Stay: 6 day(s)  Current Patient Status: Inpatient   Certification Statement: The patient will continue to require additional inpatient hospital stay due to Awaiting rehab once agitation resolves and taken off continued observation    Code Status: Level 1 - Full Code    Subjective:   Agitation much improved today and more cooperative with staff    Objective:     Vitals:   Temp (24hrs), Av.6 °F (36.4 °C), Min:97.6 °F (36.4 °C), Max:97.6 °F (36.4 °C)    Temp:  [97.6 °F (36.4 °C)] 97.6 °F (36.4 °C)  BP: (140-148)/() 148/119  Body mass index is 28.76 kg/m².      Physical Exam:   Physical Exam  Vitals reviewed. HENT:      Head: Normocephalic. Nose: Nose normal.      Mouth/Throat:      Mouth: Mucous membranes are moist.   Eyes:      Extraocular Movements: Extraocular movements intact. Cardiovascular:      Rate and Rhythm: Normal rate and regular rhythm. Pulmonary:      Effort: Pulmonary effort is normal. No respiratory distress. Breath sounds: Normal breath sounds. No wheezing. Abdominal:      General: Bowel sounds are normal. There is no distension. Palpations: Abdomen is soft. Tenderness: There is no abdominal tenderness. Musculoskeletal:         General: No swelling. Cervical back: Neck supple. Skin:     General: Skin is warm and dry. Neurological:      General: No focal deficit present. Mental Status: He is alert. He is disoriented.           Additional Data:     Labs:  Results from last 7 days   Lab Units 06/26/23  0517   WBC Thousand/uL 4.09*   HEMOGLOBIN g/dL 15.4   HEMATOCRIT % 45.9   PLATELETS Thousands/uL 199   NEUTROS PCT % 54   LYMPHS PCT % 29   MONOS PCT % 12   EOS PCT % 4     Results from last 7 days   Lab Units 06/26/23  0517   SODIUM mmol/L 142   POTASSIUM mmol/L 4.1   CHLORIDE mmol/L 114*   CO2 mmol/L 25   BUN mg/dL 36*   CREATININE mg/dL 1.43*   ANION GAP mmol/L 3   CALCIUM mg/dL 10.0   ALBUMIN g/dL 3.4*   TOTAL BILIRUBIN mg/dL 0.71   ALK PHOS U/L 67   ALT U/L 45   AST U/L 37   GLUCOSE RANDOM mg/dL 117         Results from last 7 days   Lab Units 06/24/23  1553   POC GLUCOSE mg/dl 117               Lines/Drains:  Invasive Devices     None                 Last 24 Hours Medication List:   Current Facility-Administered Medications   Medication Dose Route Frequency Provider Last Rate   • acetaminophen  650 mg Oral Q6H PRN JONAS Howard     • amLODIPine  5 mg Oral Daily JONAS Howard     • vitamin B-12  1,000 mcg Oral Daily JONAS Howard     • divalproex sodium  125 mg Oral [facility] Fausto Kussmaul, MD • docusate sodium  100 mg Oral BID Alis Donato MD     • haloperidol lactate  0.5 mg Intramuscular Q4H PRN Sam Fleischer, CRNP     • heparin (porcine)  5,000 Units Subcutaneous [facility] Santiago Ward MD     • hydrALAZINE  5 mg Intravenous Q6H PRN Sam Fleischer, CRNP     • ondansetron  4 mg Intravenous Q6H PRN Sam Fleischer, CRNP     • QUEtiapine  25 mg Oral HS Sam Fleischer, CRNP     • senna  1 tablet Oral HS Alis Donato MD          Today, Patient Was Seen By: Alis Donato MD    **Please Note: This note may have been constructed using a voice recognition system. **

## 2023-06-27 NOTE — PLAN OF CARE
Problem: PHYSICAL THERAPY ADULT  Goal: Performs mobility at highest level of function for planned discharge setting. See evaluation for individualized goals. Description: Treatment/Interventions: Gait training, Functional transfer training, Therapeutic exercise, OT          See flowsheet documentation for full assessment, interventions and recommendations. Outcome: Progressing  Note: Prognosis: Fair  Problem List: Decreased mobility, Decreased cognition, Decreased safety awareness  Assessment: Pt seen for treatment session this date. Pt pleasant and cooperative, follows commands with increased repetition and ferquent VC + TC for safety. Pt demonstrates improved activity tolerance, able to transfer and ambulate with assist , perform multiple STS transfers and tolerates standing balance training witha ctivity apron and UE reaching. Pt will benefit from continued PT to improve towards goals. The patient's AM-PAC Basic Mobility Inpatient Short Form Raw Score is 14. A Raw score of less than or equal to 16 suggests the patient may benefit from discharge to post-acute rehabilitation services. Please also refer to the recommendation of the Physical Therapist for safe discharge planning. Post d/c recommendation remains Rehab. Barriers to Discharge: Decreased caregiver support     PT Discharge Recommendation: Post acute rehabilitation services    See flowsheet documentation for full assessment.

## 2023-06-27 NOTE — PROGRESS NOTES
Progress Note - Geriatric Medicine   Rosa Peres 81 y.o. male MRN: [id number]  Unit/Bed#: [facility] 818-01 Encounter: [id number]      Assessment/Plan:    Alzheimer's dementia with psychotic disturbance  -Moderate and progressive, approx FAST scale 6b  -behaviors markedly improved with titration of Depakote, continue 125mg in morning and afternoons, increase bedtime dose to 250mg in preparation of weaning one to one in coming days for possible placement   -IM haldol available PRN but has not been requiring any frequent PRNs   -continue bedtime Seroquel for now as behaviors appear to be stabilizing on current regimen but consider trial tapering down in future once proven stable on current regimen  -no longer in physical restraints, calm and cooperative, anticipate attempt to wean one to one tomorrow during daytime hours   -continue to encourage calm quiet env to reduce risk overstimulation   -continue supportive cares      BPH with LUTS  -Encourage aggressive bowel regimen to reduce risk of patient  -Monitor for fecal and urinary retention, consider urinary retention protocol     Overactive bladder  -Myrbetriq dc'd due to increased risk confusion and falls in older adult population, recommend against resuming due to side effects   -Avoid bladder irritating agents and consider timed voiding and limiting fluid intake within 2 hours of bed to reduce overnight awakening to void  -Check for fecal and urinary retention with any change in behaviors as patients with underlying dementia may have difficulty expressing needs at times and can often be precipitator of adverse behaviors     Ambulatory dysfunction   -patient reportedly unsteady on feet at baseline and has hx recurrent falls numerous over past year  -remains high fall risk, cont precautions  -PT and OT following      Impaired Vision  -recommend use of corrective lenses at all appropriate times  -consider large font for printed materials provided to patient     Impaired Hearing  -Encourage use of hearing aids at all appropriate times  -encourage providers and caregivers to speak slowly and clearly directly to patient  -minimize background noise to encourage patient engagement  -consider use of hearing amplifier to reduce risk of straining to hear if hearing aids are not present or are not sufficient      Impaired mastication  -Requires use of dentures -encourage use at appropriate times  -ensure meal consistency appropriate for abilities  -continue aspiration precautions     Frailty syndrome in geriatric patient   -Clinical frailty scale stage V, mildly frail  -encourage well balanced nutrition   -continue to optimize chronic conditions   -Continue psychosocial supports of patient and family     High risk caregiver burnout   -wife primary caregiver and high risk burnout, discussed importance of caregiver health and wellbeing with wife at bedside    -consider o/p referral to caregiver support group and [facility] for additional community based resources and supports jose with anticipated transition to care facility on d/c     High risk developing delirium   -continue strict delirium precautions   -ensure pain controlled   -reorient frequently as appropriate     Care coordination: rounded with Rylie (RN) and Dr. Carolina Whitaker    Subjective:     Narda Snyder is seen and examined at bedside where he is sitting resting with his wife at his side, he is pleasantly confused, calm and cooperative, nursing reports same. No acute events reported. Review of Systems   Unable to perform ROS: Dementia     Objective:     Vitals: Blood pressure 153/69, pulse 87, temperature 97.9 °F (36.6 °C), resp. rate 16, height 5' 8" (1.727 m), weight 85.8 kg (189 lb 2.5 oz), SpO2 94 %. ,Body mass index is 28.76 kg/m².       Intake/Output Summary (Last 24 hours) at 6/27/2023 1439  Last data filed at 6/27/2023 0229  Gross per 24 hour   Intake --   Output 150 ml   Net -150 ml     Current Medications: Reviewed    Physical Exam: Physical Exam  Vitals and nursing note reviewed. Constitutional:       General: He is not in acute distress. Appearance: He is not ill-appearing or toxic-appearing. HENT:      Head: Normocephalic and atraumatic. Nose: Nose normal.      Mouth/Throat:      Mouth: Mucous membranes are moist.   Eyes:      General:         Right eye: No discharge. Left eye: No discharge. Conjunctiva/sclera: Conjunctivae normal.   Neck:      Comments: Trachea midline   Cardiovascular:      Rate and Rhythm: Normal rate and regular rhythm. Pulmonary:      Effort: Pulmonary effort is normal. No respiratory distress. Abdominal:      General: There is no distension. Tenderness: There is no abdominal tenderness. Musculoskeletal:      Cervical back: Neck supple. Right lower leg: No edema. Left lower leg: No edema. Comments: Reduced overall muscle mass    Skin:     General: Skin is warm and dry. Neurological:      Mental Status: He is alert. Comments: Awake and alert, pleasantly confused    Psychiatric:      Comments: Calm and cooperative        Invasive Devices     None               Lab, Imaging and other studies: I have personally reviewed pertinent reports.

## 2023-06-27 NOTE — RESTORATIVE TECHNICIAN NOTE
Restorative Technician Note      Patient Name: Samson Pearson     Restorative Tech Visit Date: 06/27/23  Note Type: Mobility  Patient Position Upon Consult: Supine  Activity Performed: Ambulated  Assistive Device: Standard walker; Other (Comment) (Ax2 to stand; Ax1 to walk; second person for chair follow)  Education Provided: Yes  Patient Position at End of Consult: Bedside chair;  All needs within reach    Left in the care of 1:1    Alvaro ANAYAT, Restorative Technician

## 2023-06-27 NOTE — ASSESSMENT & PLAN NOTE
Lab Results   Component Value Date    EGFR 43 06/26/2023    EGFR 43 06/25/2023    EGFR 42 06/24/2023    CREATININE 1.43 (H) 06/26/2023    CREATININE 1.44 (H) 06/25/2023    CREATININE 1.46 (H) 06/24/2023     · Baseline creatinine 1.4 to 1.6  · Home medication Lisinopril 10 mg daily discontinued on admission in the setting of fluctuating oral fluid intake and begun on Amlodipine 5 mg daily on 6/21    · Encourage oral intake   · Monitor I+O, monitor PVRs, monitor for constipation  · Limit nephrotoxic agents

## 2023-06-28 PROCEDURE — 99232 SBSQ HOSP IP/OBS MODERATE 35: CPT | Performed by: FAMILY MEDICINE

## 2023-06-28 PROCEDURE — 99232 SBSQ HOSP IP/OBS MODERATE 35: CPT | Performed by: INTERNAL MEDICINE

## 2023-06-28 RX ADMIN — AMLODIPINE BESYLATE 5 MG: 5 TABLET ORAL at 08:30

## 2023-06-28 RX ADMIN — HALOPERIDOL LACTATE 0.5 MG: 5 INJECTION, SOLUTION INTRAMUSCULAR at 03:33

## 2023-06-28 RX ADMIN — DOCUSATE SODIUM 100 MG: 100 CAPSULE, LIQUID FILLED ORAL at 17:00

## 2023-06-28 RX ADMIN — DIVALPROEX SODIUM 125 MG: 125 CAPSULE ORAL at 06:17

## 2023-06-28 RX ADMIN — HEPARIN SODIUM 5000 UNITS: 5000 INJECTION INTRAVENOUS; SUBCUTANEOUS at 14:20

## 2023-06-28 RX ADMIN — DIVALPROEX SODIUM 250 MG: 125 CAPSULE ORAL at 21:03

## 2023-06-28 RX ADMIN — QUETIAPINE 25 MG: 25 TABLET ORAL at 17:00

## 2023-06-28 RX ADMIN — DOCUSATE SODIUM 100 MG: 100 CAPSULE, LIQUID FILLED ORAL at 08:30

## 2023-06-28 RX ADMIN — SENNOSIDES 8.6 MG: 8.6 TABLET, FILM COATED ORAL at 21:02

## 2023-06-28 RX ADMIN — DIVALPROEX SODIUM 125 MG: 125 CAPSULE ORAL at 14:20

## 2023-06-28 RX ADMIN — CYANOCOBALAMIN TAB 500 MCG 1000 MCG: 500 TAB at 08:30

## 2023-06-28 RX ADMIN — HEPARIN SODIUM 5000 UNITS: 5000 INJECTION INTRAVENOUS; SUBCUTANEOUS at 21:02

## 2023-06-28 NOTE — PLAN OF CARE
Problem: Nutrition/Hydration-ADULT  Goal: Nutrient/Hydration intake appropriate for improving, restoring or maintaining nutritional needs  Description: Monitor and assess patient's nutrition/hydration status for malnutrition. Collaborate with interdisciplinary team and initiate plan and interventions as ordered. Monitor patient's weight and dietary intake as ordered or per policy. Utilize nutrition screening tool and intervene as necessary. Determine patient's food preferences and provide high-protein, high-caloric foods as appropriate.      INTERVENTIONS:  - Monitor oral intake, urinary output, labs, and treatment plans  - Assess nutrition and hydration status and recommend course of action  - Evaluate amount of meals eaten  - Assist patient with eating if necessary   - Allow adequate time for meals  - Recommend/ encourage appropriate diets, oral nutritional supplements, and vitamin/mineral supplements  - Order, calculate, and assess calorie counts as needed  - Recommend, monitor, and adjust tube feedings and TPN/PPN based on assessed needs  - Assess need for intravenous fluids  - Provide specific nutrition/hydration education as appropriate  - Include patient/family/caregiver in decisions related to nutrition  Outcome: Progressing     Problem: NEUROSENSORY - ADULT  Goal: Achieves stable or improved neurological status  Description: INTERVENTIONS  - Monitor and report changes in neurological status  - Monitor vital signs such as temperature, blood pressure, glucose, and any other labs ordered   - Initiate measures to prevent increased intracranial pressure  - Monitor for seizure activity and implement precautions if appropriate      Outcome: Progressing

## 2023-06-28 NOTE — ASSESSMENT & PLAN NOTE
· Presented to the ED on 6/20/23 after wife noted increasing agitation, with anger and aggressiveness at home over a 3 days period. He has dementia and she  Is usually able to care for him at home  · CT head shows diffuse moderate to severe chronic microangiopathic changes  · TSH normal, B12 low normal,   · Geriatrics following  · On Seroquel 25 mg at bedtime  · Depakote added on 6/22/23 for mood stabilization - increased to 125 mg q 8h on 6/24   · Behavior much improved  · Discussed with geriatrics - can consider increase in bedtime dose of Depakote to 250 mg from tomorrow if needed  · Ct continual observation for now  · Monitor LFTs   · Continue Haldol PRN until stabilized on consistent daily medications  · Fall/aspiration precautions  · PT/OT   · Referrals sent for placement by case management-  · Patient still has one-to-one own.   Geriatrics is adjusting the Depakote dose

## 2023-06-28 NOTE — PLAN OF CARE
Problem: Nutrition/Hydration-ADULT  Goal: Nutrient/Hydration intake appropriate for improving, restoring or maintaining nutritional needs  Description: Monitor and assess patient's nutrition/hydration status for malnutrition. Collaborate with interdisciplinary team and initiate plan and interventions as ordered. Monitor patient's weight and dietary intake as ordered or per policy. Utilize nutrition screening tool and intervene as necessary. Determine patient's food preferences and provide high-protein, high-caloric foods as appropriate.      INTERVENTIONS:  - Monitor oral intake, urinary output, labs, and treatment plans  - Assess nutrition and hydration status and recommend course of action  - Evaluate amount of meals eaten  - Assist patient with eating if necessary   - Allow adequate time for meals  - Recommend/ encourage appropriate diets, oral nutritional supplements, and vitamin/mineral supplements  - Order, calculate, and assess calorie counts as needed  - Recommend, monitor, and adjust tube feedings and TPN/PPN based on assessed needs  - Assess need for intravenous fluids  - Provide specific nutrition/hydration education as appropriate  - Include patient/family/caregiver in decisions related to nutrition  Outcome: Progressing     Problem: DISCHARGE PLANNING  Goal: Discharge to home or other facility with appropriate resources  Description: INTERVENTIONS:  - Identify barriers to discharge w/patient and caregiver  - Arrange for needed discharge resources and transportation as appropriate  - Identify discharge learning needs (meds, wound care, etc.)  - Arrange for interpretive services to assist at discharge as needed  - Refer to Case Management Department for coordinating discharge planning if the patient needs post-hospital services based on physician/advanced practitioner order or complex needs related to functional status, cognitive ability, or social support system  Outcome: Progressing     Problem: NEUROSENSORY - ADULT  Goal: Achieves stable or improved neurological status  Description: INTERVENTIONS  - Monitor and report changes in neurological status  - Monitor vital signs such as temperature, blood pressure, glucose, and any other labs ordered   - Initiate measures to prevent increased intracranial pressure  - Monitor for seizure activity and implement precautions if appropriate      Outcome: Progressing

## 2023-06-28 NOTE — UTILIZATION REVIEW
Continued Stay Review    Date: 6/28/23                          Current Patient Class: inpatient  Current Level of Care: med surg    HPI:88 y.o. male initially admitted on 6/21/23     Assessment/Plan:  Pt cooperative today, geriatrics following. Plan is to uptitrate the dose of Depakote at nighttime. May be able to wean off of the one-to-one today. Monitor labs including LFTs, IO, monitor for urinary retention, monitor blood sugars, continue PT OT, recommend post acute rehab facility, CM sent referrals for placement.     Vital Signs:   Date/Time Temp Resp BP MAP (mmHg) O2 Device   06/28/23 0828 -- -- -- -- None (Room air)   06/28/23 07:42:24 97.6 °F (36.4 °C) 18 145/70 95 --   06/27/23 2000 -- -- -- -- None (Room air)   06/27/23 15:20:09 -- 14 148/85 106 --   06/27/23 11:19:33 97.9 °F (36.6 °C) -- 153/69 97 --   06/27/23 07:26:14 97.6 °F (36.4 °C) -- 154/68 97 --   06/26/23 22:55:42 -- -- 148/119 Abnormal  129 --   06/26/23 2017 -- -- -- -- None (Room air)   06/26/23 0823 -- -- -- -- None (Room air)   06/26/23 06:42:18 97.6 °F (36.4 °C) -- 140/62 88 --     Pertinent Labs/Diagnostic Results:       Results from last 7 days   Lab Units 06/26/23  0517 06/25/23  1557   WBC Thousand/uL 4.09* 4.35   HEMOGLOBIN g/dL 15.4 15.6   HEMATOCRIT % 45.9 45.4   PLATELETS Thousands/uL 199 182   NEUTROS ABS Thousands/µL 2.23 2.39         Results from last 7 days   Lab Units 06/26/23  0517 06/25/23  1557 06/24/23  0855 06/23/23  0629   SODIUM mmol/L 142 141 141 141   POTASSIUM mmol/L 4.1 4.3 4.0 4.3   CHLORIDE mmol/L 114* 114* 112* 114*   CO2 mmol/L 25 26 26 26   ANION GAP mmol/L 3 1 3 1   BUN mg/dL 36* 34* 29* 26*   CREATININE mg/dL 1.43* 1.44* 1.46* 1.40*   EGFR ml/min/1.73sq m 43 43 42 44   CALCIUM mg/dL 10.0 9.6 9.9 9.5   MAGNESIUM mg/dL 1.9  --  2.1 2.1   PHOSPHORUS mg/dL  --   --  2.3 2.6     Results from last 7 days   Lab Units 06/26/23  0517 06/25/23  1557   AST U/L 37 27   ALT U/L 45 33   ALK PHOS U/L 67 64   TOTAL PROTEIN g/dL 7.1 6.9   ALBUMIN g/dL 3.4* 3.4*   TOTAL BILIRUBIN mg/dL 0.71 0.57     Results from last 7 days   Lab Units 06/24/23  1553   POC GLUCOSE mg/dl 117     Results from last 7 days   Lab Units 06/26/23  0517 06/25/23  1557 06/24/23  0855 06/23/23  0629   GLUCOSE RANDOM mg/dL 117 157* 116 102        Medications:   Scheduled Medications:  amLODIPine, 5 mg, Oral, Daily  vitamin B-12, 1,000 mcg, Oral, Daily  divalproex sodium, 125 mg, Oral, BID   And  divalproex sodium, 250 mg, Oral, HS  docusate sodium, 100 mg, Oral, BID  heparin (porcine), 5,000 Units, Subcutaneous, Q8H PATO  QUEtiapine, 25 mg, Oral, HS  senna, 1 tablet, Oral, HS      Continuous IV Infusions: none     PRN Meds:  acetaminophen, 650 mg, Oral, Q6H PRN  haloperidol lactate, 0.5 mg, Intramuscular, Q4H PRN x8 thus far  haloperidol lactate, 0.25 mg, Intramuscular, Q4H PRN x2 then dc'd  hydrALAZINE, 5 mg, Intravenous, Q6H PRN  ondansetron, 4 mg, Intravenous, Q6H PRN    Discharge Plan: d    Network Utilization Review Department  ATTENTION: Please call with any questions or concerns to [phone] and carefully listen to the prompts so that you are directed to the right person. All voicemails are confidential.  Camilo Means all requests for admission clinical reviews, approved or denied determinations and any other requests to dedicated fax number below belonging to the campus where the patient is receiving treatment.  List of dedicated fax numbers for the Facilities:  [place] DENIALS (Administrative/Medical Necessity) [phone] [facility] [phone]   [address] [facility] [address] [facility] [phone] 1505 [facility] [address] [facility] [address] [facility] 1000 [facility] [place] [address] [phone]

## 2023-06-28 NOTE — PROGRESS NOTES
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 He has dementia and she  Is usually able to care for him at home  · CT head shows diffuse moderate to severe chronic microangiopathic changes  · TSH normal, B12 low normal,   · Geriatrics following  · On Seroquel 25 mg at bedtime  · Depakote added on 6/22/23 for mood stabilization - increased to 125 mg q 8h on 6/24   · Behavior much improved  · Discussed with geriatrics - can consider increase in bedtime dose of Depakote to 250 mg from tomorrow if needed  · Ct continual observation for now  · Monitor LFTs   · Continue Haldol PRN until stabilized on consistent daily medications  · Fall/aspiration precautions  · PT/OT   · Referrals sent for placement by case management-  · Patient still has one-to-one own.   Geriatrics is adjusting the Depakote dose    Ambulatory dysfunction  Assessment & Plan  Unsteady gait with history of recurrent falls  · Fall precautions  · PT/OT recommend postacute rehab    Stage 3 chronic kidney disease [facility])  Assessment & Plan  Lab Results   Component Value Date    EGFR 43 06/26/2023    EGFR 43 06/25/2023    EGFR 42 06/24/2023    CREATININE 1.43 (H) 06/26/2023    CREATININE 1.44 (H) 06/25/2023    CREATININE 1.46 (H) 06/24/2023     · Baseline creatinine 1.4 to 1.6  · Home medication Lisinopril 10 mg daily discontinued on admission in the setting of fluctuating oral fluid intake and begun on Amlodipine 5 mg daily on 6/21    · Encourage oral intake   · Monitor I+O, monitor PVRs, monitor for constipation  · Limit nephrotoxic agents     Benign prostatic hyperplasia with urinary frequency  Assessment & Plan  · Was recently taken off finasteride  · Oxybutynin discontinued per geriatrics recommendation   · Monitor for urinary retention      Controlled type 2 diabetes mellitus without complication, without long-term current use of insulin ([address])  Assessment & Plan  Lab Results   Component Value Date    HGBA1C 5.8 03/17/2023       No results for input(s): "POCGLU" in the last 72 hours. Blood Sugar Average: Last 72 hrs:     · Had been taken off Metformin prior to admission   · A1c well controled   · Monitor blood sugars    Sensorineural hearing loss (SNHL) of both ears  Assessment & Plan  · Noted             VTE Pharmacologic Prophylaxis: VTE Score: 4 Moderate Risk (Score 3-4) - Pharmacological DVT Prophylaxis Ordered: heparin. Patient Centered Rounds: I performed bedside rounds with nursing staff today. Discussions with Specialists or Other Care Team Provider: geriatrics    Education and Discussions with Family / Patient: Updated  (wife) at bedside. Total Time Spent on Date of Encounter in care of patient: 45 minutes This time was spent on one or more of the following: performing physical exam; counseling and coordination of care; obtaining or reviewing history; documenting in the medical record; reviewing/ordering tests, medications or procedures; communicating with other healthcare professionals and discussing with patient's family/caregivers. Current Length of Stay: 6 day(s)  Current Patient Status: Inpatient   Certification Statement: The patient will continue to require additional inpatient hospital stay due to Pending placement-currently on one-to-one  Discharge Plan: Anticipate discharge in >72 hrs to rehab facility. Code Status: Level 1 - Full Code    Subjective:   Patient seen and examined. Cooperative today. Discussed with geriatrics. Wife in the room. Plan is to uptitrate the dose of Depakote at nighttime.   May be able to wean off of the one-to-one tomorrow    Objective:     Vitals:   Temp (24hrs), Av.8 °F (36.6 °C), Min:97.6 °F (36.4 °C), Max:97.9 °F (36.6 °C)    Temp:  [97.6 °F (36.4 °C)-97.9 °F (36.6 °C)] 97.9 °F (36.6 °C)  Resp:  [14] 14  BP: (148-154)/() 148/85  Body mass index is 28.76 kg/m². Input and Output Summary (last 24 hours): Intake/Output Summary (Last 24 hours) at 2023  Last data filed at 2023 0229  Gross per 24 hour   Intake --   Output 150 ml   Net -150 ml       Physical Exam:   Physical Exam  Constitutional:       General: He is not in acute distress. Appearance: He is not ill-appearing. HENT:      Head: Normocephalic. Nose: Nose normal.   Eyes:      General: No scleral icterus. Cardiovascular:      Rate and Rhythm: Normal rate and regular rhythm. Pulses: Normal pulses. Pulmonary:      Comments: Decreased breath sounds bilateral  Abdominal:      General: There is no distension. Palpations: Abdomen is soft. Musculoskeletal:         General: Normal range of motion. Skin:     General: Skin is warm. Neurological:      Mental Status: He is alert. Mental status is at baseline.         Additional Data:     Labs:  Results from last 7 days   Lab Units 23  0517   WBC Thousand/uL 4.09*   HEMOGLOBIN g/dL 15.4   HEMATOCRIT % 45.9   PLATELETS Thousands/uL 199   NEUTROS PCT % 54   LYMPHS PCT % 29   MONOS PCT % 12   EOS PCT % 4     Results from last 7 days   Lab Units 23  0517   SODIUM mmol/L 142   POTASSIUM mmol/L 4.1   CHLORIDE mmol/L 114*   CO2 mmol/L 25   BUN mg/dL 36*   CREATININE mg/dL 1.43*   ANION GAP mmol/L 3   CALCIUM mg/dL 10.0   ALBUMIN g/dL 3.4*   TOTAL BILIRUBIN mg/dL 0.71   ALK PHOS U/L 67   ALT U/L 45   AST U/L 37   GLUCOSE RANDOM mg/dL 117         Results from last 7 days   Lab Units 23  1553   POC GLUCOSE mg/dl 117               Lines/Drains:  Invasive Devices     None                       Imaging: Reviewed radiology reports from this admission including: CT head    Recent Cultures (last 7 days):         Last 24 Hours Medication List:   Current Facility-Administered Medications   Medication Dose Route Frequency Provider Last Rate   • acetaminophen  650 mg Oral Q6H PRN JONAS Borden     • amLODIPine  5 mg Oral Daily JONAS Borden     • vitamin B-12  1,000 mcg Oral Daily JONAS Borden     • [START ON 6/28/2023] divalproex sodium  125 mg Oral BID Briana Arreguin DO      And   • divalproex sodium  250 mg Oral HS Briana Arreguin DO     • docusate sodium  100 mg Oral BID Jacky Nieves MD     • haloperidol lactate  0.5 mg Intramuscular Q4H PRN JONAS Borden     • heparin (porcine)  5,000 Units Subcutaneous [facility] Elidia Kim MD     • hydrALAZINE  5 mg Intravenous Q6H PRN JONAS Borden     • ondansetron  4 mg Intravenous Q6H PRN JONAS Borden     • QUEtiapine  25 mg Oral HS JONAS Borden     • senna  1 tablet Oral HS Jacky Nieves MD          Today, Patient Was Seen By: Nayeli Stock MD    **Please Note: This note may have been constructed using a voice recognition system. **

## 2023-06-28 NOTE — PROGRESS NOTES
Progress Note - Geriatric Medicine   Bernadette Boykin 81 y.o. male MRN: [id number]  [facility] Encounter: [id number]      Assessment/Plan:     Alzheimer's dementia with psychotic disturbance  -Moderate and progressive, approx FAST scale 6b  -behaviors markedly improved with titration of Depakote continue current regimen    -Depakote 125mg in morning and afternoon    AND   -Depakote 250mg HS (increased 6/27)  -low dose IM Haldol remains avail as PRN, not requiring frequently   -continue to encourage calm quiet env to reduce risk overstimulation   -weaned off one to one cont obs earlier today, tolerating so far   -continue supportive cares      BPH with LUTS  -Encourage aggressive bowel regimen to reduce risk of patient  -Monitor for fecal and urinary retention, consider urinary retention protocol     Overactive bladder  -Myrbetriq dc'd due to increased risk confusion and falls in older adult population, recommend against resuming due to side effects   -Avoid bladder irritating agents and consider timed voiding and limiting fluid intake within 2 hours of bed to reduce overnight awakening to void  -Check for fecal and urinary retention with any change in behaviors as patients with underlying dementia may have difficulty expressing needs at times and can often be precipitator of adverse behaviors     Ambulatory dysfunction   -patient reportedly unsteady on feet at baseline and has hx recurrent falls numerous over past year  -remains high fall risk, cont precautions  -PT and OT following      Impaired Vision  -recommend use of corrective lenses at all appropriate times  -consider large font for printed materials provided to patient     Impaired Hearing  -Encourage use of hearing aids at all appropriate times  -encourage providers and caregivers to speak slowly and clearly directly to patient  -minimize background noise to encourage patient engagement  -continue use of hearing aids      Impaired mastication  -Requires use of dentures -encourage use at appropriate times  -ensure meal consistency appropriate for abilities  -continue aspiration precautions     Frailty syndrome in geriatric patient   -Clinical frailty scale stage V, mildly frail  -encourage well balanced nutrition   -continue to optimize chronic conditions   -Continue psychosocial supports of patient and family     High risk caregiver burnout   -wife primary caregiver and high risk burnout, continue to reinforce importance of caregiver health and wellbeing   -consider o/p referral to caregiver support group and [facility] for additional community based resources and supports jose with anticipated transition to care facility on d/c     High risk developing delirium   -continue strict delirium precautions   -ensure pain controlled   -reorient frequently as appropriate      Care coordination: rounded with Dr. Claudell Fort     Subjective:     Karla Otoole is seen and examined at bedside where he is sitting resting, he appears comfortable and offers no acute complaints. He has been weaned off the one to one continual observation and is doing well so far. His wife Yarely Forrest is updated at bedside. Review of Systems   Unable to perform ROS: Dementia     Objective:     Vitals: Blood pressure 144/71, pulse 87, temperature 97.8 °F (36.6 °C), resp. rate 18, height 5' 8" (1.727 m), weight 85.8 kg (189 lb 2.5 oz), SpO2 94 %. ,Body mass index is 28.76 kg/m². Intake/Output Summary (Last 24 hours) at 6/28/2023 1546  Last data filed at 6/28/2023 0215  Gross per 24 hour   Intake --   Output 300 ml   Net -300 ml     Current Medications: Reviewed    Physical Exam:   Physical Exam  Vitals and nursing note reviewed. Constitutional:       General: He is not in acute distress. Appearance: He is not ill-appearing or toxic-appearing. HENT:      Head: Normocephalic.       Nose: Nose normal.      Mouth/Throat:      Mouth: Mucous membranes are moist.   Eyes:      General: No scleral icterus. Right eye: No discharge. Left eye: No discharge. Conjunctiva/sclera: Conjunctivae normal.   Cardiovascular:      Rate and Rhythm: Normal rate and regular rhythm. Pulmonary:      Effort: Pulmonary effort is normal. No respiratory distress. Breath sounds: No wheezing. Abdominal:      General: There is no distension. Palpations: Abdomen is soft. Tenderness: There is no abdominal tenderness. Musculoskeletal:      Cervical back: Neck supple. Comments: Reduced overall muscle mass   Skin:     General: Skin is warm and dry. Neurological:      Mental Status: He is alert. Comments: Awake and alert, pleasantly confused   Psychiatric:      Comments: Calm and cooperative, no restlessness irritation or agitation appreciated        Invasive Devices     None               Lab, Imaging and other studies: I have personally reviewed pertinent reports.

## 2023-06-28 NOTE — ASSESSMENT & PLAN NOTE
· Presented to the ED on 6/20/23 after wife noted increasing agitation, with anger and aggressiveness at home over a 3 days period. He has dementia and she  Is usually able to care for him at home  · CT head shows diffuse moderate to severe chronic microangiopathic changes  · TSH normal, B12 low normal,   · Geriatrics following  · On Seroquel 25 mg at bedtime  · Currently on Depakote.   125 mg every morning and p.m. and 250 mg at bedtime  · Behavior much improved  · Monitor LFTs   · Continue Haldol PRN until stabilized on consistent daily medications  · Fall/aspiration precautions  · PT/OT   · Referrals sent for placement by case management-  · We will discontinue one-to-one and monitor

## 2023-06-29 PROCEDURE — 99232 SBSQ HOSP IP/OBS MODERATE 35: CPT | Performed by: INTERNAL MEDICINE

## 2023-06-29 PROCEDURE — 99232 SBSQ HOSP IP/OBS MODERATE 35: CPT | Performed by: FAMILY MEDICINE

## 2023-06-29 RX ADMIN — QUETIAPINE 25 MG: 25 TABLET ORAL at 17:05

## 2023-06-29 RX ADMIN — DIVALPROEX SODIUM 125 MG: 125 CAPSULE ORAL at 05:07

## 2023-06-29 RX ADMIN — DOCUSATE SODIUM 100 MG: 100 CAPSULE, LIQUID FILLED ORAL at 08:33

## 2023-06-29 RX ADMIN — CYANOCOBALAMIN TAB 500 MCG 1000 MCG: 500 TAB at 08:33

## 2023-06-29 RX ADMIN — AMLODIPINE BESYLATE 5 MG: 5 TABLET ORAL at 08:33

## 2023-06-29 RX ADMIN — DIVALPROEX SODIUM 125 MG: 125 CAPSULE ORAL at 14:19

## 2023-06-29 RX ADMIN — DOCUSATE SODIUM 100 MG: 100 CAPSULE, LIQUID FILLED ORAL at 17:05

## 2023-06-29 RX ADMIN — HEPARIN SODIUM 5000 UNITS: 5000 INJECTION INTRAVENOUS; SUBCUTANEOUS at 14:19

## 2023-06-29 RX ADMIN — HEPARIN SODIUM 5000 UNITS: 5000 INJECTION INTRAVENOUS; SUBCUTANEOUS at 05:07

## 2023-06-29 RX ADMIN — HEPARIN SODIUM 5000 UNITS: 5000 INJECTION INTRAVENOUS; SUBCUTANEOUS at 21:04

## 2023-06-29 NOTE — PROGRESS NOTES
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 He has dementia and she  Is usually able to care for him at home  · CT head shows diffuse moderate to severe chronic microangiopathic changes  · TSH normal, B12 low normal,   · Geriatrics following  · On Seroquel 25 mg at bedtime  · Currently on Depakote.   125 mg every morning and p.m. and 250 mg at bedtime  · Behavior much improved  · Monitor LFTs   · Continue Haldol PRN until stabilized on consistent daily medications  · Fall/aspiration precautions  · PT/OT   · Referrals sent for placement by case management-  · Was off of one-to-one since yesterday evening    Ambulatory dysfunction  Assessment & Plan  Unsteady gait with history of recurrent falls  · Fall precautions  · PT/OT recommend postacute rehab    Stage 3 chronic kidney disease [facility])  Assessment & Plan  Lab Results   Component Value Date    EGFR 43 06/26/2023    EGFR 43 06/25/2023    EGFR 42 06/24/2023    CREATININE 1.43 (H) 06/26/2023    CREATININE 1.44 (H) 06/25/2023    CREATININE 1.46 (H) 06/24/2023     · Baseline creatinine 1.4 to 1.6  · Home medication Lisinopril 10 mg daily discontinued on admission in the setting of fluctuating oral fluid intake and begun on Amlodipine 5 mg daily on 6/21    · Encourage oral intake   · Monitor I+O, monitor PVRs, monitor for constipation  · Limit nephrotoxic agents     Benign prostatic hyperplasia with urinary frequency  Assessment & Plan  · Was recently taken off finasteride  · Oxybutynin discontinued per geriatrics recommendation   · Monitor for urinary retention      Controlled type 2 diabetes mellitus without complication, without long-term current use of insulin [facility])  Assessment & Plan  Lab Results   Component Value Date    HGBA1C 5.8 2023       No results for input(s): "POCGLU" in the last 72 hours. Blood Sugar Average: Last 72 hrs:     · Had been taken off Metformin prior to admission   · A1c well controled   · Monitor blood sugars    Sensorineural hearing loss (SNHL) of both ears  Assessment & Plan  · Noted               VTE Pharmacologic Prophylaxis: VTE Score: 4 Moderate Risk (Score 3-4) - Pharmacological DVT Prophylaxis Ordered: heparin. Patient Centered Rounds: I performed bedside rounds with nursing staff today. Discussions with Specialists or Other Care Team Provider:     Education and Discussions with Family / Patient: Updated  (wife) at bedside. Total Time Spent on Date of Encounter in care of patient: 35 minutes This time was spent on one or more of the following: performing physical exam; counseling and coordination of care; obtaining or reviewing history; documenting in the medical record; reviewing/ordering tests, medications or procedures; communicating with other healthcare professionals and discussing with patient's family/caregivers. Current Length of Stay: 8 day(s)  Current Patient Status: Inpatient   Certification Statement: The patient will continue to require additional inpatient hospital stay due to Pending safe discharge/\option  Discharge Plan:    Code Status: Level 1 - Full Code    Subjective:   Patient seen and examined. Off of one-to-one. Has not received as needed Haldol overnight.   Discussed with case management patient need opening    Objective:     Vitals:   Temp (24hrs), Av.7 °F (36.5 °C), Min:97.6 °F (36.4 °C), Max:97.8 °F (36.6 °C)    Temp:  [97.6 °F (36.4 °C)-97.8 °F (36.6 °C)] 97.8 °F (36.6 °C)  HR:  [68-83] 68  Resp:  [16-20] 20  BP: (123-155)/(60-70) 155/70  SpO2:  [94 %-95 %] 94 %  Body mass index is 28.76 kg/m². Input and Output Summary (last 24 hours): Intake/Output Summary (Last 24 hours) at 6/29/2023 1848  Last data filed at 6/29/2023 1835  Gross per 24 hour   Intake 180 ml   Output --   Net 180 ml       Physical Exam:   Physical Exam  Constitutional:       General: He is not in acute distress. HENT:      Head: Normocephalic. Nose: Nose normal.   Eyes:      General: No scleral icterus. Cardiovascular:      Rate and Rhythm: Normal rate and regular rhythm. Pulmonary:      Effort: Pulmonary effort is normal.   Abdominal:      General: There is no distension. Musculoskeletal:         General: No swelling. Normal range of motion. Skin:     General: Skin is warm. Coloration: Skin is not jaundiced. Neurological:      Mental Status: He is alert. Mental status is at baseline. He is disoriented. Cranial Nerves: No cranial nerve deficit. Additional Data:     Labs:  Results from last 7 days   Lab Units 06/26/23  0517   WBC Thousand/uL 4.09*   HEMOGLOBIN g/dL 15.4   HEMATOCRIT % 45.9   PLATELETS Thousands/uL 199   NEUTROS PCT % 54   LYMPHS PCT % 29   MONOS PCT % 12   EOS PCT % 4     Results from last 7 days   Lab Units 06/26/23  0517   SODIUM mmol/L 142   POTASSIUM mmol/L 4.1   CHLORIDE mmol/L 114*   CO2 mmol/L 25   BUN mg/dL 36*   CREATININE mg/dL 1.43*   ANION GAP mmol/L 3   CALCIUM mg/dL 10.0   ALBUMIN g/dL 3.4*   TOTAL BILIRUBIN mg/dL 0.71   ALK PHOS U/L 67   ALT U/L 45   AST U/L 37   GLUCOSE RANDOM mg/dL 117         Results from last 7 days   Lab Units 06/24/23  1553   POC GLUCOSE mg/dl 117               Lines/Drains:  Invasive Devices     None                       Imaging: No pertinent imaging reviewed.     Recent Cultures (last 7 days):         Last 24 Hours Medication List:   Current Facility-Administered Medications   Medication Dose Route Frequency Provider Last Rate   • acetaminophen  650 mg Oral Q6H PRN JONAS Post     • amLODIPine  5 mg Oral Daily JONAS Howard     • vitamin B-12  1,000 mcg Oral Daily JONAS Howard     • divalproex sodium  125 mg Oral BID Sondheimer J Carlos, DO      And   • divalproex sodium  250 mg Oral HS Sondheimer J Carlos, DO     • docusate sodium  100 mg Oral BID Fausto Kussmaul, MD     • haloperidol lactate  0.5 mg Intramuscular Q4H PRN JONAS Howard     • heparin (porcine)  5,000 Units Subcutaneous [facility]     • hydrALAZINE  5 mg Intravenous Q6H PRN JONAS Howard     • ondansetron  4 mg Intravenous Q6H PRN JONAS Howard     • QUEtiapine  25 mg Oral HS JONAS Howard     • senna  1 tablet Oral HS Fausto Kussmaul, MD          Today, Patient Was Seen By: Suzy Azar MD    **Please Note: This note may have been constructed using a voice recognition system. **

## 2023-06-29 NOTE — PROGRESS NOTES
Progress Note - Geriatric Medicine   Meek Prom 81 y.o. male MRN: [id number]  Unit/Bed#: [facility] 818-01 Encounter: [id number]      Assessment/Plan:    Alzheimer's dementia with psychotic disturbance  -Moderate and progressive, approx FAST scale 6b  -behaviors markedly improved with titration of Depakote continue current regimen as tolerating well with good symptom control              -Depakote 125mg in morning and afternoon                          AND              -Depakote 250mg HS (increased 6/27)  -low dose IM Haldol remains avail as PRN, not required in over 24H  -continue to encourage calm quiet env to reduce risk overstimulation   -remains off all restraints and one to one obs and doing well   -continue supportive cares      BPH with LUTS   -Encourage aggressive bowel regimen to reduce risk of patient  -Monitor for fecal and urinary retention, consider urinary retention protocol     Overactive bladder  -Myrbetriq dc'd due to increased risk confusion and falls in older adult population  -Avoid bladder irritating agents and consider timed voiding and limiting fluid intake within 2 hours of bed to reduce overnight awakening to void  -offer assist to restroom with any restlessness as often occurs when need to void but unable to articulate need     Ambulatory dysfunction   -patient reportedly unsteady on feet at baseline and has hx recurrent falls numerous over past year  -remains high fall risk, cont precautions  -PT and OT following      Impaired Vision  -recommend use of corrective lenses at all appropriate times  -consider large font for printed materials provided to patient     Impaired Hearing  -Encourage use of hearing aids at all appropriate times  -encourage providers and caregivers to speak slowly and clearly directly to patient  -minimize background noise to encourage patient engagement  -continue use of hearing aids      Impaired mastication  -Requires use of dentures -encourage use at appropriate times  -ensure meal consistency appropriate for abilities  -continue aspiration precautions     Frailty syndrome in geriatric patient   -Clinical frailty scale stage V, mildly frail  -encourage well balanced nutrition   -continue to optimize chronic conditions   -Continue psychosocial supports of patient and family     High risk caregiver burnout   -wife primary caregiver and high risk burnout, continue to reinforce importance of caregiver health and wellbeing   -consider o/p referral to caregiver support group and [facility] for additional community based resources and supports jose with anticipated transition to care facility on d/c     High risk developing delirium   -continue strict delirium precautions   -ensure pain controlled   -reorient frequently as appropriate     Care coordination: rounded with Jacqueline (RN)    Subjective:     Violeta Rios is seen and examined at bedside where he is sitting resting comfortably. He is pleasantly confused and cooperative. Nursing notes that he gets restless when he needs to use restroom jose when he is having trouble articulating the need otherwise he remains calm pleasant and cooperative. He remains out of restraints and off one to one sitter, he did not require and PRN medications for behaviors in over 24H. Review of Systems   Unable to perform ROS: Dementia (pleasantly confused, ans not appropriate to ques asked)     Objective:     Vitals: Blood pressure 147/69, pulse 68, temperature 97.8 °F (36.6 °C), temperature source Oral, resp. rate 16, height 5' 8" (1.727 m), weight 85.8 kg (189 lb 2.5 oz), SpO2 94 %. ,Body mass index is 28.76 kg/m². Intake/Output Summary (Last 24 hours) at 6/29/2023 9181  Last data filed at 6/28/2023 1800  Gross per 24 hour   Intake 180 ml   Output --   Net 180 ml     Current Medications: Reviewed    Physical Exam:   Physical Exam  Vitals and nursing note reviewed. Constitutional:       General: He is not in acute distress.      Appearance: Normal appearance. He is not toxic-appearing. HENT:      Head: Normocephalic. Nose: Nose normal.      Mouth/Throat:      Mouth: Mucous membranes are moist.   Eyes:      General: No scleral icterus. Right eye: No discharge. Left eye: No discharge. Conjunctiva/sclera: Conjunctivae normal.      Comments: Pupils equal round   Cardiovascular:      Rate and Rhythm: Normal rate and regular rhythm. Pulses: Normal pulses. Pulmonary:      Effort: Pulmonary effort is normal. No respiratory distress. Breath sounds: No wheezing. Abdominal:      General: There is no distension. Palpations: Abdomen is soft. Tenderness: There is no abdominal tenderness. Musculoskeletal:      Cervical back: Neck supple. Right lower leg: No edema. Left lower leg: No edema. Comments: Reduced overall muscle mass    Skin:     General: Skin is warm and dry. Comments: Thin and friable    Neurological:      Mental Status: He is alert. Comments: Awake and alert, confused but able to follow simple directions to cooperate with care    Psychiatric:      Comments: Calm cooperative and pleasantly confused, no restlessness or agitation         Invasive Devices     None               Lab, Imaging and other studies: I have personally reviewed pertinent reports.

## 2023-06-29 NOTE — ASSESSMENT & PLAN NOTE
· Presented to the ED on 6/20/23 after wife noted increasing agitation, with anger and aggressiveness at home over a 3 days period. He has dementia and she  Is usually able to care for him at home  · CT head shows diffuse moderate to severe chronic microangiopathic changes  · TSH normal, B12 low normal,   · Geriatrics following  · On Seroquel 25 mg at bedtime  · Currently on Depakote.   125 mg every morning and p.m. and 250 mg at bedtime  · Behavior much improved  · Monitor LFTs   · Continue Haldol PRN until stabilized on consistent daily medications  · Fall/aspiration precautions  · PT/OT   · Referrals sent for placement by case management-  · Was off of one-to-one since yesterday evening

## 2023-06-30 LAB
ANION GAP SERPL CALCULATED.3IONS-SCNC: 5 MMOL/L
BUN SERPL-MCNC: 48 MG/DL (ref 5–25)
CALCIUM SERPL-MCNC: 10 MG/DL (ref 8.3–10.1)
CHLORIDE SERPL-SCNC: 111 MMOL/L (ref 96–108)
CO2 SERPL-SCNC: 26 MMOL/L (ref 21–32)
CREAT SERPL-MCNC: 1.35 MG/DL (ref 0.6–1.3)
GFR SERPL CREATININE-BSD FRML MDRD: 46 ML/MIN/1.73SQ M
GLUCOSE SERPL-MCNC: 136 MG/DL (ref 65–140)
POTASSIUM SERPL-SCNC: 3.9 MMOL/L (ref 3.5–5.3)
SODIUM SERPL-SCNC: 142 MMOL/L (ref 135–147)

## 2023-06-30 PROCEDURE — 80048 BASIC METABOLIC PNL TOTAL CA: CPT | Performed by: FAMILY MEDICINE

## 2023-06-30 PROCEDURE — 99232 SBSQ HOSP IP/OBS MODERATE 35: CPT | Performed by: FAMILY MEDICINE

## 2023-06-30 RX ADMIN — DIVALPROEX SODIUM 125 MG: 125 CAPSULE ORAL at 03:51

## 2023-06-30 RX ADMIN — DIVALPROEX SODIUM 250 MG: 125 CAPSULE ORAL at 21:02

## 2023-06-30 RX ADMIN — HEPARIN SODIUM 5000 UNITS: 5000 INJECTION INTRAVENOUS; SUBCUTANEOUS at 21:02

## 2023-06-30 RX ADMIN — QUETIAPINE 25 MG: 25 TABLET ORAL at 18:10

## 2023-06-30 RX ADMIN — HEPARIN SODIUM 5000 UNITS: 5000 INJECTION INTRAVENOUS; SUBCUTANEOUS at 03:51

## 2023-06-30 RX ADMIN — CYANOCOBALAMIN TAB 500 MCG 1000 MCG: 500 TAB at 08:45

## 2023-06-30 RX ADMIN — DIVALPROEX SODIUM 125 MG: 125 CAPSULE ORAL at 14:36

## 2023-06-30 RX ADMIN — AMLODIPINE BESYLATE 5 MG: 5 TABLET ORAL at 08:45

## 2023-06-30 RX ADMIN — HEPARIN SODIUM 5000 UNITS: 5000 INJECTION INTRAVENOUS; SUBCUTANEOUS at 14:36

## 2023-06-30 RX ADMIN — SENNOSIDES 8.6 MG: 8.6 TABLET, FILM COATED ORAL at 21:02

## 2023-06-30 NOTE — PROGRESS NOTES
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 He has dementia and she  Is usually able to care for him at home  · CT head shows diffuse moderate to severe chronic microangiopathic changes  · TSH normal, B12 low normal,   · Geriatrics following  · On Seroquel 25 mg at bedtime  · Currently on Depakote.   125 mg every morning and p.m. and 250 mg at bedtime  · Behavior much improved  · Monitor LFTs   · Continue Haldol PRN until stabilized on consistent daily medications  · Fall/aspiration precautions  · PT/OT   · Referrals sent for placement by case management-  · Was off of one-to-one since Thursday evening    Ambulatory dysfunction  Assessment & Plan  Unsteady gait with history of recurrent falls  · Fall precautions  · PT/OT recommend postacute rehab    Stage 3 chronic kidney disease [facility])  Assessment & Plan  Lab Results   Component Value Date    EGFR 46 06/30/2023    EGFR 43 06/26/2023    EGFR 43 06/25/2023    CREATININE 1.35 (H) 06/30/2023    CREATININE 1.43 (H) 06/26/2023    CREATININE 1.44 (H) 06/25/2023     · Baseline creatinine 1.4 to 1.6  · Home medication Lisinopril 10 mg daily discontinued on admission in the setting of fluctuating oral fluid intake and begun on Amlodipine 5 mg daily on 6/21    · Encourage oral intake   · Monitor I+O, monitor PVRs, monitor for constipation  · Limit nephrotoxic agents     Benign prostatic hyperplasia with urinary frequency  Assessment & Plan  · Was recently taken off finasteride  · Oxybutynin discontinued per geriatrics recommendation   · Monitor for urinary retention      Controlled type 2 diabetes mellitus without complication, without long-term current use of insulin [facility])  Assessment & Plan  Lab Results   Component Value Date    HGBA1C 5.8 2023       No results for input(s): "POCGLU" in the last 72 hours. Blood Sugar Average: Last 72 hrs:     · Had been taken off Metformin prior to admission   · A1c well controled   · Monitor blood sugars    Sensorineural hearing loss (SNHL) of both ears  Assessment & Plan  · Noted               VTE Pharmacologic Prophylaxis: VTE Score: 4 Moderate Risk (Score 3-4) - Pharmacological DVT Prophylaxis Ordered: heparin. Patient Centered Rounds: I performed bedside rounds with nursing staff today. Discussions with Specialists or Other Care Team Provider:     Education and Discussions with Family / Patient: Updated  (wife) via phone. Total Time Spent on Date of Encounter in care of patient: 35 minutes This time was spent on one or more of the following: performing physical exam; counseling and coordination of care; obtaining or reviewing history; documenting in the medical record; reviewing/ordering tests, medications or procedures; communicating with other healthcare professionals and discussing with patient's family/caregivers. Current Length of Stay: 9 day(s)  Current Patient Status: Inpatient   Certification Statement: The patient will continue to require additional inpatient hospital stay due to pending rehab  Discharge Plan: Anticipate discharge in >72 hrs to rehab facility. Code Status: Level 1 - Full Code    Subjective:   Patient seen and examined, no events overnight , sitting in chair     Objective:     Vitals:   Temp (24hrs), Av.5 °F (36.4 °C), Min:97.2 °F (36.2 °C), Max:98 °F (36.7 °C)    Temp:  [97.2 °F (36.2 °C)-98 °F (36.7 °C)] 98 °F (36.7 °C)  HR:  [74] 74  Resp:  [16-18] 18  BP: (145-151)/(71-74) 145/73  SpO2:  [97 %] 97 %  Body mass index is 28.76 kg/m².      Input and Output Summary (last 24 hours): Intake/Output Summary (Last 24 hours) at 6/30/2023 1804  Last data filed at 6/30/2023 1200  Gross per 24 hour   Intake 300 ml   Output 225 ml   Net 75 ml       Physical Exam:   Physical Exam  Constitutional:       General: He is not in acute distress. HENT:      Head: Normocephalic and atraumatic. Nose: Nose normal.   Eyes:      General: No scleral icterus. Cardiovascular:      Rate and Rhythm: Normal rate and regular rhythm. Pulses: Normal pulses. Heart sounds: Normal heart sounds. Pulmonary:      Effort: Pulmonary effort is normal. No respiratory distress. Breath sounds: Normal breath sounds. Abdominal:      General: Abdomen is flat. There is no distension. Musculoskeletal:         General: Normal range of motion. Skin:     General: Skin is warm. Coloration: Skin is not jaundiced. Neurological:      Mental Status: He is alert. Mental status is at baseline. Additional Data:     Labs:  Results from last 7 days   Lab Units 06/26/23  0517   WBC Thousand/uL 4.09*   HEMOGLOBIN g/dL 15.4   HEMATOCRIT % 45.9   PLATELETS Thousands/uL 199   NEUTROS PCT % 54   LYMPHS PCT % 29   MONOS PCT % 12   EOS PCT % 4     Results from last 7 days   Lab Units 06/30/23  0401 06/26/23  0517   SODIUM mmol/L 142 142   POTASSIUM mmol/L 3.9 4.1   CHLORIDE mmol/L 111* 114*   CO2 mmol/L 26 25   BUN mg/dL 48* 36*   CREATININE mg/dL 1.35* 1.43*   ANION GAP mmol/L 5 3   CALCIUM mg/dL 10.0 10.0   ALBUMIN g/dL  --  3.4*   TOTAL BILIRUBIN mg/dL  --  0.71   ALK PHOS U/L  --  67   ALT U/L  --  45   AST U/L  --  37   GLUCOSE RANDOM mg/dL 136 117         Results from last 7 days   Lab Units 06/24/23  1553   POC GLUCOSE mg/dl 117               Lines/Drains:  Invasive Devices     None                       Imaging: No pertinent imaging reviewed.     Recent Cultures (last 7 days):         Last 24 Hours Medication List:   Current Facility-Administered Medications   Medication Dose Route Frequency Provider Last Rate   • acetaminophen  650 mg Oral Q6H PRN JONAS Ng     • amLODIPine  5 mg Oral Daily JONAS Ng     • vitamin B-12  1,000 mcg Oral Daily JONAS Ng     • divalproex sodium  125 mg Oral BID Laura Slice, DO      And   • divalproex sodium  250 mg Oral HS Laura Slice, DO     • docusate sodium  100 mg Oral BID Josue Wagner MD     • haloperidol lactate  0.5 mg Intramuscular Q4H PRN JONAS Ng     • heparin (porcine)  5,000 Units Subcutaneous [facility] Caden Stinson MD     • hydrALAZINE  5 mg Intravenous Q6H PRN JONAS Ng     • ondansetron  4 mg Intravenous Q6H PRN JONAS Ng     • QUEtiapine  25 mg Oral HS JONAS Ng     • senna  1 tablet Oral HS Josue Wagner MD          Today, Patient Was Seen By: Michaela Chamberlain MD    **Please Note: This note may have been constructed using a voice recognition system. **

## 2023-06-30 NOTE — ASSESSMENT & PLAN NOTE
Lab Results   Component Value Date    EGFR 46 06/30/2023    EGFR 43 06/26/2023    EGFR 43 06/25/2023    CREATININE 1.35 (H) 06/30/2023    CREATININE 1.43 (H) 06/26/2023    CREATININE 1.44 (H) 06/25/2023     · Baseline creatinine 1.4 to 1.6  · Home medication Lisinopril 10 mg daily discontinued on admission in the setting of fluctuating oral fluid intake and begun on Amlodipine 5 mg daily on 6/21    · Encourage oral intake   · Monitor I+O, monitor PVRs, monitor for constipation  · Limit nephrotoxic agents

## 2023-06-30 NOTE — ASSESSMENT & PLAN NOTE
· Presented to the ED on 6/20/23 after wife noted increasing agitation, with anger and aggressiveness at home over a 3 days period. He has dementia and she  Is usually able to care for him at home  · CT head shows diffuse moderate to severe chronic microangiopathic changes  · TSH normal, B12 low normal,   · Geriatrics following  · On Seroquel 25 mg at bedtime  · Currently on Depakote.   125 mg every morning and p.m. and 250 mg at bedtime  · Behavior much improved  · Monitor LFTs   · Continue Haldol PRN until stabilized on consistent daily medications  · Fall/aspiration precautions  · PT/OT   · Referrals sent for placement by case management-  · Was off of one-to-one since Thursday evening

## 2023-06-30 NOTE — ASSESSMENT & PLAN NOTE
Lab Results   Component Value Date    HGBA1C 5.8 03/17/2023       No results for input(s): "POCGLU" in the last 72 hours.     Blood Sugar Average: Last 72 hrs:     · Had been taken off Metformin prior to admission   · A1c well controled   · Monitor blood sugars resolved. 2/2 Metabolic encephalopathy  - dc-ed wellbutrin   - c/w sertraline   - frequent reorientation

## 2023-06-30 NOTE — CASE MANAGEMENT
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 [facility])   Patient Active Problem List    Diagnosis Date Noted   • Ambulatory dysfunction 2023   • Stage 3 chronic kidney disease ([address]) 2023   • Urinary incontinence without sensory awareness 2023   • Slow transit constipation 2023   • At high risk for falls 2023   • Lumbar radiculopathy    • UTI symptoms 2023   • Paroxysmal atrial fibrillation ([address]) 01/10/2023   • Platelets decreased ([address]) 01/10/2023   • 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 ([address]) 2022   • Neurologic gait dysfunction 2022   • Stage 3b chronic kidney disease ([address]) 2021   • OAB (overactive bladder) 2021   • Benign prostatic hyperplasia with urinary frequency 2021   • Controlled type 2 diabetes mellitus without complication, without long-term current use of insulin ([address]) 2019   • Sensorineural hearing loss (SNHL) of both ears 10/21/2016   • Seasonal allergies 2015   • Spinal stenosis 2012      LOS (days): 9  Geometric Mean LOS (GMLOS) (days): 3.80  Days to GMLOS:-5     OBJECTIVE:  Risk of Unplanned Readmission Score: 15.59         Current admission status: Inpatient   Preferred Pharmacy:   [address] [address]  Phone: [phone] Fax: [phone]    Primary Care Provider: Nini Aquino MD    Primary Insurance: [facility]  Secondary Insurance:     DISCHARGE DETAILS:    Vendors updated that patient has been off of 1:1 since . [address] has been sent and awaiting determination. One accepting vendor in  [address] but patient has to be off of 1:1 for 72 hours prior to accepting.  DEAN will follow up in rounds today with provider. Update: 4510 Met at bedside with wife and caregiver. Discussed [employer] accepting vendor and that patient is medically ready and need to make choice. Wife has set up a tour for Saturday morning per liaison of [facility]. Will need auth. CM to continue to support.

## 2023-07-01 PROCEDURE — 99232 SBSQ HOSP IP/OBS MODERATE 35: CPT | Performed by: FAMILY MEDICINE

## 2023-07-01 RX ADMIN — HEPARIN SODIUM 5000 UNITS: 5000 INJECTION INTRAVENOUS; SUBCUTANEOUS at 22:00

## 2023-07-01 RX ADMIN — DOCUSATE SODIUM 100 MG: 100 CAPSULE, LIQUID FILLED ORAL at 17:22

## 2023-07-01 RX ADMIN — DIVALPROEX SODIUM 125 MG: 125 CAPSULE ORAL at 05:40

## 2023-07-01 RX ADMIN — DOCUSATE SODIUM 100 MG: 100 CAPSULE, LIQUID FILLED ORAL at 07:56

## 2023-07-01 RX ADMIN — SENNOSIDES 8.6 MG: 8.6 TABLET, FILM COATED ORAL at 22:00

## 2023-07-01 RX ADMIN — QUETIAPINE 25 MG: 25 TABLET ORAL at 17:22

## 2023-07-01 RX ADMIN — CYANOCOBALAMIN TAB 500 MCG 1000 MCG: 500 TAB at 07:56

## 2023-07-01 RX ADMIN — AMLODIPINE BESYLATE 5 MG: 5 TABLET ORAL at 07:56

## 2023-07-01 RX ADMIN — DIVALPROEX SODIUM 125 MG: 125 CAPSULE ORAL at 13:32

## 2023-07-01 RX ADMIN — HEPARIN SODIUM 5000 UNITS: 5000 INJECTION INTRAVENOUS; SUBCUTANEOUS at 13:32

## 2023-07-01 RX ADMIN — DIVALPROEX SODIUM 250 MG: 125 CAPSULE ORAL at 22:00

## 2023-07-01 RX ADMIN — HEPARIN SODIUM 5000 UNITS: 5000 INJECTION INTRAVENOUS; SUBCUTANEOUS at 05:40

## 2023-07-01 NOTE — PLAN OF CARE
Problem: Nutrition/Hydration-ADULT  Goal: Nutrient/Hydration intake appropriate for improving, restoring or maintaining nutritional needs  Description: Monitor and assess patient's nutrition/hydration status for malnutrition. Collaborate with interdisciplinary team and initiate plan and interventions as ordered. Monitor patient's weight and dietary intake as ordered or per policy. Utilize nutrition screening tool and intervene as necessary. Determine patient's food preferences and provide high-protein, high-caloric foods as appropriate.      INTERVENTIONS:  - Monitor oral intake, urinary output, labs, and treatment plans  - Assess nutrition and hydration status and recommend course of action  - Evaluate amount of meals eaten  - Assist patient with eating if necessary   - Allow adequate time for meals  - Recommend/ encourage appropriate diets, oral nutritional supplements, and vitamin/mineral supplements  - Order, calculate, and assess calorie counts as needed  - Recommend, monitor, and adjust tube feedings and TPN/PPN based on assessed needs  - Assess need for intravenous fluids  - Provide specific nutrition/hydration education as appropriate  - Include patient/family/caregiver in decisions related to nutrition  Outcome: Progressing     Problem: PAIN - ADULT  Goal: Verbalizes/displays adequate comfort level or baseline comfort level  Description: Interventions:  - Encourage patient to monitor pain and request assistance  - Assess pain using appropriate pain scale  - Administer analgesics based on type and severity of pain and evaluate response  - Implement non-pharmacological measures as appropriate and evaluate response  - Consider cultural and social influences on pain and pain management  - Notify physician/advanced practitioner if interventions unsuccessful or patient reports new pain  Outcome: Progressing     Problem: INFECTION - ADULT  Goal: Absence or prevention of progression during hospitalization  Description: INTERVENTIONS:  - Assess and monitor for signs and symptoms of infection  - Monitor lab/diagnostic results  - Monitor all insertion sites, i.e. indwelling lines, tubes, and drains  - Monitor endotracheal if appropriate and nasal secretions for changes in amount and color  - [place] appropriate cooling/warming therapies per order  - Administer medications as ordered  - Instruct and encourage patient and family to use good hand hygiene technique  - Identify and instruct in appropriate isolation precautions for identified infection/condition  Outcome: Progressing     Problem: DISCHARGE PLANNING  Goal: Discharge to home or other facility with appropriate resources  Description: INTERVENTIONS:  - Identify barriers to discharge w/patient and caregiver  - Arrange for needed discharge resources and transportation as appropriate  - Identify discharge learning needs (meds, wound care, etc.)  - Arrange for interpretive services to assist at discharge as needed  - Refer to Case Management Department for coordinating discharge planning if the patient needs post-hospital services based on physician/advanced practitioner order or complex needs related to functional status, cognitive ability, or social support system  Outcome: Progressing     Problem: Knowledge Deficit  Goal: Patient/family/caregiver demonstrates understanding of disease process, treatment plan, medications, and discharge instructions  Description: Complete learning assessment and assess knowledge base.   Interventions:  - Provide teaching at level of understanding  - Provide teaching via preferred learning methods  Outcome: Progressing     Problem: NEUROSENSORY - ADULT  Goal: Achieves stable or improved neurological status  Description: INTERVENTIONS  - Monitor and report changes in neurological status  - Monitor vital signs such as temperature, blood pressure, glucose, and any other labs ordered   - Initiate measures to prevent increased intracranial pressure  - Monitor for seizure activity and implement precautions if appropriate      Outcome: Progressing     Problem: METABOLIC, FLUID AND ELECTROLYTES - ADULT  Goal: Electrolytes maintained within normal limits  Description: INTERVENTIONS:  - Monitor labs and assess patient for signs and symptoms of electrolyte imbalances  - Administer electrolyte replacement as ordered  - Monitor response to electrolyte replacements, including repeat lab results as appropriate  - Instruct patient on fluid and nutrition as appropriate  Outcome: Progressing

## 2023-07-01 NOTE — PROGRESS NOTES
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 He has dementia and she  Is usually able to care for him at home  · CT head shows diffuse moderate to severe chronic microangiopathic changes  · TSH normal, B12 low normal,   · Geriatrics following  · On Seroquel 25 mg at bedtime  · Currently on Depakote.   125 mg every morning and p.m. and 250 mg at bedtime  · Behavior much improved  · Monitor LFTs   · Continue Haldol PRN until stabilized on consistent daily medications  · Fall/aspiration precautions  · PT/OT   · Referrals sent for placement by case management-likely on Monday  · Was off of one-to-one since Wednesday evening    Ambulatory dysfunction  Assessment & Plan  Unsteady gait with history of recurrent falls  · Fall precautions  · PT/OT recommend postacute rehab    Stage 3 chronic kidney disease [facility])  Assessment & Plan  Lab Results   Component Value Date    EGFR 46 06/30/2023    EGFR 43 06/26/2023    EGFR 43 06/25/2023    CREATININE 1.35 (H) 06/30/2023    CREATININE 1.43 (H) 06/26/2023    CREATININE 1.44 (H) 06/25/2023     · Baseline creatinine 1.4 to 1.6  · Home medication Lisinopril 10 mg daily discontinued on admission in the setting of fluctuating oral fluid intake and begun on Amlodipine 5 mg daily on 6/21    · Encourage oral intake   · Monitor I+O, monitor PVRs, monitor for constipation  · Limit nephrotoxic agents     Benign prostatic hyperplasia with urinary frequency  Assessment & Plan  · Was recently taken off finasteride  · Oxybutynin discontinued per geriatrics recommendation   · Monitor for urinary retention      Controlled type 2 diabetes mellitus without complication, without long-term current use of insulin [facility])  Assessment & Plan  Lab Results   Component Value Date    HGBA1C 5.8 2023       No results for input(s): "POCGLU" in the last 72 hours. Blood Sugar Average: Last 72 hrs:     · Had been taken off Metformin prior to admission   · A1c well controled   · Monitor blood sugars    Sensorineural hearing loss (SNHL) of both ears  Assessment & Plan  · Noted              VTE Pharmacologic Prophylaxis: VTE Score: 4 Moderate Risk (Score 3-4) - Pharmacological DVT Prophylaxis Ordered: heparin. Patient Centered Rounds: I performed bedside rounds with nursing staff today. Discussions with Specialists or Other Care Team Provider:    Education and Discussions with Family / Patient: Updated  (wife) at bedside. Total Time Spent on Date of Encounter in care of patient: 35 minutes This time was spent on one or more of the following: performing physical exam; counseling and coordination of care; obtaining or reviewing history; documenting in the medical record; reviewing/ordering tests, medications or procedures; communicating with other healthcare professionals and discussing with patient's family/caregivers. Current Length of Stay: 10 day(s)  Current Patient Status: Inpatient   Certification Statement: The patient will continue to require additional inpatient hospital stay due to Pending placement on Monday  Discharge Plan:    Code Status: Level 1 - Full Code    Subjective:   Patient seen and examined. Off of one-to-one. Has not required any as needed Haldol overnight. Patient sitting up in chair and eating his breakfast.    Objective:     Vitals:   Temp (24hrs), Av.8 °F (36.6 °C), Min:97.5 °F (36.4 °C), Max:98 °F (36.7 °C)    Temp:  [97.5 °F (36.4 °C)-98 °F (36.7 °C)] 97.5 °F (36.4 °C)  Resp:  [16-18] 16  BP: (143-146)/(74-76) 143/74  Body mass index is 28.76 kg/m².      Input and Output Summary (last 24 hours):   No intake or output data in the 24 hours ending 07/01/23 8403    Physical Exam:   Physical Exam  Constitutional:       General: He is not in acute distress. HENT:      Head: Normocephalic. Nose: Nose normal.   Eyes:      General: No scleral icterus. Cardiovascular:      Rate and Rhythm: Normal rate and regular rhythm. Pulses: Normal pulses. Heart sounds: No murmur heard. Pulmonary:      Effort: Pulmonary effort is normal.      Breath sounds: Normal breath sounds. Abdominal:      General: Bowel sounds are normal. There is no distension. Musculoskeletal:         General: Normal range of motion. Skin:     General: Skin is warm. Neurological:      Mental Status: He is alert. Mental status is at baseline. He is disoriented. Additional Data:     Labs:  Results from last 7 days   Lab Units 06/26/23  0517   WBC Thousand/uL 4.09*   HEMOGLOBIN g/dL 15.4   HEMATOCRIT % 45.9   PLATELETS Thousands/uL 199   NEUTROS PCT % 54   LYMPHS PCT % 29   MONOS PCT % 12   EOS PCT % 4     Results from last 7 days   Lab Units 06/30/23  0401 06/26/23  0517   SODIUM mmol/L 142 142   POTASSIUM mmol/L 3.9 4.1   CHLORIDE mmol/L 111* 114*   CO2 mmol/L 26 25   BUN mg/dL 48* 36*   CREATININE mg/dL 1.35* 1.43*   ANION GAP mmol/L 5 3   CALCIUM mg/dL 10.0 10.0   ALBUMIN g/dL  --  3.4*   TOTAL BILIRUBIN mg/dL  --  0.71   ALK PHOS U/L  --  67   ALT U/L  --  45   AST U/L  --  37   GLUCOSE RANDOM mg/dL 136 117                       Lines/Drains:  Invasive Devices     None                       Imaging: No pertinent imaging reviewed.     Recent Cultures (last 7 days):         Last 24 Hours Medication List:   Current Facility-Administered Medications   Medication Dose Route Frequency Provider Last Rate   • acetaminophen  650 mg Oral Q6H PRN Cherry Liam, CRNP     • amLODIPine  5 mg Oral Daily Cherry Liam, CRNP     • vitamin B-12  1,000 mcg Oral Daily Fauzia Aristeo JONAS Snider     • divalproex sodium  125 mg Oral BID Kamron Fischer DO      And   • divalproex sodium  250 mg Oral HS Kamron Fischer DO     • docusate sodium  100 mg Oral BID Denny Juarez MD     • haloperidol lactate  0.5 mg Intramuscular Q4H PRN JONAS Sylvester     • heparin (porcine)  5,000 Units Subcutaneous [facility] Alize Brewer MD     • hydrALAZINE  5 mg Intravenous Q6H PRN JONAS Sylvester     • ondansetron  4 mg Intravenous Q6H PRN JONAS Sylvester     • QUEtiapine  25 mg Oral HS JONAS Sylvester     • senna  1 tablet Oral HS Denny Juarez MD          Today, Patient Was Seen By: Angie Snowden MD    **Please Note: This note may have been constructed using a voice recognition system. **

## 2023-07-01 NOTE — PROGRESS NOTES
Pt is bed alarmed but he continues to try to get OOB, need to really keep an  Eye on him all the time

## 2023-07-01 NOTE — ASSESSMENT & PLAN NOTE
· Presented to the ED on 6/20/23 after wife noted increasing agitation, with anger and aggressiveness at home over a 3 days period. He has dementia and she  Is usually able to care for him at home  · CT head shows diffuse moderate to severe chronic microangiopathic changes  · TSH normal, B12 low normal,   · Geriatrics following  · On Seroquel 25 mg at bedtime  · Currently on Depakote.   125 mg every morning and p.m. and 250 mg at bedtime  · Behavior much improved  · Monitor LFTs   · Continue Haldol PRN until stabilized on consistent daily medications  · Fall/aspiration precautions  · PT/OT   · Referrals sent for placement by case management-likely on Monday  · Was off of one-to-one since Wednesday evening

## 2023-07-02 LAB
ANION GAP SERPL CALCULATED.3IONS-SCNC: 4 MMOL/L
BUN SERPL-MCNC: 43 MG/DL (ref 5–25)
CALCIUM SERPL-MCNC: 9.7 MG/DL (ref 8.3–10.1)
CHLORIDE SERPL-SCNC: 112 MMOL/L (ref 96–108)
CO2 SERPL-SCNC: 26 MMOL/L (ref 21–32)
CREAT SERPL-MCNC: 1.41 MG/DL (ref 0.6–1.3)
ERYTHROCYTE [DISTWIDTH] IN BLOOD BY AUTOMATED COUNT: 12.8 % (ref 11.6–15.1)
GFR SERPL CREATININE-BSD FRML MDRD: 44 ML/MIN/1.73SQ M
GLUCOSE SERPL-MCNC: 133 MG/DL (ref 65–140)
HCT VFR BLD AUTO: 44.5 % (ref 36.5–49.3)
HGB BLD-MCNC: 14.5 G/DL (ref 12–17)
MCH RBC QN AUTO: 31.1 PG (ref 26.8–34.3)
MCHC RBC AUTO-ENTMCNC: 32.6 G/DL (ref 31.4–37.4)
MCV RBC AUTO: 96 FL (ref 82–98)
PLATELET # BLD AUTO: 168 THOUSANDS/UL (ref 149–390)
PMV BLD AUTO: 10.6 FL (ref 8.9–12.7)
POTASSIUM SERPL-SCNC: 3.8 MMOL/L (ref 3.5–5.3)
RBC # BLD AUTO: 4.66 MILLION/UL (ref 3.88–5.62)
SODIUM SERPL-SCNC: 142 MMOL/L (ref 135–147)
WBC # BLD AUTO: 3.5 THOUSAND/UL (ref 4.31–10.16)

## 2023-07-02 PROCEDURE — 85027 COMPLETE CBC AUTOMATED: CPT | Performed by: FAMILY MEDICINE

## 2023-07-02 PROCEDURE — 99232 SBSQ HOSP IP/OBS MODERATE 35: CPT | Performed by: FAMILY MEDICINE

## 2023-07-02 PROCEDURE — 80048 BASIC METABOLIC PNL TOTAL CA: CPT | Performed by: FAMILY MEDICINE

## 2023-07-02 RX ORDER — OXYBUTYNIN CHLORIDE 5 MG/1
5 TABLET, EXTENDED RELEASE ORAL DAILY
Status: DISCONTINUED | OUTPATIENT
Start: 2023-07-02 | End: 2023-07-03 | Stop reason: HOSPADM

## 2023-07-02 RX ADMIN — HEPARIN SODIUM 5000 UNITS: 5000 INJECTION INTRAVENOUS; SUBCUTANEOUS at 13:40

## 2023-07-02 RX ADMIN — SENNOSIDES 8.6 MG: 8.6 TABLET, FILM COATED ORAL at 21:00

## 2023-07-02 RX ADMIN — HEPARIN SODIUM 5000 UNITS: 5000 INJECTION INTRAVENOUS; SUBCUTANEOUS at 05:49

## 2023-07-02 RX ADMIN — AMLODIPINE BESYLATE 5 MG: 5 TABLET ORAL at 07:42

## 2023-07-02 RX ADMIN — DIVALPROEX SODIUM 250 MG: 125 CAPSULE ORAL at 21:00

## 2023-07-02 RX ADMIN — HALOPERIDOL LACTATE 0.5 MG: 5 INJECTION, SOLUTION INTRAMUSCULAR at 19:24

## 2023-07-02 RX ADMIN — OXYBUTYNIN 5 MG: 5 TABLET, FILM COATED, EXTENDED RELEASE ORAL at 13:40

## 2023-07-02 RX ADMIN — DIVALPROEX SODIUM 125 MG: 125 CAPSULE ORAL at 05:49

## 2023-07-02 RX ADMIN — QUETIAPINE 25 MG: 25 TABLET ORAL at 17:19

## 2023-07-02 RX ADMIN — DIVALPROEX SODIUM 125 MG: 125 CAPSULE ORAL at 13:40

## 2023-07-02 RX ADMIN — DOCUSATE SODIUM 100 MG: 100 CAPSULE, LIQUID FILLED ORAL at 07:41

## 2023-07-02 RX ADMIN — DOCUSATE SODIUM 100 MG: 100 CAPSULE, LIQUID FILLED ORAL at 17:19

## 2023-07-02 RX ADMIN — HEPARIN SODIUM 5000 UNITS: 5000 INJECTION INTRAVENOUS; SUBCUTANEOUS at 21:00

## 2023-07-02 RX ADMIN — CYANOCOBALAMIN TAB 500 MCG 1000 MCG: 500 TAB at 07:41

## 2023-07-02 NOTE — PLAN OF CARE
Problem: Nutrition/Hydration-ADULT  Goal: Nutrient/Hydration intake appropriate for improving, restoring or maintaining nutritional needs  Description: Monitor and assess patient's nutrition/hydration status for malnutrition. Collaborate with interdisciplinary team and initiate plan and interventions as ordered. Monitor patient's weight and dietary intake as ordered or per policy. Utilize nutrition screening tool and intervene as necessary. Determine patient's food preferences and provide high-protein, high-caloric foods as appropriate.      INTERVENTIONS:  - Monitor oral intake, urinary output, labs, and treatment plans  - Assess nutrition and hydration status and recommend course of action  - Evaluate amount of meals eaten  - Assist patient with eating if necessary   - Allow adequate time for meals  - Recommend/ encourage appropriate diets, oral nutritional supplements, and vitamin/mineral supplements  - Order, calculate, and assess calorie counts as needed  - Recommend, monitor, and adjust tube feedings and TPN/PPN based on assessed needs  - Assess need for intravenous fluids  - Provide specific nutrition/hydration education as appropriate  - Include patient/family/caregiver in decisions related to nutrition  Outcome: Progressing     Problem: PAIN - ADULT  Goal: Verbalizes/displays adequate comfort level or baseline comfort level  Description: Interventions:  - Encourage patient to monitor pain and request assistance  - Assess pain using appropriate pain scale  - Administer analgesics based on type and severity of pain and evaluate response  - Implement non-pharmacological measures as appropriate and evaluate response  - Consider cultural and social influences on pain and pain management  - Notify physician/advanced practitioner if interventions unsuccessful or patient reports new pain  Outcome: Progressing     Problem: INFECTION - ADULT  Goal: Absence or prevention of progression during hospitalization  Description: INTERVENTIONS:  - Assess and monitor for signs and symptoms of infection  - Monitor lab/diagnostic results  - Monitor all insertion sites, i.e. indwelling lines, tubes, and drains  - Monitor endotracheal if appropriate and nasal secretions for changes in amount and color  - [place] appropriate cooling/warming therapies per order  - Administer medications as ordered  - Instruct and encourage patient and family to use good hand hygiene technique  - Identify and instruct in appropriate isolation precautions for identified infection/condition  Outcome: Progressing     Problem: DISCHARGE PLANNING  Goal: Discharge to home or other facility with appropriate resources  Description: INTERVENTIONS:  - Identify barriers to discharge w/patient and caregiver  - Arrange for needed discharge resources and transportation as appropriate  - Identify discharge learning needs (meds, wound care, etc.)  - Arrange for interpretive services to assist at discharge as needed  - Refer to Case Management Department for coordinating discharge planning if the patient needs post-hospital services based on physician/advanced practitioner order or complex needs related to functional status, cognitive ability, or social support system  Outcome: Progressing     Problem: Knowledge Deficit  Goal: Patient/family/caregiver demonstrates understanding of disease process, treatment plan, medications, and discharge instructions  Description: Complete learning assessment and assess knowledge base.   Interventions:  - Provide teaching at level of understanding  - Provide teaching via preferred learning methods  Outcome: Progressing     Problem: NEUROSENSORY - ADULT  Goal: Achieves stable or improved neurological status  Description: INTERVENTIONS  - Monitor and report changes in neurological status  - Monitor vital signs such as temperature, blood pressure, glucose, and any other labs ordered   - Initiate measures to prevent increased intracranial pressure  - Monitor for seizure activity and implement precautions if appropriate      Outcome: Progressing     Problem: METABOLIC, FLUID AND ELECTROLYTES - ADULT  Goal: Electrolytes maintained within normal limits  Description: INTERVENTIONS:  - Monitor labs and assess patient for signs and symptoms of electrolyte imbalances  - Administer electrolyte replacement as ordered  - Monitor response to electrolyte replacements, including repeat lab results as appropriate  - Instruct patient on fluid and nutrition as appropriate  Outcome: Progressing

## 2023-07-02 NOTE — ASSESSMENT & PLAN NOTE
Lab Results   Component Value Date    EGFR 44 07/02/2023    EGFR 46 06/30/2023    EGFR 43 06/26/2023    CREATININE 1.41 (H) 07/02/2023    CREATININE 1.35 (H) 06/30/2023    CREATININE 1.43 (H) 06/26/2023     · Baseline creatinine 1.4 to 1.6  · Home medication Lisinopril 10 mg daily discontinued on admission in the setting of fluctuating oral fluid intake and begun on Amlodipine 5 mg daily on 6/21    · Encourage oral intake   · Monitor I+O, monitor PVRs, monitor for constipation  · Limit nephrotoxic agents

## 2023-07-02 NOTE — PROGRESS NOTES
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 He has dementia and she  Is usually able to care for him at home  · CT head shows diffuse moderate to severe chronic microangiopathic changes  · TSH normal, B12 low normal,   · Geriatrics following  · On Seroquel 25 mg at bedtime  · Currently on Depakote.   125 mg every morning and p.m. and 250 mg at bedtime  · Behavior much improved  · Monitor LFTs   · Continue Haldol PRN until stabilized on consistent daily medications  · Fall/aspiration precautions  · PT/OT   · Referrals sent for placement by case management-likely on Monday  · Was off of one-to-one since Wednesday evening    Ambulatory dysfunction  Assessment & Plan  Unsteady gait with history of recurrent falls  · Fall precautions  · PT/OT recommend postacute rehab    Stage 3 chronic kidney disease [facility])  Assessment & Plan  Lab Results   Component Value Date    EGFR 44 07/02/2023    EGFR 46 06/30/2023    EGFR 43 06/26/2023    CREATININE 1.41 (H) 07/02/2023    CREATININE 1.35 (H) 06/30/2023    CREATININE 1.43 (H) 06/26/2023     · Baseline creatinine 1.4 to 1.6  · Home medication Lisinopril 10 mg daily discontinued on admission in the setting of fluctuating oral fluid intake and begun on Amlodipine 5 mg daily on 6/21    · Encourage oral intake   · Monitor I+O, monitor PVRs, monitor for constipation  · Limit nephrotoxic agents     Benign prostatic hyperplasia with urinary frequency  Assessment & Plan  · Was recently taken off finasteride  · Oxybutynin discontinued per geriatrics recommendation   · Monitor for urinary retention      Controlled type 2 diabetes mellitus without complication, without long-term current use of insulin [facility])  Assessment & Plan  Lab Results   Component Value Date    HGBA1C 5.8 2023       No results for input(s): "POCGLU" in the last 72 hours. Blood Sugar Average: Last 72 hrs:     · Had been taken off Metformin prior to admission   · A1c well controled   · Monitor blood sugars    Sensorineural hearing loss (SNHL) of both ears  Assessment & Plan  · Noted             VTE Pharmacologic Prophylaxis: VTE Score: 4 Moderate Risk (Score 3-4) - Pharmacological DVT Prophylaxis Ordered: heparin. Patient Centered Rounds: I performed bedside rounds with nursing staff today. Discussions with Specialists or Other Care Team Provider:     Education and Discussions with Family / Patient: Updated  (wife) at bedside. Total Time Spent on Date of Encounter in care of patient: 35 minutes This time was spent on one or more of the following: performing physical exam; counseling and coordination of care; obtaining or reviewing history; documenting in the medical record; reviewing/ordering tests, medications or procedures; communicating with other healthcare professionals and discussing with patient's family/caregivers. Current Length of Stay: 11 day(s)  Current Patient Status: Inpatient   Certification Statement: Patient will need continued inpatient stay pending placement  Discharge Plan: Anticipate discharge in 48-72 hrs to rehab facility. Code Status: Level 1 - Full Code    Subjective:   Seen and examined. No events overnight. Has not required any as needed Haldol  Wife at bedside.   Wife reported that she told [facility] and she is not happy about the choice  Objective:     Vitals:   Temp (24hrs), Av.7 °F (35.9 °C), Min:96.2 °F (35.7 °C), Max:97.2 °F (36.2 °C)    Temp:  [96.2 °F (35.7 °C)-97.2 °F (36.2 °C)] 96.2 °F (35.7 °C)  HR:  [74] 74  Resp:  [16-18] 18  BP: (133-136)/(70) 133/70  Body mass index is 28.76 kg/m². Input and Output Summary (last 24 hours): Intake/Output Summary (Last 24 hours) at 7/2/2023 1819  Last data filed at 7/2/2023 0546  Gross per 24 hour   Intake 168 ml   Output 150 ml   Net 18 ml       Physical Exam:   Physical Exam  Constitutional:       General: He is not in acute distress. HENT:      Head: Normocephalic. Nose: Nose normal.   Eyes:      General: No scleral icterus. Cardiovascular:      Rate and Rhythm: Normal rate and regular rhythm. Pulses: Normal pulses. Pulmonary:      Comments: Decreased breath sounds bilateral  Abdominal:      General: There is no distension. Musculoskeletal:         General: Normal range of motion. Skin:     General: Skin is warm. Neurological:      Mental Status: He is alert. Mental status is at baseline. Additional Data:     Labs:  Results from last 7 days   Lab Units 07/02/23  0349 06/26/23  0517   WBC Thousand/uL 3.50* 4.09*   HEMOGLOBIN g/dL 14.5 15.4   HEMATOCRIT % 44.5 45.9   PLATELETS Thousands/uL 168 199   NEUTROS PCT %  --  54   LYMPHS PCT %  --  29   MONOS PCT %  --  12   EOS PCT %  --  4     Results from last 7 days   Lab Units 07/02/23  0415 06/30/23  0401 06/26/23  0517   SODIUM mmol/L 142   < > 142   POTASSIUM mmol/L 3.8   < > 4.1   CHLORIDE mmol/L 112*   < > 114*   CO2 mmol/L 26   < > 25   BUN mg/dL 43*   < > 36*   CREATININE mg/dL 1.41*   < > 1.43*   ANION GAP mmol/L 4   < > 3   CALCIUM mg/dL 9.7   < > 10.0   ALBUMIN g/dL  --   --  3.4*   TOTAL BILIRUBIN mg/dL  --   --  0.71   ALK PHOS U/L  --   --  67   ALT U/L  --   --  45   AST U/L  --   --  37   GLUCOSE RANDOM mg/dL 133   < > 117    < > = values in this interval not displayed. Lines/Drains:  Invasive Devices     None                     Imaging: No pertinent imaging reviewed.     Recent Cultures (last 7 days):         Last 24 Hours Medication List:   Current Facility-Administered Medications   Medication Dose Route Frequency Provider Last Rate   • acetaminophen  650 mg Oral Q6H PRN Prosper Slay, CRNP     • amLODIPine  5 mg Oral Daily ProsperROSALIND WilsonNP     • vitamin B-12  1,000 mcg Oral Daily ProsperROSALIND LealNP     • divalproex sodium  125 mg Oral BID Sarita Branham DO      And   • divalproex sodium  250 mg Oral HS Sarita Branham DO     • docusate sodium  100 mg Oral BID Chantel Erazo MD     • haloperidol lactate  0.5 mg Intramuscular Q4H PRN Prosper Jacquie, CRNP     • heparin (porcine)  5,000 Units Subcutaneous [facility] Augustus Shin MD     • hydrALAZINE  5 mg Intravenous Q6H PRN ProsperROSALIND WilsonNP     • ondansetron  4 mg Intravenous Q6H PRN Prosper Jacquie, CRNP     • oxybutynin  5 mg Oral Daily Leland Castorena MD     • QUEtiapine  25 mg Oral HS JONAS Alfaro     • senna  1 tablet Oral HS Chantel Erazo MD          Today, Patient Was Seen By: Leland Castorena MD    **Please Note: This note may have been constructed using a voice recognition system. **

## 2023-07-02 NOTE — PLAN OF CARE
Problem: Nutrition/Hydration-ADULT  Goal: Nutrient/Hydration intake appropriate for improving, restoring or maintaining nutritional needs  Description: Monitor and assess patient's nutrition/hydration status for malnutrition. Collaborate with interdisciplinary team and initiate plan and interventions as ordered. Monitor patient's weight and dietary intake as ordered or per policy. Utilize nutrition screening tool and intervene as necessary. Determine patient's food preferences and provide high-protein, high-caloric foods as appropriate.      INTERVENTIONS:  - Monitor oral intake, urinary output, labs, and treatment plans  - Assess nutrition and hydration status and recommend course of action  - Evaluate amount of meals eaten  - Assist patient with eating if necessary   - Allow adequate time for meals  - Recommend/ encourage appropriate diets, oral nutritional supplements, and vitamin/mineral supplements  - Order, calculate, and assess calorie counts as needed  - Recommend, monitor, and adjust tube feedings and TPN/PPN based on assessed needs  - Assess need for intravenous fluids  - Provide specific nutrition/hydration education as appropriate  - Include patient/family/caregiver in decisions related to nutrition  Outcome: Progressing     Problem: PAIN - ADULT  Goal: Verbalizes/displays adequate comfort level or baseline comfort level  Description: Interventions:  - Encourage patient to monitor pain and request assistance  - Assess pain using appropriate pain scale  - Administer analgesics based on type and severity of pain and evaluate response  - Implement non-pharmacological measures as appropriate and evaluate response  - Consider cultural and social influences on pain and pain management  - Notify physician/advanced practitioner if interventions unsuccessful or patient reports new pain  Outcome: Progressing     Problem: INFECTION - ADULT  Goal: Absence or prevention of progression during hospitalization  Description: INTERVENTIONS:  - Assess and monitor for signs and symptoms of infection  - Monitor lab/diagnostic results  - Monitor all insertion sites, i.e. indwelling lines, tubes, and drains  - Monitor endotracheal if appropriate and nasal secretions for changes in amount and color  - [place] appropriate cooling/warming therapies per order  - Administer medications as ordered  - Instruct and encourage patient and family to use good hand hygiene technique  - Identify and instruct in appropriate isolation precautions for identified infection/condition  Outcome: Progressing     Problem: DISCHARGE PLANNING  Goal: Discharge to home or other facility with appropriate resources  Description: INTERVENTIONS:  - Identify barriers to discharge w/patient and caregiver  - Arrange for needed discharge resources and transportation as appropriate  - Identify discharge learning needs (meds, wound care, etc.)  - Arrange for interpretive services to assist at discharge as needed  - Refer to Case Management Department for coordinating discharge planning if the patient needs post-hospital services based on physician/advanced practitioner order or complex needs related to functional status, cognitive ability, or social support system  Outcome: Progressing     Problem: Knowledge Deficit  Goal: Patient/family/caregiver demonstrates understanding of disease process, treatment plan, medications, and discharge instructions  Description: Complete learning assessment and assess knowledge base.   Interventions:  - Provide teaching at level of understanding  - Provide teaching via preferred learning methods  Outcome: Progressing     Problem: NEUROSENSORY - ADULT  Goal: Achieves stable or improved neurological status  Description: INTERVENTIONS  - Monitor and report changes in neurological status  - Monitor vital signs such as temperature, blood pressure, glucose, and any other labs ordered   - Initiate measures to prevent increased intracranial pressure  - Monitor for seizure activity and implement precautions if appropriate      Outcome: Progressing     Problem: METABOLIC, FLUID AND ELECTROLYTES - ADULT  Goal: Electrolytes maintained within normal limits  Description: INTERVENTIONS:  - Monitor labs and assess patient for signs and symptoms of electrolyte imbalances  - Administer electrolyte replacement as ordered  - Monitor response to electrolyte replacements, including repeat lab results as appropriate  - Instruct patient on fluid and nutrition as appropriate  Outcome: Progressing

## 2023-07-03 VITALS
RESPIRATION RATE: 16 BRPM | HEIGHT: 68 IN | OXYGEN SATURATION: 97 % | BODY MASS INDEX: 28.67 KG/M2 | TEMPERATURE: 98.3 F | HEART RATE: 78 BPM | WEIGHT: 189.15 LBS | SYSTOLIC BLOOD PRESSURE: 154 MMHG | DIASTOLIC BLOOD PRESSURE: 71 MMHG

## 2023-07-03 PROCEDURE — 99232 SBSQ HOSP IP/OBS MODERATE 35: CPT | Performed by: INTERNAL MEDICINE

## 2023-07-03 PROCEDURE — 99239 HOSP IP/OBS DSCHRG MGMT >30: CPT | Performed by: FAMILY MEDICINE

## 2023-07-03 PROCEDURE — 97530 THERAPEUTIC ACTIVITIES: CPT

## 2023-07-03 PROCEDURE — 97535 SELF CARE MNGMENT TRAINING: CPT

## 2023-07-03 PROCEDURE — 97116 GAIT TRAINING THERAPY: CPT

## 2023-07-03 RX ORDER — DIVALPROEX SODIUM 125 MG/1
250 CAPSULE, COATED PELLETS ORAL
Refills: 0
Start: 2023-07-03

## 2023-07-03 RX ORDER — AMLODIPINE BESYLATE 5 MG/1
5 TABLET ORAL DAILY
Refills: 0
Start: 2023-07-04

## 2023-07-03 RX ORDER — ACETAMINOPHEN 325 MG/1
650 TABLET ORAL EVERY 6 HOURS PRN
Refills: 0
Start: 2023-07-03

## 2023-07-03 RX ORDER — DIVALPROEX SODIUM 125 MG/1
125 CAPSULE, COATED PELLETS ORAL 2 TIMES DAILY
Refills: 0
Start: 2023-07-04

## 2023-07-03 RX ADMIN — HEPARIN SODIUM 5000 UNITS: 5000 INJECTION INTRAVENOUS; SUBCUTANEOUS at 06:44

## 2023-07-03 RX ADMIN — CYANOCOBALAMIN TAB 500 MCG 1000 MCG: 500 TAB at 08:50

## 2023-07-03 RX ADMIN — DIVALPROEX SODIUM 125 MG: 125 CAPSULE ORAL at 06:45

## 2023-07-03 RX ADMIN — DOCUSATE SODIUM 100 MG: 100 CAPSULE, LIQUID FILLED ORAL at 08:50

## 2023-07-03 RX ADMIN — DIVALPROEX SODIUM 125 MG: 125 CAPSULE ORAL at 14:03

## 2023-07-03 RX ADMIN — OXYBUTYNIN 5 MG: 5 TABLET, FILM COATED, EXTENDED RELEASE ORAL at 08:50

## 2023-07-03 RX ADMIN — HEPARIN SODIUM 5000 UNITS: 5000 INJECTION INTRAVENOUS; SUBCUTANEOUS at 14:03

## 2023-07-03 NOTE — CASE MANAGEMENT
[address] received request for authorization from Care Manager.   Authorization request for: SNF  Facility Name:  [facility] NPI: [id number]  Facility MD: Stefania Jacob NPI: [id number]  Authorization initiated by contacting insurance: CBC Via: [email or web address]   Pending Reference #: [id number]   Clinicals submitted via: [email or web address]

## 2023-07-03 NOTE — PLAN OF CARE
Problem: Nutrition/Hydration-ADULT  Goal: Nutrient/Hydration intake appropriate for improving, restoring or maintaining nutritional needs  Description: Monitor and assess patient's nutrition/hydration status for malnutrition. Collaborate with interdisciplinary team and initiate plan and interventions as ordered. Monitor patient's weight and dietary intake as ordered or per policy. Utilize nutrition screening tool and intervene as necessary. Determine patient's food preferences and provide high-protein, high-caloric foods as appropriate.      INTERVENTIONS:  - Monitor oral intake, urinary output, labs, and treatment plans  - Assess nutrition and hydration status and recommend course of action  - Evaluate amount of meals eaten  - Assist patient with eating if necessary   - Allow adequate time for meals  - Recommend/ encourage appropriate diets, oral nutritional supplements, and vitamin/mineral supplements  - Order, calculate, and assess calorie counts as needed  - Recommend, monitor, and adjust tube feedings and TPN/PPN based on assessed needs  - Assess need for intravenous fluids  - Provide specific nutrition/hydration education as appropriate  - Include patient/family/caregiver in decisions related to nutrition  Outcome: Progressing     Problem: PAIN - ADULT  Goal: Verbalizes/displays adequate comfort level or baseline comfort level  Description: Interventions:  - Encourage patient to monitor pain and request assistance  - Assess pain using appropriate pain scale  - Administer analgesics based on type and severity of pain and evaluate response  - Implement non-pharmacological measures as appropriate and evaluate response  - Consider cultural and social influences on pain and pain management  - Notify physician/advanced practitioner if interventions unsuccessful or patient reports new pain  Outcome: Progressing     Problem: INFECTION - ADULT  Goal: Absence or prevention of progression during hospitalization  Description: INTERVENTIONS:  - Assess and monitor for signs and symptoms of infection  - Monitor lab/diagnostic results  - Monitor all insertion sites, i.e. indwelling lines, tubes, and drains  - Monitor endotracheal if appropriate and nasal secretions for changes in amount and color  - [place] appropriate cooling/warming therapies per order  - Administer medications as ordered  - Instruct and encourage patient and family to use good hand hygiene technique  - Identify and instruct in appropriate isolation precautions for identified infection/condition  Outcome: Progressing     Problem: DISCHARGE PLANNING  Goal: Discharge to home or other facility with appropriate resources  Description: INTERVENTIONS:  - Identify barriers to discharge w/patient and caregiver  - Arrange for needed discharge resources and transportation as appropriate  - Identify discharge learning needs (meds, wound care, etc.)  - Arrange for interpretive services to assist at discharge as needed  - Refer to Case Management Department for coordinating discharge planning if the patient needs post-hospital services based on physician/advanced practitioner order or complex needs related to functional status, cognitive ability, or social support system  Outcome: Progressing     Problem: Knowledge Deficit  Goal: Patient/family/caregiver demonstrates understanding of disease process, treatment plan, medications, and discharge instructions  Description: Complete learning assessment and assess knowledge base.   Interventions:  - Provide teaching at level of understanding  - Provide teaching via preferred learning methods  Outcome: Progressing     Problem: NEUROSENSORY - ADULT  Goal: Achieves stable or improved neurological status  Description: INTERVENTIONS  - Monitor and report changes in neurological status  - Monitor vital signs such as temperature, blood pressure, glucose, and any other labs ordered   - Initiate measures to prevent increased intracranial pressure  - Monitor for seizure activity and implement precautions if appropriate      Outcome: Progressing     Problem: METABOLIC, FLUID AND ELECTROLYTES - ADULT  Goal: Electrolytes maintained within normal limits  Description: INTERVENTIONS:  - Monitor labs and assess patient for signs and symptoms of electrolyte imbalances  - Administer electrolyte replacement as ordered  - Monitor response to electrolyte replacements, including repeat lab results as appropriate  - Instruct patient on fluid and nutrition as appropriate  Outcome: Progressing

## 2023-07-03 NOTE — PROGRESS NOTES
Progress Note - Geriatric Medicine   Roma Oar 81 y.o. male MRN: [id number]  Unit/Bed#: [facility] 818-01 Encounter: [id number]      Assessment/Plan:    Alzheimer's dementia with psychotic disturbance  -Moderate and progressive, approx FAST scale 6b  -behaviors markedly improved with titration of Depakote continue current regimen as tolerating well with good symptom control              -Depakote 125mg in morning and afternoon                          AND              -Depakote 250mg HS (increased 6/27)  -If needed in future can consider increasing depakote to 250mg BID but as doing well on current dosing will not increase at this time   -continue to encourage calm quiet env to reduce risk overstimulation   -remains off all restraints and one to one obs and doing well  -continue supportive cares   -anticipating d/c to STR later today      BPH with LUTS   -Encourage aggressive bowel regimen to reduce risk of patient  -Monitor for fecal and urinary retention, consider urinary retention protocol     Overactive bladder  -Myrbetriq dc'd due to increased risk confusion and falls in older adult population, do not recommend restarting in future   -Avoid bladder irritating agents and consider timed voiding and limiting fluid intake within 2 hours of bed to reduce overnight awakening to void  -offer assist to restroom with any restlessness as often occurs when need to void but unable to articulate need     Ambulatory dysfunction   -patient reportedly unsteady on feet at baseline and has hx recurrent falls numerous over past year  -remains high fall risk, cont precautions  -PT and OT following      Impaired Vision  -recommend use of corrective lenses at all appropriate times  -consider large font for printed materials provided to patient      Impaired Hearing  -Encourage use of hearing aids at all appropriate times  -encourage providers and caregivers to speak slowly and clearly directly to patient  -minimize background noise to encourage patient engagement  -continue use of hearing aids      Impaired mastication  -Requires use of dentures -encourage use at appropriate times  -ensure meal consistency appropriate for abilities  -continue aspiration precautions     Frailty syndrome in geriatric patient   -Clinical frailty scale stage V, mildly frail  -encourage well balanced nutrition   -continue to optimize chronic conditions   -Continue psychosocial supports of patient and family     High risk caregiver burnout   -wife primary caregiver and high risk burnout, discussed importance of caregiver wellbeing and avoidance of caregiver burnout   -consider o/p referral to caregiver support group and [facility] for additional community based resources and supports as pt transitions to rehab and anticipate tx to long term care     High risk developing delirium   -continue strict delirium precautions   -ensure pain controlled   -reorient frequently as appropriate   -redirect unwanted behaviors as first line     Care coordination: rounded with Rylie (RN), rounded with Dr. Rosangela Woodard, pts wife updated at bedside     Subjective:     Beronica Shearer is seen and examined at bedside where he is sitting resting, he remains pleasantly confused and cooperative. He is anticipating d/c to STR later today. Review of Systems   Unable to perform ROS: Dementia     Objective:     Vitals: Blood pressure 127/58, pulse 73, temperature 97.8 °F (36.6 °C), temperature source Axillary, resp. rate 16, height 5' 8" (1.727 m), weight 85.8 kg (189 lb 2.5 oz), SpO2 97 %. ,Body mass index is 28.76 kg/m². Intake/Output Summary (Last 24 hours) at 7/3/2023 1412  Last data filed at 7/3/2023 1028  Gross per 24 hour   Intake 118 ml   Output 100 ml   Net 18 ml     Current Medications: Reviewed    Physical Exam:   Physical Exam  Vitals and nursing note reviewed. Constitutional:       General: He is not in acute distress. Appearance: Normal appearance. He is not toxic-appearing.    HENT: Head: Normocephalic and atraumatic. Ears:      Comments: Hearing aids in place bilaterally     Nose: Nose normal.      Mouth/Throat:      Mouth: Mucous membranes are dry. Eyes:      General: No scleral icterus. Right eye: No discharge. Left eye: No discharge. Conjunctiva/sclera: Conjunctivae normal.   Neck:      Comments: Trachea midline   Cardiovascular:      Rate and Rhythm: Normal rate and regular rhythm. Pulmonary:      Effort: Pulmonary effort is normal. No respiratory distress. Breath sounds: No wheezing. Comments: Saturating well on room air   Abdominal:      General: There is no distension. Palpations: Abdomen is soft. Tenderness: There is no abdominal tenderness. Musculoskeletal:      Cervical back: Neck supple. Comments: Reduced overall muscle mass     Arthritic changes of hands bilaterally    Skin:     General: Skin is warm and dry. Comments: Thin and friable    Neurological:      Mental Status: He is alert. Comments: Awake and alert, oriented to self    Psychiatric:      Comments: Calm and cooperative         Invasive Devices     None               Lab, Imaging and other studies: I have personally reviewed pertinent reports.

## 2023-07-03 NOTE — PLAN OF CARE
Problem: OCCUPATIONAL THERAPY ADULT  Goal: Performs self-care activities at highest level of function for planned discharge setting. See evaluation for individualized goals. Description: Treatment Interventions: ADL retraining, Functional transfer training, UE strengthening/ROM, Endurance training, Cognitive reorientation, Patient/family training, Equipment evaluation/education, Compensatory technique education, Energy conservation, Activityengagement          See flowsheet documentation for full assessment, interventions and recommendations. Outcome: Progressing  Note: Limitation: Decreased ADL status, Decreased UE ROM, Decreased UE strength, Decreased cognition, Decreased endurance, Decreased Safe judgement during ADL, Decreased high-level ADLs, Decreased self-care trans  Prognosis: Fair  Assessment: Pt was seen this date for OT tx session focusing on self care tasks, sit to stand progressions, standing tolerance, tranfers, functional mobility, safety awareness, compensatory techniques, energy conservation, and overall activity tolerance. Pt presents    OOB in chair and is agreeable to OT tx session. Pt completes previously mentioned tasks at documented assist levels please see above in flow sheet. Pt continues to require overall Mod A x 1-2 for transfers and mobility and Max A for self care tasks. Pt is overall limited by cognition, decreased balance, increased fatigue, decreased strength, endurance, and activity tolerance and continues to function below baseline level. Pt resting OOB in chair at end of session with all needs in reach and alarm on. Pt would benefit from continued OT Tx to improve overall functional abilities and increase independence. Will continue to follow with current POC.      OT Discharge Recommendation: Post acute rehabilitation services

## 2023-07-03 NOTE — CASE MANAGEMENT
Case Management Discharge Planning Note    Patient name Fritz House  Location [facility] 818/[facility] 009-49 MRN [id number]  : 1935 Date 7/3/2023       Current Admission Date: 2023  Current Admission Diagnosis: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 [facility])   Patient Active Problem List    Diagnosis Date Noted   • Ambulatory dysfunction 2023   • Stage 3 chronic kidney disease ([address]) 2023   • Urinary incontinence without sensory awareness 2023   • Slow transit constipation 2023   • At high risk for falls 2023   • Lumbar radiculopathy    • UTI symptoms 2023   • Paroxysmal atrial fibrillation ([address]) 01/10/2023   • Platelets decreased ([address]) 01/10/2023   • 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 ([address]) 2022   • Neurologic gait dysfunction 2022   • Stage 3b chronic kidney disease ([address]) 2021   • OAB (overactive bladder) 2021   • Benign prostatic hyperplasia with urinary frequency 2021   • Controlled type 2 diabetes mellitus without complication, without long-term current use of insulin ([address]) 2019   • Sensorineural hearing loss (SNHL) of both ears 10/21/2016   • Seasonal allergies 2015   • Spinal stenosis 2012      LOS (days): 12  Geometric Mean LOS (GMLOS) (days): 3.80  Days to GMLOS:-8     OBJECTIVE:  Risk of Unplanned Readmission Score: 16.43         Current admission status: Inpatient   Preferred Pharmacy:   [address] [address]  Phone: [phone] Fax: [phone]    Primary Care Provider: Cassi Garcia MD    Primary Insurance: Terence Garza [facility] REP  Secondary Insurance:     DISCHARGE DETAILS:    [address] paperwork uploaded to [facility]. Wife toured [facility] over weekend. VM left for her to return call. Will need auth. CM to continue to support.           Update: [id number] S/w wife Lemuel Osman, she states that she does not really like [facility] and does not wish to send him there. Educated on patient being medically ready and that no other accepting vendors. Stated that she could send him to [facility] for [employer] and be looking for LTC placement while in rehab. Educated that she could tour facilities while in rehab for short term. She continue to state that she does not wish to send him there. Educated that IMM could be presented, she could appeal and then she would likely have to take patient home if appeal in favor of hospital. She states she can not take patient home and she wishes to speak with her son and then return call to . CM to continue to support. Update: [id number]. Wife called back, states that she will go with [facility] ([place], [place]). Educated that she could ask SW at the facility to help her look for LTC if she decides she does not want pt to stay there. Understands that patient will need auth. Vendor reserved in [place], CM has requested PT/OT to see patient. Will initiate auth once PT/OT notes uploaded. CM to continue to support. Update: 0 CM has requested CM DC support to start auth for patient for [facility], [facility]. CM to continue to support. CM notified that auth if back. [facility], [facility], [address] [facility]  Phone: [phone]  Fax: [phone]         [facility] claimed the ride for Goldy Gill in unit/room [facility] 818 bed [facility] 929-13, and will arrive on 07/03/2023 at 4:00pm EDT. Contact them at [phone]. Awaiting response from vendor to make sure they can receive patient tonight. Bedside RN, provider all notified. Patient ok to transport per [facility] charge nurse. Wife at bedside and notified.

## 2023-07-03 NOTE — PLAN OF CARE
Problem: Nutrition/Hydration-ADULT  Goal: Nutrient/Hydration intake appropriate for improving, restoring or maintaining nutritional needs  Description: Monitor and assess patient's nutrition/hydration status for malnutrition. Collaborate with interdisciplinary team and initiate plan and interventions as ordered. Monitor patient's weight and dietary intake as ordered or per policy. Utilize nutrition screening tool and intervene as necessary. Determine patient's food preferences and provide high-protein, high-caloric foods as appropriate.      INTERVENTIONS:  - Monitor oral intake, urinary output, labs, and treatment plans  - Assess nutrition and hydration status and recommend course of action  - Evaluate amount of meals eaten  - Assist patient with eating if necessary   - Allow adequate time for meals  - Recommend/ encourage appropriate diets, oral nutritional supplements, and vitamin/mineral supplements  - Order, calculate, and assess calorie counts as needed  - Recommend, monitor, and adjust tube feedings and TPN/PPN based on assessed needs  - Assess need for intravenous fluids  - Provide specific nutrition/hydration education as appropriate  - Include patient/family/caregiver in decisions related to nutrition  Outcome: Progressing     Problem: PAIN - ADULT  Goal: Verbalizes/displays adequate comfort level or baseline comfort level  Description: Interventions:  - Encourage patient to monitor pain and request assistance  - Assess pain using appropriate pain scale  - Administer analgesics based on type and severity of pain and evaluate response  - Implement non-pharmacological measures as appropriate and evaluate response  - Consider cultural and social influences on pain and pain management  - Notify physician/advanced practitioner if interventions unsuccessful or patient reports new pain  Outcome: Progressing     Problem: INFECTION - ADULT  Goal: Absence or prevention of progression during hospitalization  Description: INTERVENTIONS:  - Assess and monitor for signs and symptoms of infection  - Monitor lab/diagnostic results  - Monitor all insertion sites, i.e. indwelling lines, tubes, and drains  - Monitor endotracheal if appropriate and nasal secretions for changes in amount and color  - [place] appropriate cooling/warming therapies per order  - Administer medications as ordered  - Instruct and encourage patient and family to use good hand hygiene technique  - Identify and instruct in appropriate isolation precautions for identified infection/condition  Outcome: Progressing     Problem: DISCHARGE PLANNING  Goal: Discharge to home or other facility with appropriate resources  Description: INTERVENTIONS:  - Identify barriers to discharge w/patient and caregiver  - Arrange for needed discharge resources and transportation as appropriate  - Identify discharge learning needs (meds, wound care, etc.)  - Arrange for interpretive services to assist at discharge as needed  - Refer to Case Management Department for coordinating discharge planning if the patient needs post-hospital services based on physician/advanced practitioner order or complex needs related to functional status, cognitive ability, or social support system  Outcome: Progressing     Problem: Knowledge Deficit  Goal: Patient/family/caregiver demonstrates understanding of disease process, treatment plan, medications, and discharge instructions  Description: Complete learning assessment and assess knowledge base.   Interventions:  - Provide teaching at level of understanding  - Provide teaching via preferred learning methods  Outcome: Progressing     Problem: NEUROSENSORY - ADULT  Goal: Achieves stable or improved neurological status  Description: INTERVENTIONS  - Monitor and report changes in neurological status  - Monitor vital signs such as temperature, blood pressure, glucose, and any other labs ordered   - Initiate measures to prevent increased intracranial pressure  - Monitor for seizure activity and implement precautions if appropriate      Outcome: Progressing     Problem: METABOLIC, FLUID AND ELECTROLYTES - ADULT  Goal: Electrolytes maintained within normal limits  Description: INTERVENTIONS:  - Monitor labs and assess patient for signs and symptoms of electrolyte imbalances  - Administer electrolyte replacement as ordered  - Monitor response to electrolyte replacements, including repeat lab results as appropriate  - Instruct patient on fluid and nutrition as appropriate  Outcome: Progressing

## 2023-07-03 NOTE — CASE MANAGEMENT
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 [facility])   Patient Active Problem List    Diagnosis Date Noted   • Ambulatory dysfunction 2023   • Stage 3 chronic kidney disease ([address]) 2023   • Urinary incontinence without sensory awareness 2023   • Slow transit constipation 2023   • At high risk for falls 2023   • Lumbar radiculopathy    • UTI symptoms 2023   • Paroxysmal atrial fibrillation ([address]) 01/10/2023   • Platelets decreased ([address]) 01/10/2023   • 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 ([address]) 2022   • Neurologic gait dysfunction 2022   • Stage 3b chronic kidney disease ([address]) 2021   • OAB (overactive bladder) 2021   • Benign prostatic hyperplasia with urinary frequency 2021   • Controlled type 2 diabetes mellitus without complication, without long-term current use of insulin ([address]) 2019   • Sensorineural hearing loss (SNHL) of both ears 10/21/2016   • Seasonal allergies 2015   • Spinal stenosis 2012      LOS (days): 12  Geometric Mean LOS (GMLOS) (days): 3.80  Days to GMLOS:-8.2     OBJECTIVE:  Risk of Unplanned Readmission Score: 16.51         Current admission status: Inpatient   Preferred Pharmacy:   [address] [place] [address]  Phone: [phone] Fax: [phone]    Primary Care Provider: Irena Bejarano MD    Primary Insurance: Elias Constantino [facility]  Secondary Insurance:     [address]  Number: [id number]

## 2023-07-03 NOTE — PHYSICAL THERAPY NOTE
Physical Therapy Treatment Note    Patient's Name: Octavio Shaffer  : 23 1026   PT Last Visit   PT Visit Date 23   Note Type   Note Type Treatment for insurance authorization   Pain Assessment   Pain Assessment Tool FLACC   Pain Rating: FLACC (Rest) - Face 0   Pain Rating: FLACC (Rest) - Legs 0   Pain Rating: FLACC (Rest) - Activity 0   Pain Rating: FLACC (Rest) - Cry 0   Pain Rating: FLACC (Rest) - Consolability 0   Score: FLACC (Rest) 0   Pain Rating: FLACC (Activity) - Face 0   Pain Rating: FLACC (Activity) - Legs 0   Pain Rating: FLACC (Activity) - Activity 0   Pain Rating: FLACC (Activity) - Cry 0   Pain Rating: FLACC (Activity) - Consolability 0   Score: FLACC (Activity) 0   Restrictions/Precautions   Weight Bearing Precautions Per Order No   Other Precautions Cognitive; Chair Alarm; Bed Alarm; Fall Risk;Hard of hearing   General   Chart Reviewed Yes   Response to Previous Treatment Patient unable to report, no changes reported from family or staff   Family/Caregiver Present No   Subjective   Subjective "I have to get to a commode now!"   Bed Mobility   Supine to Sit Unable to assess   Sit to Supine Unable to assess   Additional Comments Pt greeted in chair. Transfers   Sit to Stand 3  Moderate assistance   Additional items Assist x 1; Increased time required;Verbal cues   Stand to Sit 3  Moderate assistance   Additional items Assist x 1; Increased time required;Verbal cues   Toilet transfer 4  Minimal assistance   Additional items Assist x 1; Increased time required;Verbal cues; Commode  (BSC over toilet, +BM, dependent for LE clothing mangement (diaper) + hygiene)   Additional Comments RW   Ambulation/Elevation   Gait pattern Excessively slow; Short stride; Foward flexed;Narrow SHAHRZAD; Decreased foot clearance   Gait Assistance 4  Minimal assist   Additional items Assist x 1;Verbal cues; Tactile cues  (assistance for RW negotiation)   Assistive Device Rolling walker  (+ second for chair follow)   Distance 90'   Stair Management Assistance Not tested   Balance   Static Sitting Fair +   Dynamic Sitting Fair   Static Standing Fair -   Dynamic Standing Poor +   Ambulatory Poor  (RW)   Endurance Deficit   Endurance Deficit Yes   Endurance Deficit Description weakness, fatigue   Activity Tolerance   Activity Tolerance Patient limited by fatigue  (cognitive impairments)   Medical Staff Made Aware OT Amy Galindo CM [facility]   Exercises   Balance Training   (Static standing x1 min for hygiene)   Assessment   Prognosis Fair   Problem List Decreased mobility; Decreased cognition;Decreased safety awareness;Decreased strength;Decreased endurance; Impaired balance; Impaired judgement; Impaired hearing   Assessment Pt seen for PT treatment session w/ focus on t/f training + gait training. Pt pleasant, confused, requires visual + tactile cues in additiona to verbal instruction due to cognitive impairments. Pt requires assistance for RW management for safety. Pt w/ forward trunk lean which increases fall risk. Continue to recommend rehab upon d/c.   Barriers to Discharge Inaccessible home environment;Decreased caregiver support   Goals   Patient Goals go to the [facility]   PT Treatment Day 2   Plan   Treatment/Interventions Functional transfer training;LE strengthening/ROM; Therapeutic exercise; Endurance training;Patient/family training;Equipment eval/education; Bed mobility;Gait training; Compensatory technique education;Spoke to nursing;Spoke to case management;OT   Progress Progressing toward goals   PT Frequency 2-3x/wk   Recommendation   PT Discharge Recommendation Post acute rehabilitation services   Equipment Recommended [address] Recommended Wheeled walker   Change/add to [address]?  No   AM-PAC Basic Mobility Inpatient   Turning in Flat Bed Without Bedrails 2   Lying on Back to Sitting on Edge of Flat Bed Without Bedrails 2   Moving Bed to Chair 2   Standing Up From Chair Using Arms 3   Walk in Room 3 Climb 3-5 Stairs With Railing 2   Basic Mobility Inpatient Raw Score 14   Basic Mobility Standardized Score 35.55   Highest Level Of Mobility   -HL Goal 4: Move to chair/commode   -HLM Achieved 7: Walk 25 feet or more   Education   Education Provided Mobility training;Assistive device   Patient Reinforcement needed   End of Consult   Patient Position at End of Consult Bedside chair;Bed/Chair alarm activated; All needs within reach  (on waffle cushion, BLE elevated)     Augusto Gomez, PT, DPT

## 2023-07-03 NOTE — OCCUPATIONAL THERAPY NOTE
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 (HCC)  Active Problems:    Sensorineural hearing loss (SNHL) of both ears    Controlled type 2 diabetes mellitus without complication, without long-term current use of insulin (HCC)    Benign prostatic hyperplasia with urinary frequency    Stage 3 chronic kidney disease ([address] )    Ambulatory dysfunction            07/03/23 1028   OT Last Visit   OT Visit Date 07/03/23   Note Type   Note Type Treatment   Pain Assessment   Pain Score No Pain   Restrictions/Precautions   Weight Bearing Precautions Per Order No   Other Precautions Cognitive; Chair Alarm; Fall Risk;Hard of hearing   ADL   Where Assessed Chair   Eating Assistance 5  Supervision/Setup   Grooming Assistance 5  Supervision/Setup   UB Bathing Assistance 3  Moderate Assistance   LB Bathing Assistance 2  Maximal [place] 3  Moderate Assistance   UB Dressing Deficit Thread RUE; Thread LUE   LB Dressing Assistance 2  Maximal Assistance   LB Dressing Deficit Don/doff R sock; Don/doff L sock   Toileting Assistance  2  Maximal Assistance   Toileting Deficit Perineal hygiene   Bed Mobility   Additional Comments OOB upon presentation and at en dof session   Transfers   Sit to Stand 3  Moderate assistance   Additional items Assist x 1   Stand to Sit 3  Moderate assistance   Additional items Assist x 1   Stand pivot 4  Minimal assistance   Additional items Assist x 1   Toilet transfer 4  Minimal assistance   Additional items Assist x 1   Additional Comments RW increased TC And VC for re use   Functional Mobility   Functional Mobility 4  Minimal assistance   Additional Comments Min A with RW and increased TC and VC required for RW use   Additional items Rolling walker   Subjective   Subjective "I need the commode"   Cognition   Overall Cognitive Status Impaired   Arousal/Participation Cooperative   Attention Difficulty dividing attention   Orientation Level Oriented to person;Disoriented to place; Disoriented to time;Disoriented to situation   Memory Decreased recall of precautions;Decreased recall of recent events;Decreased short term memory   Following Commands Follows one step commands with increased time or repetition   Comments Overall very pleasant and cooperative requires increased time for processing and increased TC and VC throguhout for direction following as well as for use of RW   Activity Tolerance   Activity Tolerance Patient tolerated treatment well   Medical Staff Made Aware DPT, NSG aware   Assessment   Assessment Pt was seen this date for OT tx session focusing on self care tasks, sit to stand progressions, standing tolerance, tranfers, functional mobility, safety awareness, compensatory techniques, energy conservation, and overall activity tolerance. Pt presents    OOB in chair and is agreeable to OT tx session. Pt completes previously mentioned tasks at documented assist levels please see above in flow sheet. Pt continues to require overall Mod A x 1-2 for transfers and mobility and Max A for self care tasks. Pt is overall limited by cognition, decreased balance, increased fatigue, decreased strength, endurance, and activity tolerance and continues to function below baseline level. Pt resting OOB in chair at end of session with all needs in reach and alarm on. Pt would benefit from continued OT Tx to improve overall functional abilities and increase independence. Will continue to follow with current POC. Plan   Treatment Interventions ADL retraining;Functional transfer training;UE strengthening/ROM; Endurance training;Cognitive reorientation;Patient/family training;Equipment evaluation/education; Compensatory technique education; Energy conservation; Activityengagement   Goal Expiration Date 07/17/23  (Treatment ptovided by OTR/L, goals per inital eval remain appropriate - will extend POC additional 14 days)   OT Treatment Day 2   OT Frequency 1-2x/wk   Recommendation   OT Discharge Recommendation Post acute rehabilitation services   AM-PAC Daily Activity Inpatient   Lower Body Dressing 2   Bathing 2   Toileting 2   Upper Body Dressing 2   Grooming 3   Eating 3   Daily Activity Raw Score 14   Daily Activity Standardized Score (Calc for Raw Score >=11) 33.39   AM-PAC Applied Cognition Inpatient   Following a Speech/Presentation 1   Understanding Ordinary Conversation 2   Taking Medications 2   Remembering Where Things Are Placed or Put Away 1   Remembering List of 4-5 Errands 1   Taking Care of Complicated Tasks 1   Applied Cognition Raw Score 8   Applied Cognition Standardized Score 19.32

## 2023-07-03 NOTE — CASE MANAGEMENT
[address] [facility] has received approved authorization from insurance: CBC   Called in by Anneliese Grant P#  [phone]   Authorization received for: [facility]  Facility: [address] [place] [address] #: [id number]   Start of Care: 07/03/2023  Next Review Date: 07/10/2023  Continued Stay Care Coordinator: Johny Alvares  P#: [phone]   Submit next review to: Fax. [phone]  Care Manager notified:  Essie East

## 2023-07-03 NOTE — PLAN OF CARE
Problem: PHYSICAL THERAPY ADULT  Goal: Performs mobility at highest level of function for planned discharge setting. See evaluation for individualized goals. Description: Treatment/Interventions: Gait training, Functional transfer training, Therapeutic exercise, OT          See flowsheet documentation for full assessment, interventions and recommendations. Outcome: Progressing  Note: Prognosis: Fair  Problem List: Decreased mobility, Decreased cognition, Decreased safety awareness, Decreased strength, Decreased endurance, Impaired balance, Impaired judgement, Impaired hearing  Assessment: Pt seen for PT treatment session w/ focus on t/f training + gait training. Pt pleasant, confused, requires visual + tactile cues in additiona to verbal instruction due to cognitive impairments. Pt requires assistance for RW management for safety. Pt w/ forward trunk lean which increases fall risk. Continue to recommend rehab upon d/c.  Barriers to Discharge: Inaccessible home environment, Decreased caregiver support     PT Discharge Recommendation: Post acute rehabilitation services    See flowsheet documentation for full assessment.

## 2023-07-03 NOTE — PLAN OF CARE
Problem: Nutrition/Hydration-ADULT  Goal: Nutrient/Hydration intake appropriate for improving, restoring or maintaining nutritional needs  Description: Monitor and assess patient's nutrition/hydration status for malnutrition. Collaborate with interdisciplinary team and initiate plan and interventions as ordered. Monitor patient's weight and dietary intake as ordered or per policy. Utilize nutrition screening tool and intervene as necessary. Determine patient's food preferences and provide high-protein, high-caloric foods as appropriate.      INTERVENTIONS:  - Monitor oral intake, urinary output, labs, and treatment plans  - Assess nutrition and hydration status and recommend course of action  - Evaluate amount of meals eaten  - Assist patient with eating if necessary   - Allow adequate time for meals  - Recommend/ encourage appropriate diets, oral nutritional supplements, and vitamin/mineral supplements  - Order, calculate, and assess calorie counts as needed  - Recommend, monitor, and adjust tube feedings and TPN/PPN based on assessed needs  - Assess need for intravenous fluids  - Provide specific nutrition/hydration education as appropriate  - Include patient/family/caregiver in decisions related to nutrition  7/3/2023 1642 by Jordan Evans RN  Outcome: Completed  7/3/2023 0711 by Jordan Evans RN  Outcome: Progressing     Problem: PAIN - ADULT  Goal: Verbalizes/displays adequate comfort level or baseline comfort level  Description: Interventions:  - Encourage patient to monitor pain and request assistance  - Assess pain using appropriate pain scale  - Administer analgesics based on type and severity of pain and evaluate response  - Implement non-pharmacological measures as appropriate and evaluate response  - Consider cultural and social influences on pain and pain management  - Notify physician/advanced practitioner if interventions unsuccessful or patient reports new pain  7/3/2023 1642 by Mariia Anguiano Angelica Barnett RN  Outcome: Completed  7/3/2023 5694 by Chris Rhodes RN  Outcome: Progressing     Problem: INFECTION - ADULT  Goal: Absence or prevention of progression during hospitalization  Description: INTERVENTIONS:  - Assess and monitor for signs and symptoms of infection  - Monitor lab/diagnostic results  - Monitor all insertion sites, i.e. indwelling lines, tubes, and drains  - Monitor endotracheal if appropriate and nasal secretions for changes in amount and color  - [place] appropriate cooling/warming therapies per order  - Administer medications as ordered  - Instruct and encourage patient and family to use good hand hygiene technique  - Identify and instruct in appropriate isolation precautions for identified infection/condition  7/3/2023 1642 by Chris Rhodes RN  Outcome: Completed  7/3/2023 0711 by Chris Rhodes RN  Outcome: Progressing     Problem: DISCHARGE PLANNING  Goal: Discharge to home or other facility with appropriate resources  Description: INTERVENTIONS:  - Identify barriers to discharge w/patient and caregiver  - Arrange for needed discharge resources and transportation as appropriate  - Identify discharge learning needs (meds, wound care, etc.)  - Arrange for interpretive services to assist at discharge as needed  - Refer to Case Management Department for coordinating discharge planning if the patient needs post-hospital services based on physician/advanced practitioner order or complex needs related to functional status, cognitive ability, or social support system  7/3/2023 1642 by Chris Rhodes RN  Outcome: Completed  7/3/2023 0711 by Chris Rhodes RN  Outcome: Progressing     Problem: Knowledge Deficit  Goal: Patient/family/caregiver demonstrates understanding of disease process, treatment plan, medications, and discharge instructions  Description: Complete learning assessment and assess knowledge base.   Interventions:  - Provide teaching at level of understanding  - Provide teaching via preferred learning methods  7/3/2023 1642 by Lee Ann Bush RN  Outcome: Completed  7/3/2023 0711 by Lee Ann Bush RN  Outcome: Progressing     Problem: NEUROSENSORY - ADULT  Goal: Achieves stable or improved neurological status  Description: INTERVENTIONS  - Monitor and report changes in neurological status  - Monitor vital signs such as temperature, blood pressure, glucose, and any other labs ordered   - Initiate measures to prevent increased intracranial pressure  - Monitor for seizure activity and implement precautions if appropriate      7/3/2023 1642 by Lee Ann Bush RN  Outcome: Completed  7/3/2023 0711 by Lee Ann Bush RN  Outcome: Progressing     Problem: METABOLIC, FLUID AND ELECTROLYTES - ADULT  Goal: Electrolytes maintained within normal limits  Description: INTERVENTIONS:  - Monitor labs and assess patient for signs and symptoms of electrolyte imbalances  - Administer electrolyte replacement as ordered  - Monitor response to electrolyte replacements, including repeat lab results as appropriate  - Instruct patient on fluid and nutrition as appropriate  7/3/2023 1642 by Lee Ann Bush RN  Outcome: Completed  7/3/2023 0711 by Lee Ann Bush RN  Outcome: Progressing

## 2023-07-04 NOTE — ASSESSMENT & PLAN NOTE
Unsteady gait with history of recurrent falls  · Fall precautions  · PT/OT recommend postacute rehab-patient will be discharged to rehab today

## 2023-07-04 NOTE — ASSESSMENT & PLAN NOTE
· Presented to the ED on 6/20/23 after wife noted increasing agitation, with anger and aggressiveness at home over a 3 days period. He has dementia and she  Is usually able to care for him at home  · CT head shows diffuse moderate to severe chronic microangiopathic changes  · TSH normal, B12 low normal,  · On Seroquel 25 mg at bedtime  · Depakote 125 mg every morning and p.m. and 250 mg at bedtime  · Behavior much improved  · Patient was able to be weaned off one-to-one. Patient will be discharged to rehab today.   May need to adjust the medication as an outpatient depending upon the behavior

## 2023-07-04 NOTE — DISCHARGE SUMMARY
[address] [facility]  Discharge- Dong Gandhi 1935, 81 y.o. 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 He has dementia and she  Is usually able to care for him at home  · CT head shows diffuse moderate to severe chronic microangiopathic changes  · TSH normal, B12 low normal,  · On Seroquel 25 mg at bedtime  · Depakote 125 mg every morning and p.m. and 250 mg at bedtime  · Behavior much improved  · Patient was able to be weaned off one-to-one. Patient will be discharged to rehab today.   May need to adjust the medication as an outpatient depending upon the behavior    Ambulatory dysfunction  Assessment & Plan  Unsteady gait with history of recurrent falls  · Fall precautions  · PT/OT recommend postacute rehab-patient will be discharged to rehab today    Stage 3 chronic kidney disease [facility])  Assessment & Plan  Lab Results   Component Value Date    EGFR 44 07/02/2023    EGFR 46 06/30/2023    EGFR 43 06/26/2023    CREATININE 1.41 (H) 07/02/2023    CREATININE 1.35 (H) 06/30/2023    CREATININE 1.43 (H) 06/26/2023     · Baseline creatinine 1.4 to 1.6  · Home medication Lisinopril 10 mg daily discontinued on admission in the setting of fluctuating oral fluid intake and begun on Amlodipine 5 mg daily on 6/21    ·     Benign prostatic hyperplasia with urinary frequency  Assessment & Plan  · Was recently taken off finasteride  · Myrbetriq      Controlled type 2 diabetes mellitus without complication, without long-term current use of insulin [facility])  Assessment & Plan  Lab Results   Component Value Date    HGBA1C 5.8 03/17/2023       No results for input(s): "POCGLU" in the last 72 hours. Blood Sugar Average: Last 72 hrs:     · Had been taken off Metformin prior to admission   · A1c well controled   · Monitor blood sugars    Sensorineural hearing loss (SNHL) of both ears  Assessment & Plan  · Noted      Medical Problems     Resolved Problems  Date Reviewed: 7/3/2023   None       Discharging Physician / Practitioner: Suzy Azar MD  PCP: Nini Aquino MD  Admission Date:   Admission Orders (From admission, onward)     Ordered        06/21/23 1338  Inpatient Admission  Once            06/20/23 1828  Place in Observation  Once                      Discharge Date: 07/03/23    Consultations During Hospital Stay:  geriatrics    Procedures Performed:   ·     Significant Findings / Test Results:   CT head-no  Acute intracranial abnormality    Incidental Findings:   ·     Test Results Pending at Discharge (will require follow up):   ·      Outpatient Tests Requested:  · LFT in 2 weeks    Complications:   none    Reason for Admission: Dementia with behavioral disturbances    Hospital Course:   Samson Pearson is a 81 y.o. male patient who originally presented to the hospital on 6/20/2023 due to dementia with behavioral disturbances. Patient with a advanced dementia and noted to have behavioral changes. Patient was brought to the hospital.  Infections were ruled out. Geriatrics consulted to the patient. Recently was seen by geriatrics as an outpatient and discontinued oxybutynin and Proscar. Patient was continued on Seroquel. Geriatrics started Depakote. Dose was adjusted for response. Will be discharged with the 125 mill gram in the morning and in the afternoon and to 50 mg in the evening. Patient was able to be taken off of one-to-one. Patient was cooperative. For details refer to the chart. PT OT recommended rehab and patient will be discharged to rehab.   Family in the process of making long-term care        Please see above list of diagnoses and related plan for additional information. Condition at Discharge: good    Discharge Day Visit / Exam:   Subjective: Patient seen and examined. Sitting up in chair. No events overnight  Vitals: Blood Pressure: 154/71 (07/03/23 1548)  Pulse: 78 (07/03/23 1548)  Temperature: 98.3 °F (36.8 °C) (07/03/23 1548)  Temp Source: Axillary (07/02/23 2259)  Respirations: 16 (07/03/23 1548)  Height: 5' 8" (172.7 cm) (last ht assessment) (06/21/23 1522)  Weight - Scale: 85.8 kg (189 lb 2.5 oz) (06/26/23 0700)  SpO2: 97 % (06/30/23 0720)  Exam:   Physical Exam  Constitutional:       General: He is not in acute distress. HENT:      Head: Normocephalic and atraumatic. Nose: Nose normal.   Eyes:      General: No scleral icterus. Cardiovascular:      Rate and Rhythm: Normal rate and regular rhythm. Pulses: Normal pulses. Pulmonary:      Breath sounds: Normal breath sounds. Abdominal:      General: There is no distension. Musculoskeletal:         General: No swelling. Normal range of motion. Neurological:      Mental Status: He is alert. Mental status is at baseline. He is disoriented. Discussion with Family: Updated  (wife) at bedside. Discharge instructions/Information to patient and family:   See after visit summary for information provided to patient and family. Provisions for Follow-Up Care:  See after visit summary for information related to follow-up care and any pertinent home health orders. Disposition:   Other [address] at [facility] Readmission: none     Discharge Statement:  I spent 45  minutes discharging the patient. This time was spent on the day of discharge. I had direct contact with the patient on the day of discharge. Greater than 50% of the total time was spent examining patient, answering all patient questions, arranging and discussing plan of care with patient as well as directly providing post-discharge instructions.   Additional time then spent on discharge activities. Discharge Medications:  See after visit summary for reconciled discharge medications provided to patient and/or family.       **Please Note: This note may have been constructed using a voice recognition system**

## 2023-07-04 NOTE — ASSESSMENT & PLAN NOTE
Lab Results   Component Value Date    EGFR 44 07/02/2023    EGFR 46 06/30/2023    EGFR 43 06/26/2023    CREATININE 1.41 (H) 07/02/2023    CREATININE 1.35 (H) 06/30/2023    CREATININE 1.43 (H) 06/26/2023     · Baseline creatinine 1.4 to 1.6  · Home medication Lisinopril 10 mg daily discontinued on admission in the setting of fluctuating oral fluid intake and begun on Amlodipine 5 mg daily on 6/21    ·

## 2023-07-05 ENCOUNTER — TRANSITIONAL CARE MANAGEMENT (OUTPATIENT)
Dept: FAMILY MEDICINE CLINIC | Facility: CLINIC | Age: 88
End: 2023-07-05

## 2023-07-05 NOTE — UTILIZATION REVIEW
NOTIFICATION OF ADMISSION DISCHARGE   This is a Notification of Discharge from [facility]. Please be advised that this patient has been discharge from our facility. Below you will find the admission and discharge date and time including the patient’s disposition. UTILIZATION REVIEW CONTACT:  Miladys Hawkins  Utilization   Network Utilization Review Department  Phone: [phone] x carefully listen to the prompts. All voicemails are confidential.  Email: [email or web address]     ADMISSION INFORMATION  PRESENTATION DATE: 6/20/2023  1:21 PM  OBERVATION ADMISSION DATE:   INPATIENT ADMISSION DATE: 6/21/23  1:38 PM   DISCHARGE DATE: 7/3/2023  4:44 PM   DISPOSITION:Non [facility] SNF/TCU/SNU    IMPORTANT INFORMATION:  Send all requests for admission clinical reviews, approved or denied determinations and any other requests to dedicated fax number below belonging to the campus where the patient is receiving treatment.  List of dedicated fax numbers:  [place] DENIALS (Administrative/Medical Necessity) [phone] [address] [facility] (Maternity/NICU/Pediatrics) [phone]   [facility] [phone]   [facility] [phone]   [address] [facility] [phone]   [address] [place] [address] [phone]   [facility] [phone]   [address] [facility] [phone]   [address] [place] [address] [phone]   [address] [facility] [phone]   [address] [place] [address] [facility] [phone]

## 2023-07-24 ENCOUNTER — APPOINTMENT (EMERGENCY)
Dept: CT IMAGING | Facility: HOSPITAL | Age: 88
DRG: 689 | End: 2023-07-24
Payer: COMMERCIAL

## 2023-07-24 ENCOUNTER — HOSPITAL ENCOUNTER (INPATIENT)
Facility: HOSPITAL | Age: 88
LOS: 8 days | Discharge: NURSING, REHAB OR LONG-TERM CARE | DRG: 689 | End: 2023-08-01
Attending: EMERGENCY MEDICINE | Admitting: INTERNAL MEDICINE
Payer: COMMERCIAL

## 2023-07-24 DIAGNOSIS — G30.9 ALZHEIMER'S DEMENTIA WITH AGITATION (HCC): Primary | ICD-10-CM

## 2023-07-24 DIAGNOSIS — F02.811 ALZHEIMER'S DEMENTIA WITH AGITATION (HCC): Primary | ICD-10-CM

## 2023-07-24 DIAGNOSIS — R41.82 AMS (ALTERED MENTAL STATUS): ICD-10-CM

## 2023-07-24 DIAGNOSIS — F03.918 DEMENTIA WITH BEHAVIORAL DISTURBANCE (HCC): ICD-10-CM

## 2023-07-24 DIAGNOSIS — R46.89 COMBATIVE BEHAVIOR: ICD-10-CM

## 2023-07-24 DIAGNOSIS — F03.90 DEMENTIA (HCC): ICD-10-CM

## 2023-07-24 PROBLEM — R00.1 BRADYCARDIA: Status: ACTIVE | Noted: 2023-07-24

## 2023-07-24 PROBLEM — N30.00 ACUTE CYSTITIS WITHOUT HEMATURIA: Status: ACTIVE | Noted: 2023-02-22

## 2023-07-24 LAB
2HR DELTA HS TROPONIN: 0 NG/L
ALBUMIN SERPL BCP-MCNC: 3.8 G/DL (ref 3.5–5)
ALP SERPL-CCNC: 45 U/L (ref 34–104)
ALT SERPL W P-5'-P-CCNC: 19 U/L (ref 7–52)
ANION GAP SERPL CALCULATED.3IONS-SCNC: 6 MMOL/L
AST SERPL W P-5'-P-CCNC: 14 U/L (ref 13–39)
ATRIAL RATE: 340 BPM
BACTERIA UR QL AUTO: ABNORMAL /HPF
BASOPHILS # BLD AUTO: 0.03 THOUSANDS/ÂΜL (ref 0–0.1)
BASOPHILS NFR BLD AUTO: 1 % (ref 0–1)
BILIRUB SERPL-MCNC: 0.49 MG/DL (ref 0.2–1)
BILIRUB UR QL STRIP: NEGATIVE
BUN SERPL-MCNC: 26 MG/DL (ref 5–25)
CALCIUM SERPL-MCNC: 9.7 MG/DL (ref 8.4–10.2)
CARDIAC TROPONIN I PNL SERPL HS: 5 NG/L
CARDIAC TROPONIN I PNL SERPL HS: 5 NG/L
CHLORIDE SERPL-SCNC: 105 MMOL/L (ref 96–108)
CLARITY UR: CLEAR
CO2 SERPL-SCNC: 31 MMOL/L (ref 21–32)
COLOR UR: ABNORMAL
CREAT SERPL-MCNC: 1.33 MG/DL (ref 0.6–1.3)
EOSINOPHIL # BLD AUTO: 0.13 THOUSAND/ÂΜL (ref 0–0.61)
EOSINOPHIL NFR BLD AUTO: 2 % (ref 0–6)
ERYTHROCYTE [DISTWIDTH] IN BLOOD BY AUTOMATED COUNT: 13.4 % (ref 11.6–15.1)
FOLATE SERPL-MCNC: 6.4 NG/ML
GFR SERPL CREATININE-BSD FRML MDRD: 47 ML/MIN/1.73SQ M
GLUCOSE SERPL-MCNC: 105 MG/DL (ref 65–140)
GLUCOSE SERPL-MCNC: 107 MG/DL (ref 65–140)
GLUCOSE SERPL-MCNC: 125 MG/DL (ref 65–140)
GLUCOSE UR STRIP-MCNC: NEGATIVE MG/DL
HCT VFR BLD AUTO: 43 % (ref 36.5–49.3)
HGB BLD-MCNC: 14.1 G/DL (ref 12–17)
HGB UR QL STRIP.AUTO: NEGATIVE
IMM GRANULOCYTES # BLD AUTO: 0.02 THOUSAND/UL (ref 0–0.2)
IMM GRANULOCYTES NFR BLD AUTO: 0 % (ref 0–2)
KETONES UR STRIP-MCNC: NEGATIVE MG/DL
LEUKOCYTE ESTERASE UR QL STRIP: ABNORMAL
LYMPHOCYTES # BLD AUTO: 1.54 THOUSANDS/ÂΜL (ref 0.6–4.47)
LYMPHOCYTES NFR BLD AUTO: 27 % (ref 14–44)
MCH RBC QN AUTO: 31.5 PG (ref 26.8–34.3)
MCHC RBC AUTO-ENTMCNC: 32.8 G/DL (ref 31.4–37.4)
MCV RBC AUTO: 96 FL (ref 82–98)
MONOCYTES # BLD AUTO: 0.6 THOUSAND/ÂΜL (ref 0.17–1.22)
MONOCYTES NFR BLD AUTO: 11 % (ref 4–12)
NEUTROPHILS # BLD AUTO: 3.42 THOUSANDS/ÂΜL (ref 1.85–7.62)
NEUTS SEG NFR BLD AUTO: 59 % (ref 43–75)
NITRITE UR QL STRIP: NEGATIVE
NON-SQ EPI CELLS URNS QL MICRO: ABNORMAL /HPF
NRBC BLD AUTO-RTO: 0 /100 WBCS
PH UR STRIP.AUTO: 6.5 [PH]
PLATELET # BLD AUTO: 179 THOUSANDS/UL (ref 149–390)
PMV BLD AUTO: 10.6 FL (ref 8.9–12.7)
POTASSIUM SERPL-SCNC: 4.1 MMOL/L (ref 3.5–5.3)
PROT SERPL-MCNC: 6.3 G/DL (ref 6.4–8.4)
PROT UR STRIP-MCNC: NEGATIVE MG/DL
QRS AXIS: -38 DEGREES
QRSD INTERVAL: 88 MS
QT INTERVAL: 404 MS
QTC INTERVAL: 389 MS
RBC # BLD AUTO: 4.48 MILLION/UL (ref 3.88–5.62)
RBC #/AREA URNS AUTO: ABNORMAL /HPF
SODIUM SERPL-SCNC: 142 MMOL/L (ref 135–147)
SP GR UR STRIP.AUTO: 1.02 (ref 1–1.03)
T WAVE AXIS: 22 DEGREES
UROBILINOGEN UR STRIP-ACNC: 2 MG/DL
VALPROATE SERPL-MCNC: 24 UG/ML (ref 50–100)
VENTRICULAR RATE: 56 BPM
WBC # BLD AUTO: 5.74 THOUSAND/UL (ref 4.31–10.16)
WBC #/AREA URNS AUTO: ABNORMAL /HPF

## 2023-07-24 PROCEDURE — 36415 COLL VENOUS BLD VENIPUNCTURE: CPT | Performed by: EMERGENCY MEDICINE

## 2023-07-24 PROCEDURE — 93010 ELECTROCARDIOGRAM REPORT: CPT | Performed by: INTERNAL MEDICINE

## 2023-07-24 PROCEDURE — 93005 ELECTROCARDIOGRAM TRACING: CPT

## 2023-07-24 PROCEDURE — 96372 THER/PROPH/DIAG INJ SC/IM: CPT

## 2023-07-24 PROCEDURE — 70450 CT HEAD/BRAIN W/O DYE: CPT

## 2023-07-24 PROCEDURE — G1004 CDSM NDSC: HCPCS

## 2023-07-24 PROCEDURE — 81001 URINALYSIS AUTO W/SCOPE: CPT | Performed by: EMERGENCY MEDICINE

## 2023-07-24 PROCEDURE — 84484 ASSAY OF TROPONIN QUANT: CPT | Performed by: EMERGENCY MEDICINE

## 2023-07-24 PROCEDURE — 80053 COMPREHEN METABOLIC PANEL: CPT | Performed by: EMERGENCY MEDICINE

## 2023-07-24 PROCEDURE — 87086 URINE CULTURE/COLONY COUNT: CPT

## 2023-07-24 PROCEDURE — 99285 EMERGENCY DEPT VISIT HI MDM: CPT

## 2023-07-24 PROCEDURE — 85025 COMPLETE CBC W/AUTO DIFF WBC: CPT | Performed by: EMERGENCY MEDICINE

## 2023-07-24 PROCEDURE — 80164 ASSAY DIPROPYLACETIC ACD TOT: CPT | Performed by: EMERGENCY MEDICINE

## 2023-07-24 PROCEDURE — 82948 REAGENT STRIP/BLOOD GLUCOSE: CPT

## 2023-07-24 PROCEDURE — 82746 ASSAY OF FOLIC ACID SERUM: CPT

## 2023-07-24 PROCEDURE — 87077 CULTURE AEROBIC IDENTIFY: CPT

## 2023-07-24 PROCEDURE — 96365 THER/PROPH/DIAG IV INF INIT: CPT

## 2023-07-24 PROCEDURE — 99285 EMERGENCY DEPT VISIT HI MDM: CPT | Performed by: EMERGENCY MEDICINE

## 2023-07-24 PROCEDURE — 99223 1ST HOSP IP/OBS HIGH 75: CPT | Performed by: INTERNAL MEDICINE

## 2023-07-24 RX ORDER — WATER 1000 ML/1000ML
INJECTION, SOLUTION INTRAVENOUS
Status: COMPLETED
Start: 2023-07-24 | End: 2023-07-24

## 2023-07-24 RX ORDER — ENOXAPARIN SODIUM 100 MG/ML
40 INJECTION SUBCUTANEOUS DAILY
Status: DISCONTINUED | OUTPATIENT
Start: 2023-07-25 | End: 2023-08-01 | Stop reason: HOSPADM

## 2023-07-24 RX ORDER — QUETIAPINE FUMARATE 25 MG/1
25 TABLET, FILM COATED ORAL
Status: DISCONTINUED | OUTPATIENT
Start: 2023-07-24 | End: 2023-07-27

## 2023-07-24 RX ORDER — LANOLIN ALCOHOL/MO/W.PET/CERES
3 CREAM (GRAM) TOPICAL
Status: DISCONTINUED | OUTPATIENT
Start: 2023-07-24 | End: 2023-08-01 | Stop reason: HOSPADM

## 2023-07-24 RX ORDER — QUETIAPINE FUMARATE 25 MG/1
25 TABLET, FILM COATED ORAL ONCE
Status: DISCONTINUED | OUTPATIENT
Start: 2023-07-24 | End: 2023-07-24

## 2023-07-24 RX ORDER — OLANZAPINE 10 MG/1
5 INJECTION, POWDER, LYOPHILIZED, FOR SOLUTION INTRAMUSCULAR EVERY 4 HOURS PRN
Status: DISCONTINUED | OUTPATIENT
Start: 2023-07-24 | End: 2023-07-25

## 2023-07-24 RX ORDER — AMLODIPINE BESYLATE 5 MG/1
5 TABLET ORAL DAILY
Status: DISCONTINUED | OUTPATIENT
Start: 2023-07-25 | End: 2023-08-01 | Stop reason: HOSPADM

## 2023-07-24 RX ORDER — ACETAMINOPHEN 325 MG/1
650 TABLET ORAL EVERY 6 HOURS PRN
Status: DISCONTINUED | OUTPATIENT
Start: 2023-07-24 | End: 2023-08-01 | Stop reason: HOSPADM

## 2023-07-24 RX ORDER — OLANZAPINE 10 MG/1
2.5 INJECTION, POWDER, LYOPHILIZED, FOR SOLUTION INTRAMUSCULAR ONCE
Status: COMPLETED | OUTPATIENT
Start: 2023-07-24 | End: 2023-07-24

## 2023-07-24 RX ORDER — DIVALPROEX SODIUM 125 MG/1
125 CAPSULE, COATED PELLETS ORAL 2 TIMES DAILY
Status: DISCONTINUED | OUTPATIENT
Start: 2023-07-25 | End: 2023-07-25

## 2023-07-24 RX ORDER — OXYBUTYNIN CHLORIDE 5 MG/1
10 TABLET, EXTENDED RELEASE ORAL DAILY
Status: DISCONTINUED | OUTPATIENT
Start: 2023-07-25 | End: 2023-07-25

## 2023-07-24 RX ORDER — DIVALPROEX SODIUM 125 MG/1
250 CAPSULE, COATED PELLETS ORAL
Status: DISCONTINUED | OUTPATIENT
Start: 2023-07-24 | End: 2023-07-27

## 2023-07-24 RX ORDER — SENNOSIDES 8.6 MG
8.6 TABLET ORAL
Status: DISCONTINUED | OUTPATIENT
Start: 2023-07-24 | End: 2023-08-01 | Stop reason: HOSPADM

## 2023-07-24 RX ORDER — SODIUM CHLORIDE, SODIUM GLUCONATE, SODIUM ACETATE, POTASSIUM CHLORIDE, MAGNESIUM CHLORIDE, SODIUM PHOSPHATE, DIBASIC, AND POTASSIUM PHOSPHATE .53; .5; .37; .037; .03; .012; .00082 G/100ML; G/100ML; G/100ML; G/100ML; G/100ML; G/100ML; G/100ML
75 INJECTION, SOLUTION INTRAVENOUS CONTINUOUS
Status: DISPENSED | OUTPATIENT
Start: 2023-07-24 | End: 2023-07-25

## 2023-07-24 RX ORDER — INSULIN LISPRO 100 [IU]/ML
1-6 INJECTION, SOLUTION INTRAVENOUS; SUBCUTANEOUS
Status: DISCONTINUED | OUTPATIENT
Start: 2023-07-24 | End: 2023-08-01 | Stop reason: HOSPADM

## 2023-07-24 RX ADMIN — CEFTRIAXONE 1000 MG: 2 INJECTION, POWDER, FOR SOLUTION INTRAMUSCULAR; INTRAVENOUS at 08:15

## 2023-07-24 RX ADMIN — WATER: 1 INJECTION INTRAMUSCULAR; INTRAVENOUS; SUBCUTANEOUS at 08:20

## 2023-07-24 RX ADMIN — OLANZAPINE 2.5 MG: 10 INJECTION, POWDER, FOR SOLUTION INTRAMUSCULAR at 08:15

## 2023-07-24 RX ADMIN — SODIUM CHLORIDE, SODIUM GLUCONATE, SODIUM ACETATE, POTASSIUM CHLORIDE, MAGNESIUM CHLORIDE, SODIUM PHOSPHATE, DIBASIC, AND POTASSIUM PHOSPHATE 75 ML/HR: .53; .5; .37; .037; .03; .012; .00082 INJECTION, SOLUTION INTRAVENOUS at 18:25

## 2023-07-24 RX ADMIN — OLANZAPINE 5 MG: 10 INJECTION, POWDER, FOR SOLUTION INTRAMUSCULAR at 20:22

## 2023-07-24 RX ADMIN — OLANZAPINE 2.5 MG: 10 INJECTION, POWDER, FOR SOLUTION INTRAMUSCULAR at 06:50

## 2023-07-24 NOTE — ASSESSMENT & PLAN NOTE
· History late onset Alzheimer's with psychotic disturbances  · Home meds Depakote 125 mg twice daily and Depakote 250 mg at bedtime  · POA altered mental status, combative, agitation decreased p.o. intake. No fevers, no chills, no sick contacts  · [facility] from admission [address] 07/03/2023 due to the same. · Per spouse patient has progressively declined over the past 2 months. · Most recent lab work on 06/21/2023 TSH unremarkable, vitamin B12 unremarkable  · Folate unremarkable  · At the ED CT head no acute intraparenchymal abnormalities  · UA significant for pyuria with WBCs 10-24, occasional bacteria although patient denies any symptoms at this time. · Likely in the setting of Alzheimer's dementia with progression of disease versus metabolic in the setting of pyuria versus slight dehydration on exam.    · Plan  · Continue IV ceftriaxone for possible cystitis  · Gentle hydration for total 8 hours .   · Continue home meds Depakote 125 mg twice daily, Depakote 250 mg at bedtime  · Delirium precautions  · Fall precautions  · Continue one-to-one

## 2023-07-24 NOTE — ASSESSMENT & PLAN NOTE
· History Alzheimer's with psychotic disturbances  · Most recently admitted on 06/20 and subsequently discharged on July 3, 2023 due to altered mental status. · Current regimen Depakote 125 mg twice daily, Depakote to 50 mg at bedtime  · Plan  · Continue home meds  · Can consider 1:1 if behavioral disturbances arise will avoid restraints at this time. · Can consider small dose of Zyprexa 2.5 mg p.o. as needed although will avoid at this time given patient has already received 2 doses. · Continue delirium precaution  · Frequent reorientation  · Fall precautions  · PT OT for placement.

## 2023-07-24 NOTE — ASSESSMENT & PLAN NOTE
· History of BPH with urinary frequency  · Most recently taking of finasteride  · Currently Myrbteq 50 mg once a day  · We will continue oxybutynin 10 mg once a day as per family formulary.   · Continue urinary retention protocol

## 2023-07-24 NOTE — H&P
[address] [facility]  H&P  Name: Jolie Eckert 81 y.o. male I MRN: [id number]  Unit/Bed#: S -[facility] I Date of Admission: 7/24/2023   Date of Service: 7/24/2023 I Hospital Day: 0      Assessment/Plan   * AMS (altered mental status)  Assessment & Plan  · History late onset Alzheimer's with psychotic disturbances  · Home meds Depakote 125 mg twice daily and Depakote 250 mg at bedtime  · POA altered mental status, combative, agitation decreased p.o. intake. No fevers, no chills, no sick contacts  · Recently discharged to [facility] rehab from admission [facility] 07/03/2023 due to the same. · Per spouse patient has progressively declined over the past 2 months. · Most recent lab work on 06/21/2023 TSH unremarkable, vitamin B12 unremarkable  · At the ED CT head no acute intraparenchymal abnormalities  · UA significant for pyuria with WBCs 10-24, occasional bacteria although patient denies any symptoms at this time. · Likely in the setting of Alzheimer's dementia with progression of disease versus metabolic in the setting of pyuria versus slight dehydration on exam.    · Plan  · Continue IV ceftriaxone for possible cystitis  · Gentle hydration for total 8 hours   · We will check folate for completeness, given recent TSH and vitamin B12 level was unremarkable. · Continue home meds Depakote 125 mg twice daily, Depakote 250 mg at bedtime  · Delirium precautions  · Fall precautions. · Can consider 1 to 1 if behavioral disturbance arise will avoid restrains  · Can consider small dose of Zyprexa 2.5 mg as needed   · Geriatric consult patient recommendations  · Case management consult for placement. Acute cystitis without hematuria  Assessment & Plan  · At the ED UA significant for small leukocytes, microscopy 10-20 WBCs, occasional bacteria  · At the ED received 1 dose of ceftriaxone due to concerns of possible UTI with altered mental status.   · Upon my evaluation patient denies any acute complaints, afebrile, no leukocytosis    · Plan  · Continue ceftriaxone 1 mg once a day, can consider and discontinue pending urinary cultures  · CBC a.m.  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 · Current regimen Depakote 125 mg twice daily, Depakote to 50 mg at bedtime  · Plan  · Continue home meds  · Can consider 1:1 if behavioral disturbances arise will avoid restraints at this time. · Can consider small dose of Zyprexa 2.5 mgContinue delirium precaution  · Frequent reorientation  · Fall precautions  · PT OT for placement. Bradycardia  Assessment & Plan  · POA heart rate 54  · ECG sinus bradycardia, heart rate 56 with PACs and poor R wave progression. · Patient asymptomatic  · Avoid AV ben blocker. Hypertension  Assessment & Plan  · History of hypertension  · Most recently taken off lisinopril due to level hypertension goal on previous admission  Blood Pressure: 141/68  ·   · Acceptable  · Continue home amlodipine 5 mg once a day    Controlled type 2 diabetes mellitus without complication, without long-term current use of insulin ([facility])  Assessment & Plan  Lab Results   Component Value Date    HGBA1C 5.8 03/17/2023       No results for input(s): "POCGLU" in the last 72 hours.     Blood Sugar Average: Last 72 hrs:  · Recently taken off metformin  · Continue sliding scale and glucose check per protocol    Stage 3b chronic kidney disease [facility])  Assessment & Plan  Lab Results   Component Value Date    EGFR 47 07/24/2023    EGFR 44 07/02/2023    EGFR 46 06/30/2023    CREATININE 1.33 (H) 07/24/2023    CREATININE 1.41 (H) 07/02/2023    CREATININE 1.35 (H) 06/30/2023   · History CKD stage IIIb  · Baseline seems to be from 1.3-1.5, currently at baseline  · BMP a.m.,  · Avoid nephrotoxin, avoid hypotension    Benign prostatic hyperplasia with urinary frequency  Assessment & Plan  · History of BPH with urinary frequency  · Most recently taking of finasteride  · Currently Myrbteq 50 mg once a day  · We will continue oxybutynin 10 mg once a day as per family formulary. · Continue urinary retention protocol    Ambulatory dysfunction  Assessment & Plan  · Recently admitted to [facility]  · Ambulates RW  · PTOT       VTE Pharmacologic Prophylaxis: VTE Score: 5 High Risk (Score >/= 5) - Pharmacological DVT Prophylaxis Ordered: enoxaparin (Lovenox). Sequential Compression Devices Ordered. Code Status: Level 3 - DNAR and DNI spouse  Discussion with family: Updated  (wife) at bedside. Anticipated Length of Stay: Patient will be admitted on an inpatient basis with an anticipated length of stay of greater than 2 midnights secondary to Altered mental status. Chief Complaint: Altered mental status    History of Present Illness:  Merlyn Braswell is a 81 y.o. male with a PMH of late onset Alzheimer's with psychotic features, hypertension, type 2 diabetes not on insulin, paroxysmal A-fib, BPH who presents with altered mental status, combative, agitation and decreased p.o. intake over the past 2 to 3 days. History provided mainly by spouse. Spouse endorsed that over the past 2 to 3 months patient has become more agitated, combative more altered not able to perform ADLs. Patient who currently resides [facility] after discharged on 07/03/2023 due to similar admission. Patient was doing well after discharge from [address] [place] [address] on 07/03/2023, participating in rehab however was noted over the weekend with changes in mentation more combative or agitated and somewhat changes in sleep pattern and p.o. intake. No fevers, no sick contacts no other changes.   Spouse endorsed the patient was agitated and combative a few months ago for which was seen by geriatrics on the outpatient setting and was started on Seroquel with appropriate response. At the ED vital signs unremarkable, afebrile, no leukocytosis. CT head unremarkable however UA significant for pyuria with small leukocytes and microscopy 10-20 WBCs with occasional bacteria for which received 1 dose of ceftriaxone. Given altered mental status patient received 2 doses of Zyprexa. Upon my evaluation patient denied any acute complaint. Denies any fevers, chills, chest pain, abdominal pain as well as no urinary symptoms. Review of Systems:  Review of Systems   Constitutional: Positive for activity change. Negative for chills and fever. HENT: Positive for hearing loss. Negative for ear pain and sore throat. Hearing loss chronic   Eyes: Negative for pain and visual disturbance. Respiratory: Negative for cough and shortness of breath. Cardiovascular: Negative for chest pain and palpitations. Gastrointestinal: Negative for abdominal pain, constipation, diarrhea and vomiting. Genitourinary: Negative for dysuria and hematuria. Musculoskeletal: Negative for arthralgias and back pain. Skin: Negative for color change and rash. Neurological: Negative for seizures and syncope. Psychiatric/Behavioral: Positive for agitation and behavioral problems. All other systems reviewed and are negative.       Past Medical and Surgical History:   Past Medical History:   Diagnosis Date   • Balanitis     last assessed 12/19/14   • BPH (benign prostatic hyperplasia)    • Diabetes mellitus ([address])     blood sugar 167 this am at 0630   • GERD (gastroesophageal reflux disease)    • Heme + stool    • Hypertension    • Lumbar radiculopathy    • Myocardial infarction ([address])     35 years ago   • Phimosis     last assessed 04/27/16   • Sciatica     right side, last assessed 04/25/16       Past Surgical History:   Procedure Laterality Date   • BACK SURGERY      laminectomy Lumbar   • CATARACT EXTRACTION, BILATERAL     • CIRCUMCISION     • HERNIA REPAIR Right    • MT COLONOSCOPY FLX DX W/COLLJ SPEC WHEN PFRMD N/A 1/16/2017    Procedure: EGD AND COLONOSCOPY;  Surgeon: Dali Burnham DO;  Location: [facility]; Service: General       Meds/Allergies:  Prior to Admission medications    Medication Sig Start Date End Date Taking? Authorizing Provider   acetaminophen (TYLENOL) 325 mg tablet Take 2 tablets (650 mg total) by mouth every 6 (six) hours as needed for mild pain, headaches or fever 7/3/23   Leila Perry MD   amLODIPine (NORVASC) 5 mg tablet Take 1 tablet (5 mg total) by mouth daily Do not start before July 4, 2023. 7/4/23   Leila Perry MD   cyanocobalamin (VITAMIN B-12) 1000 MCG tablet Take 1 tablet (1,000 mcg total) by mouth daily Do not start before July 4, 2023. 7/4/23   Leila Perry MD   divalproex sodium (DEPAKOTE SPRINKLE) 125 MG capsule Take 1 capsule (125 mg total) by mouth 2 (two) times a day Do not start before July 4, 2023. 7/4/23   Leila Perry MD   divalproex sodium (DEPAKOTE SPRINKLE) 125 MG capsule Take 2 capsules (250 mg total) by mouth daily at bedtime 7/3/23   Leila Perry MD   Mirabegron ER 50 MG TB24 Take 1 tablet (50 mg total) by mouth daily 4/10/23   Melisa Oneill MD   QUEtiapine (SEROquel) 25 mg tablet Take 1 tablet (25 mg total) by mouth daily at bedtime 6/14/23   Ector Mckeon MD   senna (SENOKOT) 8.6 mg Take 1 tablet (8.6 mg total) by mouth daily at bedtime 5/24/23   Ector Mckeon MD     I have reviewed home medications with recent epic encounter, unable to review medications from postacute rehab. Able to confirm medications with spouse. .     Allergies: No Known Allergies    Social History:  Marital Status: /Civil Union   Occupation: Retired  Patient Pre-hospital Living Situation: [address]: [address] [facility]  Patient Pre-hospital Level of Mobility: unable to be assessed at time of evaluation  Patient Pre-hospital Diet Restrictions: None  Substance Use History:   Social History     Substance and Sexual Activity   Alcohol Use Not Currently     Social History     Tobacco Use   Smoking Status Former   • Packs/day: 1.00   • Years: 30.00   • Total pack years: 30.00   • Types: Cigarettes   • Quit date: 1998   • Years since quittin.5   Smokeless Tobacco Never   Tobacco Comments    quit 25 yrs ago     Social History     Substance and Sexual Activity   Drug Use No       Family History:  Family History   Family history unknown: Yes       Physical Exam:     Vitals:   Blood Pressure: 153/77 (23 1752)  Pulse: (!) 54 (23 1412)  Temperature: 97.7 °F (36.5 °C) (23 1752)  Temp Source: Oral (23 0600)  Respirations: 19 (23 1412)  Weight - Scale: 83.1 kg (183 lb 3.2 oz) (23 0601)  SpO2: 97 % (23 1412)    Physical Exam  Vitals and nursing note reviewed. Constitutional:       General: He is not in acute distress. Appearance: He is well-developed. HENT:      Head: Normocephalic and atraumatic. Right Ear: External ear normal.      Left Ear: External ear normal.      Ears:      Comments: Hard of hearing     Nose: Nose normal.      Mouth/Throat:      Mouth: Mucous membranes are dry. Pharynx: No oropharyngeal exudate. Eyes:      General: Scleral icterus present. Extraocular Movements: Extraocular movements intact. Conjunctiva/sclera: Conjunctivae normal.      Pupils: Pupils are equal, round, and reactive to light. Cardiovascular:      Rate and Rhythm: Regular rhythm. Bradycardia present. Pulses: Normal pulses. Heart sounds: Normal heart sounds. No murmur heard. Pulmonary:      Effort: Pulmonary effort is normal. No respiratory distress. Breath sounds: Normal breath sounds. No rhonchi. Chest:      Chest wall: No tenderness. Abdominal:      General: Bowel sounds are normal. There is no distension. Palpations: Abdomen is soft. Tenderness: There is no abdominal tenderness.  There is no right CVA tenderness or left CVA tenderness. Musculoskeletal:         General: No swelling. Cervical back: Normal range of motion and neck supple. Right lower leg: No edema. Left lower leg: No edema. Skin:     General: Skin is warm and dry. Capillary Refill: Capillary refill takes less than 2 seconds. Neurological:      Mental Status: He is alert. Comments: Alert, oriented to self, place however not to situation. Unable to recall age, current year, month or season. Psychiatric:         Mood and Affect: Mood normal.          Additional Data:     Lab Results:  Results from last 7 days   Lab Units 07/24/23  0649   WBC Thousand/uL 5.74   HEMOGLOBIN g/dL 14.1   HEMATOCRIT % 43.0   PLATELETS Thousands/uL 179   NEUTROS PCT % 59   LYMPHS PCT % 27   MONOS PCT % 11   EOS PCT % 2     Results from last 7 days   Lab Units 07/24/23  0649   SODIUM mmol/L 142   POTASSIUM mmol/L 4.1   CHLORIDE mmol/L 105   CO2 mmol/L 31   BUN mg/dL 26*   CREATININE mg/dL 1.33*   ANION GAP mmol/L 6   CALCIUM mg/dL 9.7   ALBUMIN g/dL 3.8   TOTAL BILIRUBIN mg/dL 0.49   ALK PHOS U/L 45   ALT U/L 19   AST U/L 14   GLUCOSE RANDOM mg/dL 105                       Lines/Drains:  Invasive Devices     Peripheral Intravenous Line  Duration           Peripheral IV 07/24/23 Left;Ventral (anterior) Forearm <1 day                    Imaging: Reviewed radiology reports from this admission including: CT head  CT head without contrast   Final Result by Suzy Roth MD (07/24 6990)      No acute intracranial abnormality. Workstation performed: [id number]             EKG and Other Studies Reviewed on Admission:   · EKG: Sinus Bradycardia. HR 56.    ** Please Note: This note has been constructed using a voice recognition system.  **

## 2023-07-24 NOTE — ASSESSMENT & PLAN NOTE
• At a baseline ambulates with rolling walker  • PT/OT following  • Fall precautions  • Out of bed as tolerated  • Encourage early and frequent mobilization  • Encourage adequate hydration and nutrition  • Provide adequate pain management   • Goal is long-term care placement  • Continue with PT/OT for continued strength and balance training

## 2023-07-24 NOTE — ASSESSMENT & PLAN NOTE
Assessment:  · History Alzheimer's with psychotic disturbances  · PT/OT was unable to evaluate him right now as he is still under four-point restraints  · Most recently admitted on 06/20 and subsequently discharged on July 3, 2023 due to altered mental status. · Current regimen Depakote 125 mg twice daily, Depakote to 250 mg at bedtime  · Wife reports that he was not consistently taking his medications at his facility this is likely the reason for his suboptimal Depakote level at 24  · Wife also reports that patient's aphasia is not new and has actually progressively gotten worse, even she has difficulty understanding what he is saying.       Plan:  · Continue home meds  · Continue one-to-one  · Zyprexa 2.5 mg as needed for combative behavior unable to be controlled with first-line alternative measures  · Frequent reorientation  · Fall precautions  · Hopefully remove four-point restraints tonight, redirect unwanted behaviors as first-line therapy  · Have OT PT evaluation, try to provide frequent and early mobilization  · Minimize sleep-wake cycle, avoid nighttime interruptions  · Attempt to group overnight vitals/labs/nursing checks as possible  · Dim lights, close blinds turn off TV to minimize stimulation  · We will provide adequate pain control  · Avoid urinary retention and constipation  · Avoid medications that may worsen or precipitate delirium such as tramadol, benzodiazepines, anticholinergics, and Benadryl  · Wife would like patient to discharge to [address] once medically able  · We will continue to monitor

## 2023-07-24 NOTE — ASSESSMENT & PLAN NOTE
Lab Results   Component Value Date    HGBA1C 5.8 03/17/2023       No results for input(s): "POCGLU" in the last 72 hours.     Blood Sugar Average: Last 72 hrs:  · Recently taken off metformin  · Continue sliding scale and glucose check per protocol

## 2023-07-24 NOTE — ASSESSMENT & PLAN NOTE
· History late onset Alzheimer's with psychotic disturbances  · Home meds Depakote 125 mg twice daily and Depakote 250 mg at bedtime  · POA altered mental status, combative, agitation decreased p.o. intake. No fevers, no chills, no sick contacts  · Recently discharged to [facility] rehab from admission [address] 07/03/2023 due to the same. · Per spouse patient has progressively declined over the past 2 months. · Most recent lab work on 06/21/2023 TSH unremarkable, vitamin B12 unremarkable  · At the ED CT head no acute intraparenchymal abnormalities  · UA significant for pyuria with WBCs 10-24, occasional bacteria although patient denies any symptoms at this time. · Likely in the setting of Alzheimer's dementia with progression of disease versus metabolic in the setting of pyuria versus slight dehydration on exam.    · Plan  · Continue IV ceftriaxone for possible cystitis  · Gentle hydration for total 8 hours   · Continue home meds Depakote 125 mg twice daily, Depakote 250 mg at bedtime  · Delirium precautions  · Fall precautions.   · Can consider 1 to 1 if behavioral disturbance arise will avoid restrains  · Can consider small dose of Zyprexa 2.5 mg as needed preferably oral although will avoid at this time given patient had received 2 doses at the ED

## 2023-07-24 NOTE — ASSESSMENT & PLAN NOTE
Lab Results   Component Value Date    EGFR 47 07/24/2023    EGFR 44 07/02/2023    EGFR 46 06/30/2023    CREATININE 1.33 (H) 07/24/2023    CREATININE 1.41 (H) 07/02/2023    CREATININE 1.35 (H) 06/30/2023   · History CKD stage IIIb  · Baseline seems to be from 1.3-1.5, currently at baseline  · BMP a.m.,  · Avoid nephrotoxin, avoid hypotension

## 2023-07-24 NOTE — ASSESSMENT & PLAN NOTE
· At the ED UA significant for small leukocytes, microscopy 10-20 WBCs, occasional bacteria  · At the ED received 1 dose of ceftriaxone due to concerns of possible UTI with altered mental status.   · Upon my evaluation patient denies any acute complaints, afebrile, no leukocytosis    · Plan  · Can consider monitoring off antibiotic given unrevealing UA  · CBC a.m.  · Monitor fever curves  · Follow-up urine cultures

## 2023-07-24 NOTE — ASSESSMENT & PLAN NOTE
Assessment:  · History of hypertension  · Most recently taken off lisinopril due to level hypertension goal on previous admission  Blood Pressure: 141/68    Plan:  · Continue home amlodipine 5 mg once a day
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
## 2023-07-24 NOTE — ASSESSMENT & PLAN NOTE
· At the ED UA significant for small leukocytes, microscopy 10-20 WBCs, occasional bacteria  · At the ED received 1 dose of ceftriaxone due to concerns of possible UTI with altered mental status.   · Upon my evaluation patient denies any acute complaints, afebrile, no leukocytosis    · Plan  · Continue ceftriaxone 1 mg once a day, can consider and discontinue pending urinary cultures  · CBC a.m.  · Monitor fever curves  · Follow-up urine cultures

## 2023-07-24 NOTE — ASSESSMENT & PLAN NOTE
· POA heart rate 54  · ECG sinus bradycardia, heart rate 56 with PACs and poor R wave progression. · Patient asymptomatic  · Avoid AV ben blocker.
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
## 2023-07-24 NOTE — ASSESSMENT & PLAN NOTE
· History of hypertension  · Most recently taken off lisinopril due to level hypertension goal on previous admission  Blood Pressure: 141/68  ·   · Acceptable  · Continue home amlodipine 5 mg once a day

## 2023-07-25 PROBLEM — R13.10: Status: ACTIVE | Noted: 2023-07-25

## 2023-07-25 LAB
ATRIAL RATE: 70 BPM
ATRIAL RATE: 79 BPM
BACTERIA UR CULT: ABNORMAL
GLUCOSE SERPL-MCNC: 100 MG/DL (ref 65–140)
GLUCOSE SERPL-MCNC: 129 MG/DL (ref 65–140)
GLUCOSE SERPL-MCNC: 138 MG/DL (ref 65–140)
GLUCOSE SERPL-MCNC: 85 MG/DL (ref 65–140)
PR INTERVAL: 200 MS
QRS AXIS: -31 DEGREES
QRS AXIS: -37 DEGREES
QRSD INTERVAL: 104 MS
QRSD INTERVAL: 88 MS
QT INTERVAL: 384 MS
QT INTERVAL: 400 MS
QTC INTERVAL: 432 MS
QTC INTERVAL: 437 MS
T WAVE AXIS: 19 DEGREES
T WAVE AXIS: 29 DEGREES
VENTRICULAR RATE: 70 BPM
VENTRICULAR RATE: 78 BPM

## 2023-07-25 PROCEDURE — 93005 ELECTROCARDIOGRAM TRACING: CPT

## 2023-07-25 PROCEDURE — 99223 1ST HOSP IP/OBS HIGH 75: CPT | Performed by: NURSE PRACTITIONER

## 2023-07-25 PROCEDURE — 99232 SBSQ HOSP IP/OBS MODERATE 35: CPT | Performed by: INTERNAL MEDICINE

## 2023-07-25 PROCEDURE — 93010 ELECTROCARDIOGRAM REPORT: CPT | Performed by: INTERNAL MEDICINE

## 2023-07-25 PROCEDURE — 82948 REAGENT STRIP/BLOOD GLUCOSE: CPT

## 2023-07-25 RX ORDER — DIVALPROEX SODIUM 125 MG/1
125 CAPSULE, COATED PELLETS ORAL DAILY
Status: DISCONTINUED | OUTPATIENT
Start: 2023-07-26 | End: 2023-07-27

## 2023-07-25 RX ORDER — OLANZAPINE 10 MG/1
2.5 INJECTION, POWDER, LYOPHILIZED, FOR SOLUTION INTRAMUSCULAR
Status: DISCONTINUED | OUTPATIENT
Start: 2023-07-25 | End: 2023-07-27

## 2023-07-25 RX ADMIN — OXYBUTYNIN CHLORIDE 10 MG: 5 TABLET, EXTENDED RELEASE ORAL at 09:51

## 2023-07-25 RX ADMIN — CYANOCOBALAMIN TAB 500 MCG 1000 MCG: 500 TAB at 09:50

## 2023-07-25 RX ADMIN — DIVALPROEX SODIUM 250 MG: 125 CAPSULE ORAL at 22:52

## 2023-07-25 RX ADMIN — QUETIAPINE FUMARATE 25 MG: 25 TABLET ORAL at 22:52

## 2023-07-25 RX ADMIN — Medication 3 MG: at 22:56

## 2023-07-25 RX ADMIN — AMLODIPINE BESYLATE 5 MG: 5 TABLET ORAL at 09:50

## 2023-07-25 RX ADMIN — ENOXAPARIN SODIUM 40 MG: 40 INJECTION SUBCUTANEOUS at 10:37

## 2023-07-25 RX ADMIN — SENNOSIDES 8.6 MG: 8.6 TABLET, FILM COATED ORAL at 22:52

## 2023-07-25 RX ADMIN — DIVALPROEX SODIUM 125 MG: 125 CAPSULE ORAL at 09:52

## 2023-07-25 RX ADMIN — OLANZAPINE 5 MG: 10 INJECTION, POWDER, FOR SOLUTION INTRAMUSCULAR at 02:18

## 2023-07-25 RX ADMIN — CEFTRIAXONE 1000 MG: 2 INJECTION, POWDER, FOR SOLUTION INTRAMUSCULAR; INTRAVENOUS at 09:41

## 2023-07-25 NOTE — CASE MANAGEMENT
Case Management Assessment & Discharge Planning Note    Patient name Verenice People  [facility] S /S -08 MRN [id number]  : 1935 Date 2023       Current Admission Date: 2023  Current Admission Diagnosis:AMS (altered mental status)   Patient Active Problem List    Diagnosis Date Noted   • Swallowing/mastication problem 2023   • AMS (altered mental status) 2023   • Bradycardia 2023   • Ambulatory dysfunction 2023   • Stage 3 chronic kidney disease ([address]) 2023   • Urinary incontinence without sensory awareness 2023   • Slow transit constipation 2023   • At high risk for falls 2023   • Lumbar radiculopathy    • Acute cystitis without hematuria 2023   • UTI symptoms 2023   • Paroxysmal atrial fibrillation ([address]) 01/10/2023   • Platelets decreased ([address]) 01/10/2023   • 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 ([address]) 2022   • Neurologic gait dysfunction 2022   • Stage 3b chronic kidney disease ([address]) 2021   • OAB (overactive bladder) 2021   • Benign prostatic hyperplasia with urinary frequency 2021   • Controlled type 2 diabetes mellitus without complication, without long-term current use of insulin ([address]) 2019   • Sensorineural hearing loss (SNHL) of both ears 10/21/2016   • Seasonal allergies 2015   • Spinal stenosis 2012   • Hypertension 2012      LOS (days): 1  Geometric Mean LOS (GMLOS) (days): 5.70  Days to GMLOS:4.7     OBJECTIVE:  PATIENT READMITTED TO HOSPITAL  Risk of Unplanned Readmission Score: 19.25         Current admission status: Inpatient       Preferred Pharmacy:   [address]  [address] [place] [address]  Phone: [phone] Fax: [phone]    Primary Care Provider: Kirt Duran MD    Primary Insurance: [facility]  Secondary Insurance:     ASSESSMENT:  [address] - Spouse   Primary Phone: [phone] (Mobile)  Home Phone: [phone]                         Readmission Root Cause  30 Day Readmission: Yes  Who directed you to return to the hospital?: Other (comment) (SNF)  Did you understand whom to contact if you had questions or problems?: Yes  Did you get your prescriptions before you left the hospital?: No  Reason[de-identified] Not preferred pharmacy (transferred to SNF)  Were you able to get your prescriptions filled when you left the hospital?: Yes  Did you take your medications as prescribed?: No (spouse reports patient had missed doses of Depakote at [facility] as he was refusing to take it)  Were you able to get to your follow-up appointments?: No  Reason[de-identified] Readmitted prior to appointment  During previous admission, was a post-acute recommendation made?: Yes  What post-acute resources were offered?: STR  Patient was readmitted due to: combative behavior from SNF  Action Plan: discussing discharge options with patient's spouse - hopeful for [employer], dementia unit    Patient Information  Admitted from[de-identified] Facility ([facility])  Mental Status: Alert, Confused  During Assessment patient was accompanied by: Spouse  Assessment information provided by[de-identified] Spouse  Primary Caregiver: Spouse  Caregiver's Name[de-identified] Kwasi Gallo Relationship to Patient[de-identified] Significant Other  Support Systems: Spouse/significant other, [address] of Residence: [facility] [address] [facility] [address] do you live in?: [place]  Type of Current Residence: Bi-level  In the last 12 months, was there a time when you were not able to pay the mortgage or rent on time?: No  In the last 12 months, how many places have you lived?: 1  In the last 12 months, was there a time when you did not have a steady place to sleep or slept in a shelter (including now)?: No  Homeless/housing insecurity resource given?: N/A  Living Arrangements: Lives w/ Spouse/significant other (had been living with spouse; admitted from [facility] where he was for STR; now likely to need long-term placement in dementia facility)    Activities of Daily Living Prior to Admission  Functional Status: Assistance  Completes ADLs independently?: No  Level of ADL dependence: Assistance  Ambulates independently?: Yes  Does patient use assisted devices?: Yes  Assisted Devices (DME) used: [employer]  Does patient currently own DME?: Yes  What DME does the patient currently own?: [employer]  Does the patient have a history of Short-Term Rehab?: Yes ([facility])  Does patient currently have [address]?: No         Patient Information Continued  Does patient have prescription coverage?: Yes  Within the past 12 months, you worried that your food would run out before you got the money to buy more.: Never true  Within the past 12 months, the food you bought just didn't last and you didn't have money to get more.: Never true  Food insecurity resource given?: N/A  Does patient receive dialysis treatments?: No  Does patient have a history of substance abuse?: No  Does patient have a history of Mental Health Diagnosis?: No         Means of Transportation  Means of Transport to Appts[de-identified] Family transport  In the past 12 months, has lack of transportation kept you from medical appointments or from getting medications?: No  In the past 12 months, has lack of transportation kept you from meetings, work, or from getting things needed for daily living?: No  Was application for public transport provided?: N/A        DISCHARGE DETAILS:    Discharge planning discussed with[de-identified] patient's spouse, Michael Alonzo, at bedside  [place] of Choice: Yes (re: facility placement)  Comments - [place] of Choice: reports patient admitted from [facility], but hope is for transition to [facility]  CM contacted family/caregiver?: Yes (spouse, Michael Alonzo, at bedside)  Were Treatment Team discharge recommendations reviewed with patient/caregiver?: Yes  Did patient/caregiver verbalize understanding of patient care needs?: N/A- going to facility  Were patient/caregiver advised of the risks associated with not following Treatment Team discharge recommendations?: Yes    Contacts  Patient Contacts: Demetra Marks, spouse  Relationship to Patient[de-identified] Family  Contact Method: In Person  Reason/Outcome: Continuity of Care, Emergency Contact, Referral, Discharge [address]         Is the patient interested in [address] at discharge?: No    DME Referral Provided  Referral made for DME?: No    Other Referral/Resources/Interventions Provided:  Interventions: Assisted Living  Referral Comments: Patient admitted from [facility] where he had been receiving STR since 7/3 after discharge from [facility]. Patient currently confused, combative and on 1:1 and in 4-point restraints. Per spouse, patient had been living with her prior to hospitalization at [facility] (6/20-7/3) and she was assisting with his care. Also had [employer] [facility] coming in 4 hrs/day 2 days/week and 3 hours/day 1 day/week. Patient had become more agitated and restless, therefore was admitted to [facility]. Spouse reports he had required restraints for multiple days there, but was started on Depakote which improved his behavior. Authorization was then obtained for STR at [facility] (only facility at the time) and he was transferred there for STR. Spouse reports that patient had missed multiple doses of Depakote at [facility] as he would refuse medications. She states that while he was at rehab, she had been looking into assisted living facilities, as she reports he has long-term care insurance to assist with his care needs. Reports she was considering the [address] [facility] at [facility], but she was concerned about cost there. States that she also toured [facility], but they had no bed openings at this time. Also toured [facility], which she was hopeful could accept.  Call made to [address] [facility] at [facility] ([phone]) and he reports that he will review patient once off restraints and as long as he can stand/pivot with min assist. PT/OT evaluations pending at this time, but will follow-up once complete. Anticipate SNF vs assisted living/dementia unit for patient once medically stable. If STR recommending - will need insurance authorization prior to transfer.     Would you like to participate in our [facility] service program?  : No - Declined
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
## 2023-07-25 NOTE — ASSESSMENT & PLAN NOTE
Assessment:  · Requires use of dentures  · Is pooling food in his mouth      Plan:  • Aspiration precautions

## 2023-07-25 NOTE — ASSESSMENT & PLAN NOTE
Assessment:  · History of hypertension  · Most recently taken off lisinopril due to level hypertension goal on previous admission  Blood Pressure: 119/69    Plan:  · Continue home amlodipine 5 mg once a day

## 2023-07-25 NOTE — CONSULTS
Consultation - Geriatric Medicine   Viktor Brought 81 y.o. male MRN: [id number]  Unit/Bed#: S -01 Encounter: [id number]      Assessment/Plan   Moderately Alzheimer's dementia with psychotic disorder and  · History of late onset Alzheimer's with psychotic disturbance  · Was seen outpatient at [facility] office and at that time was started on Seroquel  · Was then seen again as a geriatric consult in hospital, last date of visit was July 3, 2023-at time of discharge patient was on Depakote 125 mg in the morning and afternoon and 250 mg nightly  · It appears at facility Depakote was increased to 250 mg 3 times daily and patient was also restarted on Seroquel 25 mg nightly  · Is currently only on Depakote 125 mg twice daily and 250 mg nightly  · Would recommend increasing Depakote back to 250 mg 3 times daily  · Patient's wife reports that he had not been consistently taking the medications at facility-at this time I do not want to make any medication adjustments until I see if this current dosing works as he was not consistently taking medications  · Also has Zyprexa 5 mg every 4 as needed ordered he received this at ([id number] last night and 218 this morning ), would recommend discontinuing Zyprexa as patient is also on Seroquel which is an antipsychotic  · If absolutely necessary would do Zyprexa 2.5 mg as needed if other measures do not work to control behaviors  Patient is alert oriented to person  At risk for delirium due to Alzheimer's dementia, hospitalization, and possible UTI  Recommend delirium precautions  Maintain sleep-wake cycle, avoid nighttime interruptions  minimize overnight interruptions, group overnight vitals/labs/nursing checks as possible  dim lights, close blinds and turn off tv to minimize stimulation and encourage sleep environment in evenings  Provide adequate pain control  Avoid urinary retention and constipation  Provide frequent and early mobilization  Provide frequent redirection and reorientation as needed  Avoid medications that may worsen or precipitate delirium such as tramadol, benzodiazepines, anticholinergics, and Benadryl  Redirect unwanted behaviors as first-line therapy, avoid physical restraints as able to  · Continue to monitor    Altered mental status  · With history of late onset Alzheimer's with psychotic disturbance  · Home medications from facility include Depakote 250 mg twice daily and 250 mg nightly and Seroquel 25 mg nightly  · Most recent vitamin B12, TSH, folate levels unremarkable  · Was started on IV ceftriaxone For possible cystitis  · Most recent QTc 389-stable for antipsychotics  · May just be progression of disease exacerbated by possible UTI  · CT head unremarkable, MRI ordered and not yet completed  · Lab work unremarkable  · Delirium precautions as above     Acute cystitis without hematuria  · Urine microscopic showed 1-2 RBCs, 10-20 WBCs, epithelial cells none seen, occasional bacteria  · Urine culture pending    Hypertension  · Blood pressure this morning 124/73  · Blood pressures have trended between 124/73 to 183/78  · Currently on amlodipine 5 mg daily  · Avoid hypotension  · Management per primary    BPH with urinary frequency  · Known history  · Medications have been being changed  · A geriatrics appointment oxybutynin was discontinued  · Patient was not on oxybutynin will discontinue    Vision impairment  Patient does have vision impairment  Wears glasses at baseline   Recommend use of corrective lenses at all appropriate times  Encourage adequate lighting and encourage use of assistance with ambulation  Keep personal belongings close to avoid reaching  Encourage appropriate footwear at all times  Recommend large font for printed materials provided to patient    Hard of hearing  Patient does have hearing impairment   Hearing aids left at facility  Hearing impairment  correlated with depression, cognitive impairment, delirium and falls in the older adult  Speak clearly  Use sound amplifier  Speak face to face  Use clear dictation and enunciation of words    Impaired mastication  · Requires use of dentures  · Ensure mucousy appropriate for abilities  · Aspiration precautions    Frailty  Clinical Frail Scale: 6 living with moderate frailty  Total protein 6.3 and albumin 3.8  Encourage adequate hydration and nutrition, including protein in meals  OOB as tolerated  PT/OT consulted  · Encourage early and frequent mobilization    Ambulatory dysfunction  At a baseline ambulates with rolling walker  PT/OT following  Fall precautions  Out of bed as tolerated  Encourage early and frequent mobilization  Encourage adequate hydration and nutrition  Provide adequate pain management   Goal is long-term care placement  · Continue with PT/OT for continued strength and balance training       Home medication review  Acetaminophen 650 mg every 4 as needed  Amlodipine 5 mg daily  Barrier cream as needed to buttocks  Vitamin B12 100 mcg daily  Depakote 250 mg nightly  Depakote 250 mg twice daily  Senna 8.6 mg nightly  Seroquel 25 mg nightly     Personally confirmed with medication list  sent from [facility]    History of Present Illness   Physician Requesting Consult: Leslie Ríos MD  Reason for Consult / Principal Problem: Alzheimer's dementia with agitation and altered mental status  Hx and PE limited by: Alzheimer's dementia  Additional history obtained from: Chart review and patient evaluation      HPI: Marilynn Giron is a 81y.o. year old male who presents with history of BPH, diabetes, GERD, hypertension, Alzheimer's dementia, MI, and ambulatory dysfunction. He presented from [facility] rehab with altered mental status. He had become more combative and agitated at the facility. He had a recent hospitalization in June in which she was discharged on July 3, 2023 to [facility].     Patient has been living at [facility] rehab since his last discharge from the hospital.  Prior to that he was living at home with his wife. She was beginning to have trouble taking care of him and concerns over safety due to his combativeness and agitation. Upon examination patient was lying in bed, resting. He appeared comfortable and was in no acute distress. He was very sleepy on exam, did not answer any questions for me. Appetite is decreased. Nurse reports he has been agitated and combative on and off all day. He remains in four-point restraints and on a one-to-one. Per nursing no other acute concerns or issues at this time. Inpatient consult to Gerontology  Consult performed by: JONAS Villela  Consult ordered by: Aldo Merino MD          Review of Systems   Unable to perform ROS: Dementia       Historical Information   Past Medical History:   Diagnosis Date   • Balanitis     last assessed 14   • BPH (benign prostatic hyperplasia)    • Diabetes mellitus ([address])     blood sugar 167 this am at 0630   • GERD (gastroesophageal reflux disease)    • Heme + stool    • Hypertension    • Lumbar radiculopathy    • Myocardial infarction ([address])     35 years ago   • Phimosis     last assessed 16   • Sciatica     right side, last assessed 16     Past Surgical History:   Procedure Laterality Date   • BACK SURGERY      laminectomy Lumbar   • CATARACT EXTRACTION, BILATERAL     • CIRCUMCISION     • HERNIA REPAIR Right    • [place] COLONOSCOPY FLX DX W/COLLJ SPEC WHEN PFRMD N/A 2017    Procedure: EGD AND COLONOSCOPY;  Surgeon: Gavin Bone DO;  Location: [facility];   Service: General     Social History   Social History     Substance and Sexual Activity   Alcohol Use Not Currently     Social History     Substance and Sexual Activity   Drug Use No     Social History     Tobacco Use   Smoking Status Former   • Packs/day: 1.00   • Years: 30.00   • Total pack years: 30.00   • Types: Cigarettes   • Quit date: 1998   • Years since quittin.5   Smokeless Tobacco Never Tobacco Comments    quit 25 yrs ago     Family History:   Family History   Family history unknown: Yes       Meds/Allergies   all current active meds have been reviewed    No Known Allergies    Objective     Intake/Output Summary (Last 24 hours) at 7/25/2023 1123  Last data filed at 7/25/2023 0600  Gross per 24 hour   Intake 0 ml   Output --   Net 0 ml     Invasive Devices     Peripheral Intravenous Line  Duration           Peripheral IV 07/24/23 Left;Ventral (anterior) Forearm 1 day                Physical Exam  Vitals reviewed. Constitutional:       General: He is sleeping. He is not in acute distress. Appearance: He is well-developed. He is not ill-appearing. Comments: Frail-appearing   HENT:      Head: Normocephalic and atraumatic. Mouth/Throat:      Mouth: Mucous membranes are dry. Cardiovascular:      Rate and Rhythm: Regular rhythm. Bradycardia present. Heart sounds: No murmur heard. Pulmonary:      Effort: Pulmonary effort is normal. No respiratory distress. Breath sounds: Normal breath sounds. Abdominal:      General: Bowel sounds are normal. There is no distension. Palpations: Abdomen is soft. Tenderness: There is no abdominal tenderness. Musculoskeletal:         General: No swelling. Right lower leg: No edema. Left lower leg: No edema. Skin:     General: Skin is warm and dry. Capillary Refill: Capillary refill takes less than 2 seconds. Neurological:      General: No focal deficit present. Mental Status: Mental status is at baseline. Cranial Nerves: No cranial nerve deficit. Motor: Weakness present. Gait: Gait abnormal.      Comments: Unable to assess orientation   Psychiatric:         Mood and Affect: Mood is anxious. Affect is angry. Behavior: Behavior is uncooperative and agitated.          Lab Results:   I have personally reviewed pertinent lab results including the following:  -CBC, BMP, vitamin B12, TSH, folate, UA, troponin    I have personally reviewed the following imaging study reports in PACS:  -CT head    Therapies:   PT: consulted  OT: consulted    VTE Prophylaxis: Sequential compression device (Venodyne)  and Enoxaparin (Lovenox)    Code Status: Level 3 - DNAR and DNI  Advance Directive and Living Will: Yes    Power of :  yes  POLST:  yes    Family and Social Support:   Living Arrangements: Other (Comment) (Facility)  Support Systems: Spouse/significant other; Family members  Assistance Needed: n/a  Type of Current Residence: Assisted living      Goals of Care: Undetermined at this time    Please note:  Voice-recognition software may have been used in the preparation of this document. Occasional wrong word or "sound-alike" substitutions may have occurred due to the inherent limitations of voice recognition software. Interpretation should be guided by context.

## 2023-07-25 NOTE — ASSESSMENT & PLAN NOTE
Assessment:  · At the ED UA significant for small leukocytes, microscopy 10-20 WBCs, occasional bacteria  · At the ED received 1 dose of ceftriaxone due to toxic metabolic encephalopathy 2/2 UTI.   · Cultures grew >100,000 CFU aerococcus urinae susceptible to most penicillins, cephalosporins, and nitrofurantoin    Plan:  · Continue ceftriaxone 1 g once a day  · CBC a.m.  · Monitor fever curves  · Follow-up urine cultures

## 2023-07-25 NOTE — ASSESSMENT & PLAN NOTE
Assessment:  · History Alzheimer's with psychotic disturbances  · PT/OT was unable to evaluate him right now as he is still under four-point restraints  · Most recently admitted on 06/20 and subsequently discharged on July 3, 2023 due to altered mental status. · Current regimen Depakote 125 mg twice daily, Depakote to 250 mg at bedtime  · Wife reports that he was not consistently taking his medications at his facility this is likely the reason for his suboptimal Depakote level at 24  · Wife also reports that patient's aphasia is not new and has actually progressively gotten worse, even she has difficulty understanding what he is saying.   · Urine culture grew >100,000 Aerococcus urinae        Plan:  · Continue to treat UTI with ceftriaxone 1g every 24 hours on day 2  · We will order brain MRI  · Continue home meds  · Continue one-to-one  · Zyprexa 2.5 mg as needed for combative behavior unable to be controlled with first-line alternative measures  · Frequent reorientation  · Fall precautions  · Hopefully remove four-point restraints tonight, redirect unwanted behaviors as first-line therapy  · Have OT PT evaluation, try to provide frequent and early mobilization  · Minimize sleep-wake cycle, avoid nighttime interruptions  · Attempt to group overnight vitals/labs/nursing checks as possible  · Dim lights, close blinds turn off TV to minimize stimulation  · We will provide adequate pain control  · Avoid urinary retention and constipation  · Avoid medications that may worsen or precipitate delirium such as tramadol, benzodiazepines, anticholinergics, and Benadryl  · Wife would like patient to discharge to [address] once medically able  · We will continue to monitor

## 2023-07-25 NOTE — PLAN OF CARE
Problem: SAFETY,RESTRAINT: NV/NON-SELF DESTRUCTIVE BEHAVIOR  Goal: Remains free of harm/injury (restraint for non violent/non self-detsructive behavior)  Description: INTERVENTIONS:  - Instruct patient/family regarding restraint use   - Assess and monitor physiologic and psychological status   - Provide interventions and comfort measures to meet assessed patient needs   - Identify and implement measures to help patient regain control  - Assess readiness for release of restraint   Outcome: Progressing  Goal: Returns to optimal restraint-free functioning  Description: INTERVENTIONS:  - Assess the patient's behavior and symptoms that indicate continued need for restraint  - Identify and implement measures to help patient regain control  - Assess readiness for release of restraint   Outcome: Progressing     Problem: MOBILITY - ADULT  Goal: Maintain or return to baseline ADL function  Description: INTERVENTIONS:  -  Assess patient's ability to carry out ADLs; assess patient's baseline for ADL function and identify physical deficits which impact ability to perform ADLs (bathing, care of mouth/teeth, toileting, grooming, dressing, etc.)  - Assess/evaluate cause of self-care deficits   - Assess range of motion  - Assess patient's mobility; develop plan if impaired  - Assess patient's need for assistive devices and provide as appropriate  - Encourage maximum independence but intervene and supervise when necessary  - Involve family in performance of ADLs  - Assess for home care needs following discharge   - Consider OT consult to assist with ADL evaluation and planning for discharge  - Provide patient education as appropriate  Outcome: Progressing  Goal: Maintains/Returns to pre admission functional level  Description: INTERVENTIONS:  - Perform BMAT or MOVE assessment daily.   - Set and communicate daily mobility goal to care team and patient/family/caregiver.    - Collaborate with rehabilitation services on mobility goals if consulted  - Perform Range of Motion 2 times a day. - Reposition patient every 2 hours. - Dangle patient 2 times a day  - Stand patient 2 times a day  - Ambulate patient 2 times a day  - Out of bed to chair 2 times a day   - Out of bed for meals 2 times a day  - Out of bed for toileting  - Record patient progress and toleration of activity level   Outcome: Progressing     Problem: Nutrition/Hydration-ADULT  Goal: Nutrient/Hydration intake appropriate for improving, restoring or maintaining nutritional needs  Description: Monitor and assess patient's nutrition/hydration status for malnutrition. Collaborate with interdisciplinary team and initiate plan and interventions as ordered. Monitor patient's weight and dietary intake as ordered or per policy. Utilize nutrition screening tool and intervene as necessary. Determine patient's food preferences and provide high-protein, high-caloric foods as appropriate.      INTERVENTIONS:  - Monitor oral intake, urinary output, labs, and treatment plans  - Assess nutrition and hydration status and recommend course of action  - Evaluate amount of meals eaten  - Assist patient with eating if necessary   - Allow adequate time for meals  - Recommend/ encourage appropriate diets, oral nutritional supplements, and vitamin/mineral supplements  - Order, calculate, and assess calorie counts as needed  - Recommend, monitor, and adjust tube feedings and TPN/PPN based on assessed needs  - Assess need for intravenous fluids  - Provide specific nutrition/hydration education as appropriate  - Include patient/family/caregiver in decisions related to nutrition  Outcome: Progressing

## 2023-07-25 NOTE — ASSESSMENT & PLAN NOTE
· Toxic metabolic encephalopathy likely secondary to UTI given findings on recent urine culture showing over 100,000 colony-forming units of Aerococcus urinae  · POA altered mental status, combative, agitation decreased p.o. intake. No fevers, no chills, no sick contacts  · Recently discharged to [facility] rehab from admission [facility] 07/03/2023 due to the same. · Per spouse patient has progressively declined over the past 2 months. · Most recent lab work on 06/21/2023 TSH unremarkable, vitamin B12 unremarkable  · Folate unremarkable  · At the ED CT head no acute intraparenchymal abnormalities  · UA significant for pyuria with WBCs 10-24, occasional bacteria although patient denies any symptoms at this time. · Likely in the setting of Alzheimer's dementia with progression of disease versus metabolic in the setting of pyuria versus slight dehydration on exam.  · Still very combative with nurses and staff and is attempting to get out of bed    Plan:  · Consider psych consult  · Will likely require restraints  · Continue IV ceftriaxone as it has good penetration for Aerococcus urinae  · Gentle hydration for total 8 hours .   · Continue home meds Depakote 125 mg morning, Depakote 250 mg at bedtime  · Delirium precautions  · Fall precautions  · Continue one-to-one

## 2023-07-25 NOTE — PHYSICAL THERAPY NOTE
Physical Therapy Cancellation Note           07/25/23 1020   Note Type   Note type Cancelled Session   Cancel Reasons Other  (agitation/combative)   Additional Comments PT orders noted and chart reviewed. Per RN, pt is currently not appropriate for evaluation as he is in 4 point restraints with 1:1 due to agitation/combativeness. Will continue to follow and evaluate as medically appropriate.      Lucy Payan,JACLYNT

## 2023-07-25 NOTE — OCCUPATIONAL THERAPY NOTE
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 ([address])    Acute cystitis without hematuria    Ambulatory dysfunction    Bradycardia    Past Medical History  Past Medical History:   Diagnosis Date    Balanitis     last assessed 12/19/14    BPH (benign prostatic hyperplasia)     Diabetes mellitus ([address])     blood sugar 167 this am at 0630    GERD (gastroesophageal reflux disease)     Heme + stool     Hypertension     Lumbar radiculopathy     Myocardial infarction ([address])     35 years ago    Phimosis     last assessed 04/27/16    Sciatica     right side, last assessed 04/25/16     Past Surgical History  Past Surgical History:   Procedure Laterality Date    BACK SURGERY      laminectomy Lumbar    CATARACT EXTRACTION, BILATERAL      CIRCUMCISION      HERNIA REPAIR Right     [place] COLONOSCOPY FLX DX W/COLLJ SPEC WHEN PFRMD N/A 1/16/2017    Procedure: EGD AND COLONOSCOPY;  Surgeon: Charline Mortimer, DO;  Location: [facility]; Service: General        07/25/23 1013   Note Type   Note type Cancelled Session  (Tuesday 6/25/23)   Cancel Reasons Other  (per Neal MURRY Page not appropriate to participate due to agitation, combative. Presently in 4 point restraints.)   Additional Comments OT orders received and chart review completed. Attempted to see pt for OT. RN reports pt is not appropriate at this time.  Will continue to follow     Evette More OTR/ANDRZEJ  [id number]  [id number]

## 2023-07-25 NOTE — ASSESSMENT & PLAN NOTE
Lab Results   Component Value Date    HGBA1C 5.8 03/17/2023       Recent Labs     07/24/23  1821 07/24/23  2201 07/25/23  0734 07/25/23  1107   POCGLU 107 125 129 85       Blood Sugar Average: Last 72 hrs:  · Recently taken off metformin  · Continue sliding scale and glucose check per protocol

## 2023-07-25 NOTE — PROGRESS NOTES
[facility]  Progress Note  Name: Leilani Garza  MRN: [id number]  Unit/Bed#: S -01 I Date of Admission: 2023   Date of Service: 2023 I Hospital Day: 2    Assessment/Plan   Acute cystitis without hematuria  Assessment & Plan  Assessment:  · At the ED UA significant for small leukocytes, microscopy 10-20 WBCs, occasional bacteria  · At the ED received 1 dose of ceftriaxone due to toxic metabolic encephalopathy 2/2 UTI. · Cultures grew >100,000 CFU aerococcus urinae susceptible to most penicillins, cephalosporins, and nitrofurantoin    Plan:  · Continue ceftriaxone 1 g once a day  · CBC a.m.  · Monitor fever curves        * Toxic metabolic encephalopathy  Assessment & Plan  · Toxic metabolic encephalopathy likely secondary to UTI given findings on recent urine culture showing over 100,000 colony-forming units of Aerococcus urinae  · POA altered mental status, combative, agitation decreased p.o. intake. No fevers, no chills, no sick contacts  · Recently discharged to [facility] rehab from admission [address] [place] [address] 2023 due to the same. · Per spouse patient has progressively declined over the past 2 months. · Most recent lab work on 2023 TSH unremarkable, vitamin B12 unremarkable  · Folate unremarkable  · At the ED CT head no acute intraparenchymal abnormalities  · UA significant for pyuria with WBCs 10-24, occasional bacteria although patient denies any symptoms at this time. · Likely in the setting of Alzheimer's dementia with progression of disease versus metabolic in the setting of pyuria versus slight dehydration on exam.  · Still very combative with nurses and staff and is attempting to get out of bed    Plan:  · Will likely require restraints  · Continue IV ceftriaxone as it has good penetration for Aerococcus urinae .   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)  Assessment & Plan  Assessment:  · History Alzheimer's with psychotic disturbances  · PT/OT was unable to evaluate him right now as he is still under four-point restraints  · Most recently admitted on 06/20 and subsequently discharged on July 3, 2023 due to altered mental status. · Current regimen Depakote 125 mg twice daily, Depakote to 250 mg at bedtime  · Wife reports that he was not consistently taking his medications at his facility this is likely the reason for his suboptimal Depakote level at 24  · Wife also reports that patient's aphasia is not new and has actually progressively gotten worse, even she has difficulty understanding what he is saying. · Urine culture grew >100,000 Aerococcus urinae        Plan:  · Continue to treat UTI with ceftriaxone 1g every 24 hours on day 2  · Continue home meds  · Continue one-to-one  · Zyprexa 2.5 mg as needed for combative behavior unable to be controlled with first-line alternative measures  · Frequent reorientation  · Fall precautions  · Hopefully remove four-point restraints tonight, redirect unwanted behaviors as first-line therapy  · Have OT PT evaluation, try to provide frequent and early mobilization  · Minimize sleep-wake cycle, avoid nighttime interruptions  · Attempt to group overnight vitals/labs/nursing checks as possible  · Dim lights, close blinds turn off TV to minimize stimulation  · We will provide adequate pain control  · Avoid urinary retention and constipation  · Avoid medications that may worsen or precipitate delirium such as tramadol, benzodiazepines, anticholinergics, and Benadryl  · Wife would like patient to discharge to [address] once medically able  · We will continue to monitor    Bradycardia  Assessment & Plan  · POA heart rate 54  · ECG sinus bradycardia, heart rate 56 with PACs and poor R wave progression.   · Patient asymptomatic  · Avoid AV ben blocker. Ambulatory dysfunction  Assessment & Plan  • At a baseline ambulates with rolling walker  • PT/OT following  • Fall precautions  • Out of bed as tolerated  • Encourage early and frequent mobilization  • Encourage adequate hydration and nutrition  • Provide adequate pain management   • Goal is long-term care placement  • Continue with PT/OT for continued strength and balance training     Benign prostatic hyperplasia with urinary frequency  Assessment & Plan  · History of BPH with urinary frequency  · Most recently taking of finasteride  · Currently Myrbteq 50 mg once a day  · We will continue oxybutynin 10 mg once a day as per family formulary. · Continue urinary retention protocol    Controlled type 2 diabetes mellitus without complication, without long-term current use of insulin [facility])  Assessment & Plan  Lab Results   Component Value Date    HGBA1C 5.8 03/17/2023       Recent Labs     07/25/23  1626 07/25/23  2114 07/26/23  0716 07/26/23  1105   POCGLU 138 100 110 136       Blood Sugar Average: Last 72 hrs:  · Recently taken off metformin  · Continue sliding scale and glucose check per protocol      VTE Pharmacologic Prophylaxis: VTE Score: 5 High Risk (Score >/= 5) - Pharmacological DVT Prophylaxis Ordered: apixaban (Eliquis). Sequential Compression Devices Ordered. Patient Centered Rounds: I performed bedside rounds with nursing staff today. Discussions with Specialists or Other Care Team Provider: Gerontology     Education and Discussions with Family / Patient: Updated  (wife) via phone. Current Length of Stay: 2 day(s)  Current Patient Status: Inpatient   Discharge Plan: Anticipate discharge in 24-48 hrs to Dementia Unit    Code Status: Level 3 - DNAR and DNI    Subjective:   Patient examined at bedside. Was a extremely combative with me when I attempted to ask him questions.   Was unable to perform a thorough physical exam as he was flailing his arms at me.    Objective:     Vitals:   Temp (24hrs), Av.9 °F (36.6 °C), Min:97.6 °F (36.4 °C), Max:98.1 °F (36.7 °C)    Temp:  [97.6 °F (36.4 °C)-98.1 °F (36.7 °C)] 97.6 °F (36.4 °C)  HR:  [84-87] 87  Resp:  [16-20] 20  BP: (143-152)/(78-79) 152/79  SpO2:  [94 %-95 %] 94 %  Body mass index is 26.05 kg/m². Input and Output Summary (last 24 hours): Intake/Output Summary (Last 24 hours) at 2023 1733  Last data filed at 2023 1330  Gross per 24 hour   Intake 500 ml   Output 236 ml   Net 264 ml       Physical Exam:   Physical Exam  Constitutional:       Appearance: He is normal weight. Cardiovascular:      Rate and Rhythm: Normal rate and regular rhythm. Musculoskeletal:      Right lower leg: No edema. Left lower leg: No edema. Psychiatric:      Comments: Altered mental status      Rest of physical exam deferred due to patient being uncooperative.     Additional Data:     Labs:  Results from last 7 days   Lab Units 23  0535   WBC Thousand/uL 6.43   HEMOGLOBIN g/dL 14.6   HEMATOCRIT % 43.8   PLATELETS Thousands/uL 167   NEUTROS PCT % 64   LYMPHS PCT % 22   MONOS PCT % 12   EOS PCT % 2     Results from last 7 days   Lab Units 23  0535 23  0649   SODIUM mmol/L 138 142   POTASSIUM mmol/L 4.2 4.1   CHLORIDE mmol/L 105 105   CO2 mmol/L 23 31   BUN mg/dL 22 26*   CREATININE mg/dL 1.21 1.33*   ANION GAP mmol/L 10 6   CALCIUM mg/dL 9.2 9.7   ALBUMIN g/dL  --  3.8   TOTAL BILIRUBIN mg/dL  --  0.49   ALK PHOS U/L  --  45   ALT U/L  --  19   AST U/L  --  14   GLUCOSE RANDOM mg/dL 108 105         Results from last 7 days   Lab Units 23  1105 23  0716 23  2114 23  1626 23  1107 23  0734 23  2201 23  1821   POC GLUCOSE mg/dl 136 110 100 138 85 129 125 107               Lines/Drains:  Invasive Devices     Peripheral Intravenous Line  Duration           Peripheral IV 23 Left;Ventral (anterior) Forearm 2 days                      Imaging: Reviewed radiology reports from this admission including: CT head    Recent Cultures (last 7 days):   Results from last 7 days   Lab Units 07/24/23  0649   URINE CULTURE  >100,000 cfu/ml Aerococcus urinae*       Last 24 Hours Medication List:   Current Facility-Administered Medications   Medication Dose Route Frequency Provider Last Rate   • acetaminophen  650 mg Oral Q6H PRN Atif Morton MD     • amLODIPine  5 mg Oral Daily Atif Morton MD     • cefTRIAXone  1,000 mg Intravenous Q24H Atif Morton MD 1,000 mg (07/26/23 0933)   • cyanocobalamin  1,000 mcg Oral Daily Atif Morton MD     • divalproex sodium  125 mg Oral Daily Susan Gaspar MD     • divalproex sodium  250 mg Oral HS Atif Morton MD     • enoxaparin  40 mg Subcutaneous Daily Atif Morton MD     • insulin lispro  1-6 Units Subcutaneous TID [address] MD     • melatonin  3 mg Oral HS PRN Atif Morton MD     • OLANZapine  2.5 mg Intramuscular Q3H PRN Kaleb Kang MD     • QUEtiapine  25 mg Oral HS Atif Morton MD     • senna  8.6 mg Oral HS Atif Morton MD          Today, Patient Was Seen By: Kaleb Kang MD    **Please Note: This note may have been constructed using a voice recognition system. **

## 2023-07-25 NOTE — PROGRESS NOTES
RN,PCA and 1:1 observer attempted to draw labs on patient- patient was too combative and was spitting at staff- labs unable to be obtained safely- provider notified
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
## 2023-07-25 NOTE — CASE MANAGEMENT
Case Management Progress Note    Patient name Najma Ghosh  Location S /S -99 MRN [id number]  : 1935 Date 2023       LOS (days): 1  Geometric Mean LOS (GMLOS) (days): 5.70  Days to GMLOS:4.9        OBJECTIVE:        Current admission status: Inpatient  Preferred Pharmacy:   [facility], [address] [place], [place] [address] [address] [place] [address]  Phone: [phone] Fax: [phone]    Primary Care Provider: Nicole Brewer MD    Primary Insurance: [facility]  Secondary Insurance:     PROGRESS NOTE:    Patient hospitalized due to AMS; currently on 4-point restraints and 1:1.  Call made to spouse, Misha De Dios, to complete assessment - she reports she's currently on her way to the hospital, so offer made to meet with her at bedside around 12:00pm.

## 2023-07-25 NOTE — ASSESSMENT & PLAN NOTE
Assessment:  · Requires use of dentures      Plan:  · Ensure mucousy appropriate for abilities  • Aspiration precautions

## 2023-07-26 PROBLEM — G92.8 TOXIC METABOLIC ENCEPHALOPATHY: Status: ACTIVE | Noted: 2023-07-24

## 2023-07-26 PROBLEM — F03.918 DEMENTIA WITH BEHAVIORAL DISTURBANCE (HCC): Status: ACTIVE | Noted: 2023-07-26

## 2023-07-26 LAB
ANION GAP SERPL CALCULATED.3IONS-SCNC: 10 MMOL/L
BASOPHILS # BLD AUTO: 0.02 THOUSANDS/ÂΜL (ref 0–0.1)
BASOPHILS NFR BLD AUTO: 0 % (ref 0–1)
BUN SERPL-MCNC: 22 MG/DL (ref 5–25)
CALCIUM SERPL-MCNC: 9.2 MG/DL (ref 8.4–10.2)
CHLORIDE SERPL-SCNC: 105 MMOL/L (ref 96–108)
CO2 SERPL-SCNC: 23 MMOL/L (ref 21–32)
CREAT SERPL-MCNC: 1.21 MG/DL (ref 0.6–1.3)
EOSINOPHIL # BLD AUTO: 0.11 THOUSAND/ÂΜL (ref 0–0.61)
EOSINOPHIL NFR BLD AUTO: 2 % (ref 0–6)
ERYTHROCYTE [DISTWIDTH] IN BLOOD BY AUTOMATED COUNT: 13.2 % (ref 11.6–15.1)
GFR SERPL CREATININE-BSD FRML MDRD: 53 ML/MIN/1.73SQ M
GLUCOSE SERPL-MCNC: 108 MG/DL (ref 65–140)
GLUCOSE SERPL-MCNC: 110 MG/DL (ref 65–140)
GLUCOSE SERPL-MCNC: 136 MG/DL (ref 65–140)
GLUCOSE SERPL-MCNC: 145 MG/DL (ref 65–140)
GLUCOSE SERPL-MCNC: 158 MG/DL (ref 65–140)
HCT VFR BLD AUTO: 43.8 % (ref 36.5–49.3)
HGB BLD-MCNC: 14.6 G/DL (ref 12–17)
IMM GRANULOCYTES # BLD AUTO: 0.02 THOUSAND/UL (ref 0–0.2)
IMM GRANULOCYTES NFR BLD AUTO: 0 % (ref 0–2)
LYMPHOCYTES # BLD AUTO: 1.4 THOUSANDS/ÂΜL (ref 0.6–4.47)
LYMPHOCYTES NFR BLD AUTO: 22 % (ref 14–44)
MCH RBC QN AUTO: 31.5 PG (ref 26.8–34.3)
MCHC RBC AUTO-ENTMCNC: 33.3 G/DL (ref 31.4–37.4)
MCV RBC AUTO: 95 FL (ref 82–98)
MONOCYTES # BLD AUTO: 0.75 THOUSAND/ÂΜL (ref 0.17–1.22)
MONOCYTES NFR BLD AUTO: 12 % (ref 4–12)
NEUTROPHILS # BLD AUTO: 4.13 THOUSANDS/ÂΜL (ref 1.85–7.62)
NEUTS SEG NFR BLD AUTO: 64 % (ref 43–75)
NRBC BLD AUTO-RTO: 0 /100 WBCS
PLATELET # BLD AUTO: 167 THOUSANDS/UL (ref 149–390)
PMV BLD AUTO: 10.5 FL (ref 8.9–12.7)
POTASSIUM SERPL-SCNC: 4.2 MMOL/L (ref 3.5–5.3)
RBC # BLD AUTO: 4.63 MILLION/UL (ref 3.88–5.62)
SODIUM SERPL-SCNC: 138 MMOL/L (ref 135–147)
WBC # BLD AUTO: 6.43 THOUSAND/UL (ref 4.31–10.16)

## 2023-07-26 PROCEDURE — 82948 REAGENT STRIP/BLOOD GLUCOSE: CPT

## 2023-07-26 PROCEDURE — 99232 SBSQ HOSP IP/OBS MODERATE 35: CPT | Performed by: INTERNAL MEDICINE

## 2023-07-26 PROCEDURE — 80048 BASIC METABOLIC PNL TOTAL CA: CPT

## 2023-07-26 PROCEDURE — NC001 PR NO CHARGE: Performed by: FAMILY MEDICINE

## 2023-07-26 PROCEDURE — 85025 COMPLETE CBC W/AUTO DIFF WBC: CPT

## 2023-07-26 RX ADMIN — QUETIAPINE FUMARATE 25 MG: 25 TABLET ORAL at 22:19

## 2023-07-26 RX ADMIN — CYANOCOBALAMIN TAB 500 MCG 1000 MCG: 500 TAB at 09:30

## 2023-07-26 RX ADMIN — CEFTRIAXONE 1000 MG: 2 INJECTION, POWDER, FOR SOLUTION INTRAMUSCULAR; INTRAVENOUS at 09:33

## 2023-07-26 RX ADMIN — DIVALPROEX SODIUM 125 MG: 125 CAPSULE, COATED PELLETS ORAL at 09:30

## 2023-07-26 RX ADMIN — DIVALPROEX SODIUM 250 MG: 125 CAPSULE ORAL at 22:19

## 2023-07-26 RX ADMIN — ACETAMINOPHEN 650 MG: 325 TABLET, FILM COATED ORAL at 22:19

## 2023-07-26 RX ADMIN — ENOXAPARIN SODIUM 40 MG: 40 INJECTION SUBCUTANEOUS at 09:40

## 2023-07-26 RX ADMIN — AMLODIPINE BESYLATE 5 MG: 5 TABLET ORAL at 09:30

## 2023-07-26 NOTE — ASSESSMENT & PLAN NOTE
Lab Results   Component Value Date    HGBA1C 5.8 03/17/2023       Recent Labs     07/25/23  1626 07/25/23  2114 07/26/23  0716 07/26/23  1105   POCGLU 138 100 110 136       Blood Sugar Average: Last 72 hrs:  (P) 116.25     Diabetes well controlled, last hemoglobin A1c 5.8  I would recommend discontinue insulin sliding scale  Goal for hemoglobin A1c for patient age and comorbidities will be 8.5-9.0  Avoid hypoglycemia

## 2023-07-26 NOTE — ASSESSMENT & PLAN NOTE
Assessment:  · At the ED UA significant for small leukocytes, microscopy 10-20 WBCs, occasional bacteria  · At the ED received 1 dose of ceftriaxone due to toxic metabolic encephalopathy 2/2 UTI.   · Cultures grew >100,000 CFU aerococcus urinae susceptible to most penicillins, cephalosporins, and nitrofurantoin    Plan:  · Continue ceftriaxone 1 g once a day  · Tylenol 975 mg 3 times daily for chronic pain  · CBC a.m.  · Monitor fever curves

## 2023-07-26 NOTE — CONSULTS
Progress Note - Geriatric Medicine   Yohana Meza 81 y.o. male MRN: [id number]  Unit/Bed#: S -01 Encounter: [id number]      Assessment/Plan:  Dementia with behavioral disturbance [facility])  Assessment & Plan  Long hx of dementia. Patient is combative and agitated.  AAOx0   Recommend discontinuing seroquel as patient's wife noted that patient behaviors got worse since he was started on Seroquel  Recommend increasing depakote 250 to TID   Recommend chronic pain management, tylenol 975 mg 3 times daily   Recommend adding mirtazapine 7.5 mg at bedtime   Continue delirium precautions   Maintain sleep/wake cycle-use melatonin 3 mg nightly scheduled  Monitor for constipation and urinary retention   Reposition periodically   Avoid tramadol, benzodiazepine, anticholinergics,and antihistamines   Encourage hydration and nutrition, assist with feeding   Redirect unwanted behaviors as first line, avoid physical restraints   For severe behaviors use Zyprexa 2.5 mg im every 12 hours as needed    Acute cystitis without hematuria  Assessment & Plan  Continue ceftriaxone   Encourage p.o. hydration  Monitor CBC CMP  Manage as per primary team    Benign prostatic hyperplasia with urinary frequency  Assessment & Plan  Per wife patient was taking Myrbetriq and oxybutynin in the past.  Avoid use if not needed  Monitor for urinary retention     Controlled type 2 diabetes mellitus without complication, without long-term current use of insulin [facility])  Assessment & Plan  Lab Results   Component Value Date    HGBA1C 5.8 03/17/2023       Recent Labs     07/25/23  1626 07/25/23  2114 07/26/23  0716 07/26/23  1105   POCGLU 138 100 110 136       Blood Sugar Average: Last 72 hrs:  (P) 116.25     Diabetes well controlled, last hemoglobin A1c 5.8  I would recommend discontinue insulin sliding scale  Goal for hemoglobin A1c for patient age and comorbidities will be 8.5-9.0  Avoid hypoglycemia    Hypertension  Assessment & Plan  Monitor BP periodically   Continue amlodipine avoid hypotension    * Toxic metabolic encephalopathy  Assessment & Plan  Continue ceftriaxone for UTI   Maintain sleep/wake cycle, I would recommend using melatonin 3 mg nightly and add mirtazapine 7.5 mg at bedtime  Continue delirium precautions   Avoid tramadol, benzodiazepines, anticholinergics, and antihistamines   Encourage hydration and nutrition   Redirect unwanted behaviors as first line, avoid physical restraints   I would recommend discontinue Seroquel as patient's wife did that his behaviors got worse since he started using it. Increase Depakote to 250 mg p.o. 3 times daily, use Depakote sprinkle  Monitor CBC CMP periodically  Encourage p.o. intake  Provide protein supplements  Encourage out of bed as tolerated  Monitor bladder scans and monitor for constipation    Subjective:   Patient was seen at bedside. He was agitated and combative. History was obtained from his wife. He has had a long history of dementia but has been worsening progressively. His wife reported worsening behavior for the past couple of months. Review of Systems   Unable to perform ROS: Dementia (Patient agitated and combative)         Objective:     Vitals: Blood pressure 152/79, pulse 87, temperature 97.6 °F (36.4 °C), temperature source Axillary, resp. rate 20, height 5' 8" (1.727 m), weight 77.7 kg (171 lb 4.8 oz), SpO2 94 %. ,Body mass index is 26.05 kg/m². Intake/Output Summary (Last 24 hours) at 7/26/2023 1708  Last data filed at 7/26/2023 1330  Gross per 24 hour   Intake 500 ml   Output 236 ml   Net 264 ml       Current Medications: Reviewed    Physical Exam:   Physical Exam  Constitutional:       General: He is not in acute distress. Comments: Frail looking   HENT:      Head: Normocephalic and atraumatic. Ears:      Comments: Unga     Mouth/Throat:      Mouth: Mucous membranes are dry. Cardiovascular:      Rate and Rhythm: Normal rate and regular rhythm.       Heart sounds: Heart sounds are distant. No murmur heard. Pulmonary:      Effort: Pulmonary effort is normal. No respiratory distress. Breath sounds: Normal breath sounds. Abdominal:      Palpations: Abdomen is soft. Tenderness: There is no abdominal tenderness. There is no guarding or rebound. Musculoskeletal:      Right lower leg: No edema. Left lower leg: No edema. Skin:     General: Skin is dry. Findings: Bruising present. Neurological:      Mental Status: He is alert. Mental status is at baseline. Comments: AAOx0   Psychiatric:      Comments: Patient agitated and combative          Invasive Devices     Peripheral Intravenous Line  Duration           Peripheral IV 07/24/23 Left;Ventral (anterior) Forearm 2 days                Lab, Imaging and other studies: I have personally reviewed pertinent reports.

## 2023-07-26 NOTE — ASSESSMENT & PLAN NOTE
Lab Results   Component Value Date    HGBA1C 5.8 03/17/2023       Recent Labs     07/25/23  1626 07/25/23  2114 07/26/23  0716 07/26/23  1105   POCGLU 138 100 110 136       Blood Sugar Average: Last 72 hrs:  · Recently taken off metformin  · Continue sliding scale and glucose check per protocol  · Blood glucose levels have been stable

## 2023-07-26 NOTE — PLAN OF CARE
Problem: SAFETY,RESTRAINT: NV/NON-SELF DESTRUCTIVE BEHAVIOR  Goal: Remains free of harm/injury (restraint for non violent/non self-detsructive behavior)  Description: INTERVENTIONS:  - Instruct patient/family regarding restraint use   - Assess and monitor physiologic and psychological status   - Provide interventions and comfort measures to meet assessed patient needs   - Identify and implement measures to help patient regain control  - Assess readiness for release of restraint   Outcome: Progressing    Goal: Returns to optimal restraint-free functioning  Description: INTERVENTIONS:  - Assess the patient's behavior and symptoms that indicate continued need for restraint  - Identify and implement measures to help patient regain control  - Assess readiness for release of restraint   Outcome: Progressing    Problem: MOBILITY - ADULT  Goal: Maintain or return to baseline ADL function  Description: INTERVENTIONS:  -  Assess patient's ability to carry out ADLs; assess patient's baseline for ADL function and identify physical deficits which impact ability to perform ADLs (bathing, care of mouth/teeth, toileting, grooming, dressing, etc.)  - Assess/evaluate cause of self-care deficits   - Assess range of motion  - Assess patient's mobility; develop plan if impaired  - Assess patient's need for assistive devices and provide as appropriate  - Encourage maximum independence but intervene and supervise when necessary  - Involve family in performance of ADLs  - Assess for home care needs following discharge   - Consider OT consult to assist with ADL evaluation and planning for discharge  - Provide patient education as appropriate  Outcome: Progressing    Goal: Maintains/Returns to pre admission functional level  Description: INTERVENTIONS:  - Perform BMAT or MOVE assessment daily.   - Set and communicate daily mobility goal to care team and patient/family/caregiver.    - Collaborate with rehabilitation services on mobility goals if consulted  - Perform Range of Motion   - Out of bed for toileting  - Record patient progress and toleration of activity level   Outcome: Progressing     Problem: Nutrition/Hydration-ADULT  Goal: Nutrient/Hydration intake appropriate for improving, restoring or maintaining nutritional needs  Description: Monitor and assess patient's nutrition/hydration status for malnutrition. Collaborate with interdisciplinary team and initiate plan and interventions as ordered. Monitor patient's weight and dietary intake as ordered or per policy. Utilize nutrition screening tool and intervene as necessary. Determine patient's food preferences and provide high-protein, high-caloric foods as appropriate.      INTERVENTIONS:  - Monitor oral intake, urinary output, labs, and treatment plans  - Assess nutrition and hydration status and recommend course of action  - Evaluate amount of meals eaten  - Assist patient with eating if necessary   - Allow adequate time for meals  - Recommend/ encourage appropriate diets, oral nutritional supplements, and vitamin/mineral supplements  - Order, calculate, and assess calorie counts as needed  - Recommend, monitor, and adjust tube feedings and TPN/PPN based on assessed needs  - Assess need for intravenous fluids  - Provide specific nutrition/hydration education as appropriate  - Include patient/family/caregiver in decisions related to nutrition  Outcome: Progressing

## 2023-07-26 NOTE — ASSESSMENT & PLAN NOTE
· History of BPH with urinary frequency  · Most recently taking of finasteride  · Avoid oxybutynin 10 mg if not needed  · Continue urinary retention protocol

## 2023-07-26 NOTE — QUICK NOTE
Received message from Cesar Adkins RN regarding patient having duplicate divalproex sodium orders. From chart review patient is on 125mg qAM and 250mg qHS. Orders changed to appropriate dosing.

## 2023-07-26 NOTE — ASSESSMENT & PLAN NOTE
Assessment:  · History Alzheimer's with psychotic disturbances  · PT/OT was unable to evaluate him right now as he is still under four-point restraints  · Most recently admitted on 06/20 and subsequently discharged on July 3, 2023 due to altered mental status. · Wife reports that he was not consistently taking his medications at his facility this is likely the reason for his suboptimal Depakote level at 24  · Wife also reports that patient's aphasia is not new and has actually progressively gotten worse, even she has difficulty understanding what he is saying.   · Urine culture grew >100,000 Aerococcus urinae        Plan:  · Continue to treat UTI with ceftriaxone 1g every 24 hours on day 2  · Depakote 250 mg TID  · Mirtazapine 7.5 mg at bedtime  · Tylenol 975 mg TID for chronic pain management  · Zyprexa 2.5 mg as needed for combative behavior unable to be controlled with first-line alternative measures  · Continue one-to-one for now  · Frequent reorientation  · Fall precautions  · Hopefully remove four-point restraints tonight, redirect unwanted behaviors as first-line therapy  · Have OT PT evaluation, try to provide frequent and early mobilization  · Minimize sleep-wake cycle, avoid nighttime interruptions  · Attempt to group overnight vitals/labs/nursing checks as possible  · Dim lights, close blinds turn off TV to minimize stimulation  · We will provide adequate pain control with tylenol 975 mg TID  · Avoid urinary retention and constipation  · Avoid medications that may worsen or precipitate delirium such as tramadol, benzodiazepines, anticholinergics, and Benadryl  · Wife would like patient to discharge to [address] once medically able  · We will continue to monitor

## 2023-07-26 NOTE — ASSESSMENT & PLAN NOTE
Per wife patient was taking Myrbetriq and oxybutynin in the past.  Avoid use if not needed  Monitor for urinary retention

## 2023-07-26 NOTE — PROGRESS NOTES
[address] [facility]  Progress Note  Name: Ole Orona  MRN: [id number]  Unit/Bed#: S -01 I Date of Admission: 7/24/2023   Date of Service: 7/26/2023 I Hospital Day: 2    Assessment/Plan   Acute cystitis without hematuria  Assessment & Plan  Assessment:  · At the ED UA significant for small leukocytes, microscopy 10-20 WBCs, occasional bacteria  · At the ED received 1 dose of ceftriaxone due to toxic metabolic encephalopathy 2/2 UTI. · Cultures grew >100,000 CFU aerococcus urinae susceptible to most penicillins, cephalosporins, and nitrofurantoin    Plan:  · Continue ceftriaxone 1 g once a day  · CBC a.m.  · Monitor fever curves        * Toxic metabolic encephalopathy  Assessment & Plan  · Toxic metabolic encephalopathy likely secondary to UTI given findings on recent urine culture showing over 100,000 colony-forming units of Aerococcus urinae  · POA altered mental status, combative, agitation decreased p.o. intake. No fevers, no chills, no sick contacts  · Recently discharged to [facility] rehab from admission [address] [place] [address] 07/03/2023 due to the same. · Per spouse patient has progressively declined over the past 2 months. · Most recent lab work on 06/21/2023 TSH unremarkable, vitamin B12 unremarkable  · Folate unremarkable  · At the ED CT head no acute intraparenchymal abnormalities  · UA significant for pyuria with WBCs 10-24, occasional bacteria although patient denies any symptoms at this time. · Likely in the setting of Alzheimer's dementia with progression of disease versus metabolic in the setting of pyuria versus slight dehydration on exam.  · Still very combative with nurses and staff and is attempting to get out of bed    Plan:  · Consider psych consult  · Will likely require restraints  · Continue IV ceftriaxone as it has good penetration for Aerococcus urinae .   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 · Current regimen Depakote 125 mg twice daily, Depakote to 250 mg at bedtime  · Wife reports that he was not consistently taking his medications at his facility this is likely the reason for his suboptimal Depakote level at 24  · Wife also reports that patient's aphasia is not new and has actually progressively gotten worse, even she has difficulty understanding what he is saying. · Urine culture grew >100,000 Aerococcus urinae        Plan:  · Continue to treat UTI with ceftriaxone 1g every 24 hours on day 2  · Continue home meds  · Continue one-to-one  · Zyprexa 2.5 mg as needed for combative behavior unable to be controlled with first-line alternative measures  · Frequent reorientation  · Fall precautions  · Hopefully remove four-point restraints tonight, redirect unwanted behaviors as first-line therapy  · Have OT PT evaluation, try to provide frequent and early mobilization  · Minimize sleep-wake cycle, avoid nighttime interruptions  · Attempt to group overnight vitals/labs/nursing checks as possible  · Dim lights, close blinds turn off TV to minimize stimulation  · We will provide adequate pain control  · Avoid urinary retention and constipation  · Avoid medications that may worsen or precipitate delirium such as tramadol, benzodiazepines, anticholinergics, and Benadryl  · Wife would like patient to discharge to [address] once medically able  · We will continue to monitor    Bradycardia  Assessment & Plan  · POA heart rate 54  · ECG sinus bradycardia, heart rate 56 with PACs and poor R wave progression.   · Patient asymptomatic  · Avoid AV ben blocker. Ambulatory dysfunction  Assessment & Plan  • At a baseline ambulates with rolling walker  • PT/OT following  • Fall precautions  • Out of bed as tolerated  • Encourage early and frequent mobilization  • Encourage adequate hydration and nutrition  • Provide adequate pain management   • Goal is long-term care placement  • Continue with PT/OT for continued strength and balance training     Benign prostatic hyperplasia with urinary frequency  Assessment & Plan  · History of BPH with urinary frequency  · Most recently taking of finasteride  · Currently Myrbteq 50 mg once a day  · We will continue oxybutynin 10 mg once a day as per family formulary. · Continue urinary retention protocol    Controlled type 2 diabetes mellitus without complication, without long-term current use of insulin [facility])  Assessment & Plan  Lab Results   Component Value Date    HGBA1C 5.8 03/17/2023       Recent Labs     07/25/23  1626 07/25/23  2114 07/26/23  0716 07/26/23  1105   POCGLU 138 100 110 136       Blood Sugar Average: Last 72 hrs:  · Recently taken off metformin  · Continue sliding scale and glucose check per protocol         VTE Pharmacologic Prophylaxis: VTE Score: 5 High Risk (Score >/= 5) - Pharmacological DVT Prophylaxis Ordered: enoxaparin (Lovenox). Sequential Compression Devices Ordered. Patient Centered Rounds: I performed bedside rounds with nursing staff today. Discussions with Specialists or Other Care Team Provider: None    Education and Discussions with Family / Patient: Updated  (wife) at bedside. Current Length of Stay: 2 day(s)  Current Patient Status: Inpatient   Discharge Plan: Anticipate discharge in 48 hrs to dementia facility    Code Status: Level 3 - DNAR and DNI    Subjective:   Patient examined at bedside. Was sleeping this morning and was unable to obtain history. Later in the afternoon I reassessed patient and he was fighting and yelling at staff. Still very confused and agitated. Objective:     Vitals:   Temp (24hrs), Av.9 °F (36.6 °C), Min:97.6 °F (36.4 °C), Max:98.1 °F (36.7 °C)    Temp:  [97.6 °F (36.4 °C)-98.1 °F (36.7 °C)] 97.6 °F (36.4 °C)  HR:  [84-87] 87  Resp:  [16-20] 20  BP: (143-152)/(78-79) 152/79  SpO2:  [94 %-95 %] 94 %  Body mass index is 26.05 kg/m². Input and Output Summary (last 24 hours): Intake/Output Summary (Last 24 hours) at 2023 1732  Last data filed at 2023 1330  Gross per 24 hour   Intake 500 ml   Output 236 ml   Net 264 ml       Physical Exam:   Physical Exam  Vitals and nursing note reviewed. HENT:      Head: Normocephalic. Cardiovascular:      Rate and Rhythm: Normal rate and regular rhythm. Pulses: Normal pulses. Heart sounds: No murmur heard. No gallop. Neurological:      Mental Status: He is alert. He is disoriented.    Psychiatric:      Comments: Severely agitated and combative           Additional Data:     Labs:  Results from last 7 days   Lab Units 23  0535   WBC Thousand/uL 6.43   HEMOGLOBIN g/dL 14.6   HEMATOCRIT % 43.8   PLATELETS Thousands/uL 167   NEUTROS PCT % 64   LYMPHS PCT % 22   MONOS PCT % 12   EOS PCT % 2     Results from last 7 days   Lab Units 23  0535 23  0649   SODIUM mmol/L 138 142   POTASSIUM mmol/L 4.2 4.1   CHLORIDE mmol/L 105 105   CO2 mmol/L 23 31   BUN mg/dL 22 26*   CREATININE mg/dL 1.21 1.33*   ANION GAP mmol/L 10 6   CALCIUM mg/dL 9.2 9.7   ALBUMIN g/dL  --  3.8   TOTAL BILIRUBIN mg/dL  --  0.49   ALK PHOS U/L  --  45   ALT U/L  --  19   AST U/L  --  14   GLUCOSE RANDOM mg/dL 108 105         Results from last 7 days   Lab Units 23  1105 23  0716 23  2114 23  1626 23  1107 23  0734 23  2201 23  1821   POC GLUCOSE mg/dl 136 110 100 138 85 129 125 107               Lines/Drains:  Invasive Devices     Peripheral Intravenous Line  Duration           Peripheral IV 23 Left;Ventral (anterior) Forearm 2 days                      Imaging: No pertinent imaging reviewed. Recent Cultures (last 7 days):   Results from last 7 days   Lab Units 07/24/23  0649   URINE CULTURE  >100,000 cfu/ml Aerococcus urinae*       Last 24 Hours Medication List:   Current Facility-Administered Medications   Medication Dose Route Frequency Provider Last Rate   • acetaminophen  650 mg Oral Q6H PRN Pretty Evans MD     • amLODIPine  5 mg Oral Daily Pretty Evans MD     • cefTRIAXone  1,000 mg Intravenous Q24H Pretty Evans MD 1,000 mg (07/26/23 0933)   • cyanocobalamin  1,000 mcg Oral Daily Pretty Evans MD     • divalproex sodium  125 mg Oral Daily Jose Alberto Ohara MD     • divalproex sodium  250 mg Oral HS Pretty Evans MD     • enoxaparin  40 mg Subcutaneous Daily Pretty Evans MD     • insulin lispro  1-6 Units Subcutaneous TID [address] MD     • melatonin  3 mg Oral HS PRN Pretty Evans MD     • OLANZapine  2.5 mg Intramuscular Q3H PRN Faviola Mcdonald MD     • QUEtiapine  25 mg Oral HS Pretty Evans MD     • senna  8.6 mg Oral HS Pretty Evans MD          Today, Patient Was Seen By: Faviola Mcdonald MD    **Please Note: This note may have been constructed using a voice recognition system. **

## 2023-07-26 NOTE — ASSESSMENT & PLAN NOTE
Continue ceftriaxone for UTI   Maintain sleep/wake cycle, continue melatonin 3 mg nightly and  mirtazapine 7.5 mg at bedtime  Continue delirium precautions   Avoid tramadol, benzodiazepines, anticholinergics, and antihistamines   Encourage hydration and nutrition   Redirect unwanted behaviors as first line, avoid physical restraints   Continue  Depakote to 250 mg p.o. 3 times daily, use Depakote sprinkle  Monitor CBC CMP periodically  Encourage p.o. intake  Provide protein supplements  Encourage out of bed as tolerated  Monitor bladder scans, monitor for constipation and manage as needed.

## 2023-07-26 NOTE — ASSESSMENT & PLAN NOTE
· Toxic metabolic encephalopathy likely secondary to UTI given findings on recent urine culture showing over 100,000 colony-forming units of Aerococcus urinae  · POA altered mental status, combative, agitation decreased p.o. intake. No fevers, no chills, no sick contacts  · Recently discharged to [facility] rehab from admission [facility] 07/03/2023 due to the same. · Per spouse patient has progressively declined over the past 2 months. · Most recent lab work on 06/21/2023 TSH unremarkable, vitamin B12 unremarkable  · Folate unremarkable  · At the ED CT head no acute intraparenchymal abnormalities  · UA significant for pyuria with WBCs 10-24, occasional bacteria although patient denies any symptoms at this time. · Likely in the setting of Alzheimer's dementia with progression of disease versus metabolic in the setting of pyuria versus slight dehydration on exam.  · Patient is less combative this morning and more cooperative with exam  · Currently not needing restraints; however, patient does attempt to get out of bed and has to be redirected frequently    Plan:  · Psych consulted  · Restraints if necessary  · Continue IV ceftriaxone as it has good penetration for Aerococcus urinae .   · Delirium precautions  · Fall precautions  · Continue one-to-one

## 2023-07-26 NOTE — PLAN OF CARE
Problem: SAFETY,RESTRAINT: NV/NON-SELF DESTRUCTIVE BEHAVIOR  Goal: Remains free of harm/injury (restraint for non violent/non self-detsructive behavior)  Description: INTERVENTIONS:  - Instruct patient/family regarding restraint use   - Assess and monitor physiologic and psychological status   - Provide interventions and comfort measures to meet assessed patient needs   - Identify and implement measures to help patient regain control  - Assess readiness for release of restraint   Outcome: Progressing  Goal: Returns to optimal restraint-free functioning  Description: INTERVENTIONS:  - Assess the patient's behavior and symptoms that indicate continued need for restraint  - Identify and implement measures to help patient regain control  - Assess readiness for release of restraint   Outcome: Progressing     Problem: MOBILITY - ADULT  Goal: Maintain or return to baseline ADL function  Description: INTERVENTIONS:  -  Assess patient's ability to carry out ADLs; assess patient's baseline for ADL function and identify physical deficits which impact ability to perform ADLs (bathing, care of mouth/teeth, toileting, grooming, dressing, etc.)  - Assess/evaluate cause of self-care deficits   - Assess range of motion  - Assess patient's mobility; develop plan if impaired  - Assess patient's need for assistive devices and provide as appropriate  - Encourage maximum independence but intervene and supervise when necessary  - Involve family in performance of ADLs  - Assess for home care needs following discharge   - Consider OT consult to assist with ADL evaluation and planning for discharge  - Provide patient education as appropriate  Outcome: Progressing  Goal: Maintains/Returns to pre admission functional level  Description: INTERVENTIONS:  - Perform BMAT or MOVE assessment daily.   - Set and communicate daily mobility goal to care team and patient/family/caregiver.    - Collaborate with rehabilitation services on mobility goals if consulted  - Perform Range of Motion 3 times a day. - Reposition patient every 2 hours. - Dangle patient 3 times a day  - Stand patient 3 times a day  - Ambulate patient 3 times a day  - Out of bed to chair 3 times a day   - Out of bed for meals 3 times a day  - Out of bed for toileting  - Record patient progress and toleration of activity level   Outcome: Progressing     Problem: Nutrition/Hydration-ADULT  Goal: Nutrient/Hydration intake appropriate for improving, restoring or maintaining nutritional needs  Description: Monitor and assess patient's nutrition/hydration status for malnutrition. Collaborate with interdisciplinary team and initiate plan and interventions as ordered. Monitor patient's weight and dietary intake as ordered or per policy. Utilize nutrition screening tool and intervene as necessary. Determine patient's food preferences and provide high-protein, high-caloric foods as appropriate.      INTERVENTIONS:  - Monitor oral intake, urinary output, labs, and treatment plans  - Assess nutrition and hydration status and recommend course of action  - Evaluate amount of meals eaten  - Assist patient with eating if necessary   - Allow adequate time for meals  - Recommend/ encourage appropriate diets, oral nutritional supplements, and vitamin/mineral supplements  - Order, calculate, and assess calorie counts as needed  - Assess need for intravenous fluids  - Provide specific nutrition/hydration education as appropriate  - Include patient/family/caregiver in decisions related to nutrition  Outcome: Progressing

## 2023-07-26 NOTE — ASSESSMENT & PLAN NOTE
Long hx of dementia. Patient continues to be at baseline. No behaviors reported today. Patient asleep at encounter. AAOx0.    Continue depakote 250 mg TID   Continue tylenol 975 mg 3 times daily   Continue mirtazapine 7.5 mg at bedtime   Continue delirium precautions   Maintain sleep/wake cycle-use melatonin 3 mg nightly scheduled   Monitor for constipation and urinary retention   Reposition periodically and encourage OOB as tolerated  Avoid tramadol, benzodiazepine, anticholinergics,and antihistamines   Encourage hydration and nutrition, assist with feeding   Redirect unwanted behaviors as first line, avoid physical restraints   For severe behaviors use Zyprexa 2.5 mg im every 12 hours as needed  Per wife, plan is to discharge to long-term facility in dementia unit

## 2023-07-27 LAB
ANION GAP SERPL CALCULATED.3IONS-SCNC: 7 MMOL/L
BASOPHILS # BLD AUTO: 0.03 THOUSANDS/ÂΜL (ref 0–0.1)
BASOPHILS NFR BLD AUTO: 1 % (ref 0–1)
BUN SERPL-MCNC: 26 MG/DL (ref 5–25)
CALCIUM SERPL-MCNC: 9.4 MG/DL (ref 8.4–10.2)
CHLORIDE SERPL-SCNC: 107 MMOL/L (ref 96–108)
CO2 SERPL-SCNC: 25 MMOL/L (ref 21–32)
CREAT SERPL-MCNC: 1.15 MG/DL (ref 0.6–1.3)
EOSINOPHIL # BLD AUTO: 0.14 THOUSAND/ÂΜL (ref 0–0.61)
EOSINOPHIL NFR BLD AUTO: 3 % (ref 0–6)
ERYTHROCYTE [DISTWIDTH] IN BLOOD BY AUTOMATED COUNT: 13.1 % (ref 11.6–15.1)
GFR SERPL CREATININE-BSD FRML MDRD: 56 ML/MIN/1.73SQ M
GLUCOSE SERPL-MCNC: 109 MG/DL (ref 65–140)
GLUCOSE SERPL-MCNC: 117 MG/DL (ref 65–140)
GLUCOSE SERPL-MCNC: 120 MG/DL (ref 65–140)
GLUCOSE SERPL-MCNC: 143 MG/DL (ref 65–140)
HCT VFR BLD AUTO: 43.1 % (ref 36.5–49.3)
HGB BLD-MCNC: 14.5 G/DL (ref 12–17)
IMM GRANULOCYTES # BLD AUTO: 0.01 THOUSAND/UL (ref 0–0.2)
IMM GRANULOCYTES NFR BLD AUTO: 0 % (ref 0–2)
LYMPHOCYTES # BLD AUTO: 1.45 THOUSANDS/ÂΜL (ref 0.6–4.47)
LYMPHOCYTES NFR BLD AUTO: 27 % (ref 14–44)
MCH RBC QN AUTO: 31.7 PG (ref 26.8–34.3)
MCHC RBC AUTO-ENTMCNC: 33.6 G/DL (ref 31.4–37.4)
MCV RBC AUTO: 94 FL (ref 82–98)
MONOCYTES # BLD AUTO: 0.74 THOUSAND/ÂΜL (ref 0.17–1.22)
MONOCYTES NFR BLD AUTO: 14 % (ref 4–12)
NEUTROPHILS # BLD AUTO: 3.05 THOUSANDS/ÂΜL (ref 1.85–7.62)
NEUTS SEG NFR BLD AUTO: 55 % (ref 43–75)
NRBC BLD AUTO-RTO: 0 /100 WBCS
PLATELET # BLD AUTO: 164 THOUSANDS/UL (ref 149–390)
PMV BLD AUTO: 10.5 FL (ref 8.9–12.7)
POTASSIUM SERPL-SCNC: 4 MMOL/L (ref 3.5–5.3)
RBC # BLD AUTO: 4.57 MILLION/UL (ref 3.88–5.62)
SODIUM SERPL-SCNC: 139 MMOL/L (ref 135–147)
WBC # BLD AUTO: 5.42 THOUSAND/UL (ref 4.31–10.16)

## 2023-07-27 PROCEDURE — 99232 SBSQ HOSP IP/OBS MODERATE 35: CPT | Performed by: INTERNAL MEDICINE

## 2023-07-27 PROCEDURE — 85025 COMPLETE CBC W/AUTO DIFF WBC: CPT

## 2023-07-27 PROCEDURE — 80048 BASIC METABOLIC PNL TOTAL CA: CPT

## 2023-07-27 PROCEDURE — 99232 SBSQ HOSP IP/OBS MODERATE 35: CPT | Performed by: FAMILY MEDICINE

## 2023-07-27 PROCEDURE — 82948 REAGENT STRIP/BLOOD GLUCOSE: CPT

## 2023-07-27 RX ORDER — OLANZAPINE 10 MG/1
2.5 INJECTION, POWDER, LYOPHILIZED, FOR SOLUTION INTRAMUSCULAR EVERY 12 HOURS PRN
Status: DISCONTINUED | OUTPATIENT
Start: 2023-07-27 | End: 2023-08-01 | Stop reason: HOSPADM

## 2023-07-27 RX ORDER — MIRTAZAPINE 15 MG/1
7.5 TABLET, FILM COATED ORAL
Status: DISCONTINUED | OUTPATIENT
Start: 2023-07-27 | End: 2023-07-30

## 2023-07-27 RX ORDER — DIVALPROEX SODIUM 125 MG/1
250 CAPSULE, COATED PELLETS ORAL EVERY 8 HOURS SCHEDULED
Status: DISCONTINUED | OUTPATIENT
Start: 2023-07-27 | End: 2023-08-01 | Stop reason: HOSPADM

## 2023-07-27 RX ORDER — ACETAMINOPHEN 325 MG/1
975 TABLET ORAL EVERY 8 HOURS
Status: DISCONTINUED | OUTPATIENT
Start: 2023-07-27 | End: 2023-08-01 | Stop reason: HOSPADM

## 2023-07-27 RX ADMIN — DIVALPROEX SODIUM 250 MG: 125 CAPSULE, COATED PELLETS ORAL at 06:35

## 2023-07-27 RX ADMIN — OLANZAPINE 2.5 MG: 10 INJECTION, POWDER, LYOPHILIZED, FOR SOLUTION INTRAMUSCULAR at 21:33

## 2023-07-27 RX ADMIN — MIRTAZAPINE 7.5 MG: 15 TABLET, FILM COATED ORAL at 21:33

## 2023-07-27 RX ADMIN — ACETAMINOPHEN 975 MG: 325 TABLET, FILM COATED ORAL at 13:53

## 2023-07-27 RX ADMIN — ACETAMINOPHEN 975 MG: 325 TABLET, FILM COATED ORAL at 06:34

## 2023-07-27 RX ADMIN — ACETAMINOPHEN 975 MG: 325 TABLET, FILM COATED ORAL at 21:33

## 2023-07-27 RX ADMIN — DIVALPROEX SODIUM 250 MG: 125 CAPSULE, COATED PELLETS ORAL at 21:34

## 2023-07-27 RX ADMIN — CEFTRIAXONE 1000 MG: 2 INJECTION, POWDER, FOR SOLUTION INTRAMUSCULAR; INTRAVENOUS at 09:29

## 2023-07-27 RX ADMIN — DIVALPROEX SODIUM 250 MG: 125 CAPSULE, COATED PELLETS ORAL at 13:30

## 2023-07-27 RX ADMIN — ENOXAPARIN SODIUM 40 MG: 40 INJECTION SUBCUTANEOUS at 09:34

## 2023-07-27 NOTE — PHYSICAL THERAPY NOTE
Physical Therapy Cancellation Note         07/27/23 1050   Note Type   Note type Cancelled Session   Cancel Reasons Other   Additional Comments PT orders received. Chart reviewed. RN Pete Waterman clears pt for PT evaluation. Pt in left side lying in bed, difficult to arouse. When awake pt becomes agitated when asked to participate in PT evaluation. Encouraged pt OOB for breakfast. Pt continued to decline with increased agitation. Will hold PT evaluation at this time and follow up when patient is appropriate.      Marlene Mak, PT

## 2023-07-27 NOTE — ASSESSMENT & PLAN NOTE
Lab Results   Component Value Date    EGFR 56 07/27/2023    EGFR 53 07/26/2023    EGFR 47 07/24/2023    CREATININE 1.15 07/27/2023    CREATININE 1.21 07/26/2023    CREATININE 1.33 (H) 07/24/2023   · History CKD stage IIIb  · Baseline seems to be from 1.3-1.5, currently at baseline  · BMP a.m.,  · Avoid nephrotoxin, avoid hypotension

## 2023-07-27 NOTE — ASSESSMENT & PLAN NOTE
Lab Results   Component Value Date    HGBA1C 5.8 03/17/2023       Recent Labs     07/26/23  1105 07/26/23  1801 07/26/23  2134 07/27/23  0744   POCGLU 136 158* 145* 120       Blood Sugar Average: Last 72 hrs:  · Recently taken off metformin  · Continue sliding scale and glucose check per protocol  · Blood glucose levels have been stable

## 2023-07-27 NOTE — ASSESSMENT & PLAN NOTE
· Toxic metabolic encephalopathy likely secondary to UTI given findings on recent urine culture showing over 100,000 colony-forming units of Aerococcus urinae  · POA altered mental status, combative, agitation decreased p.o. intake. No fevers, no chills, no sick contacts  · Recently discharged to [facility] rehab from admission [address] [id number] [place] 07/03/2023 due to the same. · Per spouse patient has progressively declined over the past 2 months. · Most recent lab work on 06/21/2023 TSH unremarkable, vitamin B12 unremarkable  · Folate unremarkable  · At the ED CT head no acute intraparenchymal abnormalities  · UA significant for pyuria with WBCs 10-24, occasional bacteria although patient denies any symptoms at this time. · Likely in the setting of Alzheimer's dementia with progression of disease versus metabolic in the setting of pyuria versus slight dehydration on exam.  · Patient is less combative this morning and more cooperative with exam  · Currently off restraints and off one to one  · Continues to be medically stable    Plan:  · Continue IV ceftriaxone as it has good penetration for Aerococcus urinae .   · Delirium precautions  · We will plan to remove restraints today  · Fall precautions

## 2023-07-27 NOTE — ASSESSMENT & PLAN NOTE
Assessment:  · At the ED UA significant for small leukocytes, microscopy 10-20 WBCs, occasional bacteria  · At the ED received 1 dose of ceftriaxone due to toxic metabolic encephalopathy 2/2 UTI.   · Cultures grew >100,000 CFU aerococcus urinae susceptible to most penicillins, cephalosporins, and nitrofurantoin  · Continues to be medically stable    Plan:  · Continue ceftriaxone 1 g once a day, currently on day 4  · Tylenol 975 mg 3 times daily for chronic pain  · CBC a.m.  · Monitor fever curves

## 2023-07-27 NOTE — OCCUPATIONAL THERAPY NOTE
Occupational Therapy Cancellation     07/27/23 1051   OT Last Visit   OT Visit Date 07/27/23   Note Type   Note type Cancelled Session   Cancel Reasons Other   Additional Comments RN cleared pt for OT evaluation and contact made with patient. Much encouragement given and attempted to have pt initiate mobility and engage in eating meal however pt resistant to all activity at this time. Pt becoming increasingly irritable with therapist's encouragement. Will continue to follow pt and evaluate when pt able to participate in meaningful therapy session. RN and case management made aware of evaluation attempt.      Christa Luciano, OT

## 2023-07-27 NOTE — ASSESSMENT & PLAN NOTE
Assessment:  · History of hypertension  · Most recently taken off lisinopril due to level hypertension goal on previous admission  Blood Pressure: 117/54    Plan:  · Continue home amlodipine 5 mg once a day

## 2023-07-27 NOTE — PLAN OF CARE
Problem: SAFETY,RESTRAINT: NV/NON-SELF DESTRUCTIVE BEHAVIOR  Goal: Remains free of harm/injury (restraint for non violent/non self-detsructive behavior)  Description: INTERVENTIONS:  - Instruct patient/family regarding restraint use   - Assess and monitor physiologic and psychological status   - Provide interventions and comfort measures to meet assessed patient needs   - Identify and implement measures to help patient regain control  - Assess readiness for release of restraint   Outcome: Progressing  Goal: Returns to optimal restraint-free functioning  Description: INTERVENTIONS:  - Assess the patient's behavior and symptoms that indicate continued need for restraint  - Identify and implement measures to help patient regain control  - Assess readiness for release of restraint   Outcome: Progressing     Problem: MOBILITY - ADULT  Goal: Maintain or return to baseline ADL function  Description: INTERVENTIONS:  -  Assess patient's ability to carry out ADLs; assess patient's baseline for ADL function and identify physical deficits which impact ability to perform ADLs (bathing, care of mouth/teeth, toileting, grooming, dressing, etc.)  - Assess/evaluate cause of self-care deficits   - Assess range of motion  - Assess patient's mobility; develop plan if impaired  - Assess patient's need for assistive devices and provide as appropriate  - Encourage maximum independence but intervene and supervise when necessary  - Involve family in performance of ADLs  - Assess for home care needs following discharge   - Consider OT consult to assist with ADL evaluation and planning for discharge  - Provide patient education as appropriate  Outcome: Progressing  Goal: Maintains/Returns to pre admission functional level  Description: INTERVENTIONS:  - Perform BMAT or MOVE assessment daily.   - Set and communicate daily mobility goal to care team and patient/family/caregiver.    - Collaborate with rehabilitation services on mobility goals if consulted  - Perform Range of Motion  times a day. - Reposition patient every hours. - Dangle patient  times a day  - Stand patient times a day  - Ambulate patient  times a day  - Out of bed to chair  times a day   - Out of bed for meals  times a day  - Out of bed for toileting  - Record patient progress and toleration of activity level   Outcome: Progressing     Problem: Nutrition/Hydration-ADULT  Goal: Nutrient/Hydration intake appropriate for improving, restoring or maintaining nutritional needs  Description: Monitor and assess patient's nutrition/hydration status for malnutrition. Collaborate with interdisciplinary team and initiate plan and interventions as ordered. Monitor patient's weight and dietary intake as ordered or per policy. Utilize nutrition screening tool and intervene as necessary. Determine patient's food preferences and provide high-protein, high-caloric foods as appropriate.      INTERVENTIONS:  - Monitor oral intake, urinary output, labs, and treatment plans  - Assess nutrition and hydration status and recommend course of action  - Evaluate amount of meals eaten  - Assist patient with eating if necessary   - Allow adequate time for meals  - Recommend/ encourage appropriate diets, oral nutritional supplements, and vitamin/mineral supplements  - Order, calculate, and assess calorie counts as needed  - Recommend, monitor, and adjust tube feedings and TPN/PPN based on assessed needs  - Assess need for intravenous fluids  - Provide specific nutrition/hydration education as appropriate  - Include patient/family/caregiver in decisions related to nutrition  Outcome: Progressing

## 2023-07-27 NOTE — ASSESSMENT & PLAN NOTE
Assessment:  · History Alzheimer's with psychotic disturbances  · PT/OT was unable to evaluate him right now as he is still under four-point restraints  · Most recently admitted on 06/20 and subsequently discharged on July 3, 2023 due to altered mental status. · Wife reports that he was not consistently taking his medications at his facility this is likely the reason for his suboptimal Depakote level at 24  · Wife also reports that patient's aphasia is not new and has actually progressively gotten worse, even she has difficulty understanding what he is saying. · Urine culture grew >100,000 Aerococcus urinae  · At the time of my assessment patient was on restraints due to fall overnight. Patient was in no significant distress at time of fall.   · Continues to be medically stable      Plan:  · Continue to treat UTI with ceftriaxone 1g every 24 hours on day 3  · Depakote sprinkles 250 mg TID  · Mirtazapine 7.5 mg at bedtime  · Tylenol 975 mg TID for chronic pain management  · Zyprexa 2.5 mg as needed for combative behavior unable to be controlled with first-line alternative measures  · Frequent reorientation  · Fall precautions  · Have OT PT evaluation, try to provide frequent and early mobilization  · Minimize sleep-wake cycle, avoid nighttime interruptions  · Attempt to group overnight vitals/labs/nursing checks as possible  · Dim lights, close blinds turn off TV to minimize stimulation  · We will provide adequate pain control with tylenol 975 mg TID  · Avoid urinary retention and constipation  · Avoid medications that may worsen or precipitate delirium such as tramadol, benzodiazepines, anticholinergics, and Benadryl  · Wife would like patient to discharge to [address] once medically able  · We will continue to monitor

## 2023-07-27 NOTE — PROGRESS NOTES
[facility]  Progress Note  Name: Tori Olivares  MRN: [id number]  Unit/Bed#: S -01 I Date of Admission: 7/24/2023   Date of Service: 7/27/2023 I Hospital Day: 3    Assessment/Plan   Acute cystitis without hematuria  Assessment & Plan  Assessment:  · At the ED UA significant for small leukocytes, microscopy 10-20 WBCs, occasional bacteria  · At the ED received 1 dose of ceftriaxone due to toxic metabolic encephalopathy 2/2 UTI. · Cultures grew >100,000 CFU aerococcus urinae susceptible to most penicillins, cephalosporins, and nitrofurantoin    Plan:  · Continue ceftriaxone 1 g once a day, currently on day 3  · Tylenol 975 mg 3 times daily for chronic pain  · CBC a.m.  · Monitor fever curves        * Toxic metabolic encephalopathy  Assessment & Plan  · Toxic metabolic encephalopathy likely secondary to UTI given findings on recent urine culture showing over 100,000 colony-forming units of Aerococcus urinae  · POA altered mental status, combative, agitation decreased p.o. intake. No fevers, no chills, no sick contacts  · Recently discharged to [facility] rehab from admission [facility] 07/03/2023 due to the same. · Per spouse patient has progressively declined over the past 2 months. · Most recent lab work on 06/21/2023 TSH unremarkable, vitamin B12 unremarkable  · Folate unremarkable  · At the ED CT head no acute intraparenchymal abnormalities  · UA significant for pyuria with WBCs 10-24, occasional bacteria although patient denies any symptoms at this time.   · Likely in the setting of Alzheimer's dementia with progression of disease versus metabolic in the setting of pyuria versus slight dehydration on exam.  · Patient is less combative this morning and more cooperative with exam  · Currently not needing restraints; however, patient does attempt to get out of bed and has to be redirected frequently    Plan:  · Continue IV ceftriaxone as it has good penetration for Aerococcus urinae . 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 · Wife reports that he was not consistently taking his medications at his facility this is likely the reason for his suboptimal Depakote level at 24  · Wife also reports that patient's aphasia is not new and has actually progressively gotten worse, even she has difficulty understanding what he is saying.   · Urine culture grew >100,000 Aerococcus urinae  · Currently off restraints      Plan:  · Continue to treat UTI with ceftriaxone 1g every 24 hours on day 3  · Depakote sprinkles 250 mg TID  · Mirtazapine 7.5 mg at bedtime  · Tylenol 975 mg TID for chronic pain management  · Zyprexa 2.5 mg as needed for combative behavior unable to be controlled with first-line alternative measures  · Continue one-to-one for now  · Frequent reorientation  · Fall precautions  · Have OT PT evaluation, try to provide frequent and early mobilization  · Minimize sleep-wake cycle, avoid nighttime interruptions  · Attempt to group overnight vitals/labs/nursing checks as possible  · Dim lights, close blinds turn off TV to minimize stimulation  · We will provide adequate pain control with tylenol 975 mg TID  · Avoid urinary retention and constipation  · Avoid medications that may worsen or precipitate delirium such as tramadol, benzodiazepines, anticholinergics, and Benadryl  · Wife would like patient to discharge to [place] once medically able  · We will continue to monitor    Swallowing/mastication problem  Assessment & Plan  Assessment:  · Requires use of dentures  · Is pooling food in his mouth      Plan:  • Aspiration precautions    Bradycardia  Assessment & Plan  · POA heart rate 54  · ECG sinus bradycardia, heart rate 56 with PACs and poor R wave progression. · Patient asymptomatic  · Avoid AV ben blocker.     Ambulatory dysfunction  Assessment & Plan  Assessment:  · At a baseline ambulates with rolling walker  • PT/OT following however were unable to walk with him today as he was uncooperative with them    Plan:  • Fall precautions  • Out of bed as tolerated  • Encourage early and frequent mobilization  • Encourage adequate hydration and nutrition  • Provide adequate pain management   • Goal is long-term care placement  • Continue with PT/OT for continued strength and balance training     Stage 3b chronic kidney disease [facility])  Assessment & Plan  Lab Results   Component Value Date    EGFR 56 07/27/2023    EGFR 53 07/26/2023    EGFR 47 07/24/2023    CREATININE 1.15 07/27/2023    CREATININE 1.21 07/26/2023    CREATININE 1.33 (H) 07/24/2023   · History CKD stage IIIb  · Baseline seems to be from 1.3-1.5, currently at baseline  · BMP a.m.,  · Avoid nephrotoxin, avoid hypotension    Benign prostatic hyperplasia with urinary frequency  Assessment & Plan  · History of BPH with urinary frequency  · Most recently taking of finasteride  · Avoid oxybutynin 10 mg if not needed  · Continue urinary retention protocol    Controlled type 2 diabetes mellitus without complication, without long-term current use of insulin [facility])  Assessment & Plan  Lab Results   Component Value Date    HGBA1C 5.8 03/17/2023       Recent Labs     07/26/23  1105 07/26/23  1801 07/26/23  2134 07/27/23  0744   POCGLU 136 158* 145* 120       Blood Sugar Average: Last 72 hrs:  · Recently taken off metformin  · Continue sliding scale and glucose check per protocol  · Blood glucose levels have been stable    Hypertension  Assessment & Plan  Assessment:  · History of hypertension  · Most recently taken off lisinopril due to level hypertension goal on previous admission  Blood Pressure: 117/54    Plan:  · Continue home amlodipine 5 mg once a day           VTE Pharmacologic Prophylaxis: VTE Score: 5 High Risk (Score >/= 5) - Pharmacological DVT Prophylaxis Ordered: enoxaparin (Lovenox). Sequential Compression Devices Ordered. Patient Centered Rounds: I performed bedside rounds with nursing staff today. Discussions with Specialists or Other Care Team Provider: Gerontology and psychology    Education and Discussions with Family / Patient: Updated  (wife) via phone. Current Length of Stay: 3 day(s)  Current Patient Status: Inpatient   Discharge Plan: Anticipate discharge in 24-48 hrs to Dementia facility    Code Status: Level 3 - DNAR and DNI    Subjective:   Patient examined at bedside. He was more cooperative today and was able to say his name. Still has persistent dysarthria and aphasia. Now has a diaper on as he was episodes of incontinence over the night. Objective:     Vitals:   Temp (24hrs), Av.6 °F (37 °C), Min:97.7 °F (36.5 °C), Max:100.3 °F (37.9 °C)    Temp:  [97.7 °F (36.5 °C)-100.3 °F (37.9 °C)] 97.9 °F (36.6 °C)  HR:  [80-85] 80  Resp:  [18-19] 19  BP: (117-155)/(54-98) 117/54  SpO2:  [91 %-94 %] 94 %  Body mass index is 25.81 kg/m². Input and Output Summary (last 24 hours):   No intake or output data in the 24 hours ending 23 1400    Physical Exam:   Physical Exam  Vitals and nursing note reviewed. Constitutional:       General: He is not in acute distress. Appearance: He is normal weight. He is not toxic-appearing. HENT:      Head: Normocephalic. Nose: Nose normal. No congestion or rhinorrhea. Mouth/Throat:      Mouth: Mucous membranes are dry. Cardiovascular:      Rate and Rhythm: Normal rate and regular rhythm. Pulses: Normal pulses. Heart sounds: Normal heart sounds.    Pulmonary:      Effort: Pulmonary effort is normal.      Breath sounds: Normal breath sounds. No wheezing, rhonchi or rales. Abdominal:      Palpations: Abdomen is soft. Tenderness: There is no abdominal tenderness. There is no guarding or rebound. Musculoskeletal:         General: Normal range of motion. Skin:     General: Skin is warm and dry. Capillary Refill: Capillary refill takes less than 2 seconds. Neurological:      Mental Status: He is alert. He is disoriented. Comments: Oriented to self only   Psychiatric:         Behavior: Behavior normal.      Comments: More cooperative and docile today           Additional Data:     Labs:  Results from last 7 days   Lab Units 07/27/23  0644   WBC Thousand/uL 5.42   HEMOGLOBIN g/dL 14.5   HEMATOCRIT % 43.1   PLATELETS Thousands/uL 164   NEUTROS PCT % 55   LYMPHS PCT % 27   MONOS PCT % 14*   EOS PCT % 3     Results from last 7 days   Lab Units 07/27/23  0644 07/26/23  0535 07/24/23  0649   SODIUM mmol/L 139   < > 142   POTASSIUM mmol/L 4.0   < > 4.1   CHLORIDE mmol/L 107   < > 105   CO2 mmol/L 25   < > 31   BUN mg/dL 26*   < > 26*   CREATININE mg/dL 1.15   < > 1.33*   ANION GAP mmol/L 7   < > 6   CALCIUM mg/dL 9.4   < > 9.7   ALBUMIN g/dL  --   --  3.8   TOTAL BILIRUBIN mg/dL  --   --  0.49   ALK PHOS U/L  --   --  45   ALT U/L  --   --  19   AST U/L  --   --  14   GLUCOSE RANDOM mg/dL 109   < > 105    < > = values in this interval not displayed. Results from last 7 days   Lab Units 07/27/23  1235 07/27/23  0744 07/26/23  2134 07/26/23  1801 07/26/23  1105 07/26/23  0716 07/25/23  2114 07/25/23  1626 07/25/23  1107 07/25/23  0734 07/24/23  2201 07/24/23  1821   POC GLUCOSE mg/dl 117 120 145* 158* 136 110 100 138 85 129 125 107               Lines/Drains:  Invasive Devices     Peripheral Intravenous Line  Duration           Peripheral IV 07/24/23 Left;Ventral (anterior) Forearm 3 days                      Imaging: No pertinent imaging reviewed.     Recent Cultures (last 7 days):   Results from last 7 days Lab Units 07/24/23  0649   URINE CULTURE  >100,000 cfu/ml Aerococcus urinae*       Last 24 Hours Medication List:   Current Facility-Administered Medications   Medication Dose Route Frequency Provider Last Rate   • acetaminophen  650 mg Oral Q6H PRN Socorro Milian MD     • acetaminophen  975 mg Oral Q8H Osiris Hastings MD     • amLODIPine  5 mg Oral Daily Socorro Milian MD     • cefTRIAXone  1,000 mg Intravenous Q24H Socorro Milian MD 1,000 mg (07/27/23 3025)   • cyanocobalamin  1,000 mcg Oral Daily Socorro Milian MD     • divalproex sodium  250 mg Oral [facility] Osiris Hastings MD     • enoxaparin  40 mg Subcutaneous Daily Socorro Milian MD     • insulin lispro  1-6 Units Subcutaneous TID [address] MD     • melatonin  3 mg Oral HS PRN Socorro Milian MD     • mirtazapine  7.5 mg Oral HS Osiris Hastings MD     • OLANZapine  2.5 mg Intramuscular Q12H PRN Osiirs Hastings MD     • senna  8.6 mg Oral HS Socorro Milian MD          Today, Patient Was Seen By: Osiris Hastings MD    **Please Note: This note may have been constructed using a voice recognition system. **

## 2023-07-27 NOTE — CONSULTS
Progress Note - Geriatric Medicine   Roma Oar 81 y.o. male MRN: [id number]  Unit/Bed#: S -01 Encounter: [id number]      Assessment/Plan:  Dementia with behavioral disturbance [facility])  Assessment & Plan  Long hx of dementia. Patient seems to be at baseline, improved from yesterday, no behaviors reported today. Patient slept for the most part of the day.  AAOx0   Continue depakote 250 mg TID   Continue tylenol 975 mg 3 times daily   Continue mirtazapine 7.5 mg at bedtime   Continue delirium precautions   Maintain sleep/wake cycle-use melatonin 3 mg nightly scheduled   Monitor for constipation and urinary retention   Reposition periodically and encourage OOB as tolerated  Avoid tramadol, benzodiazepine, anticholinergics,and antihistamines   Encourage hydration and nutrition, assist with feeding   Redirect unwanted behaviors as first line, avoid physical restraints   For severe behaviors use Zyprexa 2.5 mg im every 12 hours as needed  Discussed with patient's wife at bedside, plan is to discharge to long-term facility in dementia unit    Acute cystitis without hematuria  Assessment & Plan  Continue ceftriaxone   Encourage p.o. hydration  Monitor CBC CMP  Manage as per primary team    Benign prostatic hyperplasia with urinary frequency  Assessment & Plan  Per wife patient was taking Myrbetriq and oxybutynin in the past.  Avoid use if not needed  Monitor for urinary retention     Controlled type 2 diabetes mellitus without complication, without long-term current use of insulin [facility])  Assessment & Plan  Lab Results   Component Value Date    HGBA1C 5.8 03/17/2023       Recent Labs     07/25/23  1626 07/25/23  2114 07/26/23  0716 07/26/23  1105   POCGLU 138 100 110 136       Blood Sugar Average: Last 72 hrs:  (P) 116.25     Diabetes well controlled, last hemoglobin A1c 5.8  I would recommend discontinue insulin sliding scale  Goal for hemoglobin A1c for patient age and comorbidities will be 8.5-9.0  Avoid hypoglycemia    Hypertension  Assessment & Plan  Monitor BP periodically   Continue amlodipine avoid hypotension    * Toxic metabolic encephalopathy  Assessment & Plan  Continue ceftriaxone for UTI   Maintain sleep/wake cycle, continue melatonin 3 mg nightly and  mirtazapine 7.5 mg at bedtime  Continue delirium precautions   Avoid tramadol, benzodiazepines, anticholinergics, and antihistamines   Encourage hydration and nutrition   Redirect unwanted behaviors as first line, avoid physical restraints   Continue  Depakote to 250 mg p.o. 3 times daily, use Depakote sprinkle  Monitor CBC CMP periodically  Encourage p.o. intake  Provide protein supplements  Encourage out of bed as tolerated  Monitor bladder scans, monitor for constipation and manage as needed. Subjective:   Patient seen and examined at bedside, he is awake and alert, responds to name. His wife was present at bedside and she states that patient seems to be at baseline. Patient seems comfortable, not in acute distress. Eating snacks at the time of encounter. Review of Systems   Unable to perform ROS: Dementia         Objective:     Vitals: Blood pressure 128/61, pulse 80, temperature 97.9 °F (36.6 °C), temperature source Axillary, resp. rate 19, height 5' 8" (1.727 m), weight 77 kg (169 lb 12.1 oz), SpO2 94 %. ,Body mass index is 25.81 kg/m². No intake or output data in the 24 hours ending 07/27/23 4684    Current Medications: Reviewed    Physical Exam:   Physical Exam  Vitals and nursing note reviewed. Constitutional:       General: He is not in acute distress. Appearance: He is well-developed. Comments: Frail looking   HENT:      Head: Normocephalic and atraumatic. Ears:      Comments: [place]     Mouth/Throat:      Mouth: Mucous membranes are dry. Eyes:      Conjunctiva/sclera: Conjunctivae normal.   Cardiovascular:      Rate and Rhythm: Normal rate and regular rhythm. Heart sounds: Murmur heard.    Pulmonary:      Effort: Pulmonary effort is normal. No respiratory distress. Breath sounds: Normal breath sounds. Abdominal:      Palpations: Abdomen is soft. Tenderness: There is no abdominal tenderness. Musculoskeletal:         General: No swelling or tenderness. Cervical back: Neck supple. Right lower leg: No edema. Left lower leg: No edema. Skin:     General: Skin is warm and dry. Capillary Refill: Capillary refill takes less than 2 seconds. Neurological:      Mental Status: He is alert. Mental status is at baseline. Comments: AAOx0   Psychiatric:         Mood and Affect: Mood normal.          Invasive Devices     Peripheral Intravenous Line  Duration           Peripheral IV 07/24/23 Left;Ventral (anterior) Forearm 3 days                Lab, Imaging and other studies: I have personally reviewed pertinent reports.

## 2023-07-27 NOTE — PLAN OF CARE
Problem: SAFETY,RESTRAINT: NV/NON-SELF DESTRUCTIVE BEHAVIOR  Goal: Remains free of harm/injury (restraint for non violent/non self-detsructive behavior)  Description: INTERVENTIONS:  - Instruct patient/family regarding restraint use   - Assess and monitor physiologic and psychological status   - Provide interventions and comfort measures to meet assessed patient needs   - Identify and implement measures to help patient regain control  - Assess readiness for release of restraint   Outcome: Progressing  Goal: Returns to optimal restraint-free functioning  Description: INTERVENTIONS:  - Assess the patient's behavior and symptoms that indicate continued need for restraint  - Identify and implement measures to help patient regain control  - Assess readiness for release of restraint   Outcome: Progressing     Problem: MOBILITY - ADULT  Goal: Maintain or return to baseline ADL function  Description: INTERVENTIONS:  -  Assess patient's ability to carry out ADLs; assess patient's baseline for ADL function and identify physical deficits which impact ability to perform ADLs (bathing, care of mouth/teeth, toileting, grooming, dressing, etc.)  - Assess/evaluate cause of self-care deficits   - Assess range of motion  - Assess patient's mobility; develop plan if impaired  - Assess patient's need for assistive devices and provide as appropriate  - Encourage maximum independence but intervene and supervise when necessary  - Involve family in performance of ADLs  - Assess for home care needs following discharge   - Consider OT consult to assist with ADL evaluation and planning for discharge  - Provide patient education as appropriate  Outcome: Progressing  Goal: Maintains/Returns to pre admission functional level  Description: INTERVENTIONS:  - Perform BMAT or MOVE assessment daily.   - Set and communicate daily mobility goal to care team and patient/family/caregiver.    - Collaborate with rehabilitation services on mobility goals if consulted  - Reposition patient every 2 hours. - Record patient progress and toleration of activity level   Outcome: Progressing     Problem: Nutrition/Hydration-ADULT  Goal: Nutrient/Hydration intake appropriate for improving, restoring or maintaining nutritional needs  Description: Monitor and assess patient's nutrition/hydration status for malnutrition. Collaborate with interdisciplinary team and initiate plan and interventions as ordered. Monitor patient's weight and dietary intake as ordered or per policy. Utilize nutrition screening tool and intervene as necessary. Determine patient's food preferences and provide high-protein, high-caloric foods as appropriate.      INTERVENTIONS:  - Monitor oral intake, urinary output, labs, and treatment plans  - Assess nutrition and hydration status and recommend course of action  - Evaluate amount of meals eaten  - Assist patient with eating if necessary   - Allow adequate time for meals  - Recommend/ encourage appropriate diets, oral nutritional supplements, and vitamin/mineral supplements  - Order, calculate, and assess calorie counts as needed  - Recommend, monitor, and adjust tube feedings and TPN/PPN based on assessed needs  - Assess need for intravenous fluids  - Provide specific nutrition/hydration education as appropriate  - Include patient/family/caregiver in decisions related to nutrition  Outcome: Progressing     Problem: Prexisting or High Potential for Compromised Skin Integrity  Goal: Skin integrity is maintained or improved  Description: INTERVENTIONS:  - Identify patients at risk for skin breakdown  - Assess and monitor skin integrity  - Assess and monitor nutrition and hydration status  - Monitor labs   - Assess for incontinence   - Turn and reposition patient  - Assist with mobility/ambulation  - Relieve pressure over bony prominences  - Avoid friction and shearing  - Provide appropriate hygiene as needed including keeping skin clean and dry  - Evaluate need for skin moisturizer/barrier cream  - Collaborate with interdisciplinary team   - Patient/family teaching  - Consider wound care consult   Outcome: Progressing

## 2023-07-27 NOTE — ASSESSMENT & PLAN NOTE
Assessment:  · At a baseline ambulates with rolling walker  • PT/OT following however were unable to walk with him today as he was uncooperative with them    Plan:  • Fall precautions  • Out of bed as tolerated  • Encourage early and frequent mobilization  • Encourage adequate hydration and nutrition  • Provide adequate pain management   • Goal is long-term care placement  • Continue with PT/OT for continued strength and balance training

## 2023-07-28 LAB
ANION GAP SERPL CALCULATED.3IONS-SCNC: 5 MMOL/L
BASOPHILS # BLD AUTO: 0.04 THOUSANDS/ÂΜL (ref 0–0.1)
BASOPHILS NFR BLD AUTO: 1 % (ref 0–1)
BUN SERPL-MCNC: 27 MG/DL (ref 5–25)
CALCIUM SERPL-MCNC: 9.2 MG/DL (ref 8.4–10.2)
CHLORIDE SERPL-SCNC: 106 MMOL/L (ref 96–108)
CO2 SERPL-SCNC: 28 MMOL/L (ref 21–32)
CREAT SERPL-MCNC: 1.25 MG/DL (ref 0.6–1.3)
EOSINOPHIL # BLD AUTO: 0.2 THOUSAND/ÂΜL (ref 0–0.61)
EOSINOPHIL NFR BLD AUTO: 4 % (ref 0–6)
ERYTHROCYTE [DISTWIDTH] IN BLOOD BY AUTOMATED COUNT: 13.3 % (ref 11.6–15.1)
GFR SERPL CREATININE-BSD FRML MDRD: 51 ML/MIN/1.73SQ M
GLUCOSE SERPL-MCNC: 106 MG/DL (ref 65–140)
GLUCOSE SERPL-MCNC: 113 MG/DL (ref 65–140)
GLUCOSE SERPL-MCNC: 133 MG/DL (ref 65–140)
GLUCOSE SERPL-MCNC: 153 MG/DL (ref 65–140)
GLUCOSE SERPL-MCNC: 308 MG/DL (ref 65–140)
HCT VFR BLD AUTO: 44.1 % (ref 36.5–49.3)
HGB BLD-MCNC: 14.9 G/DL (ref 12–17)
IMM GRANULOCYTES # BLD AUTO: 0.02 THOUSAND/UL (ref 0–0.2)
IMM GRANULOCYTES NFR BLD AUTO: 0 % (ref 0–2)
LYMPHOCYTES # BLD AUTO: 1.4 THOUSANDS/ÂΜL (ref 0.6–4.47)
LYMPHOCYTES NFR BLD AUTO: 29 % (ref 14–44)
MCH RBC QN AUTO: 31.9 PG (ref 26.8–34.3)
MCHC RBC AUTO-ENTMCNC: 33.8 G/DL (ref 31.4–37.4)
MCV RBC AUTO: 94 FL (ref 82–98)
MONOCYTES # BLD AUTO: 0.56 THOUSAND/ÂΜL (ref 0.17–1.22)
MONOCYTES NFR BLD AUTO: 12 % (ref 4–12)
NEUTROPHILS # BLD AUTO: 2.58 THOUSANDS/ÂΜL (ref 1.85–7.62)
NEUTS SEG NFR BLD AUTO: 54 % (ref 43–75)
NRBC BLD AUTO-RTO: 0 /100 WBCS
PLATELET # BLD AUTO: 199 THOUSANDS/UL (ref 149–390)
PMV BLD AUTO: 10.4 FL (ref 8.9–12.7)
POTASSIUM SERPL-SCNC: 3.9 MMOL/L (ref 3.5–5.3)
RBC # BLD AUTO: 4.67 MILLION/UL (ref 3.88–5.62)
SODIUM SERPL-SCNC: 139 MMOL/L (ref 135–147)
WBC # BLD AUTO: 4.8 THOUSAND/UL (ref 4.31–10.16)

## 2023-07-28 PROCEDURE — 85025 COMPLETE CBC W/AUTO DIFF WBC: CPT

## 2023-07-28 PROCEDURE — 99232 SBSQ HOSP IP/OBS MODERATE 35: CPT | Performed by: FAMILY MEDICINE

## 2023-07-28 PROCEDURE — 97163 PT EVAL HIGH COMPLEX 45 MIN: CPT

## 2023-07-28 PROCEDURE — 99232 SBSQ HOSP IP/OBS MODERATE 35: CPT | Performed by: INTERNAL MEDICINE

## 2023-07-28 PROCEDURE — 80048 BASIC METABOLIC PNL TOTAL CA: CPT

## 2023-07-28 PROCEDURE — 97167 OT EVAL HIGH COMPLEX 60 MIN: CPT

## 2023-07-28 PROCEDURE — 82948 REAGENT STRIP/BLOOD GLUCOSE: CPT

## 2023-07-28 RX ADMIN — ACETAMINOPHEN 975 MG: 325 TABLET, FILM COATED ORAL at 14:49

## 2023-07-28 RX ADMIN — INSULIN LISPRO 4 UNITS: 100 INJECTION, SOLUTION INTRAVENOUS; SUBCUTANEOUS at 12:05

## 2023-07-28 RX ADMIN — INSULIN LISPRO 1 UNITS: 100 INJECTION, SOLUTION INTRAVENOUS; SUBCUTANEOUS at 16:57

## 2023-07-28 RX ADMIN — Medication 3 MG: at 22:26

## 2023-07-28 RX ADMIN — MIRTAZAPINE 7.5 MG: 15 TABLET, FILM COATED ORAL at 22:17

## 2023-07-28 RX ADMIN — CEFTRIAXONE 1000 MG: 2 INJECTION, POWDER, FOR SOLUTION INTRAMUSCULAR; INTRAVENOUS at 08:45

## 2023-07-28 RX ADMIN — AMLODIPINE BESYLATE 5 MG: 5 TABLET ORAL at 08:45

## 2023-07-28 RX ADMIN — DIVALPROEX SODIUM 250 MG: 125 CAPSULE, COATED PELLETS ORAL at 14:49

## 2023-07-28 RX ADMIN — ACETAMINOPHEN 975 MG: 325 TABLET, FILM COATED ORAL at 05:14

## 2023-07-28 RX ADMIN — DIVALPROEX SODIUM 250 MG: 125 CAPSULE, COATED PELLETS ORAL at 05:15

## 2023-07-28 RX ADMIN — CYANOCOBALAMIN TAB 500 MCG 1000 MCG: 500 TAB at 08:45

## 2023-07-28 RX ADMIN — ACETAMINOPHEN 975 MG: 325 TABLET, FILM COATED ORAL at 22:17

## 2023-07-28 RX ADMIN — DIVALPROEX SODIUM 250 MG: 125 CAPSULE, COATED PELLETS ORAL at 22:18

## 2023-07-28 RX ADMIN — SENNOSIDES 8.6 MG: 8.6 TABLET, FILM COATED ORAL at 22:17

## 2023-07-28 NOTE — PLAN OF CARE
Problem: SAFETY,RESTRAINT: NV/NON-SELF DESTRUCTIVE BEHAVIOR  Goal: Remains free of harm/injury (restraint for non violent/non self-detsructive behavior)  Description: INTERVENTIONS:  - Instruct patient/family regarding restraint use   - Assess and monitor physiologic and psychological status   - Provide interventions and comfort measures to meet assessed patient needs   - Identify and implement measures to help patient regain control  - Assess readiness for release of restraint   Outcome: Progressing  Goal: Returns to optimal restraint-free functioning  Description: INTERVENTIONS:  - Assess the patient's behavior and symptoms that indicate continued need for restraint  - Identify and implement measures to help patient regain control  - Assess readiness for release of restraint   Outcome: Progressing     Problem: MOBILITY - ADULT  Goal: Maintain or return to baseline ADL function  Description: INTERVENTIONS:  -  Assess patient's ability to carry out ADLs; assess patient's baseline for ADL function and identify physical deficits which impact ability to perform ADLs (bathing, care of mouth/teeth, toileting, grooming, dressing, etc.)  - Assess/evaluate cause of self-care deficits   - Assess range of motion  - Assess patient's mobility; develop plan if impaired  - Assess patient's need for assistive devices and provide as appropriate  - Encourage maximum independence but intervene and supervise when necessary  - Involve family in performance of ADLs  - Assess for home care needs following discharge   - Consider OT consult to assist with ADL evaluation and planning for discharge  - Provide patient education as appropriate  Outcome: Progressing  Goal: Maintains/Returns to pre admission functional level  Description: INTERVENTIONS:  - Perform BMAT or MOVE assessment daily.   - Set and communicate daily mobility goal to care team and patient/family/caregiver.    - Collaborate with rehabilitation services on mobility goals if consulted  - Record patient progress and toleration of activity level   Outcome: Progressing     Problem: Nutrition/Hydration-ADULT  Goal: Nutrient/Hydration intake appropriate for improving, restoring or maintaining nutritional needs  Description: Monitor and assess patient's nutrition/hydration status for malnutrition. Collaborate with interdisciplinary team and initiate plan and interventions as ordered. Monitor patient's weight and dietary intake as ordered or per policy. Utilize nutrition screening tool and intervene as necessary. Determine patient's food preferences and provide high-protein, high-caloric foods as appropriate.      INTERVENTIONS:  - Monitor oral intake, urinary output, labs, and treatment plans  - Assess nutrition and hydration status and recommend course of action  - Evaluate amount of meals eaten  - Assist patient with eating if necessary   - Allow adequate time for meals  - Recommend/ encourage appropriate diets, oral nutritional supplements, and vitamin/mineral supplements  - Order, calculate, and assess calorie counts as needed  - Recommend, monitor, and adjust tube feedings and TPN/PPN based on assessed needs  - Assess need for intravenous fluids  - Provide specific nutrition/hydration education as appropriate  - Include patient/family/caregiver in decisions related to nutrition  Outcome: Progressing     Problem: Prexisting or High Potential for Compromised Skin Integrity  Goal: Skin integrity is maintained or improved  Description: INTERVENTIONS:  - Identify patients at risk for skin breakdown  - Assess and monitor skin integrity  - Assess and monitor nutrition and hydration status  - Monitor labs   - Assess for incontinence   - Turn and reposition patient  - Assist with mobility/ambulation  - Relieve pressure over bony prominences  - Avoid friction and shearing  - Provide appropriate hygiene as needed including keeping skin clean and dry  - Evaluate need for skin moisturizer/barrier cream  - Collaborate with interdisciplinary team   - Patient/family teaching  - Consider wound care consult   Outcome: Progressing

## 2023-07-28 NOTE — ASSESSMENT & PLAN NOTE
Assessment:  · At a baseline ambulates with rolling walker  • PT/OT following   • OT recommends he would benefit from OT in acute care 2-3X / week to max functional independence  • Declined PT yesterday      Plan:  • Fall precautions  • Out of bed as tolerated  • Encourage early and frequent mobilization  • Encourage adequate hydration and nutrition  • Provide adequate pain management   • Goal is long-term care placement  • Continue with PT/OT for continued strength and balance training

## 2023-07-28 NOTE — NURSING NOTE
Patients bed alarm was going off PCA found patient on his hands and knees on the ground, with 4 rails up. Patient was safely assisted back into the bed by 2 Rns and 2 PCAs-- pt appeared not in any distress. MD resident team notified. Patient 1:1 and restraints were discontinued earlier today.

## 2023-07-28 NOTE — CASE MANAGEMENT
Case Management Discharge Planning Note    Patient name Calista Rodrigez  Location S /S -53 MRN [id number]  : 1935 Date 2023       Current Admission Date: 2023  Current Admission Diagnosis:Toxic metabolic encephalopathy   Patient Active Problem List    Diagnosis Date Noted   • Dementia with behavioral disturbance ([address]) 2023   • Swallowing/mastication problem 2023   • Toxic metabolic encephalopathy    • Bradycardia 2023   • Ambulatory dysfunction 2023   • Stage 3 chronic kidney disease ([address]) 2023   • Urinary incontinence without sensory awareness 2023   • Slow transit constipation 2023   • At high risk for falls 2023   • Lumbar radiculopathy    • Acute cystitis without hematuria 2023   • UTI symptoms 2023   • Paroxysmal atrial fibrillation ([address]) 01/10/2023   • Platelets decreased ([address]) 01/10/2023   • 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 ([address]) 2022   • Neurologic gait dysfunction 2022   • Stage 3b chronic kidney disease ([address]) 2021   • OAB (overactive bladder) 2021   • Benign prostatic hyperplasia with urinary frequency 2021   • Controlled type 2 diabetes mellitus without complication, without long-term current use of insulin ([address]) 2019   • Sensorineural hearing loss (SNHL) of both ears 10/21/2016   • Seasonal allergies 2015   • Spinal stenosis 2012   • Hypertension 2012      LOS (days): 4  Geometric Mean LOS (GMLOS) (days): 3.80  Days to GMLOS:-0.3     OBJECTIVE:  Risk of Unplanned Readmission Score: 18.66         Current admission status: Inpatient   Preferred Pharmacy:   [address], [address] [place] [address]  Phone: [phone] Fax: [phone]    Primary Care Provider: Gentry Pendleton MD    Primary Insurance: [facility]  Secondary Insurance:     DISCHARGE DETAILS:    Discharge planning discussed with[de-identified] patient's spouse, Balta Carrion, at bedside  Isabella of Choice: Yes (re: facility placement)  Comments - Freedom of Choice: [facility] in to review patient; will return on Monday for re-evaluation as patient required restraints last night  CM contacted family/caregiver?: Yes (spouse, Ivyguero Carrion, at bedside)  Were Treatment Team discharge recommendations reviewed with patient/caregiver?: Yes  Did patient/caregiver verbalize understanding of patient care needs?: N/A- going to facility  Were patient/caregiver advised of the risks associated with not following Treatment Team discharge recommendations?: Yes    Contacts  Patient Contacts: Balta Carrion, spouse  Relationship to Patient[de-identified] Family  Contact Method: In Person  Reason/Outcome: Continuity of Care, Emergency Contact, Referral, Discharge [facility]         Is the patient interested in [address] at discharge?: No    DME Referral Provided  Referral made for DME?: No    Other Referral/Resources/Interventions Provided:  Interventions: Assisted Living  Referral Comments: Representative from [facility] to bedside this afternoon to evaluate patient. Aware that patient had required restraints last night, so stated they will need to follow-up on Monday, 7/31, to confirm behaviors well managed and review PT/OT notes to confirm ability to manage patient at their facility. Met with patient's spouse, Balta Carrion, at bedside to relay same. Spouse continues to be hopeful patient can transition to [facility] at d/c and that they will evaluate patient on Monday for admission. MDs aware that patient will need to remain restraint-free for facility placement.     Would you like to participate in our [facility] service program?  : No - Declined

## 2023-07-28 NOTE — PROGRESS NOTES
[facility]  Progress Note  Name: Margo Montoya  MRN: [id number]  Unit/Bed#: S -01 I Date of Admission: 7/24/2023   Date of Service: 7/28/2023 I Hospital Day: 4    Assessment/Plan   Acute cystitis without hematuria  Assessment & Plan  Assessment:  · At the ED UA significant for small leukocytes, microscopy 10-20 WBCs, occasional bacteria  · At the ED received 1 dose of ceftriaxone due to toxic metabolic encephalopathy 2/2 UTI. · Cultures grew >100,000 CFU aerococcus urinae susceptible to most penicillins, cephalosporins, and nitrofurantoin  · Continues to be medically stable    Plan:  · Continue ceftriaxone 1 g once a day, currently on day 4  · Tylenol 975 mg 3 times daily for chronic pain  · CBC a.m.  · Monitor fever curves        * Toxic metabolic encephalopathy  Assessment & Plan  · Toxic metabolic encephalopathy likely secondary to UTI given findings on recent urine culture showing over 100,000 colony-forming units of Aerococcus urinae  · POA altered mental status, combative, agitation decreased p.o. intake. No fevers, no chills, no sick contacts  · Recently discharged to [facility] rehab from admission [address] [place] [address] 07/03/2023 due to the same. · Per spouse patient has progressively declined over the past 2 months. · Most recent lab work on 06/21/2023 TSH unremarkable, vitamin B12 unremarkable  · Folate unremarkable  · At the ED CT head no acute intraparenchymal abnormalities  · UA significant for pyuria with WBCs 10-24, occasional bacteria although patient denies any symptoms at this time.   · Likely in the setting of Alzheimer's dementia with progression of disease versus metabolic in the setting of pyuria versus slight dehydration on exam.  · Patient is less combative this morning and more cooperative with exam  · Currently off restraints and off one to one  · Continues to be medically stable    Plan:  · Continue IV ceftriaxone as it has good penetration for Aerococcus urinae . 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 · Wife reports that he was not consistently taking his medications at his facility this is likely the reason for his suboptimal Depakote level at 24  · Wife also reports that patient's aphasia is not new and has actually progressively gotten worse, even she has difficulty understanding what he is saying.   · Urine culture grew >100,000 Aerococcus urinae  · Currently off restraints  · Continues to be medically stable      Plan:  · Continue to treat UTI with ceftriaxone 1g every 24 hours on day 3  · Depakote sprinkles 250 mg TID  · Mirtazapine 7.5 mg at bedtime  · Tylenol 975 mg TID for chronic pain management  · Zyprexa 2.5 mg as needed for combative behavior unable to be controlled with first-line alternative measures  · Frequent reorientation  · Fall precautions  · Have OT PT evaluation, try to provide frequent and early mobilization  · Minimize sleep-wake cycle, avoid nighttime interruptions  · Attempt to group overnight vitals/labs/nursing checks as possible  · Dim lights, close blinds turn off TV to minimize stimulation  · We will provide adequate pain control with tylenol 975 mg TID  · Avoid urinary retention and constipation  · Avoid medications that may worsen or precipitate delirium such as tramadol, benzodiazepines, anticholinergics, and Benadryl  · Wife would like patient to discharge to [facility] once medically able  · We will continue to monitor    Bradycardia  Assessment & Plan  · POA heart rate 54  · ECG sinus bradycardia, heart rate 56 with PACs and poor R wave progression. · Patient asymptomatic  · Avoid AV ben blocker. Ambulatory dysfunction  Assessment & Plan  Assessment:  · At a baseline ambulates with rolling walker  • PT/OT following   • OT recommends he would benefit from OT in acute care 2-3X / week to max functional independence  • Declined PT yesterday      Plan:  • Fall precautions  • Out of bed as tolerated  • Encourage early and frequent mobilization  • Encourage adequate hydration and nutrition  • Provide adequate pain management   • Goal is long-term care placement  • Continue with PT/OT for continued strength and balance training     Benign prostatic hyperplasia with urinary frequency  Assessment & Plan  · History of BPH with urinary frequency  · Most recently taking of finasteride  · Avoid oxybutynin 10 mg if not needed  · Continue urinary retention protocol    Controlled type 2 diabetes mellitus without complication, without long-term current use of insulin [facility])  Assessment & Plan  Lab Results   Component Value Date    HGBA1C 5.8 03/17/2023       Recent Labs     07/27/23  1235 07/27/23  1550 07/28/23  0815 07/28/23  1119   POCGLU 117 143* 113 308*       Blood Sugar Average: Last 72 hrs:  · Recently taken off metformin  · Continue sliding scale and glucose check per protocol  · Blood glucose levels have been stable          VTE Pharmacologic Prophylaxis: VTE Score: 5 High Risk (Score >/= 5) - Pharmacological DVT Prophylaxis Ordered: enoxaparin (Lovenox). Sequential Compression Devices Ordered. Patient Centered Rounds: I performed bedside rounds with nursing staff today. Discussions with Specialists or Other Care Team Provider: Gerontology    Education and Discussions with Family / Patient: Updated  (wife) via phone.     Current Length of Stay: 4 day(s)  Current Patient Status: Inpatient   Discharge Plan: Anticipate discharge in 24-48 hrs to [facility]    Code Status: Level 3 - DNAR and DNI    Subjective:   Overnight events: he did have a fall around 10 PM and was found on his hands and knees on the ground with 4 rails up, but he was not in any distress. Wife reports that he is quite unsteady on his feet at baseline. Patient examined at bedside. He seemed more aware today that he has in the past couple days. He continues to know his name. Unable to have a conversation about how he is feeling; however, he appears medically quite stable. Objective:     Vitals:   Temp (24hrs), Av °F (36.7 °C), Min:97.6 °F (36.4 °C), Max:98.4 °F (36.9 °C)    Temp:  [97.6 °F (36.4 °C)-98.4 °F (36.9 °C)] 98.4 °F (36.9 °C)  HR:  [78-81] 81  Resp:  [16-18] 16  BP: (128-129)/(59-61) 129/59  SpO2:  [94 %] 94 %  Body mass index is 25.98 kg/m². Input and Output Summary (last 24 hours): Intake/Output Summary (Last 24 hours) at 2023 1350  Last data filed at 2023 1909  Gross per 24 hour   Intake 120 ml   Output 150 ml   Net -30 ml       Physical Exam:   Physical Exam  Vitals and nursing note reviewed. Constitutional:       General: He is not in acute distress. Appearance: He is normal weight. He is not toxic-appearing. Comments: Frail looking   HENT:      Head: Normocephalic. Nose: Nose normal. No congestion or rhinorrhea. Mouth/Throat:      Mouth: Mucous membranes are dry. Cardiovascular:      Rate and Rhythm: Normal rate and regular rhythm. Pulses: Normal pulses. Heart sounds: Normal heart sounds. Pulmonary:      Effort: Pulmonary effort is normal.      Breath sounds: Normal breath sounds. No wheezing, rhonchi or rales. Abdominal:      Palpations: Abdomen is soft. Tenderness: There is no abdominal tenderness. There is no guarding or rebound. Musculoskeletal:         General: Normal range of motion. Skin:     General: Skin is warm and dry. Capillary Refill: Capillary refill takes less than 2 seconds.    Neurological:      Mental Status: He is alert. He is disoriented. Comments: Oriented to self only   Psychiatric:         Behavior: Behavior normal.      Comments: He is less agitated and aggressive than at time of admission, but he is unresponsive to commands. Additional Data:     Labs:  Results from last 7 days   Lab Units 07/28/23  0528   WBC Thousand/uL 4.80   HEMOGLOBIN g/dL 14.9   HEMATOCRIT % 44.1   PLATELETS Thousands/uL 199   NEUTROS PCT % 54   LYMPHS PCT % 29   MONOS PCT % 12   EOS PCT % 4     Results from last 7 days   Lab Units 07/28/23  0528 07/26/23  0535 07/24/23  0649   SODIUM mmol/L 139   < > 142   POTASSIUM mmol/L 3.9   < > 4.1   CHLORIDE mmol/L 106   < > 105   CO2 mmol/L 28   < > 31   BUN mg/dL 27*   < > 26*   CREATININE mg/dL 1.25   < > 1.33*   ANION GAP mmol/L 5   < > 6   CALCIUM mg/dL 9.2   < > 9.7   ALBUMIN g/dL  --   --  3.8   TOTAL BILIRUBIN mg/dL  --   --  0.49   ALK PHOS U/L  --   --  45   ALT U/L  --   --  19   AST U/L  --   --  14   GLUCOSE RANDOM mg/dL 106   < > 105    < > = values in this interval not displayed. Results from last 7 days   Lab Units 07/28/23  1119 07/28/23  0815 07/27/23  1550 07/27/23  1235 07/27/23  0744 07/26/23  2134 07/26/23  1801 07/26/23  1105 07/26/23  0716 07/25/23  2114 07/25/23  1626 07/25/23  1107   POC GLUCOSE mg/dl 308* 113 143* 117 120 145* 158* 136 110 100 138 85               Lines/Drains:  Invasive Devices     Peripheral Intravenous Line  Duration           Peripheral IV 07/24/23 Left;Ventral (anterior) Forearm 4 days                      Imaging: No pertinent imaging reviewed.     Recent Cultures (last 7 days):   Results from last 7 days   Lab Units 07/24/23  0649   URINE CULTURE  >100,000 cfu/ml Aerococcus urinae*       Last 24 Hours Medication List:   Current Facility-Administered Medications   Medication Dose Route Frequency Provider Last Rate   • acetaminophen  650 mg Oral Q6H PRN Mi Erickson MD     • acetaminophen  975 mg Oral Q8H Arlys Furnace, MD • amLODIPine  5 mg Oral Daily Maru Zaidi MD     • cefTRIAXone  1,000 mg Intravenous Q24H Maru Zaidi MD 1,000 mg (07/28/23 0845)   • cyanocobalamin  1,000 mcg Oral Daily Maru Zaidi MD     • divalproex sodium  250 mg Oral [facility] Adrian Mittal MD     • enoxaparin  40 mg Subcutaneous Daily Maru Zaidi MD     • insulin lispro  1-6 Units Subcutaneous TID [address] MD     • melatonin  3 mg Oral HS PRN Maru Zaidi MD     • mirtazapine  7.5 mg Oral HS Adrian Mittal MD     • OLANZapine  2.5 mg Intramuscular Q12H PRN Adrian Mittal MD     • senna  8.6 mg Oral HS Maru Zaidi MD          Today, Patient Was Seen By: Adrian Mittal MD    **Please Note: This note may have been constructed using a voice recognition system. **

## 2023-07-28 NOTE — OCCUPATIONAL THERAPY NOTE
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 ([address])    Acute cystitis without hematuria    Ambulatory dysfunction    Bradycardia    Swallowing/mastication problem    Dementia with behavioral disturbance [facility])    Past Medical History  Past Medical History:   Diagnosis Date    Balanitis     last assessed 12/19/14    BPH (benign prostatic hyperplasia)     Diabetes mellitus ([address])     blood sugar 167 this am at 0630    GERD (gastroesophageal reflux disease)     Heme + stool     Hypertension     Lumbar radiculopathy     Myocardial infarction ([address])     35 years ago    Phimosis     last assessed 04/27/16    Sciatica     right side, last assessed 04/25/16     Past Surgical History  Past Surgical History:   Procedure Laterality Date    BACK SURGERY      laminectomy Lumbar    CATARACT EXTRACTION, BILATERAL      CIRCUMCISION      HERNIA REPAIR Right     [place] COLONOSCOPY FLX DX W/COLLJ SPEC WHEN PFRMD N/A 1/16/2017    Procedure: EGD AND COLONOSCOPY;  Surgeon: Arlet Cortes DO;  Location: [facility];   Service: General        07/28/23 1228   OT Last Visit   OT Visit Date 07/28/23  (Friday)   Note Type   Note type Evaluation   Pain Assessment   Pain Assessment Tool FLACC   Pain Rating: FLACC (Rest) - Face 0   Pain Rating: FLACC (Rest) - Legs 0   Pain Rating: FLACC (Rest) - Activity 0   Pain Rating: FLACC (Rest) - Cry 0   Pain Rating: FLACC (Rest) - Consolability 0   Score: FLACC (Rest) 0   Pain Rating: FLACC (Activity) - Face 1   Pain Rating: FLACC (Activity) - Legs 0   Pain Rating: FLACC (Activity) - Activity 0   Pain Rating: FLACC (Activity) - Cry 0   Pain Rating: FLACC (Activity) - Consolability 1   Score: FLACC (Activity) 2   Restrictions/Precautions   Weight Bearing Precautions Per Order No   Other Precautions Cognitive; Chair Alarm; Bed Alarm; Fall Risk;Pain   Home Living   Type of Home SNF; Other (Comment)  (admit from rehab at [facility])   Geovanna Chris   Additional Comments Pt admit from rehab at [facility] and needs assistance w/ ADL/ IADL. At baseline prior to rehab and recent admission, pt lived w/ wife   Prior Function   Level of [place] Needs assistance with IADLS;Needs assistance with ADLs   Lives With Facility staff   Receives Help From Family; Other (Comment)  (staff at [facility])   IADLs Family/Friend/Other provides transportation; Family/Friend/Other provides meals; Family/Friend/Other provides medication management   Falls in the last 6 months   (pt unable to report)   Vocational Retired   Comments Pt unable to provide social history upon eval due to impaired cognition. Pt needs assistance w/ ADL/ IADL and used RW for functional mobility   Lifestyle   Autonomy Pt needs assistance w/ ADL/ IADL   Reciprocal Relationships Supportive wife/ family and staff at rehab assist   Service to Others Pt is retired. Unable to report   Intrinsic Gratification Per previous OT eval, enjoys listening to music. Unable to elaborate / reports style / type   General   Additional Pertinent History Significant PMH impacting his occupational performance includes Alzheimer's dementia w/ behavioral disturbance, HTN, DM II, hx MI, lumbar laminectomy. Personal and environmental factors impacting performance includes advanced age, limited insight into deficits, inability to complete IADLs, difficulty completing ADLs, assist required at baseline; supports / strengths include supportive family.    Family/Caregiver Present No   Subjective   Subjective "I have a problem here" (stated post eval seated OOB in chair pointing to his knees/ legs)   ADL   Where Assessed Edge of bed  (vs OOB In chair post eval)   Eating Assistance Unable to assess  (anticipate S <> min A based on fxal obs skills, clinical judgement due to impaired cognition)   Grooming Assistance 4  Minimal Assistance   Grooming Deficit Setup;Supervision/safety;Verbal cueing; Increased time to complete  (seated unsupported at EOB after set- up w/ + time, cues)   UB Bathing Assistance Unable to assess   LB Bathing Assistance Unable to assess   UB Dressing Assistance 4  Minimal Assistance   UB Dressing Deficit Setup;Verbal cueing; Increased time to complete;Supervision/safety;Pull around back; Fasteners   LB Dressing Assistance Unable to assess   Toileting Assistance  Unable to assess   Bed Mobility   Supine to Sit 3  Moderate assistance  (to pt's R)   Additional items Assist x 1; Increased time required;Verbal cues   Sit to Supine Unable to assess   Additional Comments Pt seatd OOB In chair post eval w/ needs met, call bell in reach and chair alarm activated   Transfers   Sit to Stand 3  Moderate assistance  (performed sit <> stand 2X. Initially mod A)   Additional items Assist x 1   Stand to Sit 4  Minimal assistance   Additional items Assist x 1; Increased time required;Armrests; Verbal cues   Stand pivot 3  Moderate assistance  (mod HHA x 1 to pt's R)   Additional items Assist x 1; Increased time required;Verbal cues   Functional Mobility   Additional Comments NT. Will continue to assess   Balance   Static Sitting Fair   Static Standing Poor +   Ambulatory   (NT)   Activity Tolerance   Activity Tolerance Patient limited by fatigue; Other (Comment)  (impaired cognition)   Medical Staff Made Aware spoke w/ PTJacqueline and Gini MORAN   Nurse Made Aware spoke w/ RN, Euel Merlin Assessment   RUE Assessment   (unable to follow MMT)   RUE Strength   RUE Overall Strength Within Functional Limits - able to perform ADL tasks with strength  (observed during ADLs)   LUE Assessment   LUE Assessment   (unable to follow MMT)   LUE Strength   LUE Overall Strength Within Functional Limits - able to perform ADL tasks with strength  (observed during ADLs)   Hand Function   Gross Motor Coordination Functional   Sensation   Light Touch   (responded to light touch)   Cognition   Overall Cognitive Status (S)  Impaired  (not accurate historian at baseline)   Arousal/Participation Responsive;Arousable   Attention Difficulty attending to directions   Orientation Level Oriented to person  (responded to his name. Identified pt wristband)   Memory Unable to assess   Following Commands Follows one step commands with increased time or repetition   Comments Identified pt by name and wristband. Easily aroused and able to follow directions during ADLs w/ + time / cues. Not accurate historian at baseline due to dementia   Assessment   Limitation Decreased ADL status; Decreased Safe judgement during ADL;Decreased cognition;Decreased endurance;Decreased self-care trans;Decreased high-level ADLs   Assessment Pt is an 82yo male [facility] on 7/24[facility]23 w/ increasing agitation and combativeness, dereaased oral intake. Diagnosed w/ toxic metabolic encephalopathy. Pt admit from rehab at [facility] following [address] from [facility] on 7/3/23. Pt needs assistance w/ ADL/ IADL. Upon eval, pt easily aroused and able to follow simple directions during ADL tasks w/ + time, cues. Pt required mod A to complete bed mobility, min <> mod A to complete sit to stand, mod A to complete SPT EOB to chair w/ HHA, min A grooming, min A UBD. Pt completing ADLs below baseline level of I and would benefit from OT in acute care 2-3X / week to max functional independence. Recommend return to OF w/ OT vs rehab when medically stable pend support / assist at [facility]. Will continue to follow   Goals   Patient Goals Pt did not state. Will continue to assess   Plan   Treatment Interventions ADL retraining;Functional transfer training; Endurance training;Patient/family training;Cognitive reorientation;Equipment evaluation/education;Continued evaluation; Energy conservation; Activityengagement   Goal Expiration Date 08/07/23   OT Frequency 2-3x/wk   Recommendation   OT Discharge Recommendation Post acute rehabilitation services  (Rehab vs return to PLOF w/ assist, continued OT)   AM-PAC Daily Activity Inpatient   Lower Body Dressing 2   Bathing 2   Toileting 2   Upper Body Dressing 2   Grooming 3   Eating 3   Daily Activity Raw Score 14   Daily Activity Standardized Score (Calc for Raw Score >=11) 33.39   AM-PAC Applied Cognition Inpatient   Following a Speech/Presentation 1   Understanding Ordinary Conversation 2   Taking Medications 1   Remembering Where Things Are Placed or Put Away 1   Remembering List of 4-5 Errands 1   Taking Care of Complicated Tasks 1   Applied Cognition Raw Score 7   Applied Cognition Standardized Score 15.17   Barthel Index   Feeding 5   Bathing 0   Grooming Score 0   Dressing Score 5   Bladder Score 5   Bowels Score 5   Toilet Use Score 5   Transfers (Bed/Chair) Score 10   Mobility (Level Surface) Score 0   Stairs Score 0   Barthel Index Score 35   Modified Jerod Scale   Modified [place] Scale 4   End of Consult   Patient Position at End of Consult Bed/Chair alarm activated; All needs within reach; Bedside chair   Nurse Communication Nurse aware of consult      Pt goals to be met by 8/7/23 to max I w/ ADLs and improve engagement includes:    -Pt will complete bed mobility supine <> sit w/ S to max I w/ ADLs    -Pt will complete functional transfers to bed, chair, and toilet using LRAD, DME as needed w/ S to max I and minimize burden of care    -Pt will complete UBD w/ S after set- up    -Pt will complete LBD w/ min A to max I and minimize burden of care    -Pt will demonstrate improved activity and sitting tolerance OOB In chair for all meals    -Pt will consistently follow 1 step directions during ADLs w/ good recall to max I and improve engagement    -Pt will consistently engage in functional mobility using LRAD to / from bathroom to max I w/ ADLs and improve engagement      The patient's raw score on the AM-PAC Daily Activity Inpatient Short Form is 14. A raw score of less than 19 suggests the patient may benefit from discharge to post-acute rehabilitation services. Please refer to the recommendation of the Occupational Therapist for safe discharge planning.      Vivi Wise, OTR/L  [id number]  [id number]

## 2023-07-28 NOTE — PLAN OF CARE
Problem: PHYSICAL THERAPY ADULT  Goal: Performs mobility at highest level of function for planned discharge setting. See evaluation for individualized goals. Description: Treatment/Interventions: Functional transfer training, LE strengthening/ROM, Therapeutic exercise, Endurance training, Cognitive reorientation, Patient/family training, Equipment eval/education, Gait training, Bed mobility, Compensatory technique education          See flowsheet documentation for full assessment, interventions and recommendations. 7/28/2023 1439 by Lashawn Grimm PT  Note: Prognosis: Fair  Problem List: Decreased strength, Decreased endurance, Impaired balance, Decreased mobility, Decreased cognition, Impaired judgement, Decreased safety awareness  Assessment: Viktor Godwin is a 81 y.o. Male who [facility] to [facility] on 7/24/23 due to agitation and diagnosis of toxic metabolic encephalopathy. Orders for PT eval and treat received, w/ activity orders of ambulate patient. Comorbidities affecting pt's functional mobility at time of evaluation include: Alzheimer's dementia, DM, hearing loss, HTN, ambulatory dysfunction. Personal factors affecting DC include: ambulating w/ assistive device, inability to ambulate household distances, inability to navigate level surfaces w/o external assistance, decreased cognition, inability to perform IADLs and inability to perform ADLs. At baseline, pt mobilizes w/ SPC vs RW. Upon evaluation, pt presents w/ the following deficits: impaired strength, impaired balance, impaired cognition, decreased safety awareness, decreased endurance/activity tolerance and pain affecting mobility. Pt currently requires min-mod Ax1 for transfers.  Pt's clinical presentation is unstable/unpredictable due to need for increased assistance w/ functional mobility compared to baseline, need for input for mobility technique, need for input for task focus, need to input for safety awareness, recent readmission w/in 30 days, ongoing medical management. From a PT/mobility standpoint given the above findings, DC recommendation is: Post-acute inpatient rehabilitation. During current admission, pt will benefit from continued skilled inpatient PT in the acute care setting in order to address the above deficits and to maximize function and mobility prior to DC from acute care. PT Discharge Recommendation: Post acute rehabilitation services (vs transition to higher level of care w/ skilled PT services)    See flowsheet documentation for full assessment.

## 2023-07-28 NOTE — ASSESSMENT & PLAN NOTE
· Toxic metabolic encephalopathy likely secondary to UTI given findings on recent urine culture showing over 100,000 colony-forming units of Aerococcus urinae  · POA altered mental status, combative, agitation decreased p.o. intake. No fevers, no chills, no sick contacts  · Recently discharged to [facility] rehab from admission [facility] 07/03/2023 due to the same. · Per spouse patient has progressively declined over the past 2 months. · Most recent lab work on 06/21/2023 TSH unremarkable, vitamin B12 unremarkable  · Folate unremarkable  · At the ED CT head no acute intraparenchymal abnormalities  · UA significant for pyuria with WBCs 10-24, occasional bacteria although patient denies any symptoms at this time. · Likely in the setting of Alzheimer's dementia with progression of disease versus metabolic in the setting of pyuria versus slight dehydration on exam.  · Patient is less combative this morning and more cooperative with exam  · Currently off restraints and off one to one  · Continues to be medically stable    Plan:  · Continue IV ceftriaxone as it has good penetration for Aerococcus urinae .   · Delirium precautions  · Try to keep him off restraints  · Fall precautions

## 2023-07-28 NOTE — PLAN OF CARE
Problem: OCCUPATIONAL THERAPY ADULT  Goal: Performs self-care activities at highest level of function for planned discharge setting. See evaluation for individualized goals. Description: Treatment Interventions: ADL retraining, Functional transfer training, Endurance training, Patient/family training, Cognitive reorientation, Equipment evaluation/education, Continued evaluation, Energy conservation, Activityengagement          See flowsheet documentation for full assessment, interventions and recommendations. Note: Limitation: Decreased ADL status, Decreased Safe judgement during ADL, Decreased cognition, Decreased endurance, Decreased self-care trans, Decreased high-level ADLs     Assessment: Pt is an 80yo male admitted to [facility] on 7/24/23 w/ increasing agitation and combativeness, dereaased oral intake. Diagnosed w/ toxic metabolic encephalopathy. Pt admit from rehab at [facility] following [address] from [facility] on 7/3/23. Pt needs assistance w/ ADL/ IADL. Upon eval, pt easily aroused and able to follow simple directions during ADL tasks w/ + time, cues. Pt required mod A to complete bed mobility, min <> mod A to complete sit to stand, mod A to complete SPT EOB to chair w/ HHA, min A grooming, min A UBD. Pt completing ADLs below baseline level of I and would benefit from OT in acute care 2-3X / week to max functional independence. Recommend return to PLOF w/ OT vs rehab when medically stable pend support / assist at [facility].  Will continue to follow     OT Discharge Recommendation: Post acute rehabilitation services (Rehab vs return to PLOF w/ assist, continued OT)

## 2023-07-28 NOTE — PROGRESS NOTES
Progress Note - Geriatric Medicine   Marilynn Giron 81 y.o. male MRN: [id number]  Unit/Bed#: S -01 Encounter: [id number]      Assessment/Plan:    Dementia with behavioral disturbance [facility])  Assessment & Plan  Long hx of dementia. Patient continues to be at baseline. No behaviors reported today. Patient asleep at encounter. AAOx0.    Continue depakote 250 mg TID   Continue tylenol 975 mg 3 times daily   Continue mirtazapine 7.5 mg at bedtime   Continue delirium precautions   Maintain sleep/wake cycle-use melatonin 3 mg nightly scheduled   Monitor for constipation and urinary retention   Reposition periodically and encourage OOB as tolerated  Avoid tramadol, benzodiazepine, anticholinergics,and antihistamines   Encourage hydration and nutrition, assist with feeding   Redirect unwanted behaviors as first line, avoid physical restraints   For severe behaviors use Zyprexa 2.5 mg im every 12 hours as needed  Per wife, plan is to discharge to long-term facility in dementia unit    Acute cystitis without hematuria  Assessment & Plan  Continue ceftriaxone   Encourage p.o. hydration  Monitor CBC CMP  Manage as per primary team    Benign prostatic hyperplasia with urinary frequency  Assessment & Plan  Per wife patient was taking Myrbetriq and oxybutynin in the past.  Avoid use if not needed  Monitor for urinary retention     Controlled type 2 diabetes mellitus without complication, without long-term current use of insulin [facility])  Assessment & Plan  Lab Results   Component Value Date    HGBA1C 5.8 03/17/2023       Recent Labs     07/25/23  1626 07/25/23  2114 07/26/23  0716 07/26/23  1105   POCGLU 138 100 110 136       Blood Sugar Average: Last 72 hrs:  (P) 116.25     Diabetes well controlled, last hemoglobin A1c 5.8  I would recommend discontinue insulin sliding scale  Goal for hemoglobin A1c for patient age and comorbidities will be 8.5-9.0  Avoid hypoglycemia    Hypertension  Assessment & Plan  Monitor BP periodically Continue amlodipine avoid hypotension    * Toxic metabolic encephalopathy  Assessment & Plan  Continue ceftriaxone for UTI   Maintain sleep/wake cycle, continue melatonin 3 mg nightly and  mirtazapine 7.5 mg at bedtime  Continue delirium precautions   Avoid tramadol, benzodiazepines, anticholinergics, and antihistamines   Encourage hydration and nutrition   Redirect unwanted behaviors as first line, avoid physical restraints   Continue  Depakote to 250 mg p.o. 3 times daily, use Depakote sprinkle  Monitor CBC CMP periodically  Encourage p.o. intake  Provide protein supplements  Encourage out of bed as tolerated  Monitor bladder scans, monitor for constipation and manage as needed. Subjective:   Patient was seen at bedside today. He was sleeping at the time of the encounter. Appears comfortable, NAD. No significant behavioral disturbances in the last 24 hours per medical staff. Review of Systems   Unable to perform ROS: Dementia         Objective:     Vitals: Blood pressure 129/59, pulse 81, temperature 98.4 °F (36.9 °C), resp. rate 16, height 5' 8" (1.727 m), weight 77.5 kg (170 lb 13.7 oz), SpO2 94 %. ,Body mass index is 25.98 kg/m². Intake/Output Summary (Last 24 hours) at 7/28/2023 1301  Last data filed at 7/27/2023 1909  Gross per 24 hour   Intake 120 ml   Output 150 ml   Net -30 ml       Current Medications: Reviewed    Physical Exam:   Physical Exam  Vitals and nursing note reviewed. Constitutional:       General: He is not in acute distress. Appearance: Normal appearance. Comments: Somnolent   HENT:      Head: Normocephalic and atraumatic. Right Ear: External ear normal.      Left Ear: External ear normal.      Ears:      Comments: [place]     Mouth/Throat:      Mouth: Mucous membranes are dry. Eyes:      General: Scleral icterus present. Cardiovascular:      Rate and Rhythm: Normal rate and regular rhythm. Heart sounds: Heart sounds are distant.    Pulmonary:      Effort: Pulmonary effort is normal.      Breath sounds: Normal breath sounds. No wheezing. Abdominal:      General: Bowel sounds are normal.      Palpations: Abdomen is soft. Tenderness: There is no abdominal tenderness. There is no guarding or rebound. Musculoskeletal:      Right lower leg: No edema. Left lower leg: No edema. Skin:     General: Skin is warm. Neurological:      Comments: AAOx0   Psychiatric:         Behavior: Behavior normal.          Invasive Devices     Peripheral Intravenous Line  Duration           Peripheral IV 07/24/23 Left;Ventral (anterior) Forearm 4 days                Lab, Imaging and other studies: I have personally reviewed pertinent reports.

## 2023-07-28 NOTE — ASSESSMENT & PLAN NOTE
Lab Results   Component Value Date    HGBA1C 5.8 03/17/2023       Recent Labs     07/27/23  1235 07/27/23  1550 07/28/23  0815 07/28/23  1119   POCGLU 117 143* 113 308*       Blood Sugar Average: Last 72 hrs:  · Recently taken off metformin  · Continue sliding scale and glucose check per protocol  · Blood glucose levels have been stable

## 2023-07-28 NOTE — PHYSICAL THERAPY NOTE
PHYSICAL THERAPY EVALUATION NOTE          Patient Name: Dong Gandhi  NHMYU'V Date: 2023          AGE:   81 y.o. Mrn:   [id number]  ADMIT DX:  Agitation [R45.1]  Combative behavior [R46.89]  Alzheimer's dementia with agitation ([address]) [G30.9, Q74.760]    Past Medical History:  Past Medical History:   Diagnosis Date    Balanitis     last assessed 14    BPH (benign prostatic hyperplasia)     Diabetes mellitus ([address])     blood sugar 167 this am at 0630    GERD (gastroesophageal reflux disease)     Heme + stool     Hypertension     Lumbar radiculopathy     Myocardial infarction ([address])     35 years ago    Phimosis     last assessed 16    Sciatica     right side, last assessed 16       Past Surgical History:  Past Surgical History:   Procedure Laterality Date    BACK SURGERY      laminectomy Lumbar    CATARACT EXTRACTION, BILATERAL      CIRCUMCISION      HERNIA REPAIR Right     AR COLONOSCOPY FLX DX W/COLLJ SPEC WHEN PFRMD N/A 2017    Procedure: EGD AND COLONOSCOPY;  Surgeon: Alex Bell DO;  Location: [facility];   Service: General     Length Of Stay: 4        PHYSICAL THERAPY EVALUATION:    Patient's identity confirmed via 2 patient identifiers (full name and ) at start of session       23 1354   PT Last Visit   PT Visit Date 23   Note Type   Note type Evaluation   Pain Assessment   Pain Assessment Tool FLACC   Pain Rating: FLACC (Rest) - Face 0   Pain Rating: FLACC (Rest) - Legs 0   Pain Rating: FLACC (Rest) - Activity 0   Pain Rating: FLACC (Rest) - Cry 0   Pain Rating: FLACC (Rest) - Consolability 0   Score: FLACC (Rest) 0   Pain Rating: FLACC (Activity) - Face 1   Pain Rating: FLACC (Activity) - Legs 0   Pain Rating: FLACC (Activity) - Activity 0   Pain Rating: FLACC (Activity) - Cry 0   Pain Rating: FLACC (Activity) - Consolability 1   Score: FLACC (Activity) 2   Restrictions/Precautions   Weight Bearing Precautions Per Order No   Other Precautions Cognitive; Chair Alarm; Bed Alarm; Fall Risk;Hard of hearing   Home Living   Type of [address] Two level;Bed/bath upstairs   Home Equipment Walker;Cane  (RW, [facility])   Additional Comments At baseline prior to recent hospitalizations, pt was living in a bi-evel house w/ bedroom/bathroom located on 2nd floor. Was previously ambulating w/ SPC vs RW and needed assistance w/ ADLs and IADLs. Pt currently admitted from [facility] for STR   Prior Function   Level of Page Needs assistance with ADLs; Needs assistance with IADLS   Lives With Spouse   Receives Help From Family   IADLs Independent with driving; Independent with meal prep; Independent with medication management   Falls in the last 6 months   (unable to report)   Comments Pt unable to provide home set-up and/or PLOF due to impaired cognition (baseline dementia), information obtained from chart review and pt's wife at bedside   General   Family/Caregiver Present Yes  (pt's wife Rosy Mcconnell)   Cognition   Overall Cognitive Status Impaired  (baseline Alzheimer's dementia)   Arousal/Participation Alert   Attention Difficulty attending to directions   Orientation Level Oriented to person  (responds to first name)   Memory Unable to assess   Following Commands Follows one step commands with increased time or repetition   Comments Pt ID via name and  via wristband. Pt able to intermittently follow simple commands w/ verbal and tactile cues w/ repetition. Pt pleasant, speaking nonsensical throughout   Subjective   Subjective "it's like delivering Asprin"   Strength RLE   RLE Overall Strength 3+/5  (grossly assessed w/ functional mobility)   Strength LLE   LLE Overall Strength 3+/5  (grossly assessed w/ functional mobility)   Bed Mobility   Additional Comments pt OOB in recliner chair upon arrival and returned to chair at end of session   Transfers   Sit to Stand 4  Minimal assistance   Additional items Assist x 1; Armrests; Increased time required;Verbal cues   Stand to Sit 4  Minimal assistance   Additional items Assist x 1; Armrests; Increased time required;Verbal cues   Additional Comments pt able to maintain static and dymanic standing balance w/ RW and min Ax1, demonstrated ability to clear both LE from floor   Ambulation/Elevation   Ambulation/Elevation Additional Comments pt unable to initiate ambulation at this time due to impaired cognition and difficulty w/ consistently folloing commands/cues   Balance   Static Sitting Fair +   Dynamic Sitting Fair   Static Standing Poor +  (w/ RW)   Dynamic Standing Poor +  (w/ RW)   Activity Tolerance   Activity Tolerance Other (Comment)  (pt limited by cogntiion)   Medical Staff Made Aware Spoke to OT Nelida   Nurse Made Aware spoke to RN Mendel Forward pre and post eval   Assessment   Prognosis Fair   Problem List Decreased strength;Decreased endurance; Impaired balance;Decreased mobility; Decreased cognition; Impaired judgement;Decreased safety awareness   Assessment Froylan Gilmore is a 81 y.o. Male who [facility]23 due to agitation and diagnosis of toxic metabolic encephalopathy. Orders for PT eval and treat received, w/ activity orders of ambulate patient. Comorbidities affecting pt's functional mobility at time of evaluation include: Alzheimer's dementia, DM, hearing loss, HTN, ambulatory dysfunction. Personal factors affecting DC include: ambulating w/ assistive device, inability to ambulate household distances, inability to navigate level surfaces w/o external assistance, decreased cognition, inability to perform IADLs and inability to perform ADLs. At baseline, pt mobilizes w/ SPC vs RW. Upon evaluation, pt presents w/ the following deficits: impaired strength, impaired balance, impaired cognition, decreased safety awareness, decreased endurance/activity tolerance and pain affecting mobility. Pt currently requires min-mod Ax1 for transfers.  Pt's clinical presentation is unstable/unpredictable due to need for increased assistance w/ functional mobility compared to baseline, need for input for mobility technique, need for input for task focus, need to input for safety awareness, recent readmission w/in 30 days, ongoing medical management. From a PT/mobility standpoint given the above findings, DC recommendation is: Post-acute inpatient rehabilitation. During current admission, pt will benefit from continued skilled inpatient PT in the acute care setting in order to address the above deficits and to maximize function and mobility prior to DC from acute care. Goals   Patient Goals pt did not state goal/want at this time   STG Expiration Date 08/07/23   Short Term Goal #1 Pt will: perform bed mobility w/ supervision to decrease pt's burden of care and increase pt's independence w/ repositioning in bed; perform transfers w/ supervision to promote increased OOB mobility; ambulate at least 50' w/ LRAD and supervision to increase pt's ambulatory endurance/tolerance; increase all balance ratings by at least 1 grade to decrease pt's risk of falls   PT Treatment Day 0   Plan   Treatment/Interventions Functional transfer training;LE strengthening/ROM; Therapeutic exercise; Endurance training;Cognitive reorientation;Patient/family training;Equipment eval/education;Gait training;Bed mobility; Compensatory technique education   PT Frequency 2-3x/wk   Recommendation   PT Discharge Recommendation Post acute rehabilitation services  (vs transition to higher level of care w/ skilled PT services)   AM-PAC Basic Mobility Inpatient   Turning in Flat Bed Without Bedrails 3   Lying on Back to Sitting on Edge of Flat Bed Without Bedrails 3   Moving Bed to Chair 2   Standing Up From Chair Using Arms 3   Walk in Room 1   Climb 3-5 Stairs With Railing 1   Basic Mobility Inpatient Raw Score 13   Basic Mobility Standardized Score 33.99   Highest Level Of Mobility   -HLM Goal 4: Move to chair/commode   -HLM Achieved 5: Stand (1 or more minutes)   End of Consult   Patient Position at End of Consult Bedside chair;Bed/Chair alarm activated; All needs within reach  (pt's wife remaining present in room)       The patient's AM-PAC Basic Mobility Inpatient Short Form Raw Score is 13. A Raw score of less than or equal to 16 suggests the patient may benefit from discharge to post-acute rehabilitation services. Please also refer to the recommendation of the Physical Therapist for safe discharge planning.     Pt will benefit from skilled inpatient PT during this admission in order to facilitate progress towards goals and to maximize functional independence prior to [facility] rec: post acute rehab (vs transition to higher level of care w/ skilled PT services)        Little Carballo, PT, DPT  07/28/23

## 2023-07-28 NOTE — ASSESSMENT & PLAN NOTE
Assessment:  · History Alzheimer's with psychotic disturbances  · PT/OT was unable to evaluate him right now as he is still under four-point restraints  · Most recently admitted on 06/20 and subsequently discharged on July 3, 2023 due to altered mental status. · Wife reports that he was not consistently taking his medications at his facility this is likely the reason for his suboptimal Depakote level at 24  · Wife also reports that patient's aphasia is not new and has actually progressively gotten worse, even she has difficulty understanding what he is saying.   · Urine culture grew >100,000 Aerococcus urinae  · Currently off restraints  · Continues to be medically stable      Plan:  · Continue to treat UTI with ceftriaxone 1g every 24 hours on day 3  · Depakote sprinkles 250 mg TID  · Mirtazapine 7.5 mg at bedtime  · Tylenol 975 mg TID for chronic pain management  · Zyprexa 2.5 mg as needed for combative behavior unable to be controlled with first-line alternative measures  · Frequent reorientation  · Fall precautions  · Have OT PT evaluation, try to provide frequent and early mobilization  · Minimize sleep-wake cycle, avoid nighttime interruptions  · Attempt to group overnight vitals/labs/nursing checks as possible  · Dim lights, close blinds turn off TV to minimize stimulation  · We will provide adequate pain control with tylenol 975 mg TID  · Avoid urinary retention and constipation  · Avoid medications that may worsen or precipitate delirium such as tramadol, benzodiazepines, anticholinergics, and Benadryl  · Wife would like patient to discharge to [place]   · We will continue to monitor  · Medically stable for DC

## 2023-07-29 LAB
GLUCOSE SERPL-MCNC: 121 MG/DL (ref 65–140)
GLUCOSE SERPL-MCNC: 130 MG/DL (ref 65–140)
GLUCOSE SERPL-MCNC: 130 MG/DL (ref 65–140)
GLUCOSE SERPL-MCNC: 185 MG/DL (ref 65–140)

## 2023-07-29 PROCEDURE — 99232 SBSQ HOSP IP/OBS MODERATE 35: CPT | Performed by: INTERNAL MEDICINE

## 2023-07-29 PROCEDURE — 82948 REAGENT STRIP/BLOOD GLUCOSE: CPT

## 2023-07-29 RX ADMIN — OLANZAPINE 2.5 MG: 10 INJECTION, POWDER, LYOPHILIZED, FOR SOLUTION INTRAMUSCULAR at 22:19

## 2023-07-29 RX ADMIN — SENNOSIDES 8.6 MG: 8.6 TABLET, FILM COATED ORAL at 21:55

## 2023-07-29 RX ADMIN — ACETAMINOPHEN 975 MG: 325 TABLET, FILM COATED ORAL at 14:41

## 2023-07-29 RX ADMIN — MIRTAZAPINE 7.5 MG: 15 TABLET, FILM COATED ORAL at 21:55

## 2023-07-29 RX ADMIN — INSULIN LISPRO 1 UNITS: 100 INJECTION, SOLUTION INTRAVENOUS; SUBCUTANEOUS at 17:09

## 2023-07-29 RX ADMIN — DIVALPROEX SODIUM 250 MG: 125 CAPSULE, COATED PELLETS ORAL at 14:41

## 2023-07-29 RX ADMIN — DIVALPROEX SODIUM 250 MG: 125 CAPSULE, COATED PELLETS ORAL at 21:55

## 2023-07-29 RX ADMIN — ENOXAPARIN SODIUM 40 MG: 40 INJECTION SUBCUTANEOUS at 09:11

## 2023-07-29 RX ADMIN — ACETAMINOPHEN 975 MG: 325 TABLET, FILM COATED ORAL at 21:55

## 2023-07-29 RX ADMIN — OLANZAPINE 2.5 MG: 10 INJECTION, POWDER, LYOPHILIZED, FOR SOLUTION INTRAMUSCULAR at 06:28

## 2023-07-29 RX ADMIN — CEFTRIAXONE 1000 MG: 2 INJECTION, POWDER, FOR SOLUTION INTRAMUSCULAR; INTRAVENOUS at 09:13

## 2023-07-29 NOTE — ASSESSMENT & PLAN NOTE
Assessment:  · History of hypertension  · Most recently taken off lisinopril due to level hypertension goal on previous admission  Blood Pressure: 161/84    Plan:  · Continue home amlodipine 5 mg once a day

## 2023-07-29 NOTE — QUICK NOTE
Alerted by RN @0599 7/28/23 that patient was agitated and was flailing his legs and sliding out of his Posey belt despite being in wrist restraints and interfering with lines. Multiple attempts at redirecting unsuccessful. Wrist restraints have been put back on approximately at 7 PM prior to my shift unfortunately. Signout received to avoid restraints and 1:1 observation if possible. Since patient is already in mechanical restraints and no guidance given to chemical restraints will order lower limb restraints    Plan:   Three-point Posey (RUL, SKIP, RLL)

## 2023-07-29 NOTE — PROGRESS NOTES
[id number]  Progress Note  Name: Jamir Mendez  MRN: [id number]  Unit/Bed#: S -01 I Date of Admission: 7/24/2023   Date of Service: 7/29/2023 I Hospital Day: 5    Assessment/Plan   Swallowing/mastication problem  Assessment & Plan  Assessment:  · Requires use of dentures  · Is pooling food in his mouth      Plan:  • Aspiration precautions    Bradycardia  Assessment & Plan  · POA heart rate 54  · ECG sinus bradycardia, heart rate 56 with PACs and poor R wave progression. · Patient asymptomatic  · Avoid AV ben blocker. Ambulatory dysfunction  Assessment & Plan  Assessment:  · At a baseline ambulates with rolling walker  • PT/OT following   • OT recommends he would benefit from OT in acute care 2-3X / week to max functional independence  • Declined PT yesterday      Plan:  • Fall precautions  • Out of bed as tolerated  • Encourage early and frequent mobilization  • Encourage adequate hydration and nutrition  • Provide adequate pain management   • Goal is long-term care placement  • Continue with PT/OT for continued strength and balance training     Acute cystitis without hematuria  Assessment & Plan  Assessment:  · At the ED UA significant for small leukocytes, microscopy 10-20 WBCs, occasional bacteria  · At the ED received 1 dose of ceftriaxone due to toxic metabolic encephalopathy 2/2 UTI.   · Cultures grew >100,000 CFU aerococcus urinae susceptible to most penicillins, cephalosporins, and nitrofurantoin  · Continues to be medically stable    Plan:  · Continue ceftriaxone 1 g once a day, currently on day 4  · Tylenol 975 mg 3 times daily for chronic pain  · CBC a.m.  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 · Wife reports that he was not consistently taking his medications at his facility this is likely the reason for his suboptimal Depakote level at 24  · Wife also reports that patient's aphasia is not new and has actually progressively gotten worse, even she has difficulty understanding what he is saying.   · Urine culture grew >100,000 Aerococcus urinae  · Currently off restraints  · Continues to be medically stable      Plan:  · Continue to treat UTI with ceftriaxone 1g every 24 hours on day 3  · Depakote sprinkles 250 mg TID  · Mirtazapine 7.5 mg at bedtime  · Tylenol 975 mg TID for chronic pain management  · Zyprexa 2.5 mg as needed for combative behavior unable to be controlled with first-line alternative measures  · Frequent reorientation  · Fall precautions  · Have OT PT evaluation, try to provide frequent and early mobilization  · Minimize sleep-wake cycle, avoid nighttime interruptions  · Attempt to group overnight vitals/labs/nursing checks as possible  · Dim lights, close blinds turn off TV to minimize stimulation  · We will provide adequate pain control with tylenol 975 mg TID  · Avoid urinary retention and constipation  · Avoid medications that may worsen or precipitate delirium such as tramadol, benzodiazepines, anticholinergics, and Benadryl  · Wife would like patient to discharge to [place]   · We will continue to monitor  · Medically stable for DC    Stage 3b chronic kidney disease [facility])  Assessment & Plan  Lab Results   Component Value Date    EGFR 51 07/28/2023    EGFR 56 07/27/2023    EGFR 53 07/26/2023    CREATININE 1.25 07/28/2023    CREATININE 1.15 07/27/2023    CREATININE 1.21 07/26/2023   · History CKD stage IIIb  · Baseline seems to be from 1.3-1.5, currently at baseline  · BMP a.m.,  · Avoid nephrotoxin, avoid hypotension    Benign prostatic hyperplasia with urinary frequency  Assessment & Plan  · History of BPH with urinary frequency  · Most recently taking of finasteride  · Avoid oxybutynin 10 mg if not needed  · Continue urinary retention protocol    Controlled type 2 diabetes mellitus without complication, without long-term current use of insulin [facility])  Assessment & Plan  Lab Results   Component Value Date    HGBA1C 5.8 03/17/2023       Recent Labs     07/27/23  1235 07/27/23  1550 07/28/23  0815 07/28/23  1119   POCGLU 117 143* 113 308*       Blood Sugar Average: Last 72 hrs:  · Recently taken off metformin  · Continue sliding scale and glucose check per protocol  · Blood glucose levels have been stable    Hypertension  Assessment & Plan  Assessment:  · History of hypertension  · Most recently taken off lisinopril due to level hypertension goal on previous admission  Blood Pressure: 161/84    Plan:  · Continue home amlodipine 5 mg once a day    * Toxic metabolic encephalopathy  Assessment & Plan  · Toxic metabolic encephalopathy likely secondary to UTI given findings on recent urine culture showing over 100,000 colony-forming units of Aerococcus urinae  · POA altered mental status, combative, agitation decreased p.o. intake. No fevers, no chills, no sick contacts  · Recently discharged to [facility] rehab from admission [address] [id number] [place] 07/03/2023 due to the same. · Per spouse patient has progressively declined over the past 2 months. · Most recent lab work on 06/21/2023 TSH unremarkable, vitamin B12 unremarkable  · Folate unremarkable  · At the ED CT head no acute intraparenchymal abnormalities  · UA significant for pyuria with WBCs 10-24, occasional bacteria although patient denies any symptoms at this time.   · Likely in the setting of Alzheimer's dementia with progression of disease versus metabolic in the setting of pyuria versus slight dehydration on exam.  · Patient is less combative this morning and more cooperative with exam  · Currently off restraints and off one to one  · Continues to be medically stable    Plan:  · Continue IV ceftriaxone as it has good penetration for Aerococcus urinae . · Delirium precautions  · Try to keep him off restraints  · Fall precautions                   VTE Pharmacologic Prophylaxis: VTE Score: 5 Moderate Risk (Score 3-4) - Pharmacological DVT Prophylaxis Ordered: heparin. Patient Centered Rounds: I performed bedside rounds with nursing staff today. Discussions with Specialists or Other Care Team Provider: Discussed with nursing. Education and Discussions with Family / Patient: updated wife yesterday she is aware of plan . Total Time Spent on Date of Encounter in care of patient: 35 minutes This time was spent on one or more of the following: performing physical exam; counseling and coordination of care; obtaining or reviewing history; documenting in the medical record; reviewing/ordering tests, medications or procedures; communicating with other healthcare professionals and discussing with patient's family/caregivers. Current Length of Stay: 5 day(s)  Current Patient Status: Inpatient   Certification Statement: The patient will continue to require additional inpatient hospital stay due to from a medical standpoint stable however barriers to DC include patient needing restraints and 1 to 1. Will discuss with CM   Discharge Plan: as above medically stable however still delirium on dementia     Code Status: Level 3 - DNAR and DNI    Subjective:   Patient seen and examined   Calm today   No complaints but also not saying anything     Objective:     Vitals:   Temp (24hrs), Av.7 °F (36.5 °C), Min:97.4 °F (36.3 °C), Max:98 °F (36.7 °C)    Temp:  [97.4 °F (36.3 °C)-98 °F (36.7 °C)] 97.4 °F (36.3 °C)  HR:  [65-77] 77  Resp:  [17] 17  BP: (142-161)/(59-84) 161/84  SpO2:  [92 %] 92 %  Body mass index is 25.81 kg/m². Input and Output Summary (last 24 hours):      Intake/Output Summary (Last 24 hours) at 2023 1252  Last data filed at 2023 2100  Gross per 24 hour   Intake 0 ml   Output --   Net 0 ml       Physical Exam:   Physical Exam  Constitutional:       General: He is not in acute distress. Appearance: He is ill-appearing (chronically ). He is not toxic-appearing or diaphoretic. Eyes:      Pupils: Pupils are equal, round, and reactive to light. Cardiovascular:      Rate and Rhythm: Normal rate. Heart sounds: No murmur heard. No friction rub. No gallop. Pulmonary:      Effort: No respiratory distress. Breath sounds: No stridor. No wheezing, rhonchi or rales. Chest:      Chest wall: No tenderness. Abdominal:      General: Abdomen is flat. There is no distension. Palpations: There is no mass. Tenderness: There is no abdominal tenderness. There is no right CVA tenderness, left CVA tenderness or guarding. Hernia: No hernia is present. Neurological:      Mental Status: He is alert. Psychiatric:      Comments: Varies. Calm now . Additional Data:     Labs:  Results from last 7 days   Lab Units 07/28/23  0528   WBC Thousand/uL 4.80   HEMOGLOBIN g/dL 14.9   HEMATOCRIT % 44.1   PLATELETS Thousands/uL 199   NEUTROS PCT % 54   LYMPHS PCT % 29   MONOS PCT % 12   EOS PCT % 4     Results from last 7 days   Lab Units 07/28/23  0528 07/26/23  0535 07/24/23  0649   SODIUM mmol/L 139   < > 142   POTASSIUM mmol/L 3.9   < > 4.1   CHLORIDE mmol/L 106   < > 105   CO2 mmol/L 28   < > 31   BUN mg/dL 27*   < > 26*   CREATININE mg/dL 1.25   < > 1.33*   ANION GAP mmol/L 5   < > 6   CALCIUM mg/dL 9.2   < > 9.7   ALBUMIN g/dL  --   --  3.8   TOTAL BILIRUBIN mg/dL  --   --  0.49   ALK PHOS U/L  --   --  45   ALT U/L  --   --  19   AST U/L  --   --  14   GLUCOSE RANDOM mg/dL 106   < > 105    < > = values in this interval not displayed.          Results from last 7 days   Lab Units 07/29/23  1228 07/29/23  0831 07/28/23  2219 07/28/23  1609 07/28/23  1119 07/28/23  0815 07/27/23  1550 07/27/23  1235 07/27/23  0744 07/26/23  2135 07/26/23  1801 07/26/23  1105   POC GLUCOSE mg/dl 130 121 133 153* 308* 113 143* 117 120 145* 158* 136               Lines/Drains:  Invasive Devices     Peripheral Intravenous Line  Duration           Peripheral IV 07/24/23 Left;Ventral (anterior) Forearm 5 days    Peripheral IV 07/29/23 Dorsal (posterior); Left Hand <1 day                      Imaging: No pertinent imaging reviewed. Recent Cultures (last 7 days):   Results from last 7 days   Lab Units 07/24/23  0649   URINE CULTURE  >100,000 cfu/ml Aerococcus urinae*       Last 24 Hours Medication List:   Current Facility-Administered Medications   Medication Dose Route Frequency Provider Last Rate   • acetaminophen  650 mg Oral Q6H PRN Cris Thurman MD     • acetaminophen  975 mg Oral Q8H Dennis Nicholas MD     • amLODIPine  5 mg Oral Daily Cris Thurman MD     • cefTRIAXone  1,000 mg Intravenous Q24H Cris Thurman MD 1,000 mg (07/29/23 0913)   • cyanocobalamin  1,000 mcg Oral Daily Cris Thurman MD     • divalproex sodium  250 mg Oral [facility] Dennis Nicholas MD     • enoxaparin  40 mg Subcutaneous Daily Cris Thurman MD     • insulin lispro  1-6 Units Subcutaneous TID [address] [facility] [place] Arthur Garza MD     • melatonin  3 mg Oral HS PRN Cris Thurman MD     • mirtazapine  7.5 mg Oral HS Dennis Nicholas MD     • OLANZapine  2.5 mg Intramuscular Q12H PRN Dennis Nicholas MD     • senna  8.6 mg Oral HS Cris Thurman MD          Today, Patient Was Seen By: Khadra Power MD    **Please Note: This note may have been constructed using a voice recognition system. **

## 2023-07-29 NOTE — ASSESSMENT & PLAN NOTE
Lab Results   Component Value Date    EGFR 51 07/28/2023    EGFR 56 07/27/2023    EGFR 53 07/26/2023    CREATININE 1.25 07/28/2023    CREATININE 1.15 07/27/2023    CREATININE 1.21 07/26/2023   · History CKD stage IIIb  · Baseline seems to be from 1.3-1.5, currently at baseline  · BMP a.m.,  · Avoid nephrotoxin, avoid hypotension

## 2023-07-29 NOTE — PLAN OF CARE
Problem: SAFETY,RESTRAINT: NV/NON-SELF DESTRUCTIVE BEHAVIOR  Goal: Remains free of harm/injury (restraint for non violent/non self-detsructive behavior)  Description: INTERVENTIONS:  - Instruct patient/family regarding restraint use   - Assess and monitor physiologic and psychological status   - Provide interventions and comfort measures to meet assessed patient needs   - Identify and implement measures to help patient regain control  - Assess readiness for release of restraint   Outcome: Progressing  Goal: Returns to optimal restraint-free functioning  Description: INTERVENTIONS:  - Assess the patient's behavior and symptoms that indicate continued need for restraint  - Identify and implement measures to help patient regain control  - Assess readiness for release of restraint   Outcome: Progressing     Problem: MOBILITY - ADULT  Goal: Maintain or return to baseline ADL function  Description: INTERVENTIONS:  -  Assess patient's ability to carry out ADLs; assess patient's baseline for ADL function and identify physical deficits which impact ability to perform ADLs (bathing, care of mouth/teeth, toileting, grooming, dressing, etc.)  - Assess/evaluate cause of self-care deficits   - Assess range of motion  - Assess patient's mobility; develop plan if impaired  - Assess patient's need for assistive devices and provide as appropriate  - Encourage maximum independence but intervene and supervise when necessary  - Involve family in performance of ADLs  - Assess for home care needs following discharge   - Consider OT consult to assist with ADL evaluation and planning for discharge  - Provide patient education as appropriate  Outcome: Progressing  Goal: Maintains/Returns to pre admission functional level  Description: INTERVENTIONS:  - Perform BMAT or MOVE assessment daily.   - Set and communicate daily mobility goal to care team and patient/family/caregiver.    - Collaborate with rehabilitation services on mobility goals if consulted  - Perform Range of Motion   - Reposition patient   - Dangle patient   - Stand patient   - Ambulate patient   - Out of bed to chair    - Out of bed for meals   - Out of bed for toileting  - Record patient progress and toleration of activity level   Outcome: Progressing     Problem: Nutrition/Hydration-ADULT  Goal: Nutrient/Hydration intake appropriate for improving, restoring or maintaining nutritional needs  Description: Monitor and assess patient's nutrition/hydration status for malnutrition. Collaborate with interdisciplinary team and initiate plan and interventions as ordered. Monitor patient's weight and dietary intake as ordered or per policy. Utilize nutrition screening tool and intervene as necessary. Determine patient's food preferences and provide high-protein, high-caloric foods as appropriate.      INTERVENTIONS:  - Monitor oral intake, urinary output, labs, and treatment plans  - Assess nutrition and hydration status and recommend course of action  - Evaluate amount of meals eaten  - Assist patient with eating if necessary   - Allow adequate time for meals  - Recommend/ encourage appropriate diets, oral nutritional supplements, and vitamin/mineral supplements  - Order, calculate, and assess calorie counts as needed  - Recommend, monitor, and adjust tube feedings and TPN/PPN based on assessed needs  - Assess need for intravenous fluids  - Provide specific nutrition/hydration education as appropriate  - Include patient/family/caregiver in decisions related to nutrition  Outcome: Progressing     Problem: Prexisting or High Potential for Compromised Skin Integrity  Goal: Skin integrity is maintained or improved  Description: INTERVENTIONS:  - Identify patients at risk for skin breakdown  - Assess and monitor skin integrity  - Assess and monitor nutrition and hydration status  - Monitor labs   - Assess for incontinence   - Turn and reposition patient  - Assist with mobility/ambulation  - Relieve pressure over bony prominences  - Avoid friction and shearing  - Provide appropriate hygiene as needed including keeping skin clean and dry  - Evaluate need for skin moisturizer/barrier cream  - Collaborate with interdisciplinary team   - Patient/family teaching  - Consider wound care consult   Outcome: Progressing

## 2023-07-29 NOTE — PROGRESS NOTES
When approached for morning blood work, patient became very agitated and physically aggressive towards staff. Multiple attempts made, all were  Ineffective. Emotional support provided. Patient currently resting in bed, and all safety measures in place.

## 2023-07-29 NOTE — PLAN OF CARE
Problem: SAFETY,RESTRAINT: NV/NON-SELF DESTRUCTIVE BEHAVIOR  Goal: Remains free of harm/injury (restraint for non violent/non self-detsructive behavior)  Description: INTERVENTIONS:  - Instruct patient/family regarding restraint use   - Assess and monitor physiologic and psychological status   - Provide interventions and comfort measures to meet assessed patient needs   - Identify and implement measures to help patient regain control  - Assess readiness for release of restraint   Outcome: Progressing  Goal: Returns to optimal restraint-free functioning  Description: INTERVENTIONS:  - Assess the patient's behavior and symptoms that indicate continued need for restraint  - Identify and implement measures to help patient regain control  - Assess readiness for release of restraint   Outcome: Progressing     Problem: MOBILITY - ADULT  Goal: Maintain or return to baseline ADL function  Description: INTERVENTIONS:  -  Assess patient's ability to carry out ADLs; assess patient's baseline for ADL function and identify physical deficits which impact ability to perform ADLs (bathing, care of mouth/teeth, toileting, grooming, dressing, etc.)  - Assess/evaluate cause of self-care deficits   - Assess range of motion  - Assess patient's mobility; develop plan if impaired  - Assess patient's need for assistive devices and provide as appropriate  - Encourage maximum independence but intervene and supervise when necessary  - Involve family in performance of ADLs  - Assess for home care needs following discharge   - Consider OT consult to assist with ADL evaluation and planning for discharge  - Provide patient education as appropriate  Outcome: Progressing  Goal: Maintains/Returns to pre admission functional level  Description: INTERVENTIONS:  - Perform BMAT or MOVE assessment daily.   - Set and communicate daily mobility goal to care team and patient/family/caregiver.    - Collaborate with rehabilitation services on mobility goals if consulted  - Perform Range of Motion 3 times a day. - Reposition patient every 2 hours. - Dangle patient 3 times a day  - Stand patient 3 times a day  - Ambulate patient 3 times a day  - Out of bed to chair 3 times a day   - Out of bed for meals 3 times a day  - Out of bed for toileting  - Record patient progress and toleration of activity level   Outcome: Progressing     Problem: Nutrition/Hydration-ADULT  Goal: Nutrient/Hydration intake appropriate for improving, restoring or maintaining nutritional needs  Description: Monitor and assess patient's nutrition/hydration status for malnutrition. Collaborate with interdisciplinary team and initiate plan and interventions as ordered. Monitor patient's weight and dietary intake as ordered or per policy. Utilize nutrition screening tool and intervene as necessary. Determine patient's food preferences and provide high-protein, high-caloric foods as appropriate.      INTERVENTIONS:  - Monitor oral intake, urinary output, labs, and treatment plans  - Assess nutrition and hydration status and recommend course of action  - Evaluate amount of meals eaten  - Assist patient with eating if necessary   - Allow adequate time for meals  - Recommend/ encourage appropriate diets, oral nutritional supplements, and vitamin/mineral supplements  - Order, calculate, and assess calorie counts as needed  - Assess need for intravenous fluids  - Provide specific nutrition/hydration education as appropriate  - Include patient/family/caregiver in decisions related to nutrition  Outcome: Progressing     Problem: Prexisting or High Potential for Compromised Skin Integrity  Goal: Skin integrity is maintained or improved  Description: INTERVENTIONS:  - Identify patients at risk for skin breakdown  - Assess and monitor skin integrity  - Assess and monitor nutrition and hydration status  - Monitor labs   - Assess for incontinence   - Turn and reposition patient  - Assist with mobility/ambulation  - Relieve pressure over bony prominences  - Avoid friction and shearing  - Provide appropriate hygiene as needed including keeping skin clean and dry  - Evaluate need for skin moisturizer/barrier cream  - Collaborate with interdisciplinary team   - Patient/family teaching  - Consider wound care consult   Outcome: Progressing

## 2023-07-30 LAB
ANION GAP SERPL CALCULATED.3IONS-SCNC: 7 MMOL/L
BASOPHILS # BLD AUTO: 0.03 THOUSANDS/ÂΜL (ref 0–0.1)
BASOPHILS NFR BLD AUTO: 1 % (ref 0–1)
BUN SERPL-MCNC: 34 MG/DL (ref 5–25)
CALCIUM SERPL-MCNC: 9.7 MG/DL (ref 8.4–10.2)
CHLORIDE SERPL-SCNC: 107 MMOL/L (ref 96–108)
CO2 SERPL-SCNC: 28 MMOL/L (ref 21–32)
CREAT SERPL-MCNC: 1.3 MG/DL (ref 0.6–1.3)
EOSINOPHIL # BLD AUTO: 0.17 THOUSAND/ÂΜL (ref 0–0.61)
EOSINOPHIL NFR BLD AUTO: 4 % (ref 0–6)
ERYTHROCYTE [DISTWIDTH] IN BLOOD BY AUTOMATED COUNT: 12.9 % (ref 11.6–15.1)
GFR SERPL CREATININE-BSD FRML MDRD: 49 ML/MIN/1.73SQ M
GLUCOSE SERPL-MCNC: 111 MG/DL (ref 65–140)
GLUCOSE SERPL-MCNC: 112 MG/DL (ref 65–140)
GLUCOSE SERPL-MCNC: 123 MG/DL (ref 65–140)
GLUCOSE SERPL-MCNC: 187 MG/DL (ref 65–140)
GLUCOSE SERPL-MCNC: 266 MG/DL (ref 65–140)
HCT VFR BLD AUTO: 41.6 % (ref 36.5–49.3)
HGB BLD-MCNC: 13.8 G/DL (ref 12–17)
IMM GRANULOCYTES # BLD AUTO: 0.01 THOUSAND/UL (ref 0–0.2)
IMM GRANULOCYTES NFR BLD AUTO: 0 % (ref 0–2)
LYMPHOCYTES # BLD AUTO: 1.42 THOUSANDS/ÂΜL (ref 0.6–4.47)
LYMPHOCYTES NFR BLD AUTO: 30 % (ref 14–44)
MCH RBC QN AUTO: 31.2 PG (ref 26.8–34.3)
MCHC RBC AUTO-ENTMCNC: 33.2 G/DL (ref 31.4–37.4)
MCV RBC AUTO: 94 FL (ref 82–98)
MONOCYTES # BLD AUTO: 0.58 THOUSAND/ÂΜL (ref 0.17–1.22)
MONOCYTES NFR BLD AUTO: 12 % (ref 4–12)
NEUTROPHILS # BLD AUTO: 2.57 THOUSANDS/ÂΜL (ref 1.85–7.62)
NEUTS SEG NFR BLD AUTO: 53 % (ref 43–75)
NRBC BLD AUTO-RTO: 0 /100 WBCS
PLATELET # BLD AUTO: 214 THOUSANDS/UL (ref 149–390)
PMV BLD AUTO: 10.4 FL (ref 8.9–12.7)
POTASSIUM SERPL-SCNC: 4 MMOL/L (ref 3.5–5.3)
RBC # BLD AUTO: 4.42 MILLION/UL (ref 3.88–5.62)
SODIUM SERPL-SCNC: 142 MMOL/L (ref 135–147)
WBC # BLD AUTO: 4.78 THOUSAND/UL (ref 4.31–10.16)

## 2023-07-30 PROCEDURE — 99232 SBSQ HOSP IP/OBS MODERATE 35: CPT | Performed by: INTERNAL MEDICINE

## 2023-07-30 PROCEDURE — 80048 BASIC METABOLIC PNL TOTAL CA: CPT

## 2023-07-30 PROCEDURE — 85025 COMPLETE CBC W/AUTO DIFF WBC: CPT

## 2023-07-30 PROCEDURE — 82948 REAGENT STRIP/BLOOD GLUCOSE: CPT

## 2023-07-30 RX ORDER — MIRTAZAPINE 15 MG/1
15 TABLET, FILM COATED ORAL
Status: DISCONTINUED | OUTPATIENT
Start: 2023-07-30 | End: 2023-08-01 | Stop reason: HOSPADM

## 2023-07-30 RX ORDER — MIRTAZAPINE 15 MG/1
15 TABLET, FILM COATED ORAL
Status: DISCONTINUED | OUTPATIENT
Start: 2023-07-30 | End: 2023-07-30

## 2023-07-30 RX ADMIN — INSULIN LISPRO 1 UNITS: 100 INJECTION, SOLUTION INTRAVENOUS; SUBCUTANEOUS at 12:34

## 2023-07-30 RX ADMIN — INSULIN LISPRO 3 UNITS: 100 INJECTION, SOLUTION INTRAVENOUS; SUBCUTANEOUS at 17:36

## 2023-07-30 RX ADMIN — CEFTRIAXONE 1000 MG: 2 INJECTION, POWDER, FOR SOLUTION INTRAMUSCULAR; INTRAVENOUS at 07:37

## 2023-07-30 RX ADMIN — DIVALPROEX SODIUM 250 MG: 125 CAPSULE, COATED PELLETS ORAL at 21:50

## 2023-07-30 RX ADMIN — ACETAMINOPHEN 975 MG: 325 TABLET, FILM COATED ORAL at 13:47

## 2023-07-30 RX ADMIN — ACETAMINOPHEN 975 MG: 325 TABLET, FILM COATED ORAL at 21:49

## 2023-07-30 RX ADMIN — ENOXAPARIN SODIUM 40 MG: 40 INJECTION SUBCUTANEOUS at 08:42

## 2023-07-30 RX ADMIN — DIVALPROEX SODIUM 250 MG: 125 CAPSULE, COATED PELLETS ORAL at 06:03

## 2023-07-30 RX ADMIN — AMLODIPINE BESYLATE 5 MG: 5 TABLET ORAL at 08:42

## 2023-07-30 RX ADMIN — MIRTAZAPINE 15 MG: 15 TABLET, FILM COATED ORAL at 17:36

## 2023-07-30 RX ADMIN — DIVALPROEX SODIUM 250 MG: 125 CAPSULE, COATED PELLETS ORAL at 13:47

## 2023-07-30 RX ADMIN — CYANOCOBALAMIN TAB 500 MCG 1000 MCG: 500 TAB at 08:42

## 2023-07-30 RX ADMIN — SENNOSIDES 8.6 MG: 8.6 TABLET, FILM COATED ORAL at 21:49

## 2023-07-30 NOTE — ASSESSMENT & PLAN NOTE
· Toxic metabolic encephalopathy likely secondary to UTI given findings on recent urine culture showing over 100,000 colony-forming units of Aerococcus urinae  · POA altered mental status, combative, agitation decreased p.o. intake. No fevers, no chills, no sick contacts  · Recently discharged to [facility] rehab from admission [address] [id number] [place] 07/03/2023 due to the same. · Per spouse patient has progressively declined over the past 2 months. · Most recent lab work on 06/21/2023 TSH unremarkable, vitamin B12 unremarkable  · Folate unremarkable  · At the ED CT head no acute intraparenchymal abnormalities  · UA significant for pyuria with WBCs 10-24, occasional bacteria although patient denies any symptoms at this time. · Likely in the setting of Alzheimer's dementia with progression of disease versus metabolic in the setting of pyuria versus slight dehydration on exam.  · Patient was more combative and agitated last night fighting with nurses, attempting to get out of bed, and spitting at nurses.   · Currently off restraints in his chair    Plan:  · Delirium precautions  · Try to keep him off restraints  · Fall precautions  · Discontinued IV ceftriaxone (7 days completed on 7/31/23)   · Increased mirtazapine to 15 mg daily at 6:30 PM  · Follow up on Depakote levels

## 2023-07-30 NOTE — ASSESSMENT & PLAN NOTE
Lab Results   Component Value Date    HGBA1C 5.8 03/17/2023       Recent Labs     07/29/23  1228 07/29/23  1554 07/29/23  2133 07/30/23  0753   POCGLU 130 185* 130 123       Blood Sugar Average: Last 72 hrs:  · Recently taken off metformin  · Continue sliding scale and glucose check per protocol  · Blood glucose levels have been stable

## 2023-07-30 NOTE — PROGRESS NOTES
[facility]  Progress Note  Name: Leilani Garza  MRN: [id number]  Unit/Bed#: S -01 I Date of Admission: 2023   Date of Service: 2023 I Hospital Day: 6    Assessment/Plan   Acute cystitis without hematuria  Assessment & Plan  Assessment:  · At the ED UA significant for small leukocytes, microscopy 10-20 WBCs, occasional bacteria  · At the ED received 1 dose of ceftriaxone due to toxic metabolic encephalopathy 2/2 UTI. · Cultures grew >100,000 CFU aerococcus urinae susceptible to most penicillins, cephalosporins, and nitrofurantoin  · Continues to be medically stable    Plan:  · Continue ceftriaxone 1 g once a day, currently on day 5  · Tylenol 975 mg 3 times daily for chronic pain  · CBC a.m.  · Monitor fever curves        * Toxic metabolic encephalopathy  Assessment & Plan  · Toxic metabolic encephalopathy likely secondary to UTI given findings on recent urine culture showing over 100,000 colony-forming units of Aerococcus urinae  · POA altered mental status, combative, agitation decreased p.o. intake. No fevers, no chills, no sick contacts  · Recently discharged to [facility] rehab from admission [address] [place] [address] 2023 due to the same. · Per spouse patient has progressively declined over the past 2 months. · Most recent lab work on 2023 TSH unremarkable, vitamin B12 unremarkable  · Folate unremarkable  · At the ED CT head no acute intraparenchymal abnormalities  · UA significant for pyuria with WBCs 10-24, occasional bacteria although patient denies any symptoms at this time. · Likely in the setting of Alzheimer's dementia with progression of disease versus metabolic in the setting of pyuria versus slight dehydration on exam.  · Patient was more combative and agitated last night fighting with nurses, attempting to get out of bed, and spitting at nurses.   · Currently off restraints in his chair    Plan:  · Continue IV ceftriaxone as it has good penetration for Aerococcus urinae . 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 · Wife reports that he was not consistently taking his medications at his facility this is likely the reason for his suboptimal Depakote level at 24  · Wife also reports that patient's aphasia is not new and has actually progressively gotten worse, even she has difficulty understanding what he is saying. · Urine culture grew >100,000 Aerococcus urinae  · Patient was more combative and agitated last night fighting with nurses, attempting to get out of bed, and spitting at nurses.   · Was off restraints in the afternoon    Plan:  · Continue to treat UTI with ceftriaxone 1g every 24 hours on day 3  · Depakote sprinkles 250 mg TID  · Mirtazapine increased to 15 mg daily scheduled today to be given at 6:30 PM  · Tylenol 975 mg TID for chronic pain management  · Zyprexa 2.5 mg as needed for combative behavior unable to be controlled with first-line alternative measures  · Frequent reorientation  · Fall precautions  · Have OT PT evaluation, try to provide frequent and early mobilization  · Minimize sleep-wake cycle, avoid nighttime interruptions  · Attempt to group overnight vitals/labs/nursing checks as possible  · Dim lights, close blinds turn off TV to minimize stimulation  · We will provide adequate pain control with tylenol 975 mg TID  · Avoid urinary retention and constipation  · Avoid medications that may worsen or precipitate delirium such as tramadol, benzodiazepines, anticholinergics, and Benadryl  · Wife would like patient to discharge [address]   · We will continue to monitor  · Medically stable for DC    Bradycardia  Assessment & Plan  · POA heart rate 54  · ECG sinus bradycardia, heart rate 56 with PACs and poor R wave progression. · Patient asymptomatic  · Avoid AV ben blocker.     Ambulatory dysfunction  Assessment & Plan  Assessment:  · At a baseline ambulates with rolling walker  • OT recommends he would benefit from OT in acute care 2-3X / week to max functional independence  • PT was able to work with him July 28th and they recommend post acute rehab (vs transition to higher level of care w/ skilled PT services)post acute rehab (vs transition to higher level of care w/ skilled PT services)       Plan:  • Fall precautions  • Out of bed as tolerated  • Encourage early and frequent mobilization  • Encourage adequate hydration and nutrition  • Provide adequate pain management   • Goal is long-term care placement  • Continue with PT/OT for continued strength and balance training     Stage 3b chronic kidney disease [facility])  Assessment & Plan  Lab Results   Component Value Date    EGFR 49 07/30/2023    EGFR 51 07/28/2023    EGFR 56 07/27/2023    CREATININE 1.30 07/30/2023    CREATININE 1.25 07/28/2023    CREATININE 1.15 07/27/2023   · History CKD stage IIIb  · Baseline seems to be from 1.3-1.5, currently at baseline  · BMP a.m.,  · Avoid nephrotoxin, avoid hypotension    Benign prostatic hyperplasia with urinary frequency  Assessment & Plan  · History of BPH with urinary frequency  · Most recently taking of finasteride  · Avoid oxybutynin 10 mg if not needed  · Continue urinary retention protocol    Controlled type 2 diabetes mellitus without complication, without long-term current use of insulin [facility])  Assessment & Plan  Lab Results   Component Value Date    HGBA1C 5.8 03/17/2023       Recent Labs     07/29/23  1554 07/29/23  4903 23  0753 23  1123   POCGLU 185* 130 123 187*       Blood Sugar Average: Last 72 hrs:  · Recently taken off metformin  · Continue sliding scale and glucose check per protocol  · Blood glucose levels have been stable    Hypertension  Assessment & Plan  Assessment:  · History of hypertension  · Most recently taken off lisinopril due to level hypertension goal on previous admission  Blood Pressure: 156/82    Plan:  · Continue home amlodipine 5 mg once a day         VTE Pharmacologic Prophylaxis: VTE Score: 5 High Risk (Score >/= 5) - Pharmacological DVT Prophylaxis Ordered: enoxaparin (Lovenox). Sequential Compression Devices Ordered. Patient Centered Rounds: I performed bedside rounds with nursing staff today. Discussions with Specialists or Other Care Team Provider: Gerontology     Education and Discussions with Family / Patient: Updated  (wife) via phone. Current Length of Stay: 6 day(s)  Current Patient Status: Inpatient   Discharge Plan: Anticipate discharge in 24-48 hrs to Dementia Facility    Code Status: Level 3 - DNAR and DNI    Subjective:   Overnight patient was reportedly extremely combative and agitated and attempting to get out of his bed. He was trying to punch and spit at nurses. A joint decision was made to place patient back on restraints. Currently, on my assessment he is sleepy and calm. When asked how he is feeling he states quietly that he "is feeling okay."    Objective:     Vitals:   Temp (24hrs), Av.9 °F (36.6 °C), Min:97.3 °F (36.3 °C), Max:98.5 °F (36.9 °C)    Temp:  [97.3 °F (36.3 °C)-98.5 °F (36.9 °C)] 97.3 °F (36.3 °C)  HR:  [80] 80  Resp:  [16-17] 17  BP: (147-156)/(82-92) 156/82  SpO2:  [97 %] 97 %  Body mass index is 25.81 kg/m². Input and Output Summary (last 24 hours):   No intake or output data in the 24 hours ending 23    Physical Exam:   Physical Exam  Vitals and nursing note reviewed.    Constitutional:       General: He is not in acute distress. Appearance: He is not ill-appearing, toxic-appearing or diaphoretic. Comments: He is sleepy   Eyes:      Extraocular Movements: Extraocular movements intact. Conjunctiva/sclera: Conjunctivae normal.   Cardiovascular:      Rate and Rhythm: Normal rate and regular rhythm. Pulses: Normal pulses. Heart sounds: Normal heart sounds. No murmur heard. No friction rub. Pulmonary:      Effort: Pulmonary effort is normal.      Breath sounds: Normal breath sounds. No wheezing, rhonchi or rales. Abdominal:      Palpations: Abdomen is soft. Tenderness: There is no abdominal tenderness. There is no guarding. Musculoskeletal:      Right lower leg: No edema. Left lower leg: No edema. Skin:     General: Skin is warm. Neurological:      Comments: Oriented to self     Psychiatric:      Comments: Severely demented. Additional Data:     Labs:  Results from last 7 days   Lab Units 07/30/23  0542   WBC Thousand/uL 4.78   HEMOGLOBIN g/dL 13.8   HEMATOCRIT % 41.6   PLATELETS Thousands/uL 214   NEUTROS PCT % 53   LYMPHS PCT % 30   MONOS PCT % 12   EOS PCT % 4     Results from last 7 days   Lab Units 07/30/23  0542 07/26/23  0535 07/24/23  0649   SODIUM mmol/L 142   < > 142   POTASSIUM mmol/L 4.0   < > 4.1   CHLORIDE mmol/L 107   < > 105   CO2 mmol/L 28   < > 31   BUN mg/dL 34*   < > 26*   CREATININE mg/dL 1.30   < > 1.33*   ANION GAP mmol/L 7   < > 6   CALCIUM mg/dL 9.7   < > 9.7   ALBUMIN g/dL  --   --  3.8   TOTAL BILIRUBIN mg/dL  --   --  0.49   ALK PHOS U/L  --   --  45   ALT U/L  --   --  19   AST U/L  --   --  14   GLUCOSE RANDOM mg/dL 112   < > 105    < > = values in this interval not displayed.          Results from last 7 days   Lab Units 07/30/23  1546 07/30/23  1123 07/30/23  0753 07/29/23  2133 07/29/23  1554 07/29/23  1228 07/29/23  0831 07/28/23  2219 07/28/23  1609 07/28/23  1119 07/28/23  0815 07/27/23  1550   POC GLUCOSE mg/dl 266* 187* 123 130 185* 130 121 133 153* 308* 113 143*               Lines/Drains:  Invasive Devices     Peripheral Intravenous Line  Duration           Peripheral IV 07/30/23 Right;Ventral (anterior) Forearm <1 day                Imaging: No pertinent imaging reviewed. Recent Cultures (last 7 days):   Results from last 7 days   Lab Units 07/24/23  0649   URINE CULTURE  >100,000 cfu/ml Aerococcus urinae*       Last 24 Hours Medication List:   Current Facility-Administered Medications   Medication Dose Route Frequency Provider Last Rate   • acetaminophen  650 mg Oral Q6H PRN George Nagel MD     • acetaminophen  975 mg Oral Q8H Nghia Glover MD     • amLODIPine  5 mg Oral Daily George Nagel MD     • cefTRIAXone  1,000 mg Intravenous Q24H George Nagel MD 1,000 mg (07/30/23 0737)   • cyanocobalamin  1,000 mcg Oral Daily George Nagel MD     • divalproex sodium  250 mg Oral [facility] Nghia Glover MD     • enoxaparin  40 mg Subcutaneous Daily George Nagel MD     • insulin lispro  1-6 Units Subcutaneous TID [address] MD     • melatonin  3 mg Oral HS PRN George Nagel MD     • mirtazapine  15 mg Oral HS Nghia Glover MD     • OLANZapine  2.5 mg Intramuscular Q12H PRN Nghia Glover MD     • senna  8.6 mg Oral HS George Nagel MD          Today, Patient Was Seen By: Nghia Glover MD    **Please Note: This note may have been constructed using a voice recognition system. **

## 2023-07-30 NOTE — PLAN OF CARE
Problem: SAFETY,RESTRAINT: NV/NON-SELF DESTRUCTIVE BEHAVIOR  Goal: Remains free of harm/injury (restraint for non violent/non self-detsructive behavior)  Description: INTERVENTIONS:  - Instruct patient/family regarding restraint use   - Assess and monitor physiologic and psychological status   - Provide interventions and comfort measures to meet assessed patient needs   - Identify and implement measures to help patient regain control  - Assess readiness for release of restraint   Outcome: Progressing  Goal: Returns to optimal restraint-free functioning  Description: INTERVENTIONS:  - Assess the patient's behavior and symptoms that indicate continued need for restraint  - Identify and implement measures to help patient regain control  - Assess readiness for release of restraint   Outcome: Progressing     Problem: MOBILITY - ADULT  Goal: Maintain or return to baseline ADL function  Description: INTERVENTIONS:  -  Assess patient's ability to carry out ADLs; assess patient's baseline for ADL function and identify physical deficits which impact ability to perform ADLs (bathing, care of mouth/teeth, toileting, grooming, dressing, etc.)  - Assess/evaluate cause of self-care deficits   - Assess range of motion  - Assess patient's mobility; develop plan if impaired  - Assess patient's need for assistive devices and provide as appropriate  - Encourage maximum independence but intervene and supervise when necessary  - Involve family in performance of ADLs  - Assess for home care needs following discharge   - Consider OT consult to assist with ADL evaluation and planning for discharge  - Provide patient education as appropriate  Outcome: Progressing  Goal: Maintains/Returns to pre admission functional level  Description: INTERVENTIONS:  - Perform BMAT or MOVE assessment daily.   - Set and communicate daily mobility goal to care team and patient/family/caregiver.    - Collaborate with rehabilitation services on mobility goals if consulted  - Out of bed for toileting  - Record patient progress and toleration of activity level   Outcome: Progressing     Problem: Nutrition/Hydration-ADULT  Goal: Nutrient/Hydration intake appropriate for improving, restoring or maintaining nutritional needs  Description: Monitor and assess patient's nutrition/hydration status for malnutrition. Collaborate with interdisciplinary team and initiate plan and interventions as ordered. Monitor patient's weight and dietary intake as ordered or per policy. Utilize nutrition screening tool and intervene as necessary. Determine patient's food preferences and provide high-protein, high-caloric foods as appropriate.      INTERVENTIONS:  - Monitor oral intake, urinary output, labs, and treatment plans  - Assess nutrition and hydration status and recommend course of action  - Evaluate amount of meals eaten  - Assist patient with eating if necessary   - Allow adequate time for meals  - Recommend/ encourage appropriate diets, oral nutritional supplements, and vitamin/mineral supplements  - Order, calculate, and assess calorie counts as needed  - Recommend, monitor, and adjust tube feedings and TPN/PPN based on assessed needs  - Assess need for intravenous fluids  - Provide specific nutrition/hydration education as appropriate  - Include patient/family/caregiver in decisions related to nutrition  Outcome: Progressing     Problem: Prexisting or High Potential for Compromised Skin Integrity  Goal: Skin integrity is maintained or improved  Description: INTERVENTIONS:  - Identify patients at risk for skin breakdown  - Assess and monitor skin integrity  - Assess and monitor nutrition and hydration status  - Monitor labs   - Assess for incontinence   - Turn and reposition patient  - Assist with mobility/ambulation  - Relieve pressure over bony prominences  - Avoid friction and shearing  - Provide appropriate hygiene as needed including keeping skin clean and dry  - Evaluate need for skin moisturizer/barrier cream  - Collaborate with interdisciplinary team   - Patient/family teaching  - Consider wound care consult   Outcome: Progressing No

## 2023-07-30 NOTE — ASSESSMENT & PLAN NOTE
Lab Results   Component Value Date    HGBA1C 5.8 03/17/2023       Recent Labs     07/30/23  1546 07/30/23  2111 07/31/23  0832 07/31/23  1139   POCGLU 266* 111 107 141*     Blood Sugar Average: Last 72 hrs:  · Recently taken off metformin  · Continue sliding scale and glucose check per protocol  · Blood glucose levels have been stable

## 2023-07-30 NOTE — ASSESSMENT & PLAN NOTE
Lab Results   Component Value Date    CREATININE 1.37 (H) 07/31/2023    CREATININE 1.30 07/30/2023    CREATININE 1.25 07/28/2023     · History CKD stage IIIb  · Baseline seems to be from 1.3-1.5, currently at baseline  · BMP a.m.,  · Avoid nephrotoxin, avoid hypotension

## 2023-07-30 NOTE — ASSESSMENT & PLAN NOTE
Assessment:  · At the ED UA significant for small leukocytes, microscopy 10-20 WBCs, occasional bacteria  · At the ED received 1 dose of ceftriaxone due to toxic metabolic encephalopathy 2/2 UTI.   · Cultures grew >100,000 CFU aerococcus urinae susceptible to most penicillins, cephalosporins, and nitrofurantoin  · Continues to be medically stable    Plan:  · Discontinued IV ceftriaxone (7 days completed on 7/31/23)   · Tylenol 975 mg 3 times daily for chronic pain  · CBC a.m.  · Monitor fever curves

## 2023-07-30 NOTE — ASSESSMENT & PLAN NOTE
Assessment:  · History of hypertension  · Most recently taken off lisinopril due to level hypertension goal on previous admission    Plan:  · Continue home amlodipine 5 mg once a day

## 2023-07-30 NOTE — ASSESSMENT & PLAN NOTE
Assessment:  · History Alzheimer's with psychotic disturbances  · PT/OT was unable to evaluate him right now as he is still under four-point restraints  · Most recently admitted on 06/20 and subsequently discharged on July 3, 2023 due to altered mental status. · Wife reports that he was not consistently taking his medications at his facility this is likely the reason for his suboptimal Depakote level at 24  · Wife also reports that patient's aphasia is not new and has actually progressively gotten worse, even she has difficulty understanding what he is saying. · Urine culture grew >100,000 Aerococcus urinae  · Patient was more combative and agitated last night fighting with nurses, attempting to get out of bed, and spitting at nurses.   · Was off restraints overnight (7/30/23)     Plan:  · Discontinued IV ceftriaxone (7 days completed on 7/31/23) Depakote sprinkles 250 mg TID  · Mirtazapine increased to 15 mg daily scheduled today to be given at 6:30 PM  · Tylenol 975 mg TID for chronic pain management  · Zyprexa 2.5 mg as needed for combative behavior unable to be controlled with first-line alternative measures  · Frequent reorientation  · Fall precautions  · Have OT PT evaluation, try to provide frequent and early mobilization  · Minimize sleep-wake cycle, avoid nighttime interruptions  · Attempt to group overnight vitals/labs/nursing checks as possible  · Dim lights, close blinds turn off TV to minimize stimulation  · We will provide adequate pain control with tylenol 975 mg TID  · Avoid urinary retention and constipation  · Avoid medications that may worsen or precipitate delirium such as tramadol, benzodiazepines, anticholinergics, and Benadryl  · Wife would like patient to discharge to [address]   · We will continue to monitor  · Medically stable for DC

## 2023-07-30 NOTE — ASSESSMENT & PLAN NOTE
Assessment:  · History Alzheimer's with psychotic disturbances  · PT/OT was unable to evaluate him right now as he is still under four-point restraints  · Most recently admitted on 06/20 and subsequently discharged on July 3, 2023 due to altered mental status. · Wife reports that he was not consistently taking his medications at his facility this is likely the reason for his suboptimal Depakote level at 24  · Wife also reports that patient's aphasia is not new and has actually progressively gotten worse, even she has difficulty understanding what he is saying. · Urine culture grew >100,000 Aerococcus urinae  · Patient was more combative and agitated last night fighting with nurses, attempting to get out of bed, and spitting at nurses.   · Currently sleepy and restrained in the AM      Plan:  · Continue to treat UTI with ceftriaxone 1g every 24 hours on day 3  · Depakote sprinkles 250 mg TID  · Mirtazapine 7.5 mg at bedtime; consider increasing to 15 mg  · Tylenol 975 mg TID for chronic pain management  · Zyprexa 2.5 mg as needed for combative behavior unable to be controlled with first-line alternative measures  · Frequent reorientation  · Fall precautions  · Have OT PT evaluation, try to provide frequent and early mobilization  · Minimize sleep-wake cycle, avoid nighttime interruptions  · Attempt to group overnight vitals/labs/nursing checks as possible  · Dim lights, close blinds turn off TV to minimize stimulation  · We will provide adequate pain control with tylenol 975 mg TID  · Avoid urinary retention and constipation  · Avoid medications that may worsen or precipitate delirium such as tramadol, benzodiazepines, anticholinergics, and Benadryl  · Wife would like patient to discharge to [facility]   · We will continue to monitor  · Medically stable for DC

## 2023-07-30 NOTE — ASSESSMENT & PLAN NOTE
· Toxic metabolic encephalopathy likely secondary to UTI given findings on recent urine culture showing over 100,000 colony-forming units of Aerococcus urinae  · POA altered mental status, combative, agitation decreased p.o. intake. No fevers, no chills, no sick contacts  · Recently discharged to [facility] rehab from admission [facility] 07/03/2023 due to the same. · Per spouse patient has progressively declined over the past 2 months. · Most recent lab work on 06/21/2023 TSH unremarkable, vitamin B12 unremarkable  · Folate unremarkable  · At the ED CT head no acute intraparenchymal abnormalities  · UA significant for pyuria with WBCs 10-24, occasional bacteria although patient denies any symptoms at this time. · Likely in the setting of Alzheimer's dementia with progression of disease versus metabolic in the setting of pyuria versus slight dehydration on exam.  · Patient was more combative and agitated last night fighting with nurses, attempting to get out of bed, and spitting at nurses. · Currently sleepy and with restrained in the AM    Plan:  · Continue IV ceftriaxone as it has good penetration for Aerococcus urinae .   · Delirium precautions  · Try to keep him off restraints  · Fall precautions  · Consider increasing mirtazapine to 15 mg nightly

## 2023-07-30 NOTE — ASSESSMENT & PLAN NOTE
Assessment:  · At a baseline ambulates with rolling walker  • OT recommends he would benefit from OT in acute care 2-3X / week to max functional independence  • PT was able to work with him July 28th and they recommend post acute rehab (vs transition to higher level of care w/ skilled PT services)post acute rehab (vs transition to higher level of care w/ skilled PT services)       Plan:  • Fall precautions  • Out of bed as tolerated  • Encourage early and frequent mobilization  • Encourage adequate hydration and nutrition  • Provide adequate pain management   • Goal is long-term care placement  • Continue with PT/OT for continued strength and balance training

## 2023-07-31 LAB
ANION GAP SERPL CALCULATED.3IONS-SCNC: 6 MMOL/L
BASOPHILS # BLD AUTO: 0.02 THOUSANDS/ÂΜL (ref 0–0.1)
BASOPHILS NFR BLD AUTO: 1 % (ref 0–1)
BUN SERPL-MCNC: 35 MG/DL (ref 5–25)
CALCIUM SERPL-MCNC: 9.2 MG/DL (ref 8.4–10.2)
CHLORIDE SERPL-SCNC: 108 MMOL/L (ref 96–108)
CO2 SERPL-SCNC: 29 MMOL/L (ref 21–32)
CREAT SERPL-MCNC: 1.37 MG/DL (ref 0.6–1.3)
EOSINOPHIL # BLD AUTO: 0.19 THOUSAND/ÂΜL (ref 0–0.61)
EOSINOPHIL NFR BLD AUTO: 5 % (ref 0–6)
ERYTHROCYTE [DISTWIDTH] IN BLOOD BY AUTOMATED COUNT: 13.5 % (ref 11.6–15.1)
GFR SERPL CREATININE-BSD FRML MDRD: 46 ML/MIN/1.73SQ M
GLUCOSE SERPL-MCNC: 107 MG/DL (ref 65–140)
GLUCOSE SERPL-MCNC: 117 MG/DL (ref 65–140)
GLUCOSE SERPL-MCNC: 141 MG/DL (ref 65–140)
GLUCOSE SERPL-MCNC: 152 MG/DL (ref 65–140)
GLUCOSE SERPL-MCNC: 188 MG/DL (ref 65–140)
HCT VFR BLD AUTO: 41.4 % (ref 36.5–49.3)
HGB BLD-MCNC: 13.6 G/DL (ref 12–17)
IMM GRANULOCYTES # BLD AUTO: 0.02 THOUSAND/UL (ref 0–0.2)
IMM GRANULOCYTES NFR BLD AUTO: 1 % (ref 0–2)
LYMPHOCYTES # BLD AUTO: 1.43 THOUSANDS/ÂΜL (ref 0.6–4.47)
LYMPHOCYTES NFR BLD AUTO: 36 % (ref 14–44)
MCH RBC QN AUTO: 31.6 PG (ref 26.8–34.3)
MCHC RBC AUTO-ENTMCNC: 32.9 G/DL (ref 31.4–37.4)
MCV RBC AUTO: 96 FL (ref 82–98)
MONOCYTES # BLD AUTO: 0.5 THOUSAND/ÂΜL (ref 0.17–1.22)
MONOCYTES NFR BLD AUTO: 13 % (ref 4–12)
NEUTROPHILS # BLD AUTO: 1.78 THOUSANDS/ÂΜL (ref 1.85–7.62)
NEUTS SEG NFR BLD AUTO: 44 % (ref 43–75)
NRBC BLD AUTO-RTO: 0 /100 WBCS
PLATELET # BLD AUTO: 192 THOUSANDS/UL (ref 149–390)
PMV BLD AUTO: 10.4 FL (ref 8.9–12.7)
POTASSIUM SERPL-SCNC: 4.1 MMOL/L (ref 3.5–5.3)
RBC # BLD AUTO: 4.3 MILLION/UL (ref 3.88–5.62)
SODIUM SERPL-SCNC: 143 MMOL/L (ref 135–147)
WBC # BLD AUTO: 3.94 THOUSAND/UL (ref 4.31–10.16)

## 2023-07-31 PROCEDURE — 80165 DIPROPYLACETIC ACID FREE: CPT

## 2023-07-31 PROCEDURE — 99232 SBSQ HOSP IP/OBS MODERATE 35: CPT | Performed by: INTERNAL MEDICINE

## 2023-07-31 PROCEDURE — 82948 REAGENT STRIP/BLOOD GLUCOSE: CPT

## 2023-07-31 PROCEDURE — 97116 GAIT TRAINING THERAPY: CPT

## 2023-07-31 PROCEDURE — 80048 BASIC METABOLIC PNL TOTAL CA: CPT

## 2023-07-31 PROCEDURE — 97535 SELF CARE MNGMENT TRAINING: CPT

## 2023-07-31 PROCEDURE — 99232 SBSQ HOSP IP/OBS MODERATE 35: CPT | Performed by: NURSE PRACTITIONER

## 2023-07-31 PROCEDURE — 97530 THERAPEUTIC ACTIVITIES: CPT

## 2023-07-31 PROCEDURE — 85025 COMPLETE CBC W/AUTO DIFF WBC: CPT

## 2023-07-31 RX ADMIN — CEFTRIAXONE 1000 MG: 2 INJECTION, POWDER, FOR SOLUTION INTRAMUSCULAR; INTRAVENOUS at 08:49

## 2023-07-31 RX ADMIN — MIRTAZAPINE 15 MG: 15 TABLET, FILM COATED ORAL at 20:09

## 2023-07-31 RX ADMIN — INSULIN LISPRO 1 UNITS: 100 INJECTION, SOLUTION INTRAVENOUS; SUBCUTANEOUS at 17:08

## 2023-07-31 RX ADMIN — DIVALPROEX SODIUM 250 MG: 125 CAPSULE, COATED PELLETS ORAL at 05:35

## 2023-07-31 RX ADMIN — ACETAMINOPHEN 975 MG: 325 TABLET, FILM COATED ORAL at 05:34

## 2023-07-31 RX ADMIN — ACETAMINOPHEN 975 MG: 325 TABLET, FILM COATED ORAL at 15:42

## 2023-07-31 RX ADMIN — DIVALPROEX SODIUM 250 MG: 125 CAPSULE, COATED PELLETS ORAL at 15:42

## 2023-07-31 RX ADMIN — DIVALPROEX SODIUM 250 MG: 125 CAPSULE, COATED PELLETS ORAL at 20:09

## 2023-07-31 RX ADMIN — Medication 3 MG: at 20:09

## 2023-07-31 RX ADMIN — SENNOSIDES 8.6 MG: 8.6 TABLET, FILM COATED ORAL at 20:09

## 2023-07-31 NOTE — CASE MANAGEMENT
Case Management Discharge Planning Note    Patient name Wilmer Choi  Location S /S -20 MRN [id number]  : 1935 Date 2023       Current Admission Date: 2023  Current Admission Diagnosis:Toxic metabolic encephalopathy   Patient Active Problem List    Diagnosis Date Noted   • Dementia with behavioral disturbance ([address]) 2023   • Swallowing/mastication problem 2023   • Toxic metabolic encephalopathy    • Bradycardia 2023   • Ambulatory dysfunction 2023   • Stage 3 chronic kidney disease ([address]) 2023   • Urinary incontinence without sensory awareness 2023   • Slow transit constipation 2023   • At high risk for falls 2023   • Lumbar radiculopathy    • Acute cystitis without hematuria 2023   • UTI symptoms 2023   • Paroxysmal atrial fibrillation ([address]) 01/10/2023   • Platelets decreased ([address]) 01/10/2023   • 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 ([address]) 2022   • Neurologic gait dysfunction 2022   • Stage 3b chronic kidney disease ([address]) 2021   • OAB (overactive bladder) 2021   • Benign prostatic hyperplasia with urinary frequency 2021   • Controlled type 2 diabetes mellitus without complication, without long-term current use of insulin ([address]) 2019   • Sensorineural hearing loss (SNHL) of both ears 10/21/2016   • Seasonal allergies 2015   • Spinal stenosis 2012   • Hypertension 2012      LOS (days): 7  Geometric Mean LOS (GMLOS) (days): 3.80  Days to GMLOS:-3.2     OBJECTIVE:  Risk of Unplanned Readmission Score: 20.52         Current admission status: Inpatient   Pref[place] Pharmacy[place]   [id number] [address]  Phone: [phone] Fax: [phone]    Primary Care Provider: Luz Carias MD    Primary Insurance: [facility]  Secondary Insurance:     DISCHARGE DETAILS:    Discharge planning discussed with[de-identified] Deidre Gant at [facility]; spouse, Mario Gonzales, at bedside  Freedom of Choice: Yes (re: facility placement)  Comments - Freedom of Choice: [facility]  CM contacted family/caregiver?: Yes (spouse, Mario Gonzales, at bedside)  Were Treatment Team discharge recommendations reviewed with patient/caregiver?: Yes  Did patient/caregiver verbalize understanding of patient care needs?: N/A- going to facility  Were patient/caregiver advised of the risks associated with not following Treatment Team discharge recommendations?: Yes    Contacts  Patient Contacts: Mario Gonzales, spouse  Relationship to Patient[de-identified] Family  Contact Method: In Person  Reason/Outcome: Continuity of Care, Emergency Contact, Referral, Discharge [address]         Is the patient interested in [facility] at discharge?: No    DME Referral Provided  Referral made for DME?: No    Other Referral/Resources/Interventions Provided:  Interventions: Assisted Living  Referral Comments: Met with patient's spouse, Mario Gonzales, at bedside to discuss anticipated d/c to [facility] for tomorrow. Mario Gonzales confirmed agreement with same and states that she spoke with Elie and plans to meet with them tomorrow at 10:00am to complete admission paperwork. Transportation requested in [facility] for tomorrow at 11:00am, which is preferred time for admission per Elie at [facility]. Pick-up time confirmed with [facility] for same. Spouse aware and in agreement with plan for d/c tomorrow and for transport at 11:00am. IMM reviewed and copy provided for her records. Deidre Gant from [facility] to the hospital to re-evaluate patient and informed of pick-up time for tomorrow. Aware that this writer will follow-up to confirm behavior has remained well managed and transport time unchanged. MDs and RN aware of pick-up time and requested that restraints/IM medications no longer be used.  Anticipate transfer to [facility] for AM. CM discharge support contacted with request for [facility] auth for transport in AM.    Would you like to participate in our [address] service program?  : No - Declined    Treatment Team Recommendation: Assisted Living  Discharge Destination Plan[de-identified] Assisted Living ([facility] [facility])  Transport at Discharge : [facility]  Dispatcher Contacted: Yes  Number/Name of Dispatcher: [facility]  Transported by [employer] and Unit #): [facility]  ETA of Transport (Date): 08/01/23  ETA of Transport (Time): 1100 (confirmed)              IMM Given (Date):: 07/31/23  IMM Given to[de-identified] Family (spouse, Gary Mix, at bedside)          [address] Name, [facility]  Receiving Facility/Agency Phone Number: [phone]

## 2023-07-31 NOTE — PROGRESS NOTES
[facility]  Progress Note  Name: Mikal John  MRN: [id number]  Unit/Bed#: S -01 I Date of Admission: 7/24/2023   Date of Service: 7/31/2023 I Hospital Day: 7    Assessment/Plan   * Toxic metabolic encephalopathy  Assessment & Plan  · Toxic metabolic encephalopathy likely secondary to UTI given findings on recent urine culture showing over 100,000 colony-forming units of Aerococcus urinae  · POA altered mental status, combative, agitation decreased p.o. intake. No fevers, no chills, no sick contacts  · [facility] discharged to [facility] rehab from admission [facility] 07/03/2023 due to the same. · Per spouse patient has progressively declined over the past 2 months. · Most recent lab work on 06/21/2023 TSH unremarkable, vitamin B12 unremarkable  · Folate unremarkable  · At the ED CT head no acute intraparenchymal abnormalities  · UA significant for pyuria with WBCs 10-24, occasional bacteria although patient denies any symptoms at this time. · Likely in the setting of Alzheimer's dementia with progression of disease versus metabolic in the setting of pyuria versus slight dehydration on exam.  · Patient was more combative and agitated last night fighting with nurses, attempting to get out of bed, and spitting at nurses. 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   · Wife reports that he was not consistently taking his medications at his facility this is likely the reason for his suboptimal Depakote level at 24  · Wife also reports that patient's aphasia is not new and has actually progressively gotten worse, even she has difficulty understanding what he is saying. · Urine culture grew >100,000 Aerococcus urinae  · Patient was more combative and agitated last night fighting with nurses, attempting to get out of bed, and spitting at nurses. · Was off restraints overnight (7/30/23)     Plan:  · Discontinued IV ceftriaxone (7 days completed on 7/31/23) Depakote sprinkles 250 mg TID  · Mirtazapine increased to 15 mg daily scheduled today to be given at 6:30 PM  · Tylenol 975 mg TID for chronic pain management  · Zyprexa 2.5 mg as needed for combative behavior unable to be controlled with first-line alternative measures  · Frequent reorientation  · Fall precautions  · Have OT PT evaluation, try to provide frequent and early mobilization  · Minimize sleep-wake cycle, avoid nighttime interruptions  · Attempt to group overnight vitals/labs/nursing checks as possible  · Dim lights, close blinds turn off TV to minimize stimulation  · We will provide adequate pain control with tylenol 975 mg TID  · Avoid urinary retention and constipation  · Avoid medications that may worsen or precipitate delirium such as tramadol, benzodiazepines, anticholinergics, and Benadryl  · Wife would like patient to discharge to [place]   · We will continue to monitor  · Medically stable for DC    Acute cystitis without hematuria  Assessment & Plan  Assessment:  · At the ED UA significant for small leukocytes, microscopy 10-20 WBCs, occasional bacteria  · At the ED received 1 dose of ceftriaxone due to toxic metabolic encephalopathy 2/2 UTI.   · Cultures grew >100,000 CFU aerococcus urinae susceptible to most penicillins, cephalosporins, and nitrofurantoin  · Continues to be medically stable    Plan:  · Discontinued IV ceftriaxone (7 days completed on 7/31/23)   · Tylenol 975 mg 3 times daily for chronic pain  · CBC a.m.  · Monitor fever curves        Stage 3b chronic kidney disease ([address])  Assessment & Plan  Lab Results   Component Value Date    CREATININE 1.37 (H) 07/31/2023    CREATININE 1.30 07/30/2023    CREATININE 1.25 07/28/2023     · History CKD stage IIIb  · Baseline seems to be from 1.3-1.5, currently at baseline  · BMP a.m.,  · Avoid nephrotoxin, avoid hypotension    Bradycardia  Assessment & Plan  · POA heart rate 54  · ECG sinus bradycardia, heart rate 56 with PACs and poor R wave progression. · Patient asymptomatic  · Avoid AV ben blocker.     Ambulatory dysfunction  Assessment & Plan  Assessment:  · At a baseline ambulates with rolling walker  • OT recommends he would benefit from OT in acute care 2-3X / week to max functional independence  • PT was able to work with him July 28th and they recommend post acute rehab (vs transition to higher level of care w/ skilled PT services)post acute rehab (vs transition to higher level of care w/ skilled PT services)       Plan:  • Fall precautions  • Out of bed as tolerated  • Encourage early and frequent mobilization  • Encourage adequate hydration and nutrition  • Provide adequate pain management   • Goal is long-term care placement  • Continue with PT/OT for continued strength and balance training     Benign prostatic hyperplasia with urinary frequency  Assessment & Plan  · History of BPH with urinary frequency  · Most recently taking of finasteride  · Avoid oxybutynin 10 mg if not needed  · Continue urinary retention protocol    Controlled type 2 diabetes mellitus without complication, without long-term current use of insulin [facility])  Assessment & Plan  Lab Results   Component Value Date    HGBA1C 5.8 03/17/2023       Recent Labs     07/30/23  1546 07/30/23  2111 07/31/23  0832 07/31/23  1139   POCGLU 266* 111 107 141*     Blood Sugar Average: Last 72 hrs:  · Recently taken off metformin  · Continue sliding scale and glucose check per protocol  · Blood glucose levels have been stable    Hypertension  Assessment & Plan  Assessment:  · History of hypertension  · Most recently taken off lisinopril due to level hypertension goal on previous admission    Plan:  · Continue home amlodipine 5 mg once a day            VTE Pharmacologic Prophylaxis: VTE Score: 5 High Risk (Score >/= 5) - Pharmacological DVT Prophylaxis Ordered: enoxaparin (Lovenox). Sequential Compression Devices Ordered. Patient Centered Rounds: I performed bedside rounds with nursing staff today. Discussions with Specialists or Other Care Team Provider:     Education and Discussions with Family / Patient: Updated  (wife) via phone. Total Time Spent on Date of Encounter in care of patient: 35 minutes This time was spent on one or more of the following: performing physical exam; counseling and coordination of care; obtaining or reviewing history; documenting in the medical record; reviewing/ordering tests, medications or procedures; communicating with other healthcare professionals and discussing with patient's family/caregivers. Current Length of Stay: 7 day(s)  Current Patient Status: Inpatient   Certification Statement: The patient will continue to require additional inpatient hospital stay due to AMS  Discharge Plan: Anticipate discharge tomorrow to prior assisted or independent living facility. Code Status: Level 3 - DNAR and DNI    Subjective:   No overnight events per nurse. Patient was sleepy and calm this morning. He didn't have any complaints and was doing okay. Objective:     Vitals:   Temp (24hrs), Av.5 °F (36.4 °C), Min:97.5 °F (36.4 °C), Max:97.5 °F (36.4 °C)    Temp:  [97.5 °F (36.4 °C)] 97.5 °F (36.4 °C)  HR:  [82] 82  Resp:  [16-19] 19  BP: (126-130)/(65-82) 130/82  SpO2:  [96 %-98 %] 96 %  Body mass index is 25.81 kg/m².      Input and Output Summary (last 24 hours):   No intake or output data in the 24 hours ending 07/31/23 1539    Physical Exam:   Physical Exam  Vitals and nursing note reviewed. Constitutional:       Appearance: Normal appearance. HENT:      Head: Normocephalic and atraumatic. Eyes:      Extraocular Movements: Extraocular movements intact. Pupils: Pupils are equal, round, and reactive to light. Cardiovascular:      Rate and Rhythm: Normal rate and regular rhythm. Pulses: Normal pulses. Pulmonary:      Effort: Pulmonary effort is normal.      Breath sounds: Normal breath sounds. Abdominal:      General: Bowel sounds are normal.      Palpations: Abdomen is soft. Skin:     General: Skin is warm and dry. Capillary Refill: Capillary refill takes less than 2 seconds. Neurological:      General: No focal deficit present. Mental Status: He is alert. Additional Data:     Labs:  Results from last 7 days   Lab Units 07/31/23  0517   WBC Thousand/uL 3.94*   HEMOGLOBIN g/dL 13.6   HEMATOCRIT % 41.4   PLATELETS Thousands/uL 192   NEUTROS PCT % 44   LYMPHS PCT % 36   MONOS PCT % 13*   EOS PCT % 5     Results from last 7 days   Lab Units 07/31/23  0517   SODIUM mmol/L 143   POTASSIUM mmol/L 4.1   CHLORIDE mmol/L 108   CO2 mmol/L 29   BUN mg/dL 35*   CREATININE mg/dL 1.37*   ANION GAP mmol/L 6   CALCIUM mg/dL 9.2   GLUCOSE RANDOM mg/dL 117         Results from last 7 days   Lab Units 07/31/23  1139 07/31/23  0832 07/30/23  2111 07/30/23  1546 07/30/23  1123 07/30/23  0753 07/29/23  2133 07/29/23  1554 07/29/23  1228 07/29/23  0831 07/28/23  2219 07/28/23  1609   POC GLUCOSE mg/dl 141* 107 111 266* 187* 123 130 185* 130 121 133 153*               Lines/Drains:  Invasive Devices     Peripheral Intravenous Line  Duration           Peripheral IV 07/30/23 Right;Ventral (anterior) Forearm 1 day                      Imaging: No pertinent imaging reviewed.     Recent Cultures (last 7 days):         Last 24 Hours Medication List:   Current Facility-Administered Medications   Medication Dose Route Frequency Provider Last Rate   • acetaminophen  650 mg Oral Q6H PRN Jesus Olmstead MD     • acetaminophen  975 mg Oral Q8H Tara Allen MD     • amLODIPine  5 mg Oral Daily Jesus Olmstead MD     • cyanocobalamin  1,000 mcg Oral Daily Jesus Olmstead MD     • divalproex sodium  250 mg Oral [facility] Tara Allen MD     • enoxaparin  40 mg Subcutaneous Daily Jesus Olmstead MD     • insulin lispro  1-6 Units Subcutaneous TID [address] MD     • melatonin  3 mg Oral HS PRN Jesus Olmstead MD     • mirtazapine  15 mg Oral HS Tara Allen MD     • OLANZapine  2.5 mg Intramuscular Q12H PRN Tara Allen MD     • senna  8.6 mg Oral HS Jesus Olmstead MD          Today, Patient Was Seen By: David Hernandez DO    **Please Note: This note may have been constructed using a voice recognition system. **

## 2023-07-31 NOTE — PLAN OF CARE
Problem: PHYSICAL THERAPY ADULT  Goal: Performs mobility at highest level of function for planned discharge setting. See evaluation for individualized goals. Description: Treatment/Interventions: Functional transfer training, LE strengthening/ROM, Therapeutic exercise, Endurance training, Cognitive reorientation, Patient/family training, Equipment eval/education, Gait training, Bed mobility, Compensatory technique education          See flowsheet documentation for full assessment, interventions and recommendations. Outcome: Progressing  Note: Prognosis: Fair  Problem List: Decreased strength, Decreased endurance, Impaired balance, Decreased mobility, Decreased cognition, Impaired judgement, Decreased safety awareness  Assessment: pt began tx session lying supine in the bed and was agreeable to participate in PT intervention. pt required increased tiem to arouse Upon arrival to pt room. pt alertness improved throughout tx session. pt continues to remain consistant with requiring mod Ax1 in order to complete a supine<>sit EOB transfer with LE and trunk management. pt was able to maintain sitting EOB balance w/o LOB while being educated on all functional transfers to and from RW. pt able to complete TE activities at EOB but had difficulty following direction to complete TE properly. pt continues to remain consistant with min ax1 for all functional transfers to and from RW with VC's for hand placement while ascending to RW and descending back to seated EOB. pt required verbal and tactile cues for balance and RW management as pt demonstrated difficulty advancing RW and making directional changes as pt would abandon RW with R UE several times. pt required between min to mod ax1 for safety and balance with ambulation. pt did ambulate 50'x1 but cotninues to remain an increased risk for falls and injuries due to cognition.  pt would benefit from continued skilled PT intervention in order to increase safety and balance with all OOB mobility. Post tx session pt in bed with call bell, bed alarm activated and all pt needs met        PT Discharge Recommendation: Post acute rehabilitation services (vs transition to higher level care w/ skilled PT services)    See flowsheet documentation for full assessment.

## 2023-07-31 NOTE — CASE MANAGEMENT
Case Management Progress Note    Patient name Dong Gandhi  Location S /S -56 MRN [id number]  : 1935 Date 2023       LOS (days): 7  Geometric Mean LOS (GMLOS) (days): 3.80  Days to GMLOS:-3        OBJECTIVE:        Current admission status: Inpatient  Preferred Pharmacy:   [id number], [address] [place], [place] [address] [address] [place] [address]  Phone: [phone] Fax: [phone]    Primary Care Provider: Daniel Hobson MD    Primary Insurance: [facility]  Secondary Insurance:     PROGRESS NOTE:    Call made to Elie at [employer] ([phone]) and  left requesting return call. Per charting, patient required IM Zyprexa on Saturday and bilateral wrist restraints  morning (documented from midnight to 7:00am). [employer] was to re-evaluate patient today. Awaiting return call from [facility] to follow-up. PT/OT to see patient today to re-assess mobility as patient needs to be able to stand/pivot as an assist x1 for facility to accept.

## 2023-07-31 NOTE — PHYSICAL THERAPY NOTE
PHYSICAL THERAPY NOTE          Patient Name: Regla Chan  [id number] Date: 23 1110   PT Last Visit   PT Visit Date 23   Note Type   Note Type Treatment   Pain Assessment   Pain Assessment Tool FLACC   Pain Score No Pain   Multiple Pain Sites No   Pain Rating: FLACC (Rest) - Face 0   Pain Rating: FLACC (Rest) - Legs 0   Pain Rating: FLACC (Rest) - Activity 0   Pain Rating: FLACC (Rest) - Cry 0   Pain Rating: FLACC (Rest) - Consolability 0   Score: FLACC (Rest) 0   Pain Rating: FLACC (Activity) - Face 0   Pain Rating: FLACC (Activity) - Legs 0   Pain Rating: FLACC (Activity) - Activity 0   Pain Rating: FLACC (Activity) - Cry 0   Pain Rating: FLACC (Activity) - Consolability 0   Score: FLACC (Activity) 0   Restrictions/Precautions   Weight Bearing Precautions Per Order No   Other Precautions Cognitive; Chair Alarm; Bed Alarm;Pain;Hard of hearing   General   Chart Reviewed Yes   Response to Previous Treatment Patient with no complaints from previous session. Family/Caregiver Present No   Cognition   Overall Cognitive Status Impaired   Arousal/Participation Cooperative;Lethargic;Responsive   Attention Difficulty attending to directions   Orientation Level Oriented to person;Disoriented to place; Disoriented to time;Disoriented to situation   Memory Unable to assess   Following Commands Follows one step commands with increased time or repetition   Comments pt identified by name and  on wrist band   Subjective   Subjective pt was agreeable to participate in PT intervention   Bed Mobility   Supine to Sit 3  Moderate assistance   Additional items Assist x 1;HOB elevated; Bedrails; Increased time required; Other;LE management  (trunk management)   Sit to Supine 3  Moderate assistance   Additional items Assist x 1;HOB elevated; Bedrails; Increased time required;Verbal cues;LE management   Additional Comments pt required mos Ax2 to reposition towards [facility]   Transfers   Sit to Stand 4  Minimal assistance   Additional items Assist x 1; Increased time required;Verbal cues  (w/ RW)   Stand to Sit 4  Minimal assistance   Additional items Assist x 1; Increased time required;Verbal cues  (w/ RW)   Stand pivot 3  Moderate assistance   Additional items Assist x 1; Armrests; Increased time required;Verbal cues  (w/RW)   Additional Comments pt required VC's for hand placement while ascending to RW and descending back to seated EOB   Ambulation/Elevation   Gait pattern Improper Weight shift;Narrow SHAHRZAD; Decreased foot clearance;Shuffling; Short stride; Foward flexed; Excessively slow;Decreased hip extension;Decreased heel strike;Decreased toe off   Gait Assistance 4  Minimal assist  (min to mod Ax1 w/ RW mangement)   Additional items Assist x 1;Verbal cues   Assistive Device Rolling walker   Distance 50'x1 RW   Stair Management Assistance Not tested   Ambulation/Elevation Additional Comments pt required between min to mod ax1 for ambulation with RW as pt demonstrated difficulty managing RW and abandoned R UE off RW several times and required VC's to place hands back on RW   Balance   Static Sitting Fair -   Dynamic Sitting Fair -   Static Standing Poor +  (w/ RW)   Dynamic Standing Poor +  (w/ RW)   Ambulatory Poor  (min to mod Ax1 w/ RW as pt emonstarted difficulty with directional changes and following directions w/ hand placement on RW)   Endurance Deficit   Endurance Deficit Yes   Endurance Deficit Description limited activity tolerance and ambulation distance   Activity Tolerance   Activity Tolerance Other (Comment)  (pt limited due to cognition)   Nurse Made Aware Spoke to RN   Exercises   Hip Flexion Supine;5 reps;AROM; Bilateral   Knee AROM Long Arc Quad Sitting;10 reps;AROM; Bilateral   Ankle Pumps Sitting;10 reps;AROM; Bilateral   Balance training  pt demonstrated difficulty following direction in order to complete TE activities   Assessment Prognosis Fair   Problem List Decreased strength;Decreased endurance; Impaired balance;Decreased mobility; Decreased cognition; Impaired judgement;Decreased safety awareness   Assessment pt began tx session lying supine in the bed and was agreeable to participate in PT intervention. pt required increased tiem to arouse Upon arrival to pt room. pt alertness improved throughout tx session. pt continues to remain consistant with requiring mod Ax1 in order to complete a supine<>sit EOB transfer with LE and trunk management. pt was able to maintain sitting EOB balance w/o LOB while being educated on all functional transfers to and from RW. pt able to complete TE activities at EOB but had difficulty following direction to complete TE properly. pt continues to remain consistant with min ax1 for all functional transfers to and from RW with VC's for hand placement while ascending to RW and descending back to seated EOB. pt required verbal and tactile cues for balance and RW management as pt demonstrated difficulty advancing RW and making directional changes as pt would abandon RW with R UE several times. pt required between min to mod ax1 for safety and balance with ambulation. pt did ambulate 50'x1 but cotninues to remain an increased risk for falls and injuries due to cognition. pt would benefit from continued skilled PT intervention in order to increase safety and balance with all OOB mobility. Post tx session pt in bed with call bell, bed alarm activated and all pt needs met   Goals   Patient Goals none stated this tx session   STG Expiration Date 08/07/23   PT Treatment Day 1   Plan   Treatment/Interventions Functional transfer training;LE strengthening/ROM; Therapeutic exercise; Endurance training;Cognitive reorientation;Patient/family training;Equipment eval/education; Bed mobility;Gait training;Spoke to nursing   Progress Slow progress, cognitive deficits   PT Frequency 2-3x/wk   Recommendation   PT Discharge Recommendation Post acute rehabilitation services  (vs transition to higher level care w/ skilled PT services)   [address]   Turning in Flat Bed Without Bedrails 3   Lying on Back to Sitting on Edge of Flat Bed Without Bedrails 3   Moving Bed to Chair 2   Standing Up From Chair Using Arms 3   Walk in Room 2   Climb 3-5 Stairs With Railing 1   Basic Mobility Inpatient Raw Score 14   Basic Mobility Standardized Score 35.55   Highest Level Of Mobility   -[facility] Goal 4: Move to chair/commode   -HL Achieved 7: Walk 25 feet or more   Education   Education Provided Mobility training;Assistive device; Other  (functional transfers)   Patient Reinforcement needed   End of Consult   Patient Position at End of Consult Supine;Bed/Chair alarm activated; All needs within reach   The patient's AM-PAC Basic Mobility Inpatient Short Form Raw Score is 14. A Raw score of less than or equal to 16 suggests the patient may benefit from discharge to post-acute rehabilitation services. Please also refer to the recommendation of the Physical Therapist for safe discharge planning.       Katharina Sanabria

## 2023-07-31 NOTE — CASE MANAGEMENT
Case Management Discharge Planning Note    Patient name Rosa PONCE /S -31 MRN [id number]  : 1935 Date 2023       Current Admission Date: 2023  Current Admission Diagnosis:Toxic metabolic encephalopathy   Patient Active Problem List    Diagnosis Date Noted   • Dementia with behavioral disturbance ([facility]) 2023   • Swallowing/mastication problem 2023   • Toxic metabolic encephalopathy    • Bradycardia 2023   • Ambulatory dysfunction 2023   • Stage 3 chronic kidney disease ([facility]) 2023   • Urinary incontinence without sensory awareness 2023   • Slow transit constipation 2023   • At high risk for falls 2023   • Lumbar radiculopathy    • Acute cystitis without hematuria 2023   • UTI symptoms 2023   • Paroxysmal atrial fibrillation ([facility]) 01/10/2023   • Platelets decreased ([facility]) 01/10/2023   • 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 ([facility]) 2022   • Neurologic gait dysfunction 2022   • Stage 3b chronic kidney disease ([facility]) 2021   • OAB (overactive bladder) 2021   • Benign prostatic hyperplasia with urinary frequency 2021   • Controlled type 2 diabetes mellitus without complication, without long-term current use of insulin ([facility]) 2019   • Sensorineural hearing loss (SNHL) of both ears 10/21/2016   • Seasonal allergies 2015   • Spinal stenosis 2012   • Hypertension 2012      LOS (days): 7  Geometric Mean LOS (GMLOS) (days): 3.80  Days to GMLOS:-3     OBJECTIVE:  Risk of Unplanned Readmission Score: 20.68         Current admission status: Inpatient   Preferred Pharmacy:   [address] [address]  Phone: [phone] Fax: [phone]    Primary Care Provider: Andrew Benton MD    Primary Insurance: Ricarda Lauth [facility] REP  Secondary Insurance:     DISCHARGE DETAILS:    Discharge planning discussed with[de-identified] Perry Klein ([phone]); Tim Colmenares, spouse, by phone  Freedom of Choice: Yes (re: facility placement)  Comments - Freedom of Choice: Bonifacio Klein  CM contacted family/caregiver?: Yes (spouse, Tim Colmenares, by phone)  Were Treatment Team discharge recommendations reviewed with patient/caregiver?: Yes  Did patient/caregiver verbalize understanding of patient care needs?: N/A- going to facility  Were patient/caregiver advised of the risks associated with not following Treatment Team discharge recommendations?: Yes    Contacts  Patient Contacts: Tim Colmenares, spouse  Relationship to Patient[de-identified] Family  Contact Method: Phone  Phone Number: [phone]  Reason/Outcome: Continuity of Care, Emergency Contact, Referral, Discharge [address]         Is the patient interested in [address] [facility] at discharge?: No    DME Referral Provided  Referral made for DME?: No    Other Referral/Resources/Interventions Provided:  Referral Comments: Spoke with Bill Garcia at [facility] ([phone]) who reports that they will be able to accept patient to their facility tomorrow, 8/1, as long as he remains restraint and IM free overnight. Will need spouse to contact him to review paperwork - goal would be to meet her at 10:00am for admission paperwork to be complete and patient can then be scheduled for admission after 11:00am. Call made to patient's spouse, Tim Colmenares, to review above plan. Tim Colmenares confirmed preference for [facility] and aware that she will need to meet with admissions to complete paperwork prior to transfer. Spouse reports she's on her way to the hospital at this time, so business card for Bill Garcia left at bedside for her to contact to schedule time to meet for paperwork; aware that he had recommended 10:00am tomorrow. Will follow-up with Amaury at [facility] and spouse, Carolsjanice Sethna to confirm plan for transfer once they have a chance to speak to each other.  MD aware that patient will need to be restraint-free and IM medication free tonight in order for transfer to occur in AM.    Would you like to participate in our [address] service program?  : No - Declined    Treatment Team Recommendation: Assisted Living  Discharge Destination Plan[de-identified] Assisted Living

## 2023-07-31 NOTE — PLAN OF CARE
Problem: SAFETY,RESTRAINT: NV/NON-SELF DESTRUCTIVE BEHAVIOR  Goal: Remains free of harm/injury (restraint for non violent/non self-detsructive behavior)  Description: INTERVENTIONS:  - Instruct patient/family regarding restraint use   - Assess and monitor physiologic and psychological status   - Provide interventions and comfort measures to meet assessed patient needs   - Identify and implement measures to help patient regain control  - Assess readiness for release of restraint   Outcome: Progressing  Goal: Returns to optimal restraint-free functioning  Description: INTERVENTIONS:  - Assess the patient's behavior and symptoms that indicate continued need for restraint  - Identify and implement measures to help patient regain control  - Assess readiness for release of restraint   Outcome: Progressing     Problem: MOBILITY - ADULT  Goal: Maintain or return to baseline ADL function  Description: INTERVENTIONS:  -  Assess patient's ability to carry out ADLs; assess patient's baseline for ADL function and identify physical deficits which impact ability to perform ADLs (bathing, care of mouth/teeth, toileting, grooming, dressing, etc.)  - Assess/evaluate cause of self-care deficits   - Assess range of motion  - Assess patient's mobility; develop plan if impaired  - Assess patient's need for assistive devices and provide as appropriate  - Encourage maximum independence but intervene and supervise when necessary  - Involve family in performance of ADLs  - Assess for home care needs following discharge   - Consider OT consult to assist with ADL evaluation and planning for discharge  - Provide patient education as appropriate  Outcome: Progressing  Goal: Maintains/Returns to pre admission functional level  Description: INTERVENTIONS:  - Perform BMAT or MOVE assessment daily.   - Set and communicate daily mobility goal to care team and patient/family/caregiver.    - Collaborate with rehabilitation services on mobility goals if consulted  - Perform Range of Motion  times a day. - Reposition patient every  hours. - Dangle patient  times a day  - Stand patient  times a day  - Ambulate patient  times a day  - Out of bed to chair  times a day   - Out of bed for meals  times a day  - Out of bed for toileting  - Record patient progress and toleration of activity level   Outcome: Progressing     Problem: Nutrition/Hydration-ADULT  Goal: Nutrient/Hydration intake appropriate for improving, restoring or maintaining nutritional needs  Description: Monitor and assess patient's nutrition/hydration status for malnutrition. Collaborate with interdisciplinary team and initiate plan and interventions as ordered. Monitor patient's weight and dietary intake as ordered or per policy. Utilize nutrition screening tool and intervene as necessary. Determine patient's food preferences and provide high-protein, high-caloric foods as appropriate.      INTERVENTIONS:  - Monitor oral intake, urinary output, labs, and treatment plans  - Assess nutrition and hydration status and recommend course of action  - Evaluate amount of meals eaten  - Assist patient with eating if necessary   - Allow adequate time for meals  - Recommend/ encourage appropriate diets, oral nutritional supplements, and vitamin/mineral supplements  - Order, calculate, and assess calorie counts as needed  - Recommend, monitor, and adjust tube feedings and TPN/PPN based on assessed needs  - Assess need for intravenous fluids  - Provide specific nutrition/hydration education as appropriate  - Include patient/family/caregiver in decisions related to nutrition  Outcome: Progressing     Problem: Prexisting or High Potential for Compromised Skin Integrity  Goal: Skin integrity is maintained or improved  Description: INTERVENTIONS:  - Identify patients at risk for skin breakdown  - Assess and monitor skin integrity  - Assess and monitor nutrition and hydration status  - Monitor labs   - Assess for incontinence   - Turn and reposition patient  - Assist with mobility/ambulation  - Relieve pressure over bony prominences  - Avoid friction and shearing  - Provide appropriate hygiene as needed including keeping skin clean and dry  - Evaluate need for skin moisturizer/barrier cream  - Collaborate with interdisciplinary team   - Patient/family teaching  - Consider wound care consult   Outcome: Progressing

## 2023-07-31 NOTE — OCCUPATIONAL THERAPY NOTE
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 (HCC)    Acute cystitis without hematuria    Ambulatory dysfunction    Bradycardia    Swallowing/mastication problem    Dementia with behavioral disturbance ([address])          07/31/23 1517   OT Last Visit   OT Visit Date 07/31/23  (Monday)   Note Type   Note Type Treatment for insurance authorization   Pain Assessment   Pain Assessment Tool FLACC   Pain Rating: FLACC (Rest) - Face 0   Pain Rating: FLACC (Rest) - Legs 0   Pain Rating: FLACC (Rest) - Activity 0   Pain Rating: FLACC (Rest) - Cry 0   Pain Rating: FLACC (Rest) - Consolability 0   Score: FLACC (Rest) 0   Pain Rating: FLACC (Activity) - Face 1   Pain Rating: FLACC (Activity) - Legs 0   Pain Rating: FLACC (Activity) - Activity 1   Pain Rating: FLACC (Activity) - Cry 1   Pain Rating: FLACC (Activity) - Consolability 1   Score: FLACC (Activity) 4   Restrictions/Precautions   Weight Bearing Precautions Per Order No   Other Precautions Cognitive; Chair Alarm; Bed Alarm; Fall Risk;Hard of hearing   Lifestyle   Autonomy Pt needs assistance w/ ADL/ IADL due to impaired cognition. Does not use AD at baseline for functional mobility. Reciprocal Relationships Supportive wife present during session   Service to Others Pt is retired and drove cross country followed by bus for [employer]   ADL   Where Assessed Edge of bed  (vs bathroom)   Eating Assistance 5  Supervision/Setup   Eating Deficit Setup;Supervision/safety; Increased time to complete   Grooming Assistance 4  Minimal Assistance   Grooming Deficit Setup;Verbal cueing;Supervision/safety; Increased time to complete   Grooming Comments standing at sink   UB Dressing Assistance 4  Minimal Assistance   UB Dressing Deficit Setup;Verbal cueing;Supervision/safety; Increased time to complete;Pull around back; Fasteners   UB Dressing Comments seated OOB in bedside recliner chair   LB Dressing Assistance 3  Moderate Assistance   LB Dressing Deficit Setup;Steadying; Thread RLE into pants; Thread LLE into pants; Don/doff R shoe;Don/doff L shoe; Fasteners   LB Dressing Comments seated at EOB to don pants vs OOB In chair to don slip on shoes   Toileting Assistance  3  Moderate Assistance   Toileting Deficit Setup;Verbal cueing;Supervison/safety; Increased time to complete;Clothing management up;Clothing management down;Perineal hygiene   Toileting Comments voided on toilet in bathroom. Required A w/ clothing mgmt, personal hygiene and transition on / off toilet   Bed Mobility   Supine to Sit 3  Moderate assistance   Additional items Assist x 1;Bedrails; Increased time required;Verbal cues  (to pt's R)   Sit to Supine Unable to assess   Additional Comments Pt seated OOB In chair at end of session w/ needs met, call bell in reach and chair alarm activated   Transfers   Sit to Stand 4  Minimal assistance   Additional items Assist x 1; Increased time required;Verbal cues; Bedrails;Armrests   Stand to Sit 4  Minimal assistance   Additional items Assist x 1; Armrests; Bedrails; Increased time required;Verbal cues   Toilet transfer 4  Minimal assistance   Additional items Assist x 1; Increased time required;Verbal cues;Standard toilet  (Grab bar use)   Additional Comments Pt performed sit <> stand 3X w/ min A and + time, cues   Functional Mobility   Functional Mobility   (min <> mod A)   Additional Comments min A (CG/ steadying) using RW in open spaces vs min HHA in bathoom and to complete approch to chair. Pt resistant to use of walker to complete approach to bedside recliner chair. Additional items   (RW vs HHA)   Toilet Transfers   Toilet Transfer From Rolling walker   Toilet Transfer Type To and from   Toilet Transfer to Standard toilet   Toilet Transfer Technique Ambulating   Toilet Transfers Minimal assistance;Verbal cues   Subjective   Subjective "I have had enough of this"   Cognition   Overall Cognitive Status Impaired   Arousal/Participation Responsive;Lethargic  (sleeping upon arrival)   Attention Difficulty attending to directions   Orientation Level Oriented to person;Disoriented to situation;Disoriented to time;Disoriented to place   Memory Decreased short term memory;Decreased recall of recent events;Decreased recall of precautions   Following Commands Follows one step commands with increased time or repetition   Comments Identified pt by full name and birthdate. Confused and able to follow one step directions during ADLs w/ + time. Not accurate historian at baseline. Pt's wife present reports decline in past 7 weeks and stated "this anger is new"   Activity Tolerance   Activity Tolerance Patient limited by fatigue; Other (Comment)  (impaired cognition)   Medical Staff Made Aware spoke w/ RNNarda and CMCourt. Spoke w/ Jesus RAGLAND   Assessment   Assessment Pt seen for skilled OT tx session focusing on activity engagement and challenging activity tolerance. Pt agreeable to participate and required + time and cues to follow directions. Pt demonstrated improved activity tolerance and engaged in functional mobility household distances. Pt engaged in UBD/LBD seated at EOB vs OOB in chair w/ + time and cues. Pt voided seated on toilet in bathroom and required A to manage clothing / personal hygiene. Continue to recommend return to [facility] w/ staff assist vs rehab pend support / assist at [facility] when medically stable. Will continue to follow   Plan   Treatment Interventions ADL retraining;Functional transfer training; Endurance training;Patient/family training;Equipment evaluation/education; Compensatory technique education;Continued evaluation; Energy conservation; Activityengagement   Goal Expiration Date 08/07/23   OT Treatment Day 1  (Monday 7/31/23)   OT Frequency 2-3x/wk   Recommendation   OT Discharge Recommendation Return to facility with rehabilitation services   AM-PAC Daily Activity Inpatient   Lower Body Dressing 2   Bathing 2   Toileting 2   Upper Body Dressing 3   Grooming 3   Eating 3   Daily Activity Raw Score 15   Daily Activity Standardized Score (Calc for Raw Score >=11) 34.69   AM-PAC Applied Cognition Inpatient   Following a Speech/Presentation 1   Understanding Ordinary Conversation 2   Taking Medications 1   Remembering Where Things Are Placed or Put Away 1   Remembering List of 4-5 Errands 1   Taking Care of Complicated Tasks 1   Applied Cognition Raw Score 7   Applied Cognition Standardized Score 15.17   Barthel Index   Feeding 5   Bathing 0   Grooming Score 0   Dressing Score 5   Bladder Score 5   Bowels Score 5   Toilet Use Score 5   Transfers (Bed/Chair) Score 10   Mobility (Level Surface) Score 0   Stairs Score 0   Barthel Index Score 35   Modified Bon[place] Scale   Modified Bonne[place] Scale 4   End of Consult   Patient Position at End of Consult Bedside chair;Bed/Chair alarm activated; All needs within reach   Nurse Communication Nurse aware of consult       Pt goals to be met by 8/7/23 to max I w/ ADLs and improve engagement includes:     -Pt will complete bed mobility supine <> sit w/ S to max I w/ ADLs  PROGRESSING     -Pt will complete functional transfers to bed, chair, and toilet using LRAD, DME as needed w/ S to max I and minimize burden of care PROGRESSING     -Pt will complete UBD w/ S after set- up PROGRESSING     -Pt will complete LBD w/ min A to max I and minimize burden of care PROGRESSING     -Pt will demonstrate improved activity and sitting tolerance OOB In chair for all meals PROGRESSING     -Pt will consistently follow 1 step directions during ADLs w/ good recall to max I and improve engagement PROGRESSING     -Pt will consistently engage in functional mobility using LRAD to / from bathroom to max I w/ ADLs and improve engagement PROGRESSING         The patient's raw score on the AM-PAC Daily Activity Inpatient Short Form is 15. A raw score of less than 19 suggests the patient may benefit from discharge to post-acute rehabilitation services. Please refer to the recommendation of the Occupational Therapist for safe discharge planning.  Recommend return to [facility] w/ staff assist and continued OT vs rehab pend medical optimization, A / support at [facility] [address], OTR/L  [id number]  [id number]

## 2023-07-31 NOTE — PLAN OF CARE
Problem: OCCUPATIONAL THERAPY ADULT  Goal: Performs self-care activities at highest level of function for planned discharge setting. See evaluation for individualized goals. Description: Treatment Interventions: ADL retraining, Functional transfer training, Endurance training, Patient/family training, Cognitive reorientation, Equipment evaluation/education, Continued evaluation, Energy conservation, Activityengagement          See flowsheet documentation for full assessment, interventions and recommendations. Outcome: Progressing  Note: Limitation: Decreased ADL status, Decreased Safe judgement during ADL, Decreased cognition, Decreased endurance, Decreased self-care trans, Decreased high-level ADLs     Assessment: Pt seen for skilled OT tx session focusing on activity engagement and challenging activity tolerance. Pt agreeable to participate and required + time and cues to follow directions. Pt demonstrated improved activity tolerance and engaged in functional mobility household distances. Pt engaged in UBD/LBD seated at EOB vs OOB in chair w/ + time and cues. Pt voided seated on toilet in bathroom and required A to manage clothing / personal hygiene. Continue to recommend return to [facility] w/ staff assist vs rehab pend support / assist at [facility] when medically stable.  Will continue to follow     OT Discharge Recommendation: Return to facility with rehabilitation services

## 2023-07-31 NOTE — CASE MANAGEMENT
Case Management Progress Note    Patient name Yohana Meza  Location S /S -41 MRN [id number]  : 1935 Date 2023       LOS (days): 7  Geometric Mean LOS (GMLOS) (days): 3.80  Days to GMLOS:-3.2        OBJECTIVE:        Current admission status: Inpatient  Preferred Pharmacy:   [address] [facility], [address] [place] [address]  [address] [facility]  Phone: [phone] Fax: [address] [place] [address], [address], [place] [address]  Phone: [phone] Fax: [phone]    Primary Care Provider: Major Hicks MD    Primary Insurance: Vin Rojas [facility]  Secondary Insurance:     PROGRESS NOTE:    Call received from Beth Foster at [facility] requesting DME form be completed prior to patient's transfer tomorrow. TT sent to MDs to relay request- asking for this writer to follow-up in AM for form to be completed. Form left in patient's chart at this time. Will follow-up in AM to confirm documentation completed prior to patient's transfer. Facility uses [facility] for medications - pharmacy added to patient's pharmacy list. Will need any scripts for controlled substances sent with patient at discharge. Also requesting orders for PT/OT evaluations at discharge so same can be arranged with [address].

## 2023-07-31 NOTE — PROGRESS NOTES
Progress Note - Geriatric Medicine   Ruth Bryan 81 y.o. male MRN: [id number]  Unit/Bed#: S -01 Encounter: [id number]      Assessment/Plan:   Moderate Alzheimer's dementia with psychotic disorder  · Presented from facility on Depakote 250 3 times daily, Seroquel 25 mg nightly-wife reports he had stopped taking his medications persistently   · Currently on Depakote 200 mg every 8 and mirtazapine 15 mg nightly-doing well on these medications at this time  · Mirtazapine was increased by internal medicine over the weekend  Unable to assess orientation  At risk for delirium due to hospitalization, Alzheimer's dementia, hospitalization  Recommend delirium precautions  Maintain sleep-wake cycle, avoid nighttime interruptions  minimize overnight interruptions, group overnight vitals/labs/nursing checks as possible  dim lights, close blinds and turn off tv to minimize stimulation and encourage sleep environment in evenings  Provide adequate pain control  Avoid urinary retention and constipation  Provide frequent and early mobilization  Provide frequent redirection and reorientation as needed  Avoid medications that may worsen or precipitate delirium such as tramadol, benzodiazepines, anticholinergics, and Benadryl  Redirect unwanted behaviors as first-line therapy, avoid physical restraints as able to  · Continue to monitor    Acute cystitis without hematuria  · Urine culture grew greater than 100,000 Aerococcus urinae  · Completed 7-day course of ceftriaxone  · Continue to monitor lab work    Insomnia  First-line treatment is behavior modification  Maintain sleep-wake cycle, avoid nighttime interruptions  Avoid caffeine throughout the day  Avoid napping throughout the day  Encourage physical activity throughout the day  · Avoid sedative hypnotics including benzodiazepines and benadryl  · Continue Depakote and mirtazapine 15 mg nightly    Ambulatory dysfunction  At a baseline ambulates with rolling walker  PT/OT following  Fall precautions  Out of bed as tolerated  Encourage early and frequent mobilization  Encourage adequate hydration and nutrition  Provide adequate pain management   Goal is to discharge to [facility]  · Continue with PT/OT for continued strength and balance training     Subjective:   Upon examination patient was out of bed in chair, resting. He appeared comfortable and was in no acute distress. Appetite is improving. He has been sleeping better at night. Per nursing no acute concerns or issues at this time. Review of Systems   Unable to perform ROS: Dementia         Objective:     Vitals: Blood pressure 130/82, pulse 82, temperature 97.5 °F (36.4 °C), resp. rate 19, height 5' 8" (1.727 m), weight 77 kg (169 lb 12.1 oz), SpO2 96 %. ,Body mass index is 25.81 kg/m². No intake or output data in the 24 hours ending 07/31/23 1550    Current Medications: Reviewed    Physical Exam:   Physical Exam  Vitals reviewed. Constitutional:       General: He is sleeping. He is not in acute distress. Appearance: He is well-developed. He is not ill-appearing. Comments: Frail-appearing   HENT:      Head: Normocephalic and atraumatic. Mouth/Throat:      Mouth: Mucous membranes are dry. Cardiovascular:      Rate and Rhythm: Regular rhythm. Bradycardia present. Heart sounds: No murmur heard. Pulmonary:      Effort: Pulmonary effort is normal. No respiratory distress. Breath sounds: Normal breath sounds. Abdominal:      General: Bowel sounds are normal. There is no distension. Palpations: Abdomen is soft. Tenderness: There is no abdominal tenderness. Musculoskeletal:         General: No swelling. Right lower leg: No edema. Left lower leg: No edema. Skin:     General: Skin is warm and dry. Capillary Refill: Capillary refill takes less than 2 seconds. Neurological:      General: No focal deficit present. Mental Status: Mental status is at baseline. Cranial Nerves: No cranial nerve deficit. Motor: Weakness present. Gait: Gait abnormal.      Comments: Unable to assess orientation   Psychiatric:         Mood and Affect: Mood is anxious. Affect is angry. Behavior: Behavior is uncooperative and agitated. Invasive Devices     Peripheral Intravenous Line  Duration           Peripheral IV 07/30/23 Right;Ventral (anterior) Forearm 1 day                Lab, Imaging and other studies:    Lab Results:   I have personally reviewed pertinent lab results including the following:  -CBC, BMP    I have personally reviewed the following imaging study reports in PACS:  No new imaging to review  - I have personally reviewed pertinent reports. Please note:  Voice-recognition software may have been used in the preparation of this document. Occasional wrong word or "sound-alike" substitutions may have occurred due to the inherent limitations of voice recognition software. Interpretation should be guided by context.

## 2023-08-01 ENCOUNTER — TRANSITIONAL CARE MANAGEMENT (OUTPATIENT)
Dept: FAMILY MEDICINE CLINIC | Facility: CLINIC | Age: 88
End: 2023-08-01

## 2023-08-01 VITALS
DIASTOLIC BLOOD PRESSURE: 69 MMHG | HEIGHT: 68 IN | OXYGEN SATURATION: 97 % | WEIGHT: 175.04 LBS | BODY MASS INDEX: 26.53 KG/M2 | RESPIRATION RATE: 18 BRPM | HEART RATE: 61 BPM | SYSTOLIC BLOOD PRESSURE: 149 MMHG | TEMPERATURE: 97.8 F

## 2023-08-01 LAB
ANION GAP SERPL CALCULATED.3IONS-SCNC: 8 MMOL/L
BUN SERPL-MCNC: 36 MG/DL (ref 5–25)
CALCIUM SERPL-MCNC: 9.6 MG/DL (ref 8.4–10.2)
CHLORIDE SERPL-SCNC: 106 MMOL/L (ref 96–108)
CO2 SERPL-SCNC: 28 MMOL/L (ref 21–32)
CREAT SERPL-MCNC: 1.24 MG/DL (ref 0.6–1.3)
GFR SERPL CREATININE-BSD FRML MDRD: 51 ML/MIN/1.73SQ M
GLUCOSE SERPL-MCNC: 110 MG/DL (ref 65–140)
GLUCOSE SERPL-MCNC: 123 MG/DL (ref 65–140)
POTASSIUM SERPL-SCNC: 4.2 MMOL/L (ref 3.5–5.3)
SODIUM SERPL-SCNC: 142 MMOL/L (ref 135–147)

## 2023-08-01 PROCEDURE — 99238 HOSP IP/OBS DSCHRG MGMT 30/<: CPT | Performed by: HOSPITALIST

## 2023-08-01 PROCEDURE — 80048 BASIC METABOLIC PNL TOTAL CA: CPT

## 2023-08-01 PROCEDURE — 82948 REAGENT STRIP/BLOOD GLUCOSE: CPT

## 2023-08-01 RX ORDER — DIVALPROEX SODIUM 125 MG/1
250 CAPSULE, COATED PELLETS ORAL EVERY 8 HOURS SCHEDULED
Qty: 180 CAPSULE | Refills: 0 | Status: SHIPPED
Start: 2023-08-01 | End: 2023-08-31

## 2023-08-01 RX ORDER — LANOLIN ALCOHOL/MO/W.PET/CERES
3 CREAM (GRAM) TOPICAL
Refills: 0 | Status: SHIPPED
Start: 2023-08-01 | End: 2023-08-31

## 2023-08-01 RX ORDER — DIVALPROEX SODIUM 125 MG/1
250 CAPSULE, COATED PELLETS ORAL EVERY 8 HOURS SCHEDULED
Qty: 180 CAPSULE | Refills: 0
Start: 2023-08-01 | End: 2023-08-01 | Stop reason: SDUPTHER

## 2023-08-01 RX ORDER — MIRTAZAPINE 15 MG/1
15 TABLET, FILM COATED ORAL
Qty: 30 TABLET | Refills: 0
Start: 2023-08-01 | End: 2023-08-01 | Stop reason: SDUPTHER

## 2023-08-01 RX ORDER — ACETAMINOPHEN 325 MG/1
975 TABLET ORAL EVERY 8 HOURS
Qty: 270 TABLET | Refills: 0 | Status: SHIPPED
Start: 2023-08-01 | End: 2023-08-31

## 2023-08-01 RX ORDER — MIRTAZAPINE 15 MG/1
15 TABLET, FILM COATED ORAL
Qty: 30 TABLET | Refills: 0 | Status: SHIPPED
Start: 2023-08-01 | End: 2023-08-31

## 2023-08-01 RX ADMIN — ENOXAPARIN SODIUM 40 MG: 40 INJECTION SUBCUTANEOUS at 09:07

## 2023-08-01 RX ADMIN — AMLODIPINE BESYLATE 5 MG: 5 TABLET ORAL at 09:07

## 2023-08-01 RX ADMIN — CYANOCOBALAMIN TAB 500 MCG 1000 MCG: 500 TAB at 09:07

## 2023-08-01 RX ADMIN — DIVALPROEX SODIUM 250 MG: 125 CAPSULE, COATED PELLETS ORAL at 05:32

## 2023-08-01 NOTE — ASSESSMENT & PLAN NOTE
Assessment:  · History Alzheimer's with psychotic disturbances  · PT/OT was unable to evaluate him right now as he is still under four-point restraints  · Most recently admitted on 06/20 and subsequently discharged on July 3, 2023 due to altered mental status. · Wife reports that he was not consistently taking his medications at his facility this is likely the reason for his suboptimal Depakote level at 24  · Wife also reports that patient's aphasia is not new and has actually progressively gotten worse, even she has difficulty understanding what he is saying. · Urine culture grew >100,000 Aerococcus urinae  · Patient was more combative and agitated last night fighting with nurses, attempting to get out of bed, and spitting at nurses.   · Was off restraints overnight (7/30 and 7/31)     Plan:  · Discontinued IV ceftriaxone (7 days completed on 7/31/23) Depakote sprinkles 250 mg TID  · Mirtazapine increased to 15 mg daily scheduled today to be given at 6:30 PM  · Tylenol 975 mg TID for chronic pain management  · Zyprexa 2.5 mg as needed for combative behavior unable to be controlled with first-line alternative measures  · Frequent reorientation  · Fall precautions  · Have OT PT evaluation, try to provide frequent and early mobilization  · Minimize sleep-wake cycle, avoid nighttime interruptions  · Attempt to group overnight vitals/labs/nursing checks as possible  · Dim lights, close blinds turn off TV to minimize stimulation  · We will provide adequate pain control with tylenol 975 mg TID  · Avoid urinary retention and constipation  · Avoid medications that may worsen or precipitate delirium such as tramadol, benzodiazepines, anticholinergics, and Benadryl  · We will continue to monitor  · Medically stable for DC

## 2023-08-01 NOTE — ASSESSMENT & PLAN NOTE
· Toxic metabolic encephalopathy likely secondary to UTI given findings on recent urine culture showing over 100,000 colony-forming units of Aerococcus urinae  · POA altered mental status, combative, agitation decreased p.o. intake. No fevers, no chills, no sick contacts  · Recently discharged to [facility] rehab from admission [facility] 07/03/2023 due to the same. · Per spouse patient has progressively declined over the past 2 months. · Most recent lab work on 06/21/2023 TSH unremarkable, vitamin B12 unremarkable  · Folate unremarkable  · At the ED CT head no acute intraparenchymal abnormalities  · UA significant for pyuria with WBCs 10-24, occasional bacteria although patient denies any symptoms at this time. · Likely in the setting of Alzheimer's dementia with progression of disease versus metabolic in the setting of pyuria versus slight dehydration on exam.  · Patient was more combative and agitated last night fighting with nurses, attempting to get out of bed, and spitting at nurses.   · Currently off restraints in his chair    Plan:  · Delirium precautions  · Try to keep him off restraints  · Fall precautions  · Discontinued IV ceftriaxone (7 days completed on 7/31/23)   · Increased mirtazapine to 15 mg daily at 6:30 PM  · Follow up on Depakote levels

## 2023-08-01 NOTE — CASE MANAGEMENT
Case Management Discharge Planning Note    Patient name Wilmer Choi  Location S /S -86 MRN [id number]  : 1935 Date 2023       Current Admission Date: 2023  Current Admission Diagnosis:Toxic metabolic encephalopathy   Patient Active Problem List    Diagnosis Date Noted   • Dementia with behavioral disturbance ([address]) 2023   • Swallowing/mastication problem 2023   • Toxic metabolic encephalopathy    • Bradycardia 2023   • Ambulatory dysfunction 2023   • Stage 3 chronic kidney disease ([address]) 2023   • Urinary incontinence without sensory awareness 2023   • Slow transit constipation 2023   • At high risk for falls 2023   • Lumbar radiculopathy    • Acute cystitis without hematuria 2023   • UTI symptoms 2023   • Paroxysmal atrial fibrillation ([address]) 01/10/2023   • Platelets decreased ([address]) 01/10/2023   • 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 ([address]) 2022   • Neurologic gait dysfunction 2022   • Stage 3b chronic kidney disease ([address]) 2021   • OAB (overactive bladder) 2021   • Benign prostatic hyperplasia with urinary frequency 2021   • Controlled type 2 diabetes mellitus without complication, without long-term current use of insulin ([address]) 2019   • Sensorineural hearing loss (SNHL) of both ears 10/21/2016   • Seasonal allergies 2015   • Spinal stenosis 2012   • Hypertension 2012      LOS (days): 8  Geometric Mean LOS (GMLOS) (days): 3.80  Days to GMLOS:-4     OBJECTIVE:  Risk of Unplanned Readmission Score: 19.46         Current admission status: Inpatient   Preferred Pharmacy:   [address] [address]  Phone: [phone] Fax: [address] [place] [address] Road  [address] [place] [address]  Phone: [phone] Fax: [phone]    Primary Care Provider: Madai Mancilla MD    Primary Insurance: [facility]  Secondary Insurance:     DISCHARGE DETAILS:    Discharge planning discussed with[de-identified] patient's spouse, Obdulia Becker, by phone  Freedom of Choice: Yes (re: facility placement)  Comments - Freedom of Choice: [facility]  CM contacted family/caregiver?: Yes (spouse, Obdulia Becker, by phone)  Were Treatment Team discharge recommendations reviewed with patient/caregiver?: Yes  Did patient/caregiver verbalize understanding of patient care needs?: N/A- going to facility  Were patient/caregiver advised of the risks associated with not following Treatment Team discharge recommendations?: Yes    Contacts  Patient Contacts: Luzmacolton Eugenio, spouse  Relationship to Patient[de-identified] Family  Contact Method: Phone  Phone Number: [phone]  Reason/Outcome: Continuity of Care, Emergency Contact, Referral, Discharge [address]         Is the patient interested in [facility] at discharge?: No    DME Referral Provided  Referral made for DME?: No    Other Referral/Resources/Interventions Provided:  Interventions: Assisted Living  Referral Comments: Transport confirmed for 11:00am with [facility]. No insurance auth needed for BLS transportation per CM discharge support. TT sent to MD to relay need for DME form to be completed prior to transfer. Also informed of need for hard copies of controlled substances and for medications to be escribed to [facility] (already added to pharmacy list). Requested ambulatory orders for PT/OT for continued therapy to be arranged at [address] [place] [address]. Call made to spouse, Obdulia Becker, and reviewed plan for d/c today. Spouse confirmed that she's on her way to [address] [place] [address] to complete admission paperwork. Confirmed agreement with transfer for this morning.     Would you like to participate in our [address] service program?  : No - Declined    Treatment Team Recommendation: Assisted Living  Discharge Destination Plan[de-identified] Assisted Living ([facility])  Transport at Discharge : BLS Ambulance  Dispatcher Contacted: Yes  Number/Name of Dispatcher: [employer]  Transported by [employer] and Unit #): [facility]  ETA of Transport (Date): 08/01/23  ETA of Transport (Time): 1100 (confirmed)                            Accepting Facility Name, [address] [place] [address] Street : [facility]  Receiving Facility/Agency Phone Number: [phone]  Facility/Agency Fax Number: [phone]

## 2023-08-01 NOTE — DISCHARGE SUMMARY
[facility]  Discharge- Jolie Eckert 1935, 81 y.o. male MRN: [id number]  Unit/Bed#: S -Emleyn Encounter: [id number]  Primary Care Provider: Deborah Burton MD   Date and time admitted to hospital: 7/24/2023  5:52 AM    * Toxic metabolic encephalopathy  Assessment & Plan  · Toxic metabolic encephalopathy likely secondary to UTI given findings on recent urine culture showing over 100,000 colony-forming units of Aerococcus urinae  · POA altered mental status, combative, agitation decreased p.o. intake. No fevers, no chills, no sick contacts  · Recently discharged to [facility] rehab from admission [address] [place] [address] 07/03/2023 due to the same. · Per spouse patient has progressively declined over the past 2 months. · Most recent lab work on 06/21/2023 TSH unremarkable, vitamin B12 unremarkable  · Folate unremarkable  · At the ED CT head no acute intraparenchymal abnormalities  · UA significant for pyuria with WBCs 10-24, occasional bacteria although patient denies any symptoms at this time. · Likely in the setting of Alzheimer's dementia with progression of disease versus metabolic in the setting of pyuria versus slight dehydration on exam.  · Patient was more combative and agitated last night fighting with nurses, attempting to get out of bed, and spitting at nurses.   · Currently off restraints in his chair    Plan:  · Delirium precautions  · Try to keep him off restraints  · Fall precautions  · Discontinued IV ceftriaxone (7 days completed on 7/31/23)   · Increased mirtazapine to 15 mg daily at 6:30 PM  · Follow up on Depakote levels     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 [facility])  Assessment & Plan  Assessment:  · History Alzheimer's with psychotic disturbances  · PT/OT was unable to evaluate him right now as he is still under four-point restraints  · Most recently admitted on 06/20 and subsequently discharged on July 3, 2023 due to altered mental status. · Wife reports that he was not consistently taking his medications at his facility this is likely the reason for his suboptimal Depakote level at 24  · Wife also reports that patient's aphasia is not new and has actually progressively gotten worse, even she has difficulty understanding what he is saying. · Urine culture grew >100,000 Aerococcus urinae  · Patient was more combative and agitated last night fighting with nurses, attempting to get out of bed, and spitting at nurses. · Was off restraints overnight (7/30 and 7/31)     Plan:  · Discontinued IV ceftriaxone (7 days completed on 7/31/23) Depakote sprinkles 250 mg TID  · Mirtazapine increased to 15 mg daily scheduled today to be given at 6:30 PM  · Tylenol 975 mg TID for chronic pain management  · Zyprexa 2.5 mg as needed for combative behavior unable to be controlled with first-line alternative measures  · Frequent reorientation  · Fall precautions  · Have OT PT evaluation, try to provide frequent and early mobilization  · Minimize sleep-wake cycle, avoid nighttime interruptions  · Attempt to group overnight vitals/labs/nursing checks as possible  · Dim lights, close blinds turn off TV to minimize stimulation  · We will provide adequate pain control with tylenol 975 mg TID  · Avoid urinary retention and constipation  · Avoid medications that may worsen or precipitate delirium such as tramadol, benzodiazepines, anticholinergics, and Benadryl  · We will continue to monitor  · Medically stable for DC    Acute cystitis without hematuria  Assessment & Plan  Assessment:  · At the ED UA significant for small leukocytes, microscopy 10-20 WBCs, occasional bacteria  · At the ED received 1 dose of ceftriaxone due to toxic metabolic encephalopathy 2/2 UTI.   · Cultures grew >100,000 CFU aerococcus urinae susceptible to most penicillins, cephalosporins, and nitrofurantoin  · Continues to be medically stable    Plan:  · Discontinued IV ceftriaxone (7 days completed on 7/31/23)   · Tylenol 975 mg 3 times daily for chronic pain  · CBC a.m.  · Monitor fever curves        Stage 3b chronic kidney disease ([address])  Assessment & Plan  Lab Results   Component Value Date    CREATININE 1.24 08/01/2023    CREATININE 1.37 (H) 07/31/2023    CREATININE 1.30 07/30/2023     · History CKD stage IIIb  · Baseline seems to be from 1.3-1.5, currently at baseline  · BMP a.m.,  · Avoid nephrotoxin, avoid hypotension    Medical Problems     Resolved Problems  Date Reviewed: 7/29/2023   None       Discharging Resident: Paresh Downs DO  Discharging Attending: No att. providers found  PCP: Jordin Blum MD  Admission Date:   Admission Orders (From admission, onward)     Ordered        07/24/23 [facility]  Once                      Discharge Date: 08/01/23    Consultations During Hospital Stay:  · Geriatric medicine, , OT/PT    Procedures Performed:   CT head without contrast   Final Result by Cuco Delgado MD (07/24 6381)      No acute intracranial abnormality. Workstation performed: [id number]             Significant Findings / Test Results:   · None    Incidental Findings:   · None    Test Results Pending at Discharge (will require follow up): · Follow-up on Depakote levels. Outpatient Tests Requested:  · None    Complications: None    Reason for Admission: Toxic metabolic encephalopathy    Hospital Course:   Ariane Whitaker is a 81 y.o. male patient with Alzheimer's dementia, hypertension, type 2 diabetes, BPH and CKD stage III who originally presented to the hospital on 7/24/2023 from nursing residence with altered mentation, combativeness and decreased oral intake. At the ED CT of the head was negative for any acute changes. Patient was treated with IV ceftriaxone for toxic metabolic encephalopathy and delirium secondary to UTI noted for Aerococcus. Patient was taking Depakote and Seroquel at the facility but wife reported noncompliance. At the hospital, we continued with Depakote 125 mg in a.m. and 250 mg at bedtime and discontinued Seroquel. He was given mirtazapine 7.5 mg at bedtime initially and had to be increased to 15 mg. Hospital stay was prolonged due to patient being combative and agitated thus had to be on restraints and pharmacological sedation. Patient was stable to discharge once he did not require any restraints or mood stabilizing medications given via IV or IM over the past 48 hours as required by the nursing facility. Please see above list of diagnoses and related plan for additional information. Condition at Discharge: fair    Discharge Day Visit / Exam:   Subjective: No significant overnight events. Patient was calm this morning. He denies any fever, chills, chest pain, shortness of breath, abdominal pain. Vitals: Blood Pressure: 149/69 (08/01/23 0700)  Pulse: 61 (08/01/23 0700)  Temperature: 97.8 °F (36.6 °C) (08/01/23 0700)  Temp Source: Axillary (08/01/23 0700)  Respirations: 18 (08/01/23 0700)  Height: 5' 8" (172.7 cm) (last ht assessment) (07/25/23 1316)  Weight - Scale: 79.4 kg (175 lb 0.7 oz) (08/01/23 0533)  SpO2: 97 % (08/01/23 0700)  Exam:   Physical Exam  Vitals and nursing note reviewed. Constitutional:       Appearance: Normal appearance. HENT:      Head: Normocephalic and atraumatic. Eyes:      Extraocular Movements: Extraocular movements intact. Pupils: Pupils are equal, round, and reactive to light. Cardiovascular:      Rate and Rhythm: Normal rate and regular rhythm. Pulses: Normal pulses. Pulmonary:      Effort: Pulmonary effort is normal.      Breath sounds: Normal breath sounds. Abdominal:      General: Bowel sounds are normal.      Palpations: Abdomen is soft. Skin:     General: Skin is warm and dry. Capillary Refill: Capillary refill takes less than 2 seconds. Neurological:      General: No focal deficit present.       Mental Status: He is alert and oriented to person, place, and time. Discussion with Family: Updated  (wife) via phone. Discharge instructions/Information to patient and family:   See after visit summary for information provided to patient and family. Provisions for Follow-Up Care:  See after visit summary for information related to follow-up care and any pertinent home health orders. Disposition:   Other [facility] Readmission:     Discharge Medications:  See after visit summary for reconciled discharge medications provided to patient and/or family.       **Please Note: This note may have been constructed using a voice recognition system**

## 2023-08-01 NOTE — DISCHARGE INSTR - AVS FIRST PAGE
Dear Miguel Gonzáles,     It was our pleasure to care for you here at [facility] [employer]. It is our hope that we were always able to exceed the expected standards for your care during your stay. You were hospitalized due to Altered Mental Status. You were cared for on the 3rd floor by Mortimer Bal, MD under the service of Angelique Maxwell MD with the [facility] who covers for your primary care physician (PCP), Mario Alberto Eason MD, while you were hospitalized. If you have any questions or concerns related to this hospitalization, you may contact us at [phone]. For follow up as well as any medication refills, we recommend that you follow up with your primary care physician. A registered nurse will reach out to you by phone within a few days after your discharge to answer any additional questions that you may have after going home. However, at this time we provide for you here, the most important instructions / recommendations at discharge:     Notable Medication Adjustments -   Please continue to take Mirtazapine 15mg around 6:30 pm    Please take Melatonin 3mg at night   Please take Depakote 250 three times a day  Please STOP taking Seroquel   Please start taking tylenol 375mg every 8 hours as needed   Testing Required after Discharge -   Please monitor renal function with a BMP in 2-3 days after discharge and follow up with PCP   Important follow up information -   Please follow up with your PCP in 1 week   Other Instructions -   If you are experiencing worsening headaches, weakness, numbness, feeling confused, chest pain, shortness of breath or any other symptoms please return to the ED   Please review this entire after visit summary as additional general instructions including medication list, appointments, activity, diet, any pertinent wound care, and other additional recommendations from your care team that may be provided for you.       Sincerely,     Nelson Mclean Vinny Kirk MD

## 2023-08-01 NOTE — ASSESSMENT & PLAN NOTE
Lab Results   Component Value Date    CREATININE 1.24 08/01/2023    CREATININE 1.37 (H) 07/31/2023    CREATININE 1.30 07/30/2023     · History CKD stage IIIb  · Baseline seems to be from 1.3-1.5, currently at baseline  · BMP a.m.,  · Avoid nephrotoxin, avoid hypotension

## 2023-08-01 NOTE — CASE MANAGEMENT
Case Management Progress Note    Patient name Dong Gandhi  Location S /S -19 MRN [id number]  : 1935 Date 2023       LOS (days): 8  Geometric Mean LOS (GMLOS) (days): 3.80  Days to GMLOS:-4        OBJECTIVE:        Current admission status: Inpatient  Preferred Pharmacy:   [facility], [address] [place] [address]  [address] [facility]  Phone: [phone] Fax: [address] [place] [address], [address], [place] [address] [address]  Phone: [phone] Fax: [phone]    Primary Care Provider: Daniel Hobson MD    Primary Insurance: [facility]  Secondary Insurance:     PROGRESS NOTE:    DME form completed faxed to [facility] at: [phone]. Call made to facility and spoke with Iván Rushing who confirmed it was received. Call back number left with Iván Rushing in case anything else needed for admission, as Serenity Elias currently in a meeting. VM also left for [facility] ([phone]). Original DME form and orders for PT/OT provided to RN to include with d/c paperwork. MD and RN aware of plan for pick-up at 11:00am for transfer to [facility].

## 2023-08-01 NOTE — CASE MANAGEMENT
[address] [place] [address] received request for transport authorization from Care Manager. Type of transport: BLS  Authorization initiated by contacting: [facility] Via: Availity   Authorization is not required by insurance for BLS transport. CM notified.

## 2023-08-02 LAB — VALPROATE FREE SERPL-MCNC: 5 UG/ML (ref 6–22)

## 2023-08-02 NOTE — UTILIZATION REVIEW
NOTIFICATION OF ADMISSION DISCHARGE   This is a Notification of Discharge from [facility]. Please be advised that this patient has been discharge from our facility. Below you will find the admission and discharge date and time including the patient’s disposition. UTILIZATION REVIEW CONTACT:  Marlene Robbins MA  Utilization   Network Utilization Review Department  Phone: [phone] x carefully listen to the prompts. All voicemails are confidential.  Email: [email or web address]     ADMISSION INFORMATION  PRESENTATION DATE: 7/24/2023  5:52 AM  OBERVATION ADMISSION DATE:   INPATIENT ADMISSION DATE: 7/24/23  4:09 PM   DISCHARGE DATE: 8/1/2023 11:34 AM   DISPOSITION:Released to SNF/TCU/SNU Facility    IMPORTANT INFORMATION:  Send all requests for admission clinical reviews, approved or denied determinations and any other requests to dedicated fax number below belonging to the campus where the patient is receiving treatment.  List of dedicated fax numbers:  [place] DENIALS (Administrative/Medical Necessity) [phone] [facility] (M[facility]/NICU/Pediatrics) [phone]   [facility] [phone]   [facility] [phone] 16[address] [facility] [phone] 401 [facility] [phone]   [facility] [phone]   [facility] 608 [facility] [phone]   [facility] [phone]   3445 [facility] [phone]   27[facility] [address] [facility] [phone]

## 2023-08-11 ENCOUNTER — NURSING HOME VISIT (OUTPATIENT)
Dept: GERIATRICS | Facility: OTHER | Age: 88
End: 2023-08-11

## 2023-08-11 DIAGNOSIS — G30.1 LATE ONSET ALZHEIMER'S DEMENTIA WITH BEHAVIORAL DISTURBANCE (HCC): Primary | ICD-10-CM

## 2023-08-11 DIAGNOSIS — F02.818 LATE ONSET ALZHEIMER'S DEMENTIA WITH BEHAVIORAL DISTURBANCE (HCC): Primary | ICD-10-CM

## 2023-08-11 DIAGNOSIS — I10 PRIMARY HYPERTENSION: ICD-10-CM

## 2023-08-11 DIAGNOSIS — F33.9 DEPRESSION, RECURRENT (HCC): ICD-10-CM

## 2023-08-11 DIAGNOSIS — N39.42 URINARY INCONTINENCE WITHOUT SENSORY AWARENESS: Chronic | ICD-10-CM

## 2023-08-11 DIAGNOSIS — R26.2 AMBULATORY DYSFUNCTION: ICD-10-CM

## 2023-09-29 PROBLEM — N30.00 ACUTE CYSTITIS WITHOUT HEMATURIA: Status: RESOLVED | Noted: 2023-02-22 | Resolved: 2023-09-29

## 2023-10-01 ENCOUNTER — APPOINTMENT (EMERGENCY)
Dept: RADIOLOGY | Facility: HOSPITAL | Age: 88
End: 2023-10-01
Payer: COMMERCIAL

## 2023-10-01 ENCOUNTER — HOSPITAL ENCOUNTER (EMERGENCY)
Facility: HOSPITAL | Age: 88
Discharge: HOME | End: 2023-10-01
Attending: EMERGENCY MEDICINE
Payer: COMMERCIAL

## 2023-10-01 ENCOUNTER — APPOINTMENT (EMERGENCY)
Dept: CT IMAGING | Facility: HOSPITAL | Age: 88
End: 2023-10-01
Payer: COMMERCIAL

## 2023-10-01 ENCOUNTER — TELEPHONE (OUTPATIENT)
Dept: OTHER | Facility: OTHER | Age: 88
End: 2023-10-01

## 2023-10-01 VITALS
TEMPERATURE: 98.1 F | OXYGEN SATURATION: 98 % | RESPIRATION RATE: 18 BRPM | BODY MASS INDEX: 25.14 KG/M2 | SYSTOLIC BLOOD PRESSURE: 149 MMHG | WEIGHT: 165.34 LBS | HEART RATE: 59 BPM | DIASTOLIC BLOOD PRESSURE: 87 MMHG

## 2023-10-01 DIAGNOSIS — T50.905A ADVERSE EFFECT OF DRUG, INITIAL ENCOUNTER: Primary | ICD-10-CM

## 2023-10-01 DIAGNOSIS — F03.90 DEMENTIA (HCC): ICD-10-CM

## 2023-10-01 LAB
ALBUMIN SERPL BCP-MCNC: 3.9 G/DL (ref 3.5–5)
ALP SERPL-CCNC: 59 U/L (ref 34–104)
ALT SERPL W P-5'-P-CCNC: 17 U/L (ref 7–52)
ANION GAP SERPL CALCULATED.3IONS-SCNC: 6 MMOL/L
AST SERPL W P-5'-P-CCNC: 20 U/L (ref 13–39)
BACTERIA UR QL AUTO: NORMAL /HPF
BASOPHILS # BLD AUTO: 0.03 THOUSANDS/ÂΜL (ref 0–0.1)
BASOPHILS NFR BLD AUTO: 1 % (ref 0–1)
BILIRUB SERPL-MCNC: 1 MG/DL (ref 0.2–1)
BILIRUB UR QL STRIP: NEGATIVE
BUN SERPL-MCNC: 31 MG/DL (ref 5–25)
CALCIUM SERPL-MCNC: 9.9 MG/DL (ref 8.4–10.2)
CHLORIDE SERPL-SCNC: 107 MMOL/L (ref 96–108)
CLARITY UR: CLEAR
CO2 SERPL-SCNC: 33 MMOL/L (ref 21–32)
COLOR UR: YELLOW
CREAT SERPL-MCNC: 1.5 MG/DL (ref 0.6–1.3)
EOSINOPHIL # BLD AUTO: 0.08 THOUSAND/ÂΜL (ref 0–0.61)
EOSINOPHIL NFR BLD AUTO: 2 % (ref 0–6)
ERYTHROCYTE [DISTWIDTH] IN BLOOD BY AUTOMATED COUNT: 13.5 % (ref 11.6–15.1)
GFR SERPL CREATININE-BSD FRML MDRD: 41 ML/MIN/1.73SQ M
GLUCOSE SERPL-MCNC: 107 MG/DL (ref 65–140)
GLUCOSE UR STRIP-MCNC: NEGATIVE MG/DL
HCT VFR BLD AUTO: 43.1 % (ref 36.5–49.3)
HGB BLD-MCNC: 13.9 G/DL (ref 12–17)
HGB UR QL STRIP.AUTO: NEGATIVE
IMM GRANULOCYTES # BLD AUTO: 0 THOUSAND/UL (ref 0–0.2)
IMM GRANULOCYTES NFR BLD AUTO: 0 % (ref 0–2)
KETONES UR STRIP-MCNC: ABNORMAL MG/DL
LACTATE SERPL-SCNC: 0.9 MMOL/L (ref 0.5–2)
LEUKOCYTE ESTERASE UR QL STRIP: NEGATIVE
LYMPHOCYTES # BLD AUTO: 1.27 THOUSANDS/ÂΜL (ref 0.6–4.47)
LYMPHOCYTES NFR BLD AUTO: 32 % (ref 14–44)
MCH RBC QN AUTO: 32.1 PG (ref 26.8–34.3)
MCHC RBC AUTO-ENTMCNC: 32.3 G/DL (ref 31.4–37.4)
MCV RBC AUTO: 100 FL (ref 82–98)
MONOCYTES # BLD AUTO: 0.54 THOUSAND/ÂΜL (ref 0.17–1.22)
MONOCYTES NFR BLD AUTO: 13 % (ref 4–12)
NEUTROPHILS # BLD AUTO: 2.11 THOUSANDS/ÂΜL (ref 1.85–7.62)
NEUTS SEG NFR BLD AUTO: 52 % (ref 43–75)
NITRITE UR QL STRIP: NEGATIVE
NON-SQ EPI CELLS URNS QL MICRO: NORMAL /HPF
NRBC BLD AUTO-RTO: 0 /100 WBCS
PH UR STRIP.AUTO: 6 [PH]
PLATELET # BLD AUTO: 188 THOUSANDS/UL (ref 149–390)
PMV BLD AUTO: 10.6 FL (ref 8.9–12.7)
POTASSIUM SERPL-SCNC: 4.3 MMOL/L (ref 3.5–5.3)
PROT SERPL-MCNC: 6.6 G/DL (ref 6.4–8.4)
PROT UR STRIP-MCNC: ABNORMAL MG/DL
RBC # BLD AUTO: 4.33 MILLION/UL (ref 3.88–5.62)
RBC #/AREA URNS AUTO: NORMAL /HPF
SODIUM SERPL-SCNC: 146 MMOL/L (ref 135–147)
SP GR UR STRIP.AUTO: 1.02 (ref 1–1.03)
UROBILINOGEN UR STRIP-ACNC: 4 MG/DL
WBC # BLD AUTO: 4.03 THOUSAND/UL (ref 4.31–10.16)
WBC #/AREA URNS AUTO: NORMAL /HPF

## 2023-10-01 PROCEDURE — G1004 CDSM NDSC: HCPCS

## 2023-10-01 PROCEDURE — 36415 COLL VENOUS BLD VENIPUNCTURE: CPT

## 2023-10-01 PROCEDURE — 83605 ASSAY OF LACTIC ACID: CPT

## 2023-10-01 PROCEDURE — 99285 EMERGENCY DEPT VISIT HI MDM: CPT

## 2023-10-01 PROCEDURE — 71045 X-RAY EXAM CHEST 1 VIEW: CPT

## 2023-10-01 PROCEDURE — 99285 EMERGENCY DEPT VISIT HI MDM: CPT | Performed by: EMERGENCY MEDICINE

## 2023-10-01 PROCEDURE — 85025 COMPLETE CBC W/AUTO DIFF WBC: CPT

## 2023-10-01 PROCEDURE — 81001 URINALYSIS AUTO W/SCOPE: CPT

## 2023-10-01 PROCEDURE — 93005 ELECTROCARDIOGRAM TRACING: CPT

## 2023-10-01 PROCEDURE — 80053 COMPREHEN METABOLIC PANEL: CPT

## 2023-10-01 PROCEDURE — 96365 THER/PROPH/DIAG IV INF INIT: CPT

## 2023-10-01 PROCEDURE — 70450 CT HEAD/BRAIN W/O DYE: CPT

## 2023-10-01 RX ADMIN — SODIUM CHLORIDE, SODIUM LACTATE, POTASSIUM CHLORIDE, AND CALCIUM CHLORIDE 1000 ML: .6; .31; .03; .02 INJECTION, SOLUTION INTRAVENOUS at 17:29

## 2023-10-01 NOTE — ED PROVIDER NOTES
History  Chief Complaint   Patient presents with   • Altered Mental Status     Patient presents from [facility] for altered mental status. Patient started valproic acid two days ago for agitation. Wife visited patient today and stated that he was not acting appropriate which prompted EMS. Pt is an 81year old male who presents to ED via EMS from [facility] for evaluation of altered mental status. Per EMS report the patient's wife came to visit him today and found him to be altered and more agitated than normal. EMS reports also note that the pt was started on a new medication, valproic acid, 2 days ago, and was reportedly agitated prior to beginning the new medication. History is limited due to dementia and history provided was from EMS. Pt denies any pain in chest, abdomen, back, extremities. Prior to Admission Medications   Prescriptions Last Dose Informant Patient Reported? Taking? Mirabegron ER 50 MG TB24   No No   Sig: Take 1 tablet (50 mg total) by mouth daily   amLODIPine (NORVASC) 5 mg tablet   No No   Sig: Take 1 tablet (5 mg total) by mouth daily Do not start before July 4, 2023. cyanocobalamin (VITAMIN B-12) 1000 MCG tablet   No No   Sig: Take 1 tablet (1,000 mcg total) by mouth daily Do not start before July 4, 2023.    divalproex sodium (DEPAKOTE SPRINKLE) 125 MG capsule   No No   Sig: Take 2 capsules (250 mg total) by mouth every 8 (eight) hours   divalproex sodium (DEPAKOTE SPRINKLE) 125 MG capsule   No No   Sig: Take 2 capsules (250 mg total) by mouth every 8 (eight) hours   mirtazapine (REMERON) 15 mg tablet   No No   Sig: Take 1 tablet (15 mg total) by mouth daily at bedtime   senna (SENOKOT) 8.6 mg   No No   Sig: Take 1 tablet (8.6 mg total) by mouth daily at bedtime      Facility-Administered Medications: None       Past Medical History:   Diagnosis Date   • Balanitis     last assessed 12/19/14   • BPH (benign prostatic hyperplasia)    • Diabetes mellitus ([address])     blood sugar 167 this am at 0630   • GERD (gastroesophageal reflux disease)    • Heme + stool    • Hypertension    • Lumbar radiculopathy    • Myocardial infarction ([address])     35 years ago   • Phimosis     last assessed 16   • Sciatica     right side, last assessed 16       Past Surgical History:   Procedure Laterality Date   • BACK SURGERY      laminectomy Lumbar   • CATARACT EXTRACTION, BILATERAL     • CIRCUMCISION     • HERNIA REPAIR Right    • NM COLONOSCOPY FLX DX W/COLLJ SPEC WHEN PFRMD N/A 2017    Procedure: EGD AND COLONOSCOPY;  Surgeon: Eli Mccormick DO;  Location: [facility]; Service: General       Family History   Family history unknown: Yes     I have reviewed and agree with the history as documented. E-Cigarette/Vaping   • E-Cigarette Use Never User      E-Cigarette/Vaping Substances     Social History     Tobacco Use   • Smoking status: Former     Packs/day: 1.00     Years: 30.00     Total pack years: 30.00     Types: Cigarettes     Quit date: 1998     Years since quittin.7   • Smokeless tobacco: Never   • Tobacco comments:     quit 25 yrs ago   Vaping Use   • Vaping Use: Never used   Substance Use Topics   • Alcohol use: Not Currently   • Drug use: No        Review of Systems   Unable to perform ROS: Dementia   Constitutional: Positive for activity change and fatigue. Negative for fever. Respiratory: Negative for cough. Cardiovascular: Negative for chest pain. Gastrointestinal: Negative for abdominal pain. Skin: Negative for wound. Psychiatric/Behavioral: Positive for agitation.        Physical Exam  ED Triage Vitals [10/01/23 1434]   Temperature Pulse Respirations Blood Pressure SpO2   98.1 °F (36.7 °C) 64 16 159/69 96 %      Temp Source Heart Rate Source Patient Position - Orthostatic VS BP Location FiO2 (%)   Axillary Monitor -- Right arm --      Pain Score       --             Orthostatic Vital Signs  Vitals:    10/01/23 1434 10/01/23 1700 10/01/23 1800   BP: 159/69 144/78 149/87   Pulse: 64 63 59       Physical Exam  Vitals and nursing note reviewed. Constitutional:       Appearance: He is normal weight. HENT:      Head: Normocephalic and atraumatic. Eyes:      Pupils: Pupils are equal, round, and reactive to light. Cardiovascular:      Rate and Rhythm: Normal rate and regular rhythm. Heart sounds: Normal heart sounds. Pulmonary:      Effort: Pulmonary effort is normal.      Breath sounds: Normal breath sounds. Abdominal:      General: Bowel sounds are normal.      Palpations: Abdomen is soft. Musculoskeletal:         General: No swelling or tenderness. Cervical back: Neck supple. Skin:     General: Skin is warm and dry. Neurological:      Mental Status: He is alert. He is disoriented. Cranial Nerves: No facial asymmetry. Comments: Oriented to self. Able to answer yes and no questions and states he is hungry. Psychiatric:         Behavior: Behavior is agitated. Cognition and Memory: Cognition is impaired.          ED Medications  Medications   lactated ringers bolus 1,000 mL (0 mL Intravenous Stopped 10/1/23 1837)       Diagnostic Studies  Results Reviewed     Procedure Component Value Units Date/Time    Urine Microscopic [[id number]]  (Normal) Collected: 10/01/23 1723    Lab Status: Final result Specimen: Urine, Clean Catch Updated: 10/01/23 1733     RBC, UA 1-2 /hpf      WBC, UA 1-2 /hpf      Epithelial Cells None Seen /hpf      Bacteria, UA None Seen /hpf     UA w Reflex to Microscopic w Reflex to Culture [[id number]]  (Abnormal) Collected: 10/01/23 1723    Lab Status: Final result Specimen: Urine, Clean Catch Updated: 10/01/23 1731     Color, UA Yellow     Clarity, UA Clear     Specific Gravity, UA 1.024     pH, UA 6.0     Leukocytes, UA Negative     Nitrite, UA Negative     Protein, UA Trace mg/dl      Glucose, UA Negative mg/dl      Ketones, UA 10 (1+) mg/dl      Urobilinogen, UA 4.0 mg/dl      Bilirubin, UA Negative Occult Blood, UA Negative    Comprehensive metabolic panel [[id number]]  (Abnormal) Collected: 10/01/23 1536    Lab Status: Final result Specimen: Blood from Arm, Right Updated: 10/01/23 1608     Sodium 146 mmol/L      Potassium 4.3 mmol/L      Chloride 107 mmol/L      CO2 33 mmol/L      ANION GAP 6 mmol/L      BUN 31 mg/dL      Creatinine 1.50 mg/dL      Glucose 107 mg/dL      Calcium 9.9 mg/dL      AST 20 U/L      ALT 17 U/L      Alkaline Phosphatase 59 U/L      Total Protein 6.6 g/dL      Albumin 3.9 g/dL      Total Bilirubin 1.00 mg/dL      eGFR 41 ml/min/1.73sq m     Narrative:      [place] guidelines for Chronic Kidney Disease (CKD):   •  Stage 1 with normal or high GFR (GFR > 90 mL/min/1.73 square meters)  •  Stage 2 Mild CKD (GFR = 60-89 mL/min/1.73 square meters)  •  Stage 3A Moderate CKD (GFR = 45-59 mL/min/1.73 square meters)  •  Stage 3B Moderate CKD (GFR = 30-44 mL/min/1.73 square meters)  •  Stage 4 Severe CKD (GFR = 15-29 mL/min/1.73 square meters)  •  Stage 5 End Stage CKD (GFR <15 mL/min/1.73 square meters)  Note: GFR calculation is accurate only with a steady state creatinine    Lactic acid, plasma (w/reflex if result > 2.0) [[id number]]  (Normal) Collected: 10/01/23 1536    Lab Status: Final result Specimen: Blood from Arm, Right Updated: 10/01/23 1607     LACTIC ACID 0.9 mmol/L     Narrative:      Result may be elevated if tourniquet was used during collection.     CBC and differential [[id number]]  (Abnormal) Collected: 10/01/23 1536    Lab Status: Final result Specimen: Blood from Arm, Right Updated: 10/01/23 1546     WBC 4.03 Thousand/uL      RBC 4.33 Million/uL      Hemoglobin 13.9 g/dL      Hematocrit 43.1 %       fL      MCH 32.1 pg      MCHC 32.3 g/dL      RDW 13.5 %      MPV 10.6 fL      Platelets 323 Thousands/uL      nRBC 0 /100 WBCs      Neutrophils Relative 52 %      Immat GRANS % 0 %      Lymphocytes Relative 32 %      Monocytes Relative 13 % Eosinophils Relative 2 %      Basophils Relative 1 %      Neutrophils Absolute 2.11 Thousands/µL      Immature Grans Absolute 0.00 Thousand/uL      Lymphocytes Absolute 1.27 Thousands/µL      Monocytes Absolute 0.54 Thousand/µL      Eosinophils Absolute 0.08 Thousand/µL      Basophils Absolute 0.03 Thousands/µL                  CT head without contrast   Final Result by Mónica Reyna MD (10/01 1650)      No acute intracranial abnormality. Chronic microangiopathic changes. Workstation performed: [id number]         XR chest 1 view portable    (Results Pending)         Procedures  ECG 12 Lead Documentation Only    Date/Time: 10/1/2023 3:27 PM    Performed by: Anthony Mcgill MD  Authorized by: Anthony Mcgill MD    Indications / Diagnosis:  AMS  ECG reviewed by me, the ED Provider: yes    Patient location:  Bedside  Previous ECG:     Previous ECG:  Compared to current    Comparison ECG info:  07/25/23    Comparison to cardiac monitor: Yes    Interpretation:     Interpretation: abnormal    Rate:     ECG rate:  64    ECG rate assessment: normal    Rhythm:     Rhythm: sinus rhythm    Ectopy:     Ectopy: PAC    QRS:     QRS axis:  Normal  ST segments:     ST segments:  Normal  T waves:     T waves: non-specific            ED Course  ED Course as of 10/02/23 0009   Sun Oct 01, 2023   1546 Spoke with wife at bedside. Pt is comfort care but may receive antibiotics. She states the valproic acide is not new, but dose was increased abut 4 days ago. She notes the ot had been more lethargic the past 2 days and had not been taking much by mouth including his medications, and the valproic acid. He was more agitated today and eating better today.     1645 Lactic acid, plasma (w/reflex if result > 2.0)  WNL   1645 WBC(!): 4.03  Borderline low   1645 CBC and differential(!)  Unremarkable aside from WBC   1646 Creatinine(!): 1.50  Elevated from baseline of ~1.3   1737 Urine Microscopic  WNL     1737 CT head without contrast  IMPRESSION: No acute intracranial abnormality. Chronic microangiopathic changes. 1752 XR chest 1 view portable  No consolidation, infiltrates   1759 Discussed results of CT scan and labs with wife who is present at bedside. Given that the patient is comfort care only, the negative findings on testing, and timeline with valproic acid dosage increase, likely medication adverse effect. Discussed that patient will be able to be discharged with recommendation to hold valproic acid until pt is able to follow up with his                                        Medical Decision Making  DDx: arhythmia, UTI, PNA, stroke,     Wife arrived and was able to provide a clearer timeline on medication adjustments and patient's baseline cognition level and behavior. Time line of events and negative CBC, CMP, UA support adverse effect of valproic acid. Amount and/or Complexity of Data Reviewed  Independent Historian: spouse and EMS  Labs: ordered. Decision-making details documented in ED Course. Radiology: ordered. Decision-making details documented in ED Course. Risk  Prescription drug management. Disposition  Final diagnoses: Adverse effect of drug, initial encounter   Dementia [facility])     Time reflects when diagnosis was documented in both MDM as applicable and the Disposition within this note     Time User Action Codes Description Comment    10/1/2023  7:12 PM Lexis Duque, Julia Rangel Dr Adverse effect of drug, initial encounter     10/1/2023  7:12 PM Lan Shoemaker [F03.90] Dementia [facility])       ED Disposition     ED Disposition   Discharge    Condition   Stable    Date/Time   Sun Oct 1, 2023  7:12 PM    [facility] discharge to home/self care.                Follow-up Information     Follow up With Specialties Details Why Contact Info    Vinicius Ramírez MD Family Medicine, Internal Medicine  Follow up with your primary doctor within 1 week for adjustment of valproic acid or consider medication change if deemed appropriate. [address] [facility]  [phone]            Discharge Medication List as of 10/1/2023  7:16 PM      CONTINUE these medications which have NOT CHANGED    Details   amLODIPine (NORVASC) 5 mg tablet Take 1 tablet (5 mg total) by mouth daily Do not start before July 4, 2023., Starting Tue 7/4/2023, No Print      cyanocobalamin (VITAMIN B-12) 1000 MCG tablet Take 1 tablet (1,000 mcg total) by mouth daily Do not start before July 4, 2023., Starting Tue 7/4/2023, No Print      divalproex sodium (DEPAKOTE SPRINKLE) 125 MG capsule Take 2 capsules (250 mg total) by mouth every 8 (eight) hours, Starting Tue 8/1/2023, Until Thu 8/31/2023, No Print      divalproex sodium (DEPAKOTE SPRINKLE) 125 MG capsule Take 2 capsules (250 mg total) by mouth every 8 (eight) hours, Starting Tue 8/1/2023, Until Thu 8/31/2023, No Print      Mirabegron ER 50 MG TB24 Take 1 tablet (50 mg total) by mouth daily, Starting Mon 4/10/2023, Normal      mirtazapine (REMERON) 15 mg tablet Take 1 tablet (15 mg total) by mouth daily at bedtime, Starting Tue 8/1/2023, Until Thu 8/31/2023, No Print      senna (SENOKOT) 8.6 mg Take 1 tablet (8.6 mg total) by mouth daily at bedtime, Starting Wed 5/24/2023, Normal           No discharge procedures on file. PDMP Review     None           ED Provider  Attending physically available and evaluated Demetrio Colunga. I managed the patient along with the ED Attending.     Electronically Signed by         Ivis Connor MD  10/02/23 7705

## 2023-10-01 NOTE — DISCHARGE INSTRUCTIONS
Please hold valproic acid until the patient can see his primary to evaluate for medication dosage adjustment or medication change.

## 2023-10-02 LAB
ATRIAL RATE: 64 BPM
P AXIS: 47 DEGREES
PR INTERVAL: 176 MS
QRS AXIS: -12 DEGREES
QRSD INTERVAL: 88 MS
QT INTERVAL: 414 MS
QTC INTERVAL: 427 MS
T WAVE AXIS: 18 DEGREES
VENTRICULAR RATE: 64 BPM

## 2023-10-02 PROCEDURE — 93010 ELECTROCARDIOGRAM REPORT: CPT | Performed by: INTERNAL MEDICINE

## 2023-10-05 ENCOUNTER — NURSING HOME VISIT (OUTPATIENT)
Dept: GERIATRICS | Facility: OTHER | Age: 88
End: 2023-10-05
Payer: COMMERCIAL

## 2023-10-05 VITALS
DIASTOLIC BLOOD PRESSURE: 66 MMHG | BODY MASS INDEX: 21.93 KG/M2 | SYSTOLIC BLOOD PRESSURE: 130 MMHG | WEIGHT: 144.2 LBS | TEMPERATURE: 97.6 F | HEART RATE: 74 BPM | OXYGEN SATURATION: 96 % | RESPIRATION RATE: 18 BRPM

## 2023-10-05 DIAGNOSIS — R26.2 AMBULATORY DYSFUNCTION: ICD-10-CM

## 2023-10-05 DIAGNOSIS — F02.B2 MODERATE LATE ONSET ALZHEIMER'S DEMENTIA WITH PSYCHOTIC DISTURBANCE (HCC): Primary | Chronic | ICD-10-CM

## 2023-10-05 DIAGNOSIS — G30.1 MODERATE LATE ONSET ALZHEIMER'S DEMENTIA WITH PSYCHOTIC DISTURBANCE (HCC): Primary | Chronic | ICD-10-CM

## 2023-10-05 DIAGNOSIS — F33.9 RECURRENT MAJOR DEPRESSIVE DISORDER, REMISSION STATUS UNSPECIFIED (HCC): ICD-10-CM

## 2023-10-05 DIAGNOSIS — R35.0 BENIGN PROSTATIC HYPERPLASIA WITH URINARY FREQUENCY: ICD-10-CM

## 2023-10-05 DIAGNOSIS — N40.1 BENIGN PROSTATIC HYPERPLASIA WITH URINARY FREQUENCY: ICD-10-CM

## 2023-10-05 PROCEDURE — 99349 HOME/RES VST EST MOD MDM 40: CPT | Performed by: NURSE PRACTITIONER

## 2023-10-05 NOTE — ASSESSMENT & PLAN NOTE
AAOx1  Awake, alert - asking for breakfast   Continue Divalproex 250 mg and Zyprexa low dose PRN  Provide redirection, reorientation, distraction techniques  Fall Precautions  Assist with ADLs/IADLs  Avoid deliriogenic medications such as tramadol, benzodiazepines, anticholinergics, benadryl  Encourage Hydration/ Nutrition  Implement sleep hygiene, limit night time interuptions   Encourage participation in group activities when appropriate

## 2023-10-05 NOTE — PROGRESS NOTES
[address]  [phone]  [address]  Code 13        NAME: Atlas Krabbe  AGE: 81 y.o. SEX: male CODE STATUS: No CPR    DATE OF ENCOUNTER: 10/5/2023    Assessment and Plan     1. 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 Recurrent major depressive disorder, remission status unspecified (HCC)  Assessment & Plan:  Continue mirtazapine 15 mg Q HS      3. Benign prostatic hyperplasia with urinary frequency  Assessment & Plan:  Continue Myrbetriq 50 mg daily       4. Ambulatory dysfunction  Assessment & Plan:  Continue fall precautions  Assist with transfers and ADLs  Uses RW or W/C for mobility           All medications and routine orders were reviewed and updated as needed. Chief Complaint     LTC follow up visit     History of Present Illness     Atlas Krabbe is a 81 y.o. male , she/he is a LTC resident of [address] [place] [address]. Past Medical Hx including but not limited to Alzheimer's, BPH, HTN, DM2, AFIB, CKD3, seen in collaboration with nursing for medical mgmt and LTC follow up. Seen and examined at bedside today. Patient is a poor historian due to Alzheimer's/Dementia. History is obtained from review of records and staff. Patient is in bed, requsting to get out, asking for breakfast, states, "I feel fine". Recently sent to ED for change in MS, thought to be due to increased Depakote. He was given IVF with improvement.  He is on comfort care for continued poor po intake. He needs Max assist with ADLs, uses a RW or W/C. Staff states he does become agitated at times, currently controlled. No other concerns at this time. The patient's allergies, past medical, surgical, social and family history were reviewed and unchanged. Review of Systems     Review of Systems   Unable to perform ROS: Dementia         Objective     Vitals:   Vitals:    10/05/23 1721   BP: 130/66   Pulse: 74   Resp: 18   Temp: 97.6 °F (36.4 °C)   SpO2: 96%         Physical Exam  Vitals and nursing note reviewed. Constitutional:       General: He is not in acute distress. Appearance: Normal appearance. He is ill-appearing. HENT:      Head: Normocephalic and atraumatic. Nose: No congestion or rhinorrhea. Mouth/Throat:      Mouth: Mucous membranes are moist.   Eyes:      General: No scleral icterus. Extraocular Movements: Extraocular movements intact. Conjunctiva/sclera: Conjunctivae normal.      Pupils: Pupils are equal, round, and reactive to light. Cardiovascular:      Rate and Rhythm: Normal rate and regular rhythm. Pulses: Normal pulses. Heart sounds: Normal heart sounds. No murmur heard. Pulmonary:      Effort: Pulmonary effort is normal.      Breath sounds: Normal breath sounds. No wheezing, rhonchi or rales. Abdominal:      General: Bowel sounds are normal. There is no distension. Palpations: Abdomen is soft. Tenderness: There is no abdominal tenderness. Musculoskeletal:         General: No swelling or signs of injury. Comments: Limited ROM to upper and lower extremities    Lymphadenopathy:      Cervical: No cervical adenopathy. Skin:     General: Skin is warm and dry. Findings: No erythema. Comments: Bruise noted to right hip   Neurological:      Mental Status: He is alert. He is disoriented. Sensory: No sensory deficit. Motor: Weakness present.       Gait: Gait abnormal.   Psychiatric: Mood and Affect: Mood normal.         Behavior: Behavior normal.         Pertinent Laboratory/Diagnostic Studies:  Reviewed in facility chart-stable     Current Medications   Medications reviewed and updated see facility [facility] for details. Current Outpatient Medications:   •  amLODIPine (NORVASC) 5 mg tablet, Take 1 tablet (5 mg total) by mouth daily Do not start before July 4, 2023., Disp: , Rfl: 0  •  cyanocobalamin (VITAMIN B-12) 1000 MCG tablet, Take 1 tablet (1,000 mcg total) by mouth daily Do not start before July 4, 2023., Disp: , Rfl: 0  •  divalproex sodium (DEPAKOTE SPRINKLE) 125 MG capsule, Take 2 capsules (250 mg total) by mouth every 8 (eight) hours, Disp: 180 capsule, Rfl: 0  •  divalproex sodium (DEPAKOTE SPRINKLE) 125 MG capsule, Take 2 capsules (250 mg total) by mouth every 8 (eight) hours, Disp: 180 capsule, Rfl: 0  •  Mirabegron ER 50 MG TB24, Take 1 tablet (50 mg total) by mouth daily, Disp: 90 tablet, Rfl: 0  •  mirtazapine (REMERON) 15 mg tablet, Take 1 tablet (15 mg total) by mouth daily at bedtime, Disp: 30 tablet, Rfl: 0  •  senna (SENOKOT) 8.6 mg, Take 1 tablet (8.6 mg total) by mouth daily at bedtime, Disp: 90 tablet, Rfl: 3     Please note:  Voice-recognition software may have been used in the preparation of this document. Occasional wrong word or "sound-alike" substitutions may have occurred due to the inherent limitations of voice recognition software. Interpretation should be guided by context.          JONAS Boland  10/5/2023 5:22 PM

## 2023-10-12 NOTE — ED ATTENDING ATTESTATION
10/1/2023  Krissy Gayle DO, saw and evaluated the patient. I have discussed the patient with the resident/non-physician practitioner and agree with the resident's/non-physician practitioner's findings, Plan of Care, and MDM as documented in the resident's/non-physician practitioner's note, except where noted. All available labs and Radiology studies were reviewed. I was present for key portions of any procedure(s) performed by the resident/non-physician practitioner and I was immediately available to provide assistance. At this point I agree with the current assessment done in the Emergency Department.   I have conducted an independent evaluation of this patient a history and physical is as follows:    ED Course         Critical Care Time  Procedures

## 2023-11-30 ENCOUNTER — NURSING HOME VISIT (OUTPATIENT)
Dept: GERIATRICS | Facility: OTHER | Age: 88
End: 2023-11-30
Payer: COMMERCIAL

## 2023-11-30 DIAGNOSIS — F03.918 DEMENTIA WITH BEHAVIORAL DISTURBANCE (HCC): ICD-10-CM

## 2023-11-30 DIAGNOSIS — R35.0 BENIGN PROSTATIC HYPERPLASIA WITH URINARY FREQUENCY: ICD-10-CM

## 2023-11-30 DIAGNOSIS — I10 PRIMARY HYPERTENSION: ICD-10-CM

## 2023-11-30 DIAGNOSIS — N40.1 BENIGN PROSTATIC HYPERPLASIA WITH URINARY FREQUENCY: ICD-10-CM

## 2023-11-30 DIAGNOSIS — R26.2 AMBULATORY DYSFUNCTION: Primary | ICD-10-CM

## 2023-11-30 PROCEDURE — 99349 HOME/RES VST EST MOD MDM 40: CPT | Performed by: FAMILY MEDICINE

## 2023-11-30 NOTE — PROGRESS NOTES
[address]  [phone]  [place] [address]  [address]      NAME: José Miguel Gotti  AGE: 81 y.o. SEX: male [id number]    DATE OF ENCOUNTER: 12/3/2023    Assessment and Plan     1. Ambulatory dysfunction  - Fall precautions in place  - wheelchair mobility    2. Benign prostatic hyperplasia with urinary frequency  - cont Mirabegron ER 50 mg po qd    3. Dementia with behavioral disturbance ([address])  - redirection, reorientation  - total assistance with ADLs  - cont Olanzapine 2.5 mg po qd  - cont [id number] mg po qhs    4. Primary hypertension  - cont Amlodipine 5 mg po qd      - Counseling Documentation: patient was counseled regarding: risk factor reductions    Chief Complaint     Routine Long term follow-up visit. History of Present Illness     HPI  Ana Castanon, a 80 y/o male is a long term resident at [facility], seen and examined, stable. He found sitting in the family room. He is non-verbal, very minimal interaction with staff. Staff have no concerns at this time, he needs total assistance with ADLs, including feeding with assistance. The following portions of the patient's history were reviewed and updated as appropriate: allergies, current medications, past family history, past medical history, past social history, past surgical history and problem list.    Review of Systems     Review of Systems   Unable to perform ROS: Dementia   As in HPI.     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 Constitutional:       General: He is not in acute distress. Appearance: He is well-developed. He is not diaphoretic. Comments: Elderly male sitting in the chair leaning forward   HENT:      Head: Normocephalic and atraumatic. Nose: Nose normal.      Mouth/Throat:      Mouth: Mucous membranes are dry. Pharynx: Oropharynx is clear. No oropharyngeal exudate. Eyes:      General: No scleral icterus. Right eye: No discharge. Left eye: No discharge. Conjunctiva/sclera: Conjunctivae normal.      Comments: Poor eye contact   Cardiovascular:      Rate and Rhythm: Normal rate and regular rhythm. Heart sounds: Murmur heard. Pulmonary:      Effort: Pulmonary effort is normal. No respiratory distress. Breath sounds: Normal breath sounds. No wheezing. Chest:      Chest wall: No tenderness. Abdominal:      General: Bowel sounds are normal. There is no distension. Palpations: Abdomen is soft. Tenderness: There is no abdominal tenderness. There is no guarding. Musculoskeletal:         General: No tenderness or deformity. Right lower leg: No edema. Left lower leg: No edema. Comments: Impaired ROM due to debility   Skin:     General: Skin is warm and dry. Neurological:      Mental Status: He is alert. Cranial Nerves: No cranial nerve deficit.       Motor: No abnormal muscle tone.      Coordination: Coordination normal.      Comments: Not oriented  Stooped posture  Does not involve in the conversation  Non verbal mostly  Unable to follow commands   Psychiatric:      Comments: Confused, indifferent         Pertinent Laboratory/Diagnostic Studies:  Refer to facility chart. Current Medications   Medications reviewed and updated in facility chart.

## 2023-12-03 RX ORDER — LANOLIN ALCOHOL/MO/W.PET/CERES
3 CREAM (GRAM) TOPICAL
COMMUNITY

## 2023-12-03 RX ORDER — OLANZAPINE 2.5 MG/1
2.5 TABLET, FILM COATED ORAL
COMMUNITY

## 2023-12-31 DIAGNOSIS — Z51.5 HOSPICE CARE: Primary | ICD-10-CM

## 2023-12-31 RX ORDER — MORPHINE SULFATE 100 MG/5ML
5 SOLUTION, CONCENTRATE ORAL EVERY 4 HOURS PRN
Qty: 30 ML | Refills: 0 | Status: SHIPPED | OUTPATIENT
Start: 2023-12-31

## 2023-12-31 RX ORDER — LORAZEPAM 2 MG/ML
0.5 CONCENTRATE ORAL EVERY 6 HOURS PRN
Qty: 30 ML | Refills: 0 | Status: SHIPPED | OUTPATIENT
Start: 2023-12-31

## 2024-01-05 ENCOUNTER — TELEPHONE (OUTPATIENT)
Dept: OTHER | Facility: OTHER | Age: 89
End: 2024-01-05

## 2024-01-05 ENCOUNTER — TELEPHONE (OUTPATIENT)
Age: 89
End: 2024-01-05

## 2024-01-05 NOTE — TELEPHONE ENCOUNTER
ZAKIA/[phone]/Antonina from [facility] calling to advise that pt was on Hospice and passed away today.    Blanca Bliss was paged via TC

## 2024-01-05 NOTE — TELEPHONE ENCOUNTER
Received call from Demetrius with [facility]  [facility].   Case #[id number]  Time of death 8:35AM   Date of death 2024    Please sign  Thank you!

## 2024-01-08 NOTE — TELEPHONE ENCOUNTER
Demetrius from [facility] called following up on Death Certificate.     Death Certificate needs to be signed.    Case #  [id number]  Time of death:  8:35 am   Date of death:  2024

## 2024-07-28 NOTE — PROGRESS NOTES
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)  Assessment & Plan  · Presented to the ED on 6/20/23 after wife noted increasing agitation, with anger and aggressiveness at home over a 3 days period. He has dementia and she  Is usually able to care for him at home  · CT head shows diffuse moderate to severe chronic microangiopathic changes  · TSH normal, B12 low normal,   · Geriatrics following  · On Seroquel 25 mg at bedtime  · Currently on Depakote.   125 mg every morning and p.m. and 250 mg at bedtime  · Behavior much improved  · Monitor LFTs   · Continue Haldol PRN until stabilized on consistent daily medications  · Fall/aspiration precautions  · PT/OT   · Referrals sent for placement by case management-  · We will discontinue one-to-one and monitor    Ambulatory dysfunction  Assessment & Plan  Unsteady gait with history of recurrent falls  · Fall precautions  · PT/OT recommend postacute rehab    Stage 3 chronic kidney disease [facility])  Assessment & Plan  Lab Results   Component Value Date    EGFR 43 06/26/2023    EGFR 43 06/25/2023    EGFR 42 06/24/2023    CREATININE 1.43 (H) 06/26/2023    CREATININE 1.44 (H) 06/25/2023    CREATININE 1.46 (H) 06/24/2023     · Baseline creatinine 1.4 to 1.6  · Home medication Lisinopril 10 mg daily discontinued on admission in the setting of fluctuating oral fluid intake and begun on Amlodipine 5 mg daily on 6/21    · Encourage oral intake   · Monitor I+O, monitor PVRs, monitor for constipation  · Limit nephrotoxic agents     Benign prostatic hyperplasia with urinary frequency  Assessment & Plan  · Was recently taken off finasteride  · Oxybutynin discontinued per geriatrics recommendation   · Monitor for urinary retention      Controlled type 2 diabetes mellitus without complication, without long-term current use of insulin [facility])  Assessment & Plan  Lab Results   Component Value Date    HGBA1C 5.8 2023       No results for input(s): "POCGLU" in the last 72 hours. Blood Sugar Average: Last 72 hrs:     · Had been taken off Metformin prior to admission   · A1c well controled   · Monitor blood sugars    Sensorineural hearing loss (SNHL) of both ears  Assessment & Plan  · Noted           VTE Pharmacologic Prophylaxis: VTE Score: 4 Moderate Risk (Score 3-4) - Pharmacological DVT Prophylaxis Ordered: heparin. Patient Centered Rounds: I performed bedside rounds with nursing staff today. Discussions with Specialists or Other Care Team Provider:  geriatrics    Education and Discussions with Family / Patient: Updated  (wife) at bedside. Total Time Spent on Date of Encounter in care of patient: 35 minutes This time was spent on one or more of the following: performing physical exam; counseling and coordination of care; obtaining or reviewing history; documenting in the medical record; reviewing/ordering tests, medications or procedures; communicating with other healthcare professionals and discussing with patient's family/caregivers. Current Length of Stay: 7 day(s)  Current Patient Status: Inpatient   Certification Statement: The patient will continue to require additional inpatient hospital stay due to Medication adjustment and behavioral disturbance  Discharge Plan:     Code Status: Level 1 - Full Code    Subjective:   Patient seen and examined. Sitting up in chair. Appears that patient was at a one-time dose of Haldol overnight. Discussed with nursing and also current bedside sitter. Patient cooperative. Will take off one-to-one and monitor    Discussed  with geriatrics.   Plan is to continue on the current dose of Depakote    Objective:     Vitals:   Temp (24hrs), Av.7 °F (36.5 °C), Min:97.6 °F (36.4 °C), Max:97.8 °F (36.6 °C)    Temp:  [97.6 °F (36.4 °C)-97.8 °F (36.6 °C)] 97.8 °F (36.6 °C)  Resp:  [18] 18  BP: (144-145)/(70-71) 144/71  Body mass index is 28.76 kg/m². Input and Output Summary (last 24 hours): Intake/Output Summary (Last 24 hours) at 6/28/2023 1737  Last data filed at 6/28/2023 0215  Gross per 24 hour   Intake --   Output 300 ml   Net -300 ml       Physical Exam:   Physical Exam  Constitutional:       General: He is not in acute distress. Appearance: He is not ill-appearing. Comments: Hard of hearing   HENT:      Head: Normocephalic and atraumatic. Nose: Nose normal.   Eyes:      General: No scleral icterus. Cardiovascular:      Rate and Rhythm: Normal rate and regular rhythm. Pulses: Normal pulses. Heart sounds: Normal heart sounds. Pulmonary:      Breath sounds: No stridor. Abdominal:      General: Bowel sounds are normal.   Musculoskeletal:         General: Normal range of motion. Cervical back: Normal range of motion. Skin:     General: Skin is warm. Neurological:      General: No focal deficit present. Mental Status: He is alert. He is disoriented.          Additional Data:     Labs:  Results from last 7 days   Lab Units 06/26/23  0517   WBC Thousand/uL 4.09*   HEMOGLOBIN g/dL 15.4   HEMATOCRIT % 45.9   PLATELETS Thousands/uL 199   NEUTROS PCT % 54   LYMPHS PCT % 29   MONOS PCT % 12   EOS PCT % 4     Results from last 7 days   Lab Units 06/26/23  0517   SODIUM mmol/L 142   POTASSIUM mmol/L 4.1   CHLORIDE mmol/L 114*   CO2 mmol/L 25   BUN mg/dL 36*   CREATININE mg/dL 1.43*   ANION GAP mmol/L 3   CALCIUM mg/dL 10.0   ALBUMIN g/dL 3.4*   TOTAL BILIRUBIN mg/dL 0.71   ALK PHOS U/L 67   ALT U/L 45   AST U/L 37   GLUCOSE RANDOM mg/dL 117         Results from last 7 days   Lab Units 06/24/23  1553   POC GLUCOSE mg/dl 117               Lines/Drains:  Invasive Devices     None                       Imaging: No pertinent imaging reviewed. Recent Cultures (last 7 days):         Last 24 Hours Medication List:   Current Facility-Administered Medications   Medication Dose Route Frequency Provider Last Rate   • acetaminophen  650 mg Oral Q6H PRN Soco Evans, CRNP     • amLODIPine  5 mg Oral Daily Soco Evans, CRNP     • vitamin B-12  1,000 mcg Oral Daily Soco Evans, CRNP     • divalproex sodium  125 mg Oral BID Emma Jacob DO      And   • divalproex sodium  250 mg Oral HS Emma Jacob DO     • docusate sodium  100 mg Oral BID Estephanie Branham MD     • haloperidol lactate  0.5 mg Intramuscular Q4H PRN Soco Evans, CRNP     • heparin (porcine)  5,000 Units Subcutaneous [facility] Maksim Rubio MD     • hydrALAZINE  5 mg Intravenous Q6H PRN Soco Evans, CRNP     • ondansetron  4 mg Intravenous Q6H PRN Soco Evans, CRNP     • QUEtiapine  25 mg Oral HS Soco Krueger CRNP     • senna  1 tablet Oral HS Estephanie Branham MD          Today, Patient Was Seen By: Julianne Hills MD    **Please Note: This note may have been constructed using a voice recognition system. ** PRINCIPAL DISCHARGE DIAGNOSIS  Diagnosis: Primary spontaneous pneumothorax  Assessment and Plan of Treatment:

## 2024-11-09 NOTE — PROGRESS NOTES
[facility]  [address] [facility] [address] [facility], [address] [facility]  POS 13  History and Physical    NAME: Chandler De La Rosa  AGE: 81 y.o. SEX: male [phone]    DATE OF ENCOUNTER: 8/26/2023    Code status:  No CPR    Assessment and Plan     1. Late onset Alzheimer's dementia with behavioral disturbance ([address])  - redirection, reorientation  - assistance with ADLs  - cont Divalproex 250 mg po q8    2. Depression, recurrent (HCC)  - cont [id number] mg po qhs    3. Primary hypertension  - cont amlodipine 5 mg po qd    4. Ambulatory dysfunction  - Fall precautions in place  - uses walker    5. Urinary incontinence without sensory awareness  - cont Myrbetriq 50 mg po qd      All medications and routine orders were reviewed and updated as needed. Plan discussed with: staff    Chief Complaint     Seen for admission at [address] [place] [address]    History of Present Illness     Luis Bliss, a 80 y/o male with PMH of Dementia, HTN, DM2,  A. Fib, BPH, UI, CKD3 and insomnia got admitted to [address] [place] [address] for long term dementia care. He was recently got admitted to the hospital with AMS and poor po intake. He needed restraints due to delirium. He was seen and examined, stable. He is unable to give any history due to dementia. Staff have no concerns at this time. He needs max assistance with ADLs. He uses walker.     HISTORY:  Past Medical History:   Diagnosis Date   • Balanitis     last assessed 12/19/14   • BPH (benign prostatic hyperplasia)    • Diabetes mellitus ([address])     blood sugar 167 this am at 0630   • GERD (gastroesophageal reflux disease)    • Heme + stool    • Hypertension    • Lumbar radiculopathy    • Myocardial infarction ([address])     35 years ago   • Phimosis     last assessed 04/27/16   • Sciatica     right side, last assessed 04/25/16     Family History   Family history unknown: Yes     Social History     Socioeconomic History   • Marital status: /Civil Union     Spouse name: None   • Patient confused, oriented to self and to current year. States objects in room when they aren't. Vitals stable during the shift. One IV removed d/t pain per patient along with patient's nonverbal reactions when flushed. Unable to place new IV. IV team attempted and was able to get access. However, patient then pulled IV out after restraints were reapplied. RN attempted again, unable to gain access. Spoke with IV team, recommended Day shift IV team place IV with US and would update them. Patient has one remaining IV. Continue Octreotide and abx. Electrolytes replaced per protocol.     Problem: Adult Inpatient Plan of Care  Goal: Optimal Comfort and Wellbeing  Intervention: Provide Person-Centered Care  Recent Flowsheet Documentation  Taken 11/9/2024 0400 by Annetta Partida RN  Trust Relationship/Rapport:   care explained   choices provided   thoughts/feelings acknowledged  Taken 11/9/2024 0000 by Annetta Partida RN  Trust Relationship/Rapport:   care explained   choices provided   thoughts/feelings acknowledged  Taken 11/8/2024 2000 by Annetta Partida RN  Trust Relationship/Rapport:   care explained   choices provided   thoughts/feelings acknowledged     Problem: Skin Injury Risk Increased  Goal: Skin Health and Integrity  Intervention: Optimize Skin Protection  Recent Flowsheet Documentation  Taken 11/9/2024 0400 by Annetta Partida RN  Activity Management: activity encouraged  Pressure Reduction Techniques: frequent weight shift encouraged  Head of Bed (HOB) Positioning: HOB at 20-30 degrees  Pressure Reduction Devices: specialty bed utilized  Skin Protection:   drying agents applied   incontinence pads utilized  Taken 11/9/2024 0000 by Annetta Partida RN  Activity Management: activity encouraged  Pressure Reduction Techniques: frequent weight shift encouraged  Head of Bed (HOB) Positioning: HOB at 20-30 degrees  Pressure Reduction Devices: specialty bed utilized  Skin Protection:   drying agents applied    Number of children: None   • Years of education: None   • Highest education level: None   Occupational History   • None   Tobacco Use   • Smoking status: Former     Packs/day: 1.00     Years: 30.00     Total pack years: 30.00     Types: Cigarettes     Quit date: 1998     Years since quittin.6   • Smokeless tobacco: Never   • Tobacco comments:     quit 25 yrs ago   Vaping Use   • Vaping Use: Never used   Substance and Sexual Activity   • Alcohol use: Not Currently   • Drug use: No   • Sexual activity: None   Other Topics Concern   • None   Social History Narrative   • None     Social Determinants of Health     Financial Resource Strain: Low Risk  (2023)    Overall Financial Resource Strain (CARDIA)    • Difficulty of Paying Living Expenses: Not hard at all   Food Insecurity: No Food Insecurity (2023)    Hunger Vital Sign    • Worried About Running Out of Food in the Last Year: Never true    • Ran Out of Food in the Last Year: Never true   Transportation Needs: No Transportation Needs (2023)    PRAPARE - Transportation    • Lack of Transportation (Medical): No    • Lack of Transportation (Non-Medical): No   Physical Activity: Not on file   Stress: Not on file   Social Connections: Not on file   Intimate Partner Violence: Not on file   Housing Stability: Low Risk  (2023)    Housing Stability Vital Sign    • Unable to Pay for Housing in the Last Year: No    • Number of Places Lived in the Last Year: 1    • Unstable Housing in the Last Year: No       Allergies:  No Known Allergies    Review of Systems     Review of Systems   Unable to perform ROS: Dementia   As in HPI. Medications and orders     All medications reviewed and updated in detention EMR. Objective     Vitals: T: 97.8, P: 60, R: 16, BP: 143/66, 99% on RA    Physical Exam  Vitals and nursing note reviewed. Constitutional:       General: He is not in acute distress. Appearance: Normal appearance. He is well-developed. incontinence pads utilized   transparent dressing maintained  Taken 11/8/2024 2200 by Annetta Partida RN  Activity Management: activity encouraged  Head of Bed (HOB) Positioning: HOB at 30 degrees  Taken 11/8/2024 2000 by Annetta Partida RN  Activity Management: activity encouraged  Pressure Reduction Techniques: frequent weight shift encouraged  Head of Bed (HOB) Positioning: HOB at 30-45 degrees  Pressure Reduction Devices:   specialty bed utilized   pressure-redistributing mattress utilized  Skin Protection:   drying agents applied   incontinence pads utilized     Problem: Restraint, Nonviolent  Goal: Absence of Harm or Injury  Intervention: Protect Skin and Joint Integrity  Recent Flowsheet Documentation  Taken 11/9/2024 0400 by Annetta Partida RN  Body Position: position changed independently  Skin Protection:   drying agents applied   incontinence pads utilized  Taken 11/9/2024 0000 by Annetta Partida RN  Body Position: position changed independently  Skin Protection:   drying agents applied   incontinence pads utilized   transparent dressing maintained  Taken 11/8/2024 2200 by Annetta Partida RN  Body Position: position changed independently  Taken 11/8/2024 2000 by Annetta Partida RN  Body Position: position changed independently  Skin Protection:   drying agents applied   incontinence pads utilized     Problem: Adult Inpatient Plan of Care  Goal: Absence of Hospital-Acquired Illness or Injury  Intervention: Identify and Manage Fall Risk  Recent Flowsheet Documentation  Taken 11/9/2024 0700 by Annetta Partida RN  Safety Promotion/Fall Prevention:   activity supervised   lighting adjusted   nonskid shoes/slippers when out of bed   safety round/check completed   room organization consistent  Taken 11/9/2024 0600 by Annetta Partida RN  Safety Promotion/Fall Prevention:   activity supervised   lighting adjusted   nonskid shoes/slippers when out of bed   safety round/check completed   room organization  He is not diaphoretic. HENT:      Head: Normocephalic and atraumatic. Nose: Nose normal.      Mouth/Throat:      Mouth: Mucous membranes are moist.      Pharynx: Oropharynx is clear. No oropharyngeal exudate. Eyes:      General: No scleral icterus. Right eye: No discharge. Left eye: No discharge. Conjunctiva/sclera: Conjunctivae normal.   Cardiovascular:      Rate and Rhythm: Normal rate and regular rhythm. Heart sounds: Normal heart sounds. No murmur heard. Pulmonary:      Effort: Pulmonary effort is normal. No respiratory distress. Breath sounds: Normal breath sounds. No wheezing. Chest:      Chest wall: No tenderness. Abdominal:      General: Bowel sounds are normal. There is no distension. Palpations: Abdomen is soft. Tenderness: There is no abdominal tenderness. There is no guarding. Musculoskeletal:         General: No tenderness or deformity. Right lower leg: No edema. Left lower leg: No edema. Comments: Impaired ROM   Skin:     General: Skin is warm and dry. Neurological:      Mental Status: He is alert. Cranial Nerves: No cranial nerve deficit. Motor: No abnormal muscle tone. Coordination: Coordination normal.      Comments: Oriented to self  Minimally verbal  Difficult to follow commands   Psychiatric:         Behavior: Behavior normal.      Comments: Confused, memory impaired. Pertinent Laboratory/Diagnostic Studies: The following labs/studies were reviewed please see chart or hospital paperwork for details.   Ref Range & Units 8/1/23 0430 7/31/23 9527 7/30/23 0542 7/28/23 0528 7/27/23 0644 7/26/23 0535 7/24/23 0649    Sodium 135 - 147 mmol/L 142  143  142  139  139  138  142    Potassium 3.5 - 5.3 mmol/L 4.2  4.1  4.0  3.9  4.0  4.2  4.1    Chloride 96 - 108 mmol/L 106  108  107  106  107  105  105    CO2 21 - 32 mmol/L 28  29  28  28  25  23 CM  31    ANION GAP mmol/L 8  6  7  5  7  10  6    BUN 5 - 25 mg/dL consistent  Taken 11/9/2024 0500 by Annetta Partida RN  Safety Promotion/Fall Prevention:   activity supervised   lighting adjusted   nonskid shoes/slippers when out of bed   safety round/check completed   room organization consistent  Taken 11/9/2024 0400 by Annetta Partida RN  Safety Promotion/Fall Prevention:   activity supervised   lighting adjusted   nonskid shoes/slippers when out of bed   safety round/check completed   room organization consistent  Taken 11/9/2024 0300 by Annetta Partida RN  Safety Promotion/Fall Prevention:   activity supervised   lighting adjusted   nonskid shoes/slippers when out of bed   safety round/check completed   room organization consistent  Taken 11/9/2024 0200 by Annetta Partida RN  Safety Promotion/Fall Prevention:   activity supervised   lighting adjusted   nonskid shoes/slippers when out of bed   safety round/check completed   room organization consistent  Taken 11/9/2024 0100 by Annetta Partida RN  Safety Promotion/Fall Prevention:   activity supervised   lighting adjusted   nonskid shoes/slippers when out of bed   safety round/check completed   room organization consistent  Taken 11/9/2024 0000 by Annetta Partida RN  Safety Promotion/Fall Prevention:   activity supervised   lighting adjusted   nonskid shoes/slippers when out of bed   safety round/check completed   room organization consistent  Taken 11/8/2024 2300 by Annetta Partida RN  Safety Promotion/Fall Prevention:   activity supervised   lighting adjusted   nonskid shoes/slippers when out of bed   safety round/check completed   room organization consistent  Taken 11/8/2024 2200 by Annetta Partida RN  Safety Promotion/Fall Prevention:   activity supervised   lighting adjusted   nonskid shoes/slippers when out of bed   safety round/check completed   room organization consistent  Taken 11/8/2024 2100 by Annetta Partida RN  Safety Promotion/Fall Prevention:   activity supervised   lighting adjusted   nonskid  36 High   35 High   34 High   27 High   26 High   22  26 High     Creatinine 0.60 - 1.30 mg/dL 1.24  1.37 High  CM  1.30 CM  1.25 CM  1.15 CM  1.21 CM  1.33 High  CM    Comment: Standardized to IDMS reference method   Glucose 65 - 140 mg/dL 110  117 CM  112 CM  106 CM  109 CM  108 CM  105 CM    Comment: If the patient is fasting, the ADA then defines impaired fasting glucose as > 100 mg/dL and diabetes as > or equal to 123 mg/dL.    Calcium 8.4 - 10.2 mg/dL 9.6  9.2  9.7  9.2  9.4  9.2  9.7    eGFR ml/min/1.73sq m 51  46  49         Ref Range & Units 7/31/23 0517 7/30/23 0542 7/28/23 0528 7/27/23 0644 7/26/23 0535 7/24/23 0649 7/2/23 0349    WBC 4.31 - 10.16 Thousand/uL 3.94 Low   4.78  4.80  5.42  6.43  5.74  3.50 Low     RBC 3.88 - 5.62 Million/uL 4.30  4.42  4.67  4.57  4.63  4.48  4.66    Hemoglobin 12.0 - 17.0 g/dL 13.6  13.8  14.9  14.5  14.6  14.1  14.5    Hematocrit 36.5 - 49.3 % 41.4  41.6  44.1  43.1  43.8  43.0  44.5    MCV 82 - 98 fL 96  94  94  94  95  96  96    MCH 26.8 - 34.3 pg 31.6  31.2  31.9  31.7  31.5  31.5  31.1    MCHC 31.4 - 37.4 g/dL 32.9  33.2  33.8  33.6  33.3  32.8  32.6    RDW 11.6 - 15.1 % 13.5  12.9  13.3  13.1  13.2  13.4  12.8    MPV 8.9 - 12.7 fL 10.4  10.4  10.4  10.5  10.5  10.6  10.6    Platelets 166 - 680 Thousands/uL 192  214  199  164  167  179  168    nRBC /100 WBCs 0  0  0  0  0  0     Neutrophils Relative 43 - 75 % 44  53  54  55  64  59     Immat GRANS % 0 - 2 % 1  0  0  0  0  0     Lymphocytes Relative 14 - 44 % 36  30  29  27  22  27     Monocytes Relative 4 - 12 % 13 High   12  12  14 High   12  11     Eosinophils Relative 0 - 6 % 5  4  4  3  2  2     Basophils Relative 0 - 1 % 1  1  1  1  0  1     Neutrophils Absolute 1.85 - 7.62 Thousands/µL 1.78 Low   2.57  2.58  3.05  4.13  3.42     Immature Grans Absolute 0.00 - 0.20 Thousand/uL 0.02  0.01  0.02          - Counseling Documentation: patient was counseled regarding: prognosis shoes/slippers when out of bed   safety round/check completed   room organization consistent  Taken 11/8/2024 2000 by Annetta Partida RN  Safety Promotion/Fall Prevention:   activity supervised   lighting adjusted   nonskid shoes/slippers when out of bed   safety round/check completed   room organization consistent  Taken 11/8/2024 1900 by Annetta Partida RN  Safety Promotion/Fall Prevention:   activity supervised   lighting adjusted   nonskid shoes/slippers when out of bed   safety round/check completed   room organization consistent  Intervention: Prevent Skin Injury  Recent Flowsheet Documentation  Taken 11/9/2024 0400 by Annetta Partida RN  Body Position: position changed independently  Skin Protection:   drying agents applied   incontinence pads utilized  Taken 11/9/2024 0000 by Annetta Partida RN  Body Position: position changed independently  Skin Protection:   drying agents applied   incontinence pads utilized   transparent dressing maintained  Taken 11/8/2024 2200 by Annetta Partida RN  Body Position: position changed independently  Taken 11/8/2024 2000 by Annetta Partida RN  Body Position: position changed independently  Skin Protection:   drying agents applied   incontinence pads utilized  Intervention: Prevent and Manage VTE (Venous Thromboembolism) Risk  Recent Flowsheet Documentation  Taken 11/9/2024 0400 by Annetta Partida RN  VTE Prevention/Management:   SCDs (sequential compression devices) on   bilateral  Intervention: Prevent Infection  Recent Flowsheet Documentation  Taken 11/9/2024 0700 by Annetta Partida RN  Infection Prevention: hand hygiene promoted  Taken 11/9/2024 0600 by Annetta Partida RN  Infection Prevention: hand hygiene promoted  Taken 11/9/2024 0500 by Annetta Partida RN  Infection Prevention: hand hygiene promoted  Taken 11/9/2024 0400 by Annetta Partida RN  Infection Prevention: hand hygiene promoted  Taken 11/9/2024 0300 by Annetta Partida RN  Infection Prevention:  hand hygiene promoted  Taken 11/9/2024 0200 by Annetta Partida RN  Infection Prevention: hand hygiene promoted  Taken 11/9/2024 0100 by Annetta Partida RN  Infection Prevention: hand hygiene promoted  Taken 11/9/2024 0000 by Annetta Partida RN  Infection Prevention: hand hygiene promoted  Taken 11/8/2024 2300 by Annetta Partida RN  Infection Prevention: hand hygiene promoted  Taken 11/8/2024 2200 by Annetta Partida RN  Infection Prevention: hand hygiene promoted  Taken 11/8/2024 2100 by Annetta Partida RN  Infection Prevention: hand hygiene promoted  Taken 11/8/2024 2000 by Annetta Partida RN  Infection Prevention: hand hygiene promoted  Taken 11/8/2024 1900 by Annetta Partida RN  Infection Prevention: hand hygiene promoted     Problem: Fall Injury Risk  Goal: Absence of Fall and Fall-Related Injury  Intervention: Identify and Manage Contributors  Recent Flowsheet Documentation  Taken 11/9/2024 0700 by Annetta Partida RN  Medication Review/Management: medications reviewed  Taken 11/9/2024 0600 by Annetta Partida RN  Medication Review/Management: medications reviewed  Taken 11/9/2024 0500 by Annetta Partida RN  Medication Review/Management: medications reviewed  Taken 11/9/2024 0400 by Annetta Partida RN  Medication Review/Management: medications reviewed  Taken 11/9/2024 0300 by Annetta Partida RN  Medication Review/Management: medications reviewed  Taken 11/9/2024 0200 by Annetta Partida RN  Medication Review/Management: medications reviewed  Taken 11/9/2024 0100 by Annetta Partida RN  Medication Review/Management: medications reviewed  Taken 11/9/2024 0000 by Annetta Partida RN  Medication Review/Management: medications reviewed  Taken 11/8/2024 2300 by Annetta Partida RN  Medication Review/Management: medications reviewed  Taken 11/8/2024 2200 by Annetta Partida RN  Medication Review/Management: medications reviewed  Taken 11/8/2024 2100 by Annetta Partida RN  Medication Review/Management:  medications reviewed  Taken 11/8/2024 2000 by Annetta Partida RN  Medication Review/Management: medications reviewed  Taken 11/8/2024 1900 by Annetta Patrida RN  Medication Review/Management: medications reviewed  Intervention: Promote Injury-Free Environment  Recent Flowsheet Documentation  Taken 11/9/2024 0700 by Annetta Partida RN  Safety Promotion/Fall Prevention:   activity supervised   lighting adjusted   nonskid shoes/slippers when out of bed   safety round/check completed   room organization consistent  Taken 11/9/2024 0600 by Annetta Partida RN  Safety Promotion/Fall Prevention:   activity supervised   lighting adjusted   nonskid shoes/slippers when out of bed   safety round/check completed   room organization consistent  Taken 11/9/2024 0500 by Annetta Partida RN  Safety Promotion/Fall Prevention:   activity supervised   lighting adjusted   nonskid shoes/slippers when out of bed   safety round/check completed   room organization consistent  Taken 11/9/2024 0400 by Annetta Partida RN  Safety Promotion/Fall Prevention:   activity supervised   lighting adjusted   nonskid shoes/slippers when out of bed   safety round/check completed   room organization consistent  Taken 11/9/2024 0300 by Annetta Partida RN  Safety Promotion/Fall Prevention:   activity supervised   lighting adjusted   nonskid shoes/slippers when out of bed   safety round/check completed   room organization consistent  Taken 11/9/2024 0200 by Annetta Partida RN  Safety Promotion/Fall Prevention:   activity supervised   lighting adjusted   nonskid shoes/slippers when out of bed   safety round/check completed   room organization consistent  Taken 11/9/2024 0100 by Annetta Partida RN  Safety Promotion/Fall Prevention:   activity supervised   lighting adjusted   nonskid shoes/slippers when out of bed   safety round/check completed   room organization consistent  Taken 11/9/2024 0000 by Annetta Partida RN  Safety Promotion/Fall  Prevention:   activity supervised   lighting adjusted   nonskid shoes/slippers when out of bed   safety round/check completed   room organization consistent  Taken 11/8/2024 2300 by Annetta Partida RN  Safety Promotion/Fall Prevention:   activity supervised   lighting adjusted   nonskid shoes/slippers when out of bed   safety round/check completed   room organization consistent  Taken 11/8/2024 2200 by Annetta Partida RN  Safety Promotion/Fall Prevention:   activity supervised   lighting adjusted   nonskid shoes/slippers when out of bed   safety round/check completed   room organization consistent  Taken 11/8/2024 2100 by Annetta Partida RN  Safety Promotion/Fall Prevention:   activity supervised   lighting adjusted   nonskid shoes/slippers when out of bed   safety round/check completed   room organization consistent  Taken 11/8/2024 2000 by Annetta Partida RN  Safety Promotion/Fall Prevention:   activity supervised   lighting adjusted   nonskid shoes/slippers when out of bed   safety round/check completed   room organization consistent  Taken 11/8/2024 1900 by Annetta Partida RN  Safety Promotion/Fall Prevention:   activity supervised   lighting adjusted   nonskid shoes/slippers when out of bed   safety round/check completed   room organization consistent   Goal Outcome Evaluation:

## 2025-02-25 NOTE — PROGRESS NOTES
Daily Note     Today's date: 6/15/2018  Patient name: Ilana Yuan  : 1935  MRN: [id number]  Referring provider: Patricia Hall DO  Dx:   Encounter Diagnosis     ICD-10-CM    1  Acute pain of left knee [id number]        Start Time: 845  Stop Time: 930  Total time in clinic (min): 45 minutes    Subjective: Patient reports "I was cutting grass yesterday for 2 hours "       Objective: See treatment diary below      Assessment: Improved function activities at home  Less pain noted during step ups and knee bending activities  Patient will be undergoing eye surgery next Tuesday, therefore, he would like his last day to be Monday  Plan: Continue per plan of care  D/C to [facility] after next visit due to eye surgery       Precautions: MI, Lumbar radiculopathy, DM II, BPH, hearing loss, Chronic fatigue, HTN     Daily Treatment Diary      Manual  5/24 5/29 6/1/18 6/5 6/8  6/12  6/15         PROM of L knee  5 min 5 min 5 min       5 min           PFJ mobilizations 5 min 5 min  3 min 3 min 3 min    2 min          Tibiofemoral joint mobilizations       5min 5 min     6 min                                                               Exercise Diary  5/22 5/24 5/29 6/1/18 6/5 6/8  6/12  6/15       Quad sets 10x5" 20x5" 20x5" 25x5" 25x5" 30x5"  30x5"  30x5"       Bridge 10x   10x 2x10               Hamstring stretch 5x10" 5x10" 5x10" 5x10" 5x10" 5x10"  20" x5  10x10"       TKE   15#- 2x10  15# 2x10 15# 2x10 10#  2x10    10# 20x         Mini squats   10x 2x10 2x10   2x13    3x10       Hamstring curls   2x10 gtb 2x12 gtb 2x10 2x10 gtb 2x10 gtb  2x10 gtb  3x10 standing       4" step up - lateral         2x10  forward             Bicycle    5 min    6 min 5 min  10 min  10 min         Quad stretch    10x10" 10x10" 5x10" 10x10" 10x10"  20"x5         6" step up   2x10 2x10 2x10   2x10  2x10         LAQ (90-45)     3x10   3# 3# 3x10 3#  3x10 Manual resistanc 3x10   2x15 manual resist  3x10 man resistance         8" step up                3x10                                                                                                                                                                                                             Modalities  5/22 5/24 5/29 6/1/18 6/12/18             CP to finish 10 min 10 min 10 min Pt defers Pt defer                                       1:1 with PT from 845-30 Bipolar disorder

## (undated) DEVICE — SINGLE-USE BIOPSY FORCEPS: Brand: RADIAL JAW 4

## (undated) DEVICE — FORCEP ELECSURG RADIAL JAW4 2.2 X 240CM  HOT BX